# Patient Record
Sex: FEMALE | Race: WHITE | NOT HISPANIC OR LATINO | Employment: OTHER | URBAN - METROPOLITAN AREA
[De-identification: names, ages, dates, MRNs, and addresses within clinical notes are randomized per-mention and may not be internally consistent; named-entity substitution may affect disease eponyms.]

---

## 2021-01-01 ENCOUNTER — TELEPHONE (OUTPATIENT)
Dept: GERIATRIC MEDICINE | Facility: CLINIC | Age: 76
End: 2021-01-01

## 2021-01-01 ENCOUNTER — PATIENT OUTREACH (OUTPATIENT)
Dept: GERIATRIC MEDICINE | Facility: CLINIC | Age: 76
End: 2021-01-01
Payer: COMMERCIAL

## 2021-01-01 ENCOUNTER — LAB REQUISITION (OUTPATIENT)
Dept: LAB | Facility: CLINIC | Age: 76
End: 2021-01-01
Payer: MEDICARE

## 2021-01-01 ENCOUNTER — HOSPITAL ENCOUNTER (OUTPATIENT)
Dept: CARDIAC REHAB | Facility: CLINIC | Age: 76
End: 2021-12-21
Attending: INTERNAL MEDICINE
Payer: MEDICARE

## 2021-01-01 ENCOUNTER — LAB REQUISITION (OUTPATIENT)
Dept: LAB | Facility: CLINIC | Age: 76
End: 2021-01-01
Payer: COMMERCIAL

## 2021-01-01 ENCOUNTER — HOSPITAL ENCOUNTER (OUTPATIENT)
Dept: CARDIAC REHAB | Facility: CLINIC | Age: 76
End: 2021-12-09
Attending: INTERNAL MEDICINE
Payer: MEDICARE

## 2021-01-01 ENCOUNTER — DOCUMENTATION ONLY (OUTPATIENT)
Dept: GERIATRICS | Facility: CLINIC | Age: 76
End: 2021-01-01

## 2021-01-01 ENCOUNTER — HOSPITAL ENCOUNTER (OUTPATIENT)
Dept: CARDIOLOGY | Facility: CLINIC | Age: 76
Discharge: HOME | End: 2021-12-07
Attending: INTERNAL MEDICINE | Admitting: INTERNAL MEDICINE
Payer: MEDICARE

## 2021-01-01 ENCOUNTER — HEALTH MAINTENANCE LETTER (OUTPATIENT)
Age: 76
End: 2021-01-01

## 2021-01-01 ENCOUNTER — HOSPITAL ENCOUNTER (OUTPATIENT)
Dept: CARDIAC REHAB | Facility: CLINIC | Age: 76
End: 2021-11-29
Attending: INTERNAL MEDICINE
Payer: MEDICARE

## 2021-01-01 ENCOUNTER — TELEPHONE (OUTPATIENT)
Dept: GERIATRICS | Facility: CLINIC | Age: 76
End: 2021-01-01
Payer: COMMERCIAL

## 2021-01-01 ENCOUNTER — ASSISTED LIVING VISIT (OUTPATIENT)
Dept: GERIATRICS | Facility: CLINIC | Age: 76
End: 2021-01-01
Payer: MEDICARE

## 2021-01-01 ENCOUNTER — OFFICE VISIT (OUTPATIENT)
Dept: PHARMACY | Facility: CLINIC | Age: 76
End: 2021-01-01
Payer: COMMERCIAL

## 2021-01-01 ENCOUNTER — TELEPHONE (OUTPATIENT)
Dept: PULMONOLOGY | Facility: CLINIC | Age: 76
End: 2021-01-01
Payer: MEDICARE

## 2021-01-01 ENCOUNTER — HOSPITAL ENCOUNTER (OUTPATIENT)
Dept: CARDIAC REHAB | Facility: CLINIC | Age: 76
End: 2021-12-23
Attending: INTERNAL MEDICINE
Payer: MEDICARE

## 2021-01-01 ENCOUNTER — HOSPITAL ENCOUNTER (OUTPATIENT)
Dept: CARDIAC REHAB | Facility: CLINIC | Age: 76
End: 2021-12-30
Attending: INTERNAL MEDICINE
Payer: MEDICARE

## 2021-01-01 ENCOUNTER — TELEPHONE (OUTPATIENT)
Dept: SLEEP MEDICINE | Facility: CLINIC | Age: 76
End: 2021-01-01
Payer: COMMERCIAL

## 2021-01-01 ENCOUNTER — TELEPHONE (OUTPATIENT)
Dept: CARDIOLOGY | Facility: CLINIC | Age: 76
End: 2021-01-01

## 2021-01-01 ENCOUNTER — ANCILLARY PROCEDURE (OUTPATIENT)
Dept: CARDIOLOGY | Facility: CLINIC | Age: 76
End: 2021-01-01
Attending: INTERNAL MEDICINE
Payer: COMMERCIAL

## 2021-01-01 ENCOUNTER — OFFICE VISIT (OUTPATIENT)
Dept: CARDIOLOGY | Facility: CLINIC | Age: 76
End: 2021-01-01
Payer: MEDICARE

## 2021-01-01 ENCOUNTER — PATIENT OUTREACH (OUTPATIENT)
Dept: GERIATRIC MEDICINE | Facility: CLINIC | Age: 76
End: 2021-01-01

## 2021-01-01 ENCOUNTER — HOSPITAL ENCOUNTER (OUTPATIENT)
Dept: CARDIAC REHAB | Facility: CLINIC | Age: 76
End: 2021-12-28
Attending: INTERNAL MEDICINE
Payer: MEDICARE

## 2021-01-01 ENCOUNTER — HOSPITAL ENCOUNTER (OUTPATIENT)
Dept: CARDIAC REHAB | Facility: CLINIC | Age: 76
End: 2021-12-07
Attending: INTERNAL MEDICINE
Payer: MEDICARE

## 2021-01-01 ENCOUNTER — DOCUMENTATION ONLY (OUTPATIENT)
Dept: OTHER | Facility: CLINIC | Age: 76
End: 2021-01-01

## 2021-01-01 ENCOUNTER — MYC MEDICAL ADVICE (OUTPATIENT)
Dept: PULMONOLOGY | Facility: CLINIC | Age: 76
End: 2021-01-01
Payer: COMMERCIAL

## 2021-01-01 ENCOUNTER — OFFICE VISIT (OUTPATIENT)
Dept: CARDIOLOGY | Facility: CLINIC | Age: 76
End: 2021-01-01
Attending: INTERNAL MEDICINE
Payer: MEDICARE

## 2021-01-01 ENCOUNTER — TELEPHONE (OUTPATIENT)
Dept: GERIATRICS | Facility: CLINIC | Age: 76
End: 2021-01-01

## 2021-01-01 ENCOUNTER — HOSPITAL ENCOUNTER (OUTPATIENT)
Dept: CARDIAC REHAB | Facility: CLINIC | Age: 76
End: 2021-12-14
Attending: INTERNAL MEDICINE
Payer: MEDICARE

## 2021-01-01 ENCOUNTER — CARE COORDINATION (OUTPATIENT)
Dept: CARDIOLOGY | Facility: CLINIC | Age: 76
End: 2021-01-01

## 2021-01-01 ENCOUNTER — HOSPITAL ENCOUNTER (OUTPATIENT)
Dept: CARDIAC REHAB | Facility: CLINIC | Age: 76
End: 2021-12-16
Attending: INTERNAL MEDICINE
Payer: MEDICARE

## 2021-01-01 ENCOUNTER — OFFICE VISIT (OUTPATIENT)
Dept: SLEEP MEDICINE | Facility: CLINIC | Age: 76
End: 2021-01-01
Attending: NURSE PRACTITIONER
Payer: MEDICARE

## 2021-01-01 ENCOUNTER — ASSISTED LIVING VISIT (OUTPATIENT)
Dept: GERIATRICS | Facility: CLINIC | Age: 76
End: 2021-01-01
Payer: COMMERCIAL

## 2021-01-01 ENCOUNTER — HOSPITAL ENCOUNTER (OUTPATIENT)
Dept: CARDIAC REHAB | Facility: CLINIC | Age: 76
End: 2021-11-23
Attending: INTERNAL MEDICINE
Payer: MEDICARE

## 2021-01-01 ENCOUNTER — DOCUMENTATION ONLY (OUTPATIENT)
Dept: GERIATRICS | Facility: CLINIC | Age: 76
End: 2021-01-01
Payer: MEDICARE

## 2021-01-01 VITALS
SYSTOLIC BLOOD PRESSURE: 146 MMHG | HEART RATE: 89 BPM | OXYGEN SATURATION: 100 % | DIASTOLIC BLOOD PRESSURE: 74 MMHG | HEIGHT: 64 IN | BODY MASS INDEX: 22.2 KG/M2 | WEIGHT: 130 LBS

## 2021-01-01 VITALS
WEIGHT: 132.8 LBS | HEART RATE: 84 BPM | SYSTOLIC BLOOD PRESSURE: 132 MMHG | HEIGHT: 65 IN | BODY MASS INDEX: 22.13 KG/M2 | DIASTOLIC BLOOD PRESSURE: 70 MMHG

## 2021-01-01 VITALS
WEIGHT: 120.8 LBS | RESPIRATION RATE: 18 BRPM | HEIGHT: 64 IN | TEMPERATURE: 97.2 F | HEART RATE: 87 BPM | BODY MASS INDEX: 20.79 KG/M2 | OXYGEN SATURATION: 99 % | BODY MASS INDEX: 20.62 KG/M2 | OXYGEN SATURATION: 94 % | RESPIRATION RATE: 22 BRPM | SYSTOLIC BLOOD PRESSURE: 120 MMHG | DIASTOLIC BLOOD PRESSURE: 62 MMHG | SYSTOLIC BLOOD PRESSURE: 146 MMHG | HEART RATE: 74 BPM | HEIGHT: 64 IN | DIASTOLIC BLOOD PRESSURE: 72 MMHG | WEIGHT: 121.8 LBS | TEMPERATURE: 96.8 F

## 2021-01-01 VITALS
SYSTOLIC BLOOD PRESSURE: 113 MMHG | HEIGHT: 64 IN | OXYGEN SATURATION: 97 % | TEMPERATURE: 98.3 F | DIASTOLIC BLOOD PRESSURE: 77 MMHG | WEIGHT: 121 LBS | HEART RATE: 79 BPM | RESPIRATION RATE: 16 BRPM | BODY MASS INDEX: 20.66 KG/M2

## 2021-01-01 VITALS
SYSTOLIC BLOOD PRESSURE: 100 MMHG | HEART RATE: 86 BPM | OXYGEN SATURATION: 99 % | TEMPERATURE: 98 F | WEIGHT: 120.8 LBS | DIASTOLIC BLOOD PRESSURE: 62 MMHG | HEIGHT: 64 IN | BODY MASS INDEX: 20.62 KG/M2 | RESPIRATION RATE: 26 BRPM

## 2021-01-01 VITALS
HEIGHT: 64 IN | BODY MASS INDEX: 20.9 KG/M2 | DIASTOLIC BLOOD PRESSURE: 80 MMHG | RESPIRATION RATE: 16 BRPM | HEART RATE: 59 BPM | TEMPERATURE: 97.5 F | WEIGHT: 122.4 LBS | SYSTOLIC BLOOD PRESSURE: 156 MMHG | OXYGEN SATURATION: 97 %

## 2021-01-01 VITALS
DIASTOLIC BLOOD PRESSURE: 69 MMHG | BODY MASS INDEX: 20.49 KG/M2 | SYSTOLIC BLOOD PRESSURE: 140 MMHG | HEART RATE: 79 BPM | WEIGHT: 120 LBS | OXYGEN SATURATION: 99 % | HEIGHT: 64 IN

## 2021-01-01 VITALS
TEMPERATURE: 97.3 F | OXYGEN SATURATION: 100 % | HEART RATE: 87 BPM | WEIGHT: 118 LBS | SYSTOLIC BLOOD PRESSURE: 99 MMHG | RESPIRATION RATE: 18 BRPM | HEIGHT: 64 IN | BODY MASS INDEX: 20.14 KG/M2 | DIASTOLIC BLOOD PRESSURE: 71 MMHG

## 2021-01-01 VITALS
SYSTOLIC BLOOD PRESSURE: 135 MMHG | HEIGHT: 64 IN | DIASTOLIC BLOOD PRESSURE: 69 MMHG | TEMPERATURE: 97.3 F | HEART RATE: 88 BPM | WEIGHT: 130 LBS | RESPIRATION RATE: 23 BRPM | BODY MASS INDEX: 22.2 KG/M2 | OXYGEN SATURATION: 95 %

## 2021-01-01 DIAGNOSIS — F41.9 ANXIETY AND DEPRESSION: ICD-10-CM

## 2021-01-01 DIAGNOSIS — J44.9 COPD MIXED TYPE (H): ICD-10-CM

## 2021-01-01 DIAGNOSIS — L29.9 PRURITIC DISORDER: ICD-10-CM

## 2021-01-01 DIAGNOSIS — Z87.891 FORMER HEAVY TOBACCO SMOKER: ICD-10-CM

## 2021-01-01 DIAGNOSIS — Z95.0 CARDIAC PACEMAKER IN SITU: ICD-10-CM

## 2021-01-01 DIAGNOSIS — I48.20 CHRONIC ATRIAL FIBRILLATION (H): ICD-10-CM

## 2021-01-01 DIAGNOSIS — I27.81 COR PULMONALE (CHRONIC) (H): ICD-10-CM

## 2021-01-01 DIAGNOSIS — M10.9 GOUT, UNSPECIFIED: ICD-10-CM

## 2021-01-01 DIAGNOSIS — Z99.2 ANEMIA IN CHRONIC KIDNEY DISEASE, ON CHRONIC DIALYSIS (H): ICD-10-CM

## 2021-01-01 DIAGNOSIS — I07.1 SEVERE TRICUSPID VALVE REGURGITATION: ICD-10-CM

## 2021-01-01 DIAGNOSIS — M1A.9XX0 CHRONIC GOUT WITHOUT TOPHUS, UNSPECIFIED CAUSE, UNSPECIFIED SITE: ICD-10-CM

## 2021-01-01 DIAGNOSIS — J44.9 COPD, SEVERE (H): Primary | ICD-10-CM

## 2021-01-01 DIAGNOSIS — I73.9 PAD (PERIPHERAL ARTERY DISEASE) (H): ICD-10-CM

## 2021-01-01 DIAGNOSIS — R52 PAIN: ICD-10-CM

## 2021-01-01 DIAGNOSIS — Z99.2 ESRD (END STAGE RENAL DISEASE) ON DIALYSIS (H): ICD-10-CM

## 2021-01-01 DIAGNOSIS — L29.9 ITCHING: ICD-10-CM

## 2021-01-01 DIAGNOSIS — E78.5 HYPERLIPIDEMIA, UNSPECIFIED HYPERLIPIDEMIA TYPE: ICD-10-CM

## 2021-01-01 DIAGNOSIS — I10 ESSENTIAL HYPERTENSION, BENIGN: ICD-10-CM

## 2021-01-01 DIAGNOSIS — G47.33 OSA (OBSTRUCTIVE SLEEP APNEA): Primary | ICD-10-CM

## 2021-01-01 DIAGNOSIS — D63.1 ANEMIA IN CHRONIC KIDNEY DISEASE, ON CHRONIC DIALYSIS (H): ICD-10-CM

## 2021-01-01 DIAGNOSIS — R54 FRAIL ELDERLY: ICD-10-CM

## 2021-01-01 DIAGNOSIS — E87.1 HYPONATREMIA: ICD-10-CM

## 2021-01-01 DIAGNOSIS — E78.5 HYPERLIPIDEMIA LDL GOAL <100: ICD-10-CM

## 2021-01-01 DIAGNOSIS — R26.9 ABNORMAL GAIT: ICD-10-CM

## 2021-01-01 DIAGNOSIS — I50.32 CHRONIC HEART FAILURE WITH PRESERVED EJECTION FRACTION (HFPEF) (H): ICD-10-CM

## 2021-01-01 DIAGNOSIS — R41.89 COGNITIVE IMPAIRMENT: ICD-10-CM

## 2021-01-01 DIAGNOSIS — Z87.39 HX OF GOUT: ICD-10-CM

## 2021-01-01 DIAGNOSIS — R54 FRAILTY: ICD-10-CM

## 2021-01-01 DIAGNOSIS — H35.30 ARMD (AGE RELATED MACULAR DEGENERATION): ICD-10-CM

## 2021-01-01 DIAGNOSIS — Z99.2 ESRD (END STAGE RENAL DISEASE) ON DIALYSIS (H): Primary | ICD-10-CM

## 2021-01-01 DIAGNOSIS — I27.81 COR PULMONALE (CHRONIC) (H): Primary | ICD-10-CM

## 2021-01-01 DIAGNOSIS — D64.9 ANEMIA, UNSPECIFIED: ICD-10-CM

## 2021-01-01 DIAGNOSIS — I07.1 TRICUSPID VALVE REGURGITATION: Primary | ICD-10-CM

## 2021-01-01 DIAGNOSIS — G47.00 INSOMNIA, UNSPECIFIED TYPE: ICD-10-CM

## 2021-01-01 DIAGNOSIS — R25.1 TREMOR: ICD-10-CM

## 2021-01-01 DIAGNOSIS — T14.8XXA HEMATOMA OF SKIN: Primary | ICD-10-CM

## 2021-01-01 DIAGNOSIS — N18.6 END STAGE RENAL DISEASE (H): ICD-10-CM

## 2021-01-01 DIAGNOSIS — F32.A DEPRESSION, UNSPECIFIED DEPRESSION TYPE: ICD-10-CM

## 2021-01-01 DIAGNOSIS — I49.5 SICK SINUS SYNDROME (H): ICD-10-CM

## 2021-01-01 DIAGNOSIS — N18.6 ANEMIA IN CHRONIC KIDNEY DISEASE, ON CHRONIC DIALYSIS (H): ICD-10-CM

## 2021-01-01 DIAGNOSIS — Z00.00 PREVENTATIVE HEALTH CARE: ICD-10-CM

## 2021-01-01 DIAGNOSIS — Z11.59 ENCOUNTER FOR SCREENING FOR OTHER VIRAL DISEASES: ICD-10-CM

## 2021-01-01 DIAGNOSIS — I27.20 PULMONARY HYPERTENSION (H): ICD-10-CM

## 2021-01-01 DIAGNOSIS — I50.42 CHRONIC COMBINED SYSTOLIC AND DIASTOLIC HF (HEART FAILURE) (H): ICD-10-CM

## 2021-01-01 DIAGNOSIS — Z95.0 PACEMAKER: ICD-10-CM

## 2021-01-01 DIAGNOSIS — I50.42 CHRONIC COMBINED SYSTOLIC (CONGESTIVE) AND DIASTOLIC (CONGESTIVE) HEART FAILURE (H): ICD-10-CM

## 2021-01-01 DIAGNOSIS — F32.A ANXIETY AND DEPRESSION: ICD-10-CM

## 2021-01-01 DIAGNOSIS — G47.33 OSA (OBSTRUCTIVE SLEEP APNEA): ICD-10-CM

## 2021-01-01 DIAGNOSIS — N18.6 ESRD (END STAGE RENAL DISEASE) ON DIALYSIS (H): ICD-10-CM

## 2021-01-01 DIAGNOSIS — F41.9 ANXIETY: ICD-10-CM

## 2021-01-01 DIAGNOSIS — N18.6 ESRD (END STAGE RENAL DISEASE) (H): ICD-10-CM

## 2021-01-01 DIAGNOSIS — I36.1 NONRHEUMATIC TRICUSPID VALVE REGURGITATION: ICD-10-CM

## 2021-01-01 DIAGNOSIS — Z71.89 ACP (ADVANCE CARE PLANNING): ICD-10-CM

## 2021-01-01 DIAGNOSIS — N18.6 ESRD (END STAGE RENAL DISEASE) ON DIALYSIS (H): Primary | ICD-10-CM

## 2021-01-01 DIAGNOSIS — I27.20 PULMONARY HTN (H): ICD-10-CM

## 2021-01-01 DIAGNOSIS — K21.9 GERD (GASTROESOPHAGEAL REFLUX DISEASE): ICD-10-CM

## 2021-01-01 DIAGNOSIS — I48.91 ATRIAL FIBRILLATION, UNSPECIFIED TYPE (H): ICD-10-CM

## 2021-01-01 DIAGNOSIS — J44.9 COPD MIXED TYPE (H): Primary | ICD-10-CM

## 2021-01-01 DIAGNOSIS — K21.9 GASTROESOPHAGEAL REFLUX DISEASE WITHOUT ESOPHAGITIS: ICD-10-CM

## 2021-01-01 DIAGNOSIS — J44.9 COPD, SEVERE (H): ICD-10-CM

## 2021-01-01 DIAGNOSIS — I50.42 CHRONIC COMBINED SYSTOLIC AND DIASTOLIC HEART FAILURE (H): ICD-10-CM

## 2021-01-01 DIAGNOSIS — Z12.31 ENCOUNTER FOR SCREENING MAMMOGRAM FOR BREAST CANCER: ICD-10-CM

## 2021-01-01 DIAGNOSIS — I73.9 PAD (PERIPHERAL ARTERY DISEASE) (H): Primary | ICD-10-CM

## 2021-01-01 DIAGNOSIS — J44.9 CHRONIC OBSTRUCTIVE PULMONARY DISEASE, UNSPECIFIED COPD TYPE (H): ICD-10-CM

## 2021-01-01 DIAGNOSIS — I07.1 TRICUSPID VALVE INSUFFICIENCY, UNSPECIFIED ETIOLOGY: ICD-10-CM

## 2021-01-01 DIAGNOSIS — D63.8 ANEMIA IN OTHER CHRONIC DISEASES CLASSIFIED ELSEWHERE: ICD-10-CM

## 2021-01-01 DIAGNOSIS — E87.1 HYPO-OSMOLALITY AND HYPONATREMIA: ICD-10-CM

## 2021-01-01 DIAGNOSIS — J34.89 NOSE DRYNESS: ICD-10-CM

## 2021-01-01 DIAGNOSIS — I07.1 SEVERE TRICUSPID VALVE REGURGITATION: Primary | ICD-10-CM

## 2021-01-01 DIAGNOSIS — E78.5 HYPERLIPIDEMIA: ICD-10-CM

## 2021-01-01 DIAGNOSIS — J30.2 SEASONAL ALLERGIC RHINITIS, UNSPECIFIED TRIGGER: ICD-10-CM

## 2021-01-01 DIAGNOSIS — I10 HTN (HYPERTENSION): ICD-10-CM

## 2021-01-01 DIAGNOSIS — R32 URINARY INCONTINENCE, UNSPECIFIED TYPE: Primary | ICD-10-CM

## 2021-01-01 DIAGNOSIS — H35.30 MACULAR DEGENERATION, UNSPECIFIED LATERALITY, UNSPECIFIED TYPE: ICD-10-CM

## 2021-01-01 DIAGNOSIS — I10 HYPERTENSION, UNSPECIFIED TYPE: ICD-10-CM

## 2021-01-01 DIAGNOSIS — L98.9 SKIN LESION: ICD-10-CM

## 2021-01-01 DIAGNOSIS — Z99.2 DEPENDENCE ON HEMODIALYSIS (H): ICD-10-CM

## 2021-01-01 DIAGNOSIS — M1A.9XX0 CHRONIC GOUT, UNSPECIFIED, WITHOUT TOPHUS (TOPHI): ICD-10-CM

## 2021-01-01 DIAGNOSIS — I07.1 TRICUSPID VALVE INSUFFICIENCY, UNSPECIFIED ETIOLOGY: Primary | ICD-10-CM

## 2021-01-01 DIAGNOSIS — N17.9 ACUTE RENAL FAILURE, UNSPECIFIED ACUTE RENAL FAILURE TYPE (H): Primary | ICD-10-CM

## 2021-01-01 DIAGNOSIS — Z95.0 CARDIAC PACEMAKER IN SITU: Primary | ICD-10-CM

## 2021-01-01 DIAGNOSIS — M1A.9XX0 CHRONIC GOUT WITHOUT TOPHUS, UNSPECIFIED CAUSE, UNSPECIFIED SITE: Primary | ICD-10-CM

## 2021-01-01 DIAGNOSIS — N18.6 END STAGE RENAL DISEASE (H): Primary | ICD-10-CM

## 2021-01-01 DIAGNOSIS — Z87.891 PERSONAL HISTORY OF TOBACCO USE: ICD-10-CM

## 2021-01-01 DIAGNOSIS — R60.0 LOWER EXTREMITY EDEMA: Primary | ICD-10-CM

## 2021-01-01 DIAGNOSIS — R68.2 DRY MOUTH: ICD-10-CM

## 2021-01-01 LAB
ALBUMIN SERPL-MCNC: 3.4 G/DL (ref 3.4–5)
ALP SERPL-CCNC: 122 U/L (ref 40–150)
ALT SERPL W P-5'-P-CCNC: 19 U/L (ref 0–50)
ANION GAP SERPL CALCULATED.3IONS-SCNC: 13 MMOL/L (ref 3–14)
ANION GAP SERPL CALCULATED.3IONS-SCNC: 8 MMOL/L (ref 3–14)
ANION GAP SERPL CALCULATED.3IONS-SCNC: 9 MMOL/L (ref 3–14)
AST SERPL W P-5'-P-CCNC: 24 U/L (ref 0–45)
BILIRUB SERPL-MCNC: 0.7 MG/DL (ref 0.2–1.3)
BUN SERPL-MCNC: 35 MG/DL (ref 7–30)
BUN SERPL-MCNC: 41 MG/DL (ref 7–30)
BUN SERPL-MCNC: 45 MG/DL (ref 7–30)
CALCIUM SERPL-MCNC: 10.3 MG/DL (ref 8.5–10.1)
CALCIUM SERPL-MCNC: 10.4 MG/DL (ref 8.5–10.1)
CALCIUM SERPL-MCNC: 9.9 MG/DL (ref 8.5–10.1)
CHLORIDE BLD-SCNC: 90 MMOL/L (ref 94–109)
CHLORIDE BLD-SCNC: 91 MMOL/L (ref 94–109)
CHLORIDE BLD-SCNC: 92 MMOL/L (ref 94–109)
CO2 SERPL-SCNC: 25 MMOL/L (ref 20–32)
CO2 SERPL-SCNC: 26 MMOL/L (ref 20–32)
CO2 SERPL-SCNC: 30 MMOL/L (ref 20–32)
CREAT SERPL-MCNC: 2.79 MG/DL (ref 0.52–1.04)
CREAT SERPL-MCNC: 3.32 MG/DL (ref 0.52–1.04)
CREAT SERPL-MCNC: 3.97 MG/DL (ref 0.52–1.04)
ERYTHROCYTE [DISTWIDTH] IN BLOOD BY AUTOMATED COUNT: 14 % (ref 10–15)
GFR SERPL CREATININE-BSD FRML MDRD: 10 ML/MIN/1.73M2
GFR SERPL CREATININE-BSD FRML MDRD: 13 ML/MIN/1.73M2
GFR SERPL CREATININE-BSD FRML MDRD: 16 ML/MIN/1.73M2
GLUCOSE BLD-MCNC: 82 MG/DL (ref 70–99)
GLUCOSE BLD-MCNC: 96 MG/DL (ref 70–99)
GLUCOSE BLD-MCNC: 96 MG/DL (ref 70–99)
HCT VFR BLD AUTO: 40.1 % (ref 35–47)
HGB BLD-MCNC: 13 G/DL (ref 11.7–15.7)
LVEF ECHO: NORMAL
MCH RBC QN AUTO: 33.5 PG (ref 26.5–33)
MCHC RBC AUTO-ENTMCNC: 32.4 G/DL (ref 31.5–36.5)
MCV RBC AUTO: 103 FL (ref 78–100)
MDC_IDC_EPISODE_DTM: NORMAL
MDC_IDC_EPISODE_DURATION: 0 S
MDC_IDC_EPISODE_DURATION: 1 S
MDC_IDC_EPISODE_DURATION: 2 S
MDC_IDC_EPISODE_ID: 2
MDC_IDC_EPISODE_ID: 3
MDC_IDC_EPISODE_ID: 4
MDC_IDC_EPISODE_ID: 5
MDC_IDC_EPISODE_ID: 6
MDC_IDC_EPISODE_ID: 7
MDC_IDC_EPISODE_TYPE: NORMAL
MDC_IDC_LEAD_IMPLANT_DT: NORMAL
MDC_IDC_LEAD_LOCATION: NORMAL
MDC_IDC_LEAD_LOCATION_DETAIL_1: NORMAL
MDC_IDC_LEAD_MFG: NORMAL
MDC_IDC_LEAD_MODEL: NORMAL
MDC_IDC_LEAD_POLARITY_TYPE: NORMAL
MDC_IDC_MSMT_BATTERY_DTM: NORMAL
MDC_IDC_MSMT_BATTERY_REMAINING_LONGEVITY: 163 MO
MDC_IDC_MSMT_BATTERY_RRT_TRIGGER: 2.62
MDC_IDC_MSMT_BATTERY_STATUS: NORMAL
MDC_IDC_MSMT_BATTERY_VOLTAGE: 3.2 V
MDC_IDC_MSMT_LEADCHNL_RV_IMPEDANCE_VALUE: 437 OHM
MDC_IDC_MSMT_LEADCHNL_RV_IMPEDANCE_VALUE: 589 OHM
MDC_IDC_MSMT_LEADCHNL_RV_PACING_THRESHOLD_AMPLITUDE: 0.75 V
MDC_IDC_MSMT_LEADCHNL_RV_PACING_THRESHOLD_PULSEWIDTH: 0.4 MS
MDC_IDC_MSMT_LEADCHNL_RV_SENSING_INTR_AMPL: 6.62 MV
MDC_IDC_MSMT_LEADCHNL_RV_SENSING_INTR_AMPL: 8.62 MV
MDC_IDC_PG_IMPLANT_DTM: NORMAL
MDC_IDC_PG_MFG: NORMAL
MDC_IDC_PG_MODEL: NORMAL
MDC_IDC_PG_SERIAL: NORMAL
MDC_IDC_PG_TYPE: NORMAL
MDC_IDC_SESS_CLINIC_NAME: NORMAL
MDC_IDC_SESS_DTM: NORMAL
MDC_IDC_SESS_TYPE: NORMAL
MDC_IDC_SET_BRADY_HYSTRATE: NORMAL
MDC_IDC_SET_BRADY_LOWRATE: 70 {BEATS}/MIN
MDC_IDC_SET_BRADY_MAX_SENSOR_RATE: 120 {BEATS}/MIN
MDC_IDC_SET_BRADY_MODE: NORMAL
MDC_IDC_SET_LEADCHNL_RV_PACING_AMPLITUDE: 2 V
MDC_IDC_SET_LEADCHNL_RV_PACING_ANODE_ELECTRODE_1: NORMAL
MDC_IDC_SET_LEADCHNL_RV_PACING_ANODE_LOCATION_1: NORMAL
MDC_IDC_SET_LEADCHNL_RV_PACING_CAPTURE_MODE: NORMAL
MDC_IDC_SET_LEADCHNL_RV_PACING_CATHODE_ELECTRODE_1: NORMAL
MDC_IDC_SET_LEADCHNL_RV_PACING_CATHODE_LOCATION_1: NORMAL
MDC_IDC_SET_LEADCHNL_RV_PACING_POLARITY: NORMAL
MDC_IDC_SET_LEADCHNL_RV_PACING_PULSEWIDTH: 0.4 MS
MDC_IDC_SET_LEADCHNL_RV_SENSING_ANODE_ELECTRODE_1: NORMAL
MDC_IDC_SET_LEADCHNL_RV_SENSING_ANODE_LOCATION_1: NORMAL
MDC_IDC_SET_LEADCHNL_RV_SENSING_CATHODE_ELECTRODE_1: NORMAL
MDC_IDC_SET_LEADCHNL_RV_SENSING_CATHODE_LOCATION_1: NORMAL
MDC_IDC_SET_LEADCHNL_RV_SENSING_POLARITY: NORMAL
MDC_IDC_SET_LEADCHNL_RV_SENSING_SENSITIVITY: 0.9 MV
MDC_IDC_SET_ZONE_DETECTION_INTERVAL: 400 MS
MDC_IDC_SET_ZONE_TYPE: NORMAL
MDC_IDC_STAT_BRADY_DTM_END: NORMAL
MDC_IDC_STAT_BRADY_DTM_START: NORMAL
MDC_IDC_STAT_BRADY_RV_PERCENT_PACED: 84.49 %
MDC_IDC_STAT_EPISODE_RECENT_COUNT: 0
MDC_IDC_STAT_EPISODE_RECENT_COUNT: 6
MDC_IDC_STAT_EPISODE_RECENT_COUNT_DTM_END: NORMAL
MDC_IDC_STAT_EPISODE_RECENT_COUNT_DTM_END: NORMAL
MDC_IDC_STAT_EPISODE_RECENT_COUNT_DTM_START: NORMAL
MDC_IDC_STAT_EPISODE_RECENT_COUNT_DTM_START: NORMAL
MDC_IDC_STAT_EPISODE_TOTAL_COUNT: 0
MDC_IDC_STAT_EPISODE_TOTAL_COUNT: 7
MDC_IDC_STAT_EPISODE_TOTAL_COUNT_DTM_END: NORMAL
MDC_IDC_STAT_EPISODE_TOTAL_COUNT_DTM_END: NORMAL
MDC_IDC_STAT_EPISODE_TOTAL_COUNT_DTM_START: NORMAL
MDC_IDC_STAT_EPISODE_TOTAL_COUNT_DTM_START: NORMAL
MDC_IDC_STAT_EPISODE_TYPE: NORMAL
PLATELET # BLD AUTO: 201 10E3/UL (ref 150–450)
POTASSIUM BLD-SCNC: 3.4 MMOL/L (ref 3.4–5.3)
POTASSIUM BLD-SCNC: 4.3 MMOL/L (ref 3.4–5.3)
POTASSIUM BLD-SCNC: 4.5 MMOL/L (ref 3.4–5.3)
PROT SERPL-MCNC: 8.6 G/DL (ref 6.8–8.8)
RBC # BLD AUTO: 3.88 10E6/UL (ref 3.8–5.2)
SARS-COV-2 RNA RESP QL NAA+PROBE: NEGATIVE
SARS-COV-2 RNA RESP QL NAA+PROBE: NOT DETECTED
SODIUM SERPL-SCNC: 126 MMOL/L (ref 133–144)
SODIUM SERPL-SCNC: 128 MMOL/L (ref 133–144)
SODIUM SERPL-SCNC: 130 MMOL/L (ref 133–144)
URATE SERPL-MCNC: 3.5 MG/DL (ref 2.6–6)
URATE SERPL-MCNC: 3.7 MG/DL (ref 2.6–6)
WBC # BLD AUTO: 6 10E3/UL (ref 4–11)

## 2021-01-01 PROCEDURE — G0239 OTH RESP PROC, GROUP: HCPCS

## 2021-01-01 PROCEDURE — 93306 TTE W/DOPPLER COMPLETE: CPT

## 2021-01-01 PROCEDURE — 36415 COLL VENOUS BLD VENIPUNCTURE: CPT | Mod: ORL | Performed by: NURSE PRACTITIONER

## 2021-01-01 PROCEDURE — U0003 INFECTIOUS AGENT DETECTION BY NUCLEIC ACID (DNA OR RNA); SEVERE ACUTE RESPIRATORY SYNDROME CORONAVIRUS 2 (SARS-COV-2) (CORONAVIRUS DISEASE [COVID-19]), AMPLIFIED PROBE TECHNIQUE, MAKING USE OF HIGH THROUGHPUT TECHNOLOGIES AS DESCRIBED BY CMS-2020-01-R: HCPCS | Mod: ORL | Performed by: FAMILY MEDICINE

## 2021-01-01 PROCEDURE — P9603 ONE-WAY ALLOW PRORATED MILES: HCPCS | Mod: ORL | Performed by: NURSE PRACTITIONER

## 2021-01-01 PROCEDURE — 85027 COMPLETE CBC AUTOMATED: CPT | Mod: ORL | Performed by: NURSE PRACTITIONER

## 2021-01-01 PROCEDURE — 84550 ASSAY OF BLOOD/URIC ACID: CPT | Mod: ORL | Performed by: NURSE PRACTITIONER

## 2021-01-01 PROCEDURE — 99607 MTMS BY PHARM ADDL 15 MIN: CPT | Performed by: PHARMACIST

## 2021-01-01 PROCEDURE — G0238 OTH RESP PROC, INDIV: HCPCS

## 2021-01-01 PROCEDURE — 99605 MTMS BY PHARM NP 15 MIN: CPT | Performed by: PHARMACIST

## 2021-01-01 PROCEDURE — 99203 OFFICE O/P NEW LOW 30 MIN: CPT | Performed by: SURGERY

## 2021-01-01 PROCEDURE — P9604 ONE-WAY ALLOW PRORATED TRIP: HCPCS | Mod: ORL | Performed by: NURSE PRACTITIONER

## 2021-01-01 PROCEDURE — 93279 PRGRMG DEV EVAL PM/LDLS PM: CPT | Performed by: INTERNAL MEDICINE

## 2021-01-01 PROCEDURE — 93000 ELECTROCARDIOGRAM COMPLETE: CPT | Performed by: INTERNAL MEDICINE

## 2021-01-01 PROCEDURE — 80048 BASIC METABOLIC PNL TOTAL CA: CPT | Mod: ORL | Performed by: NURSE PRACTITIONER

## 2021-01-01 PROCEDURE — U0005 INFEC AGEN DETEC AMPLI PROBE: HCPCS | Mod: ORL | Performed by: NURSE PRACTITIONER

## 2021-01-01 PROCEDURE — 99205 OFFICE O/P NEW HI 60 MIN: CPT | Performed by: INTERNAL MEDICINE

## 2021-01-01 PROCEDURE — G0239 OTH RESP PROC, GROUP: HCPCS | Performed by: CLINICAL EXERCISE PHYSIOLOGIST

## 2021-01-01 PROCEDURE — 80053 COMPREHEN METABOLIC PANEL: CPT | Mod: ORL | Performed by: NURSE PRACTITIONER

## 2021-01-01 PROCEDURE — 99204 OFFICE O/P NEW MOD 45 MIN: CPT | Performed by: INTERNAL MEDICINE

## 2021-01-01 PROCEDURE — 93306 TTE W/DOPPLER COMPLETE: CPT | Mod: 26 | Performed by: INTERNAL MEDICINE

## 2021-01-01 PROCEDURE — U0005 INFEC AGEN DETEC AMPLI PROBE: HCPCS | Mod: ORL | Performed by: FAMILY MEDICINE

## 2021-01-01 RX ORDER — SILDENAFIL CITRATE 20 MG/1
20 TABLET ORAL 3 TIMES DAILY
COMMUNITY
Start: 2021-01-01 | End: 2021-01-01

## 2021-01-01 RX ORDER — ATORVASTATIN CALCIUM 40 MG/1
40 TABLET, FILM COATED ORAL DAILY
Qty: 30 TABLET | Refills: 11 | Status: SHIPPED | OUTPATIENT
Start: 2021-01-01 | End: 2022-05-27

## 2021-01-01 RX ORDER — ANTIOX #8/OM3/DHA/EPA/LUT/ZEAX 250-2.5 MG
2 CAPSULE ORAL 2 TIMES DAILY
COMMUNITY
End: 2021-01-01

## 2021-01-01 RX ORDER — TORSEMIDE 100 MG/1
50 TABLET ORAL 2 TIMES DAILY
COMMUNITY
Start: 2021-01-01 | End: 2021-01-01

## 2021-01-01 RX ORDER — ALBUTEROL SULFATE 90 UG/1
2 AEROSOL, METERED RESPIRATORY (INHALATION) EVERY 4 HOURS PRN
COMMUNITY
Start: 2021-01-01

## 2021-01-01 RX ORDER — CITALOPRAM HYDROBROMIDE 10 MG/1
5 TABLET ORAL DAILY
COMMUNITY
Start: 2021-01-01 | End: 2021-01-01

## 2021-01-01 RX ORDER — ATORVASTATIN CALCIUM 40 MG/1
40 TABLET, FILM COATED ORAL DAILY
COMMUNITY
Start: 2021-01-01 | End: 2021-01-01

## 2021-01-01 RX ORDER — CITALOPRAM HYDROBROMIDE 10 MG/1
5 TABLET ORAL DAILY
Qty: 15 TABLET | Refills: 11 | Status: SHIPPED | OUTPATIENT
Start: 2021-01-01 | End: 2021-01-01

## 2021-01-01 RX ORDER — SILDENAFIL CITRATE 20 MG/1
20 TABLET ORAL 3 TIMES DAILY
Qty: 90 TABLET | Refills: 11 | Status: SHIPPED | OUTPATIENT
Start: 2021-01-01 | End: 2021-01-01

## 2021-01-01 RX ORDER — LORATADINE 10 MG/1
10 TABLET ORAL AT BEDTIME
Qty: 30 TABLET | Refills: 98 | Status: SHIPPED | OUTPATIENT
Start: 2021-01-01 | End: 2022-05-27

## 2021-01-01 RX ORDER — PANTOPRAZOLE SODIUM 40 MG/1
40 TABLET, DELAYED RELEASE ORAL DAILY
Qty: 30 TABLET | Refills: 11 | Status: SHIPPED | OUTPATIENT
Start: 2021-01-01 | End: 2022-05-27

## 2021-01-01 RX ORDER — PHENOL 1.4 %
10 AEROSOL, SPRAY (ML) MUCOUS MEMBRANE AT BEDTIME
COMMUNITY
Start: 2021-01-01 | End: 2022-05-27

## 2021-01-01 RX ORDER — ALLOPURINOL 100 MG/1
100 TABLET ORAL DAILY
Qty: 30 TABLET | Refills: 11 | Status: SHIPPED | OUTPATIENT
Start: 2021-01-01 | End: 2021-01-01

## 2021-01-01 RX ORDER — ACETAMINOPHEN 325 MG/1
325 TABLET ORAL EVERY 4 HOURS PRN
COMMUNITY
Start: 2021-01-01 | End: 2021-01-01

## 2021-01-01 RX ORDER — VIT B COMP NO.3/FOLIC/C/BIOTIN 1 MG-60 MG
1 TABLET ORAL DAILY
COMMUNITY
End: 2022-01-01

## 2021-01-01 RX ORDER — TIOTROPIUM BROMIDE AND OLODATEROL 3.124; 2.736 UG/1; UG/1
2 SPRAY, METERED RESPIRATORY (INHALATION) DAILY
COMMUNITY
Start: 2021-01-01 | End: 2022-01-01

## 2021-01-01 RX ORDER — TORSEMIDE 20 MG/1
40 TABLET ORAL DAILY
Qty: 120 TABLET | Refills: 4 | Status: SHIPPED | OUTPATIENT
Start: 2021-01-01 | End: 2022-01-01

## 2021-01-01 RX ORDER — ALLOPURINOL 100 MG/1
100 TABLET ORAL DAILY
COMMUNITY
Start: 2021-01-01 | End: 2021-01-01

## 2021-01-01 RX ORDER — PANTOPRAZOLE SODIUM 40 MG/1
40 TABLET, DELAYED RELEASE ORAL DAILY
COMMUNITY
Start: 2021-01-01 | End: 2021-01-01

## 2021-01-01 RX ORDER — IPRATROPIUM BROMIDE AND ALBUTEROL SULFATE 2.5; .5 MG/3ML; MG/3ML
3 SOLUTION RESPIRATORY (INHALATION) EVERY 6 HOURS PRN
COMMUNITY
Start: 2021-01-01 | End: 2021-01-01

## 2021-01-01 RX ORDER — ALLOPURINOL 100 MG/1
100 TABLET ORAL
COMMUNITY
Start: 2021-01-01 | End: 2021-01-01

## 2021-01-01 RX ORDER — ALLOPURINOL 100 MG/1
TABLET ORAL
Qty: 12 TABLET | Refills: 97 | Status: SHIPPED | OUTPATIENT
Start: 2021-01-01 | End: 2022-05-27

## 2021-01-01 RX ORDER — ACETAMINOPHEN 325 MG/1
TABLET ORAL
Qty: 120 TABLET | Refills: 98 | Status: ON HOLD | OUTPATIENT
Start: 2021-01-01 | End: 2022-01-01

## 2021-01-01 RX ORDER — TORSEMIDE 100 MG/1
50 TABLET ORAL 2 TIMES DAILY
Qty: 30 TABLET | Refills: 11 | Status: SHIPPED | OUTPATIENT
Start: 2021-01-01 | End: 2021-01-01

## 2021-01-01 RX ORDER — AMBRISENTAN 5 MG/1
5 TABLET, FILM COATED ORAL DAILY
COMMUNITY
Start: 2021-01-01 | End: 2021-01-01

## 2021-01-01 ASSESSMENT — MIFFLIN-ST. JEOR
SCORE: 1020.24
SCORE: 1098.26
SCORE: 1032.48
SCORE: 1027.95
SCORE: 1069.68
SCORE: 1035.2
SCORE: 1069.68
SCORE: 1027.95
SCORE: 1024.32
SCORE: 1028.85

## 2021-09-09 NOTE — PROGRESS NOTES
[facility] Care Coordination Contact    Member became effective with  [facility] on 9/1/2021 with Nadya MSC+.  Previous Health Plan: [place]/Fee For Service  Previous Care System: [facility]  Previous care coordinators name and number: Salvador Miranda, [phone]  Waiver Type: EW  Last MMIS Entry: Date 8/20/21 and Type Doc Change  MMIS visit date (and type) if different from above: 8/11/21- EW Open, 7/21/21- Pers Assmt  Services Listed in MMIS:   A3 F [facility]-CSP 36 F CASE MGMT  UTF received: Yes: Received and saved to member file.    Maricruz Pizano  Care Management Specialist  [facility]  [phone]

## 2021-09-09 NOTE — LETTER
September 9, 2021    FRANCINE CHO  [facility]  [address] [facility] [address]    Dear  Francine,    Welcome to [facility] [place] [facility] Plus (MSC+) health program. My name is GAYLE Coleman. I am your MSC+ care coordinator. You are eligible for Care Coordination through [facility] MSC+ Product.    Here is how Care Coordination works:    I will meet with you in person to determine your care coordination needs    We will develop a plan of care to meet your needs    We will create a service plan showing the services you will receive    We will talk about and coordinate any preventive care needs you have    I will call you soon to see how you are doing and determine what needs you may have. Our goal is to keep you as healthy and independent as possible.     Soon, you will receive a new [facility] member identification (ID) card from [facility]. When you receive it, please use this card along with your [place] [facility] Programs card and Prescription Drug Coverage Program card. When you receive, it please use this card where you get your health services. If you have Medicare, you will need to show your Medicare card when you get health services.    Being in the [place] [facility] Plus (MSC+) Care Coordination program is voluntary and offered to you at no cost. If you ever wish to stop being in the Care Coordination program or have questions, call me at [phone]. If you reach my voice mail, leave a message and your phone number. If you are hearing impaired, please call the [place] Relay at 812 or [phone] (geoplh-yv-xowigd relay service).    Sincerely,      GAYLE Coleman    E-mail: [email or web address]  Phone: [phone]    Care Manager  [facility]    [id number] accepted   (12/2019)
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
## 2021-09-17 NOTE — TELEPHONE ENCOUNTER
DME supply(s) have been requested by the patient and her sister. Both patient and family report that patient is incontinent and requires the use of incontinent products. DME orders(s) have been queued up and pended for the provider to review. Patient reports the need for DME supplies due to incontinence. Once completed, please route the encounter to GAYLE Coleman to fax completed documentation and order requisition to [facility].  GAYLE Coleman  [facility]  [phone]

## 2021-09-20 PROBLEM — E83.52 HYPERCALCEMIA: Status: ACTIVE | Noted: 2018-07-16

## 2021-09-20 PROBLEM — D89.0 POLYCLONAL GAMMOPATHY: Status: ACTIVE | Noted: 2018-07-17

## 2021-09-20 PROBLEM — H35.3132 INTERMEDIATE STAGE NONEXUDATIVE AGE-RELATED MACULAR DEGENERATION OF BOTH EYES: Status: ACTIVE | Noted: 2021-01-13

## 2021-09-20 PROBLEM — N18.6 ANEMIA IN CHRONIC KIDNEY DISEASE, ON CHRONIC DIALYSIS (H): Status: ACTIVE | Noted: 2021-01-01

## 2021-09-20 PROBLEM — L30.9 DERMATITIS OF LOWER EXTREMITY: Status: ACTIVE | Noted: 2017-05-05

## 2021-09-20 PROBLEM — G47.33 OSA (OBSTRUCTIVE SLEEP APNEA): Status: ACTIVE | Noted: 2020-04-08

## 2021-09-20 PROBLEM — I47.29 NSVT (NONSUSTAINED VENTRICULAR TACHYCARDIA) (H): Status: ACTIVE | Noted: 2020-04-09

## 2021-09-20 PROBLEM — N17.9 ACUTE RENAL FAILURE (ARF) (H): Status: ACTIVE | Noted: 2021-01-01

## 2021-09-20 PROBLEM — N18.30 CHRONIC KIDNEY DISEASE, STAGE III (MODERATE) (H): Status: ACTIVE | Noted: 2019-11-13

## 2021-09-20 PROBLEM — Z99.2 ANEMIA IN CHRONIC KIDNEY DISEASE, ON CHRONIC DIALYSIS (H): Status: ACTIVE | Noted: 2021-01-01

## 2021-09-20 PROBLEM — D63.1 ANEMIA IN CHRONIC KIDNEY DISEASE, ON CHRONIC DIALYSIS (H): Status: ACTIVE | Noted: 2021-01-01

## 2021-09-20 PROBLEM — Z95.0 PACEMAKER: Status: ACTIVE | Noted: 2021-01-01

## 2021-09-20 PROBLEM — I73.9 PAD (PERIPHERAL ARTERY DISEASE) (H): Status: ACTIVE | Noted: 2019-02-08

## 2021-09-20 PROBLEM — I13.10 HYPERTENSIVE HEART AND KIDNEY DISEASE: Status: ACTIVE | Noted: 2018-07-16

## 2021-09-20 PROBLEM — N26.1 ATROPHY OF LEFT KIDNEY: Status: ACTIVE | Noted: 2017-05-18

## 2021-09-20 PROBLEM — R94.31 PROLONGED QT INTERVAL: Status: ACTIVE | Noted: 2018-07-16

## 2021-09-20 PROBLEM — I27.81 COR PULMONALE (CHRONIC) (H): Status: ACTIVE | Noted: 2021-01-01

## 2021-09-20 PROBLEM — R91.1 LUNG NODULE: Status: ACTIVE | Noted: 2021-04-28

## 2021-09-20 NOTE — PROGRESS NOTES
[facility]  PRIMARY CARE PROVIDER AND CLINIC:  Eileen Arredondo, APRN CNP, [address] / [place] [address]  Chief Complaint   Patient presents with     Establish Care     [place] [facility] Record Number:  [id number]  Place of Service where encounter took place:  [facility] [[id number]]    Francine Chris  is a 75 year old  (1945) female with hx of ESRD on HD, afib s/p AV node ablation and pacemaker on DOAC (dx 2013), HTN, combined systolic and diastolic HF, mod-severe tricuspid regurgitation, mid ascending aortic enlargement, BL LE edema, PAD, QTc prolongation, COPD, pulmonary HTN, hx of smoking, macular degeneration, anxiety/depression, SKY on CPAP, PUD admitted to the above facility from [facility].     Admitted to this facility for rehab, medical management and nursing care.    HPI:    HPI information obtained from: facility chart records, facility staff, patient report, [facility] chart review, Care Everywhere Epic chart review and family/first contact sister Mugs report.     Previous PCP: Dr. Hari Snyder  Pulmonology: Dr. Agusto Sharma, [phone]    Nephrology: [facility] [place], Dr. Ro, [phone]  Cardiology: Dr. Talavera, Dr. Martinez > [facility] [phone]    Brief Summary of Hospital Course:   Hospitalized at [facility] from 6/21/21 to 7/5/21 with nausea/weakness/dairrhea and acute renal failure along with supratheraputic INR, hyperkalemia, and digoxin toxicity. Per nephrology note unclear precipitating even for SILAS but likely severe ATN. Imaging negative for acute process. Previous baseline Cr 1.5-1.7 as of May 2021. Required emergent femoral line for HD on admission, was transitioned to right chest wall tunneled catheter on 6/29/21 as renal function did not return to baseline prior to discharge and HD still required. During this hospitalization coumadin was switched to apixaban.  Hospital stay  significant for fluid overload on 7/3, restarted torsemide. Continued on supplemental oxygen. Permanent pacemaker placed 6/25/21 s/p ablation due to inability to use digoxin in setting of renal failure. Recovered at [facility] and discharged to [facility]. Per hospital discharge was supposed to follow-up at [facility] for Pacemaker check, but lost to follow-up per sister.    Updates on Status Since Skilled nursing Admission:   Follow-up today for initial [facility] visit with primary support person her sister Tereza who goes by Mugs.  See ROS.     CODE STATUS/ADVANCE DIRECTIVES DISCUSSION:   CPR/Full code     Patient's living condition: lives in an assisted living facility  ALLERGIES: Sotalol and Bupropion  PAST MEDICAL HISTORY:  has a past medical history of Acute renal failure (ARF) (H) (6/22/2021), Anemia in chronic kidney disease, on chronic dialysis (H) (7/14/2021), Atrophy of left kidney (5/18/2017), Chronic atrial fibrillation (H) (4/1/2013), Chronic kidney disease, stage III (moderate) (11/13/2019), COPD mixed type (H) (4/6/2013), Cor pulmonale (chronic) (H) (9/20/2021), Depressive disorder, not elsewhere classified, Dermatitis of lower extremity (5/5/2017), Elevated blood pressure reading without diagnosis of hypertension (12/10/2004), HTN (hypertension) (9/7/2011), gout (8/13/2014), Hypercalcemia (7/16/2018), Hypertensive heart and kidney disease (7/16/2018), Hyponatremia (6/22/2021), Intermediate stage nonexudative age-related macular degeneration of both eyes (1/13/2021), Lower extremity edema (4/6/2013), Lung nodule (4/28/2021), Migraine variant (12/10/2004), Neoplasm of skin of neck (6/6/2014), NSVT (nonsustained ventricular tachycardia) (H) (4/9/2020), SKY (obstructive sleep apnea) (4/8/2020), Osteoporosis (11/10/2004), Osteoporosis, unspecified (dx 8/02), Pacemaker (7/14/2021), PAD (peripheral artery disease) (H) (2/8/2019), 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  PAST SURGICAL HISTORY:   has a past surgical history that includes NONSPECIFIC PROCEDURE (1990's); NONSPECIFIC PROCEDURE; NONSPECIFIC PROCEDURE; and NONSPECIFIC PROCEDURE (1980's).    (IA) [place] OPEN TX POST ANKLE FX   right     CATARACT REMOVAL 2012   bilateral     COLONOSCOPY DIAGNOSTIC 10/09/2020   ESD     HYSTERECTOMY     [place] LAP RPR INCARCERATED FEMORAL HERNIA Right 05/18/2017     [place] UNLISTED PROCEDURE VESTIBULE MOUTH   upper teeth all removed     SMALL BOWEL RESECTION 05/18/2017   due to incarcerated femoral hernia     tonsiletomy     TUBAL LIGATION     WISDOM TEETH EXTRACTION     FAMILY HISTORY: family history includes Alcohol/Drug in her brother and father; Allergies in her sister; Alzheimer Disease in her maternal uncle; Arthritis in her maternal grandmother, mother, and sister; Bladder Cancer in her brother; Breast Cancer in her maternal grandmother; C.A.D. in her maternal grandfather; Cancer - colorectal in her maternal grandmother; Cerebrovascular Disease in her maternal grandmother; Circulatory in her father and mother; Diabetes in her brother; Gastrointestinal Disease in her maternal grandmother; Heart Disease in her father and mother; Heart Failure in her paternal grandmother; Hyperlipidemia in her maternal uncle; Hypertension in her mother and sister; Lipids in her mother; Myocardial Infarction in her brother; Obesity in her brother; Osteoarthritis in her maternal grandfather; Osteoporosis in her maternal grandmother, mother, and sister; Prostate Cancer in her brother; Thyroid Disease in her maternal grandmother and another family member.     SOCIAL HISTORY:   reports that she quit smoking about 18 months ago. Her smoking use included cigarettes. She has a 43.00 pack-year smoking history. She has never used smokeless tobacco. She reports  current alcohol use. She reports that she does not use drugs.    to Richard Chris, estranged, not sure of location, but likely still living.  Foster, long-time partner .   Sister Yukos primary contact ([phone]). Two brothers passed away in 2019.   Living independetly prior to hospitalization in 2021.  Grew up in [place].  Highest education - first year of college.  Worked as  for group home.  Smoked 1 to 3 packs a day since age 16, quit around 2021.  Occasional wine.     Most Recent Immunizations   Administered Date(s) Administered     COVID-19,PF,Moderna 03/10/2021     DTaP, Unspecified 2011     Influenza (High Dose) 3 valent vaccine 10/09/2019     Influenza (IIV3) PF 2011     Influenza, Quad, High Dose, Pf, 65yr+ (Fluzone HD) 09/10/2020     Mantoux Tuberculin Skin Test 2009     Pneumococcal 23 valent 2013     TD (ADULT, 7+) 11/10/2004     Td (Adult), Adsorbed 11/10/2004     Tdap (Adacel,Boostrix) 2011     Post Discharge Medication Reconciliation Status: discharge medications reconciled, continue medications without change.    Current Outpatient Medications   Medication Sig Dispense Refill     acetaminophen (TYLENOL) 325 MG tablet Take 325 mg by mouth every 4 hours as needed       albuterol (PROAIR HFA/PROVENTIL HFA/VENTOLIN HFA) 108 (90 Base) MCG/ACT inhaler Inhale 2 puffs into the lungs every 4 hours as needed       ipratropium - albuterol 0.5 mg/2.5 mg/3 mL (DUONEB) 0.5-2.5 (3) MG/3ML neb solution Inhale 3 mLs into the lungs every 6 hours as needed       Melatonin 10 MG TABS tablet Take 10 mg by mouth nightly as needed       Multiple Vitamins-Minerals (PRESERVISION AREDS 2) CAPS Take 2 capsules by mouth 2 times daily       sodium chloride (OCEAN) 0.65 % nasal spray Spray 1 spray into both nostrils daily as needed for congestion       tiotropium-olodaterol (STIOLTO RESPIMAT) 2.5-2.5 MCG/ACT AERS Inhale 2 puffs into the lungs daily        "allopurinol (ZYLOPRIM) 100 MG tablet Take 1 tablet (100 mg) by mouth daily 30 tablet 11     apixaban ANTICOAGULANT (ELIQUIS) 2.5 MG tablet Take 1 tablet (2.5 mg) by mouth 2 times daily 60 tablet 11     atorvastatin (LIPITOR) 40 MG tablet Take 1 tablet (40 mg) by mouth daily 30 tablet 11     citalopram (CELEXA) 10 MG tablet Take 0.5 tablets (5 mg) by mouth daily 15 tablet 11     pantoprazole (PROTONIX) 40 MG EC tablet Take 1 tablet (40 mg) by mouth daily 30 tablet 11     sildenafil (REVATIO) 20 MG tablet Take 1 tablet (20 mg) by mouth 3 times daily 90 tablet 11     torsemide (DEMADEX) 100 MG tablet Take 0.5 tablets (50 mg) by mouth 2 times daily 30 tablet 11     ROS:  No PCP for many years, saw Dr. Snyder only recently.  Constitutional - No fever/chills. Good appetite \"so-so\" cardiac and renal diet, follows with dietician at HD. Sleeps  at night.  HEENT - Rare HA, No double/blurry vision. New glasses, ARMD was on Presvervision in the past. No vision change, follows with Dr. Rick. No difficulty hearing. Upper denture plate and needs dental work on bottom.  Pulmonary - Only saw pulmologist one time after hospital, lack of communication regarding medications. Episodic SOB. SOB yesterday after not wearing oxygen for several days. [facility] took CPAP, but she was wearing this prior to hospital. Occasional coughing spells, brings up phlegm.   CV- Pacemaker is new 6/25/21. Episode of chest pain recently, but felt related to anxiety when not wearing oxygen. 115#-120#.  PV - Mild swelling to legs.  GI - No abd pain. No N/V. No GERD. Normal BMS no loose stools.   - No dysuria, wears pull-up incontinent of urine.   Neuro - No focal deficit. No dizziness or seizure. + tremor, saw neurology in the hospital, occurs at rest and with intention.   MS - Ambulatory with 4WW and wheelchair, no pain in muscles or joints. Needing WC due to oxygen tank, does not have concentrator.  SKIN - SCC of neck s/p resection, " "lesion coming back.  Psychiatric - Irritable at times due to frustrations with her health. Memory issues over last several years, no formal dementia diagnosis.    Vitals:  BP 99/71   Pulse 87   Temp 97.3  F (36.3  C)   Resp 18   Ht 1.626 m (5' 4\")   Wt 53.5 kg (118 lb)   SpO2 100%   BMI 20.25 kg/m    Exam:  GENERAL APPEARANCE:  Alert, in no distress, pleasant, cooperative, thin well dressed women sitting on edge of bed.  EYES:  EOM intact, lids normal, PERRL BL, sclera clear and conjunctiva normal, no discharge   ENT:  Mouth normal, moist mucous membranes, nose without drainage or crusting, external ears without lesions, hearing acuity grossly in tact.  NECK:  Nontender, supple, symmetrical; no cervical adenopathy, masses or thyromegaly, trachea midline  RESP:  Non-labored breathing, palpation of chest w/ L CW pacer and R tunnel cath, no chest wall tenderness, no respiratory distress, Lung sounds clear, patient is on supplemental oxygen via NC as needed.  CV:  Palpation - no murmur/non-displaced PMI, L CW pacemaker with well healed incision and R tunneled cath. Auscultation - rate and rhythm regular.  VASCULAR: No edema bilateral lower extremities.   ABDOMEN:  Normal bowel sounds, soft, nontender, no grimacing or guarding with palpation. No hepatosplenomegaly. No appreciable masses.  M/S:   Gait and station ambulates independently with walker and uses wheelchair.   SKIN:  Inspection - < 0.5 cm nodular growths to neck, site of previous skin cancer. R chest wall tunneled cath with CDI dressing. Well healed L CW incision over pacemaker.  Palpation- no increased warmth, skin is dry and non-tender.  NEURO: cranial nerves II-XII grossly intact, no facial asymmetry, follows simple commands, moves all extremities symmetrically, normal tone, no tremor noted today.  PSYCH: Awake and alert, speech fluent,  insight and judgement fair, oriented to person/place/time, memory fiar, without depressed or anxious affect, calm " and cooperative.    Lab/Diagnostic data:  Recent labs in [facility] reviewed by me today.              Echo 5/19/21  Estimated EF: 60-65%    FINDINGS    eft Ventricle Normal left ventricular chamber size. Normal left ventricular wall thickness.   No regional wall motion abnormalities. Normal left ventricular systolic function. Calculated left ventricular ejection   fraction (modified Krishna technique) is 63 %.    Diastolic Function Indeterminate left ventricular diastolic function.    Right Ventricle Moderate-severe right ventricular chamber enlargement. Mildly-moderately decreased right ventricular systolic function. Estimated right ventricular systolic pressure is 55 mmHg.    Left Atrium Severe left atrial enlargement.    Right Atrium Severe right atrial enlargement.    Atrial Septum No evidence of inter-atrial shunt by color flow Doppler.    Aortic Valve Tricuspid aortic valve. No aortic valve stenosis. Mild aortic valve regurgitation.    Mitral Valve Mildly calcified mitral annulus. Mildly thickened mitral valve. Mild-moderate mitral valve regurgitation.    Tricuspid Valve Tricuspid annular dilatation. Moderate-severe tricuspid valve regurgitation.    Pulmonic Valve Normal pulmonary valve. Trivial pulmonary valve regurgitation.    Pericardium No pericardial effusion.    Aorta Normal aortic sinus of Valsalva dimension (3.7 cm). Mild ascending aorta dilatation (3.8 cm).    Inferior Vena Cava Dilated inferior vena cava with normal collapse.       ASSESSMENT/PLAN:  (N17.9) Acute renal failure, - June 2021 (H)  (primary encounter diagnosis)  (Z99.2) Dependence on hemodialysis (H)  Comment: Newly diagnosed with SILAS June 2021 etiology unclear but likely severe ATN with vomiting and afib with RVR/hypotension prior, imaging negative for acute process. Getting HD MWF at [facility] [address] [place], [place] [address], [phone] via R CW tunneled cath. Patient/family unsure of plan for  ongoing HD, recently completed 24 hour urine. Possible uremic pruritis.  Plan:   - Work to get records/labs from nephrology,  - Renally dose medications and avoid nephrotoxins, consider MTM review of medications  - Resident/sister to discuss skin itching with nephrology tomorrow.   - R CW tunneled cath care by HD nursing     (J44.9) COPD mixed type (H)  Comment: Recently stopped smoking (June 2021) after 60 year history of 1-2 PPD  Plan:   - Needs screening chest CT   - Continue PRN albuterol  - Continue Duonebs PRN, consider switching to inhaler due to global pandemic vs stopping if not using as albuterol in place  - Continue Stiolto respimat daily  - PRN supplemental oxygen in place    (G47.33) SKY (obstructive sleep apnea)  Comment: Using CPAP prior to recent hospitalization  Plan:   - Referral place to [facility] sleep medicine to clarify need for ongoing CPAP use    (I10) Essential hypertension, benign  Comment: Chronic, at goal of < 140/90  BP Readings from Last 3 Encounters:   09/20/21 99/71   08/17/05 134/82   04/13/05 118/78   Plan:   - Follow VS and labs  - Referral placed to cardiology in [facility] system for urgent follow-up for reasons below    (I48.20) Chronic atrial fibrillation (H)  (Z95.0) Pacemaker s/p AV Node Ablation  Comment: Chronic afib with digoxin toxicity in June 2021.   S/p ablation and pacemaker placement 6/25/21 anticoagulated in apixaban.   Lost to cardiology follow-up in [facility] system.   Plan:   - Referral placed to cardiology in [facility] system to establish follow-up care for pacemaker  - Continue apixaban with monitoring for adverse effects in setting of CKD    (I50.42) Chronic combined systolic and diastolic HF (heart failure) (H)  (I07.1) Tricuspid valve insufficiency, unspecified etiology  (I27.20) Pulmonary hypertension (H)  (I27.81) Cor pulmonale (chronic) (H)  Comment: Last Echo 5/19/21 outside [facility] system with EF 60-65%, see report above.   Lost to  follow-up with pulmonology never started on ambesartan due to cost and miscommunication with speciality clinic, do not want to follow-up with previous care team. Admission weight 118#.  Plan:   - Discontinue ambesartan order  - Continue PRN oxygen   - Continue Torsemide 50 mg BID  - Continue sildenafil 20 mg TID  - Referral placed to [facility] for HF and pulmonary HTN    (N18.6,  D63.1,  Z99.2) Anemia in chronic kidney disease, on chronic dialysis (H)  Comment: Hgb as low as 7.3 in July 2021, slowly increased to 11.7 over last two months. . .   9/20/21: Iron 49 WNL, Ferritin 836 H  7/5/21: B12 305 WNL,   Plan:   - Needs follow-up with [place] [facility] for colonoscopy  - Hgb trending up, continue to follow labs    - Continue PPI, reasonable given DOAC    (I73.9) PAD (peripheral artery disease) (H)  (E78.5) HL  Comment: Stable, diagnosis per chart review, work to clarify hx  4/29/21: Chol 101 N, HDL 34 L, LDL 49 N, Tri 88 N  Plan:   - Continue Lipitor  - Follow lipid panel      (H35.30) ARMD (age related macular degeneration)  Comment: Stable  Plan:   - Continue eye vitamin  - Follows with opthalmology Dr. Rick     (F41.9,  F32.9) Anxiety and depression  Comment: Stable, multiple stressors over last six motnhs  Plan:  - Continue Celexa 5 mg daily  - Continue melatonin PRN  - Referral placed for ACP supports    (R26.9) Gait Abnormality  Comment: Uses walker and wheelchair, limited by size and weight of oxygen tank  Plan:   - HH PT/OT through [facility]    (R41.89) Cognitive impairment  Comment: Sister and patient endorse memory losses  Plan:   - HH OT to complete formal testing to further characterize deficits  - Will benefit from supportive care in group home setting    (M1A.9XXX0) Chronic gout  Comment: Per history, uric acid level 5.0 11/13/19  Plan:   - Allopurinol 100 mg daily, will consult with PharmD about renal dosing for this medication  - Check uric acid with next labs, coordinate with HD if  possible    (L98.9) Skin Lesion  Comment: Bumps on neck, hx of skin cancer  Plan:  - Dermatology referral in [facility]   - Nephrology input regarding skin itching, consider antihistamine or topical agent    (Z00.0) Preventive Health Care  Comment: Needs multiple vaccinations and screening tests, limited primary care over last several years  Plan:   - Primarily needs to establish care with cardiology and sleep medicine, then work on list below:  - Needs: Td booster, Shingrix, and Prevnar  - Referral to [facility] for colonoscopy  - Needs annual mammogram    - Screening chest CT    (Z71.89) ACP (advance care planning)  Comment: No POLST on chart  Plan:   - Confirms Full Code, but due to recent health concerns would like to have ongoing discussion  -  but  not involved in life, would prefer sister Mugs for decision making, appreciate support from [facility] . Would benefit from making HCD as soon as able.     Electronically signed by:  JUSTO Jerry CNP        Documentation of Face-to-Face and Certification for Home Health Services     Patient: Francine Chris   YOB: 1945  MR Number: [id number]  Today's Date: 9/21/2021    I certify that patient: Francine Chris is under my care and that I, or a nurse practitioner or physician's assistant working with me, had a face-to-face encounter that meets the physician face-to-face encounter requirements with this patient on: 9/21/21.    This encounter with the patient was in whole, or in part, for the following medical condition, which is the primary reason for home health care:    Abnormal gait  Cognitive impairment  Multi-comorbidity    I certify that, based on my findings, the following services are medically necessary home health services: Occupational Therapy, Physical Therapy    My clinical findings support the need for the above services because: Occupational Therapy Services are needed to assess and treat cognitive ability and  address ADL safety due to impairment in cognition and function mobiltiy, request updated SLUMS score, Physical Therapy Services are needed to assess and treat the following functional impairments: gait, strength, and balance.    Further, I certify that my clinical findings support that this patient is homebound (i.e. absences from home require considerable and taxing effort and are for medical reasons or [facility] services or infrequently or of short duration when for other reasons) because: Requires assistance of another person or specialized equipment to access medical services because patient: Is prone to wander/get lost without assistance and mainly using wheelchair for mobility.    Based on the above findings. I certify that this patient is confined to the home and needs intermittent skilled nursing care, physical therapy and/or speech therapy.  The patient is under my care, and I have initiated the establishment of the plan of care.  This patient will be followed by a physician who will periodically review the plan of care.  Physician/Provider to provide follow up care: Eileen Arredondo    Responsible Medicare certified PECOS Physician: Dr. Grisel Santos MD  Physician Signature: See electronic signature associated with these discharge orders.  Date: 9/21/2021    [employer] [facility].

## 2021-09-21 PROBLEM — I47.29 NSVT (NONSUSTAINED VENTRICULAR TACHYCARDIA) (H): Status: RESOLVED | Noted: 2020-04-09 | Resolved: 2021-01-01

## 2021-09-21 NOTE — LETTER
9/21/2021        RE: [facility]  [address]  [facility] [address]        [facility]  PRIMARY CARE PROVIDER AND CLINIC:  JUSTO Jerry CNP, [address] [facility] [address] / [facility] [address]  Chief Complaint   Patient presents with     Establish Care     [facility] Record Number:  [id number]  Place of Service where encounter took place:  [facility] [[id number]]    Francine Chris  is a 75 year old  (1945) female with hx of ESRD on HD, afib s/p AV node ablation and pacemaker on DOAC (dx 2013), HTN, combined systolic and diastolic HF, mod-severe tricuspid regurgitation, mid ascending aortic enlargement, BL LE edema, PAD, QTc prolongation, COPD, pulmonary HTN, hx of smoking, macular degeneration, anxiety/depression, SKY on CPAP, PUD admitted to the above facility from [facility].     Admitted to this facility for rehab, medical management and nursing care.    HPI:    HPI information obtained from: facility chart records, facility staff, patient report, [facility] chart review, Care Everywhere Epic chart review and family/first contact sister Mugs report.     Previous PCP: Dr. Hari Snyder  Pulmonology: Dr. Agusto Sharma, [phone]    Nephrology: [facility] [place], Dr. Ro, [phone]  Cardiology: Dr. Talavera, Dr. Martinez [facility] [phone]    Brief Summary of Hospital Course:   Hospitalized at  from 6/21/21 to 7/5/21 with nausea/weakness/dairrhea and acute renal failure along with supratheraputic INR, hyperkalemia, and digoxin toxicity. Per nephrology note unclear precipitating even for SILAS but likely severe ATN. Imaging negative for acute process. Previous baseline Cr 1.5-1.7 as of May 2021. Required emergent femoral line for HD on admission, was transitioned to right chest wall tunneled catheter on 6/29/21 as renal function did not return to baseline prior to  discharge and HD still required. During this hospitalization coumadin was switched to apixaban.  Hospital stay significant for fluid overload on 7/3, restarted torsemide. Continued on supplemental oxygen. Permanent pacemaker placed 6/25/21 s/p ablation due to inability to use digoxin in setting of renal failure. Recovered at [facility] and discharged to [facility]. Per hospital discharge was supposed to follow-up at [facility] for Pacemaker check, but lost to follow-up per sister.    Updates on Status Since Skilled nursing Admission:   Follow-up today for initial [facility] visit with primary support person her sister Tereza who goes by Mugs.  See ROS.     CODE STATUS/ADVANCE DIRECTIVES DISCUSSION:   CPR/Full code     Patient's living condition: lives in an assisted living facility  ALLERGIES: Sotalol and Bupropion  PAST MEDICAL HISTORY:  has a past medical history of Acute renal failure (ARF) (H) (6/22/2021), Anemia in chronic kidney disease, on chronic dialysis (H) (7/14/2021), Atrophy of left kidney (5/18/2017), Chronic atrial fibrillation (H) (4/1/2013), Chronic kidney disease, stage III (moderate) (11/13/2019), COPD mixed type (H) (4/6/2013), Cor pulmonale (chronic) (H) (9/20/2021), Depressive disorder, not elsewhere classified, Dermatitis of lower extremity (5/5/2017), Elevated blood pressure reading without diagnosis of hypertension (12/10/2004), HTN (hypertension) (9/7/2011), gout (8/13/2014), Hypercalcemia (7/16/2018), Hypertensive heart and kidney disease (7/16/2018), Hyponatremia (6/22/2021), Intermediate stage nonexudative age-related macular degeneration of both eyes (1/13/2021), Lower extremity edema (4/6/2013), Lung nodule (4/28/2021), Migraine variant (12/10/2004), Neoplasm of skin of neck (6/6/2014), NSVT (nonsustained ventricular tachycardia) (H) (4/9/2020), SKY (obstructive sleep apnea) (4/8/2020), Osteoporosis (11/10/2004), Osteoporosis, unspecified (dx 8/02), Pacemaker  (7/14/2021), PAD (peripheral artery disease) (H) (2/8/2019), 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  PAST SURGICAL HISTORY:   has a past surgical history that includes NONSPECIFIC PROCEDURE (1990's); NONSPECIFIC PROCEDURE; NONSPECIFIC PROCEDURE; and NONSPECIFIC PROCEDURE (1980's).    (IA) AR OPEN TX POST ANKLE FX   right     CATARACT REMOVAL 2012   bilateral     COLONOSCOPY DIAGNOSTIC 10/09/2020   ESD     HYSTERECTOMY     AR LAP RPR INCARCERATED FEMORAL HERNIA Right 05/18/2017     AR UNLISTED PROCEDURE VESTIBULE MOUTH   upper teeth all removed     SMALL BOWEL RESECTION 05/18/2017   due to incarcerated femoral hernia     tonsiletomy     TUBAL LIGATION     WISDOM TEETH EXTRACTION     FAMILY HISTORY: family history includes Alcohol/Drug in her brother and father; Allergies in her sister; Alzheimer Disease in her maternal uncle; Arthritis in her maternal grandmother, mother, and sister; Bladder Cancer in her brother; Breast Cancer in her maternal grandmother; C.A.D. in her maternal grandfather; Cancer - colorectal in her maternal grandmother; Cerebrovascular Disease in her maternal grandmother; Circulatory in her father and mother; Diabetes in her brother; Gastrointestinal Disease in her maternal grandmother; Heart Disease in her father and mother; Heart Failure in her paternal grandmother; Hyperlipidemia in her maternal uncle; Hypertension in her mother and sister; Lipids in her mother; Myocardial Infarction in her brother; Obesity in her brother; Osteoarthritis in her maternal grandfather; Osteoporosis in her maternal grandmother, mother, and sister; Prostate Cancer in her brother; Thyroid Disease in her maternal grandmother and another family member.     SOCIAL HISTORY:   reports that she quit smoking about 18 months ago. Her smoking  use included cigarettes. She has a 43.00 pack-year smoking history. She has never used smokeless tobacco. She reports current alcohol use. She reports that she does not use drugs.    to Richard Chris, estranged, not sure of location, but likely still living.  Foster, long-time partner .   Sister Yukos primary contact ([phone]). Two brothers passed away in 2019.   Living independetly prior to hospitalization in 2021.  Grew up in [place].  Highest education - first year of college.  Worked as  for group home.  Smoked 1 to 3 packs a day since age 16, quit around 2021.  Occasional wine.     Most Recent Immunizations   Administered Date(s) Administered     COVID-19,PF,Moderna 03/10/2021     DTaP, Unspecified 2011     Influenza (High Dose) 3 valent vaccine 10/09/2019     Influenza (IIV3) PF 2011     Influenza, Quad, High Dose, Pf, 65yr+ (Fluzone HD) 09/10/2020     Mantoux Tuberculin Skin Test 2009     Pneumococcal 23 valent 2013     TD (ADULT, 7+) 11/10/2004     Td (Adult), Adsorbed 11/10/2004     Tdap (Adacel,Boostrix) 2011     Post Discharge Medication Reconciliation Status: discharge medications reconciled, continue medications without change.    Current Outpatient Medications   Medication Sig Dispense Refill     acetaminophen (TYLENOL) 325 MG tablet Take 325 mg by mouth every 4 hours as needed       albuterol (PROAIR HFA/PROVENTIL HFA/VENTOLIN HFA) 108 (90 Base) MCG/ACT inhaler Inhale 2 puffs into the lungs every 4 hours as needed       ipratropium - albuterol 0.5 mg/2.5 mg/3 mL (DUONEB) 0.5-2.5 (3) MG/3ML neb solution Inhale 3 mLs into the lungs every 6 hours as needed       Melatonin 10 MG TABS tablet Take 10 mg by mouth nightly as needed       Multiple Vitamins-Minerals (PRESERVISION AREDS 2) CAPS Take 2 capsules by mouth 2 times daily       sodium chloride (OCEAN) 0.65 % nasal spray Spray 1 spray into both nostrils daily as needed for  "congestion       tiotropium-olodaterol (STIOLTO RESPIMAT) 2.5-2.5 MCG/ACT AERS Inhale 2 puffs into the lungs daily       allopurinol (ZYLOPRIM) 100 MG tablet Take 1 tablet (100 mg) by mouth daily 30 tablet 11     apixaban ANTICOAGULANT (ELIQUIS) 2.5 MG tablet Take 1 tablet (2.5 mg) by mouth 2 times daily 60 tablet 11     atorvastatin (LIPITOR) 40 MG tablet Take 1 tablet (40 mg) by mouth daily 30 tablet 11     citalopram (CELEXA) 10 MG tablet Take 0.5 tablets (5 mg) by mouth daily 15 tablet 11     pantoprazole (PROTONIX) 40 MG EC tablet Take 1 tablet (40 mg) by mouth daily 30 tablet 11     sildenafil (REVATIO) 20 MG tablet Take 1 tablet (20 mg) by mouth 3 times daily 90 tablet 11     torsemide (DEMADEX) 100 MG tablet Take 0.5 tablets (50 mg) by mouth 2 times daily 30 tablet 11     ROS:  No PCP for many years, saw Dr. Snyder only recently.  Constitutional - No fever/chills. Good appetite \"so-so\" cardiac and renal diet, follows with dietician at HD. Sleeps  at night.  HEENT - Rare HA, No double/blurry vision. New glasses, ARMD was on Presvervision in the past. No vision change, follows with Dr. Rick. No difficulty hearing. Upper denture plate and needs dental work on bottom.  Pulmonary - Only saw pulmologist one time after hospital, lack of communication regarding medications. Episodic SOB. SOB yesterday after not wearing oxygen for several days. [facility] took CPAP, but she was wearing this prior to hospital. Occasional coughing spells, brings up phlegm.   CV- Pacemaker is new 6/25/21. Episode of chest pain recently, but felt related to anxiety when not wearing oxygen. 115#-120#.  PV - Mild swelling to legs.  GI - No abd pain. No N/V. No GERD. Normal BMS no loose stools.   - No dysuria, wears pull-up incontinent of urine.   Neuro - No focal deficit. No dizziness or seizure. + tremor, saw neurology in the hospital, occurs at rest and with intention.   MS - Ambulatory with 4WW and wheelchair, no " "pain in muscles or joints. Needing WC due to oxygen tank, does not have concentrator.  SKIN - SCC of neck s/p resection, lesion coming back.  Psychiatric - Irritable at times due to frustrations with her health. Memory issues over last several years, no formal dementia diagnosis.    Vitals:  BP 99/71   Pulse 87   Temp 97.3  F (36.3  C)   Resp 18   Ht 1.626 m (5' 4\")   Wt 53.5 kg (118 lb)   SpO2 100%   BMI 20.25 kg/m    Exam:  GENERAL APPEARANCE:  Alert, in no distress, pleasant, cooperative, thin well dressed women sitting on edge of bed.  EYES:  EOM intact, lids normal, PERRL BL, sclera clear and conjunctiva normal, no discharge   ENT:  Mouth normal, moist mucous membranes, nose without drainage or crusting, external ears without lesions, hearing acuity grossly in tact.  NECK:  Nontender, supple, symmetrical; no cervical adenopathy, masses or thyromegaly, trachea midline  RESP:  Non-labored breathing, palpation of chest w/ L CW pacer and R tunnel cath, no chest wall tenderness, no respiratory distress, Lung sounds clear, patient is on supplemental oxygen via NC as needed.  CV:  Palpation - no murmur/non-displaced PMI, L CW pacemaker with well healed incision and R tunneled cath. Auscultation - rate and rhythm regular.  VASCULAR: No edema bilateral lower extremities.   ABDOMEN:  Normal bowel sounds, soft, nontender, no grimacing or guarding with palpation. No hepatosplenomegaly. No appreciable masses.  M/S:   Gait and station ambulates independently with walker and uses wheelchair.   SKIN:  Inspection - < 0.5 cm nodular growths to neck, site of previous skin cancer. R chest wall tunneled cath with CDI dressing. Well healed L CW incision over pacemaker.  Palpation- no increased warmth, skin is dry and non-tender.  NEURO: cranial nerves II-XII grossly intact, no facial asymmetry, follows simple commands, moves all extremities symmetrically, normal tone, no tremor noted today.  PSYCH: Awake and alert, speech " fluent,  insight and judgement fair, oriented to person/place/time, memory fiar, without depressed or anxious affect, calm and cooperative.    Lab/Diagnostic data:  Recent labs in [facility] reviewed by me today.              Echo 5/19/21  Estimated EF: 60-65%    FINDINGS    eft Ventricle Normal left ventricular chamber size. Normal left ventricular wall thickness.   No regional wall motion abnormalities. Normal left ventricular systolic function. Calculated left ventricular ejection   fraction (modified Krishna technique) is 63 %.    Diastolic Function Indeterminate left ventricular diastolic function.    Right Ventricle Moderate-severe right ventricular chamber enlargement. Mildly-moderately decreased right ventricular systolic function. Estimated right ventricular systolic pressure is 55 mmHg.    Left Atrium Severe left atrial enlargement.    Right Atrium Severe right atrial enlargement.    Atrial Septum No evidence of inter-atrial shunt by color flow Doppler.    Aortic Valve Tricuspid aortic valve. No aortic valve stenosis. Mild aortic valve regurgitation.    Mitral Valve Mildly calcified mitral annulus. Mildly thickened mitral valve. Mild-moderate mitral valve regurgitation.    Tricuspid Valve Tricuspid annular dilatation. Moderate-severe tricuspid valve regurgitation.    Pulmonic Valve Normal pulmonary valve. Trivial pulmonary valve regurgitation.    Pericardium No pericardial effusion.    Aorta Normal aortic sinus of Valsalva dimension (3.7 cm). Mild ascending aorta dilatation (3.8 cm).    Inferior Vena Cava Dilated inferior vena cava with normal collapse.       ASSESSMENT/PLAN:  (N17.9) Acute renal failure, - June 2021 (H)  (primary encounter diagnosis)  (Z99.2) Dependence on hemodialysis (H)  Comment: Newly diagnosed with SILAS June 2021 etiology unclear but likely severe ATN with vomiting and afib with RVR/hypotension prior, imaging negative for acute process. Getting HD MWF at [place] [address]  [address] [place], [place] [address], [phone] via R CW tunneled cath. Patient/family unsure of plan for ongoing HD, recently completed 24 hour urine. Possible uremic pruritis.  Plan:   - Work to get records/labs from nephrology,  - Renally dose medications and avoid nephrotoxins, consider MTM review of medications  - Resident/sister to discuss skin itching with nephrology tomorrow.   - R CW tunneled cath care by HD nursing     (J44.9) COPD mixed type (H)  Comment: Recently stopped smoking (June 2021) after 60 year history of 1-2 PPD  Plan:   - Needs screening chest CT   - Continue PRN albuterol  - Continue Duonebs PRN, consider switching to inhaler due to global pandemic vs stopping if not using as albuterol in place  - Continue Stiolto respimat daily  - PRN supplemental oxygen in place    (G47.33) SKY (obstructive sleep apnea)  Comment: Using CPAP prior to recent hospitalization  Plan:   - Referral place to [facility] sleep medicine to clarify need for ongoing CPAP use    (I10) Essential hypertension, benign  Comment: Chronic, at goal of < 140/90  BP Readings from Last 3 Encounters:   09/20/21 99/71   08/17/05 134/82   04/13/05 118/78   Plan:   - Follow VS and labs  - Referral placed to cardiology in [facility] system for urgent follow-up for reasons below    (I48.20) Chronic atrial fibrillation (H)  (Z95.0) Pacemaker s/p AV Node Ablation  Comment: Chronic afib with digoxin toxicity in June 2021.   S/p ablation and pacemaker placement 6/25/21 anticoagulated in apixaban.   Lost to cardiology follow-up in [facility] system.   Plan:   - Referral placed to cardiology in [facility] system to establish follow-up care for pacemaker  - Continue apixaban with monitoring for adverse effects in setting of CKD    (I50.42) Chronic combined systolic and diastolic HF (heart failure) (H)  (I07.1) Tricuspid valve insufficiency, unspecified etiology  (I27.20) Pulmonary hypertension (H)  (I27.81) Cor pulmonale  (chronic) (H)  Comment: Last Echo 5/19/21 outside [facility] system with EF 60-65%, see report above.   Lost to follow-up with pulmonology never started on ambesartan due to cost and miscommunication with speciality clinic, do not want to follow-up with previous care team. Admission weight 118#.  Plan:   - Discontinue ambesartan order  - Continue PRN oxygen   - Continue Torsemide 50 mg BID  - Continue sildenafil 20 mg TID  - Referral placed to [facility] for HF and pulmonary HTN    (N18.6,  D63.1,  Z99.2) Anemia in chronic kidney disease, on chronic dialysis (H)  Comment: Hgb as low as 7.3 in July 2021, slowly increased to 11.7 over last two months. . .   9/20/21: Iron 49 WNL, Ferritin 836 H  7/5/21: B12 305 WNL,   Plan:   - Needs follow-up with [place] [facility] for colonoscopy  - Hgb trending up, continue to follow labs    - Continue PPI, reasonable given DOAC    (I73.9) PAD (peripheral artery disease) (H)  (E78.5) HL  Comment: Stable, diagnosis per chart review, work to clarify hx  4/29/21: Chol 101 N, HDL 34 L, LDL 49 N, Tri 88 N  Plan:   - Continue Lipitor  - Follow lipid panel      (H35.30) ARMD (age related macular degeneration)  Comment: Stable  Plan:   - Continue eye vitamin  - Follows with opthalmology Dr. Rick     (F41.9,  F32.9) Anxiety and depression  Comment: Stable, multiple stressors over last six motnhs  Plan:  - Continue Celexa 5 mg daily  - Continue melatonin PRN  - Referral placed for ACP supports    (R26.9) Gait Abnormality  Comment: Uses walker and wheelchair, limited by size and weight of oxygen tank  Plan:   - HH PT/OT through [facility]    (R41.89) Cognitive impairment  Comment: Sister and patient endorse memory losses  Plan:   - HH OT to complete formal testing to further characterize deficits  - Will benefit from supportive care in half-way setting    (M1A.9XXX0) Chronic gout  Comment: Per history, uric acid level 5.0 11/13/19  Plan:   - Allopurinol 100 mg daily, will consult  with PharmD about renal dosing for this medication  - Check uric acid with next labs, coordinate with HD if possible    (L98.9) Skin Lesion  Comment: Bumps on neck, hx of skin cancer  Plan:  - Dermatology referral in [facility]   - Nephrology input regarding skin itching, consider antihistamine or topical agent    (Z00.0) Preventive Health Care  Comment: Needs multiple vaccinations and screening tests, limited primary care over last several years  Plan:   - Primarily needs to establish care with cardiology and sleep medicine, then work on list below:  - Needs: Td booster, Shingrix, and Prevnar  - Referral to [facility] for colonoscopy  - Needs annual mammogram    - Screening chest CT    (Z71.89) ACP (advance care planning)  Comment: No POLST on chart  Plan:   - Confirms Full Code, but due to recent health concerns would like to have ongoing discussion  -  but  not involved in life, would prefer sister Mugs for decision making, appreciate support from [facility] . Would benefit from making HCD as soon as able.     Electronically signed by:  JUSTO Jerry CNP        Documentation of Face-to-Face and Certification for Home Health Services     Patient: Francine Chris   YOB: 1945  MR Number: [id number]  Today's Date: 9/21/2021    I certify that patient: Francine Chris is under my care and that I, or a nurse practitioner or physician's assistant working with me, had a face-to-face encounter that meets the physician face-to-face encounter requirements with this patient on: 9/21/21.    This encounter with the patient was in whole, or in part, for the following medical condition, which is the primary reason for home health care:    Abnormal gait  Cognitive impairment  Multi-comorbidity    I certify that, based on my findings, the following services are medically necessary home health services: Occupational Therapy, Physical Therapy    My clinical findings support the need for the above  services because: Occupational Therapy Services are needed to assess and treat cognitive ability and address ADL safety due to impairment in cognition and function mobiltiy, request updated SLUMS score, Physical Therapy Services are needed to assess and treat the following functional impairments: gait, strength, and balance.    Further, I certify that my clinical findings support that this patient is homebound (i.e. absences from home require considerable and taxing effort and are for medical reasons or Sabian[facility] services or infrequently or of short duration when for other reasons) because: Requires assistance of another person or specialized equipment to access medical services because patient: Is prone to wander/get lost without assistance and mainly using wheelchair for mobility.    Based on the above findings. I certify that this patient is confined to the home and needs intermittent skilled nursing care, physical therapy and/or speech therapy.  The patient is under my care, and I have initiated the establishment of the plan of care.  This patient will be followed by a physician who will periodically review the plan of care.  Physician/Provider to provide follow up care: Eileen Arredondo    Responsible Medicare certified [place] Physician: Dr. Grisel Santos MD  Physician Signature: See electronic signature associated with these discharge orders.  Date: 9/21/2021    [facility]           Sincerely,        JUSTO Jerry CNP

## 2021-09-24 NOTE — TELEPHONE ENCOUNTER
[facility] Nurse Triage Telephone Note    Provider: JUSTO Jerry CNP  Facility: [facility] Type:  AL    Caller: [facility]  Call Back Number:     Allergies:    Allergies   Allergen Reactions     Sotalol Other (See Comments)     Prolonged QT     Bupropion Other (See Comments)     Na 118 acutely. Comorbid diuresis for heart failure        Reason for call: [employer] is requesting RN , PT and OT to eval and treat d/t new admission to the [facility] and was recently in a TCU.    [facility] STANDING ORDER GIVEN:  Start/Continue in house Home Care services if requested by facility or home care agency  RN, PT and OT    Provider giving Order:  JUSTO Jerry CNP    Verbal Order given to: Randi Mar RN

## 2021-10-04 NOTE — TELEPHONE ENCOUNTER
Per [facility], the pull-ups were delivered on 9/23/21. CC notified.     Francine Chris 1945 [employer] products- Pull-ups, XL, 3/day 9/21/2021 Jesica Mohr DELIVERED 9/23/2021     Maricruz Pizano  Care Management Specialist  [facility]  [phone]

## 2021-10-04 NOTE — PATIENT INSTRUCTIONS
Francine Chris  1945  ORDERS:  - ACP Psych referral for anxiety/depression  - Please fax POLST to [phone] for scan into Epic, if no POLST please leave blank form in NP folder to be completed   FYIs --   - HOME CARE REFERRAL[facility]: PT/OT (already completed)  - Adult Dermatology Referral   - Adult Cardiology Eval Referral  - SLEEP EVALUATION & MANAGEMENT REFERRAL  Electronically signed by:   JUSTO Jerry CNP  10/04/21 3:18 PM

## 2021-10-05 NOTE — TELEPHONE ENCOUNTER
Patient needs to establish pacemaker care with our device clinic. Patient is scheduled for cardiology consult with Dr. Pacheco on 10/12/2021.     Patient has single chamber Medtronic Pacemaker placed at [facility] in June 2021. Upon further chart review, last remote device check completed on 8/2/2021. Patient has chronic Afib and is on Eliquis.     Placed orders for patient to be seen in device clinic to establish care. Called patient to confirm appointment time of 10/12 at 1:30PM will work following office visit with Dr. Pacheco. Patient/sister, Tereza in agreement with plan.

## 2021-10-05 NOTE — PROGRESS NOTES
Medication Therapy Management (MTM) Encounter    ASSESSMENT:                            Medication Adherence/Access: No issues identified    ESRD on hemodialysis: May benefit from recheck K level due to low on 9/22 - nephrology following.  Current medication doses appropriate, however depending on uric acid result, may be of benefit to consider reduction in allopurinol frequency as noted below.    Pruritis:  Discussed potential culprits - due to kidney disease, environmental changes, allopurinol dose. She may benefit from using her own detergent on her sheets and will ask facility to use this.  As noted below, felt benadryl cream was helpful and resolved rash/itching - requesting anti-itch cream from NP.    COPD: May benefit from d'c prn duoneb (no nebulizer machine on hand, and has prn albuterol inhaler available).  Discussed she does have prn albuterol inhaler available if needed for shortness of breath/wheezing/cough.  Discussed benefits of aerochamber if inhaler technique is tough for her, but with education, she feels she can use inhalers without aerochamber right now.  Reviewed proper inhaler technique and will mail patient instructions to her for a reminder.    Afib, Hypertension, Chronic combined systolic and diastolic heart failure, Pulmonary hypertension : Stable.  Discussed use of eliquis vs warfarin in ESRD/risks vs benefits, and is appropriate to continue with current eliquis dose if continues to tolerate.  Discussed more likely that pruritis is from alternative cause vs eliquis.  As noted below, establishing with new cardiologist next week, 10/12, and they are looking forward to this, and hoping for pulmonology referral from cardiology as well.    Hyperlipidemia, PAD: Stable.    Depression, Anxiety:  Continue to monitor.  Of note, does have a h/o QTc prolongation, and is at increased risk of this with citalopram use in ESRD, h/o afib.  If feel citalopram is not offering adequate benefit, may consider  alternative such as sertraline in future, which has lower likelihood of contributing to QTc prolongation and does not require dose adjustment in ESRD.  Discussed may be beneficial to recheck EKG with upcoming cardiology appt given h/o QTc prolongation.    Gout: Patient is due for uric acid level.  Depending on result, given renal function and per her report, allopurinol initiation after only one episode of gout several years ago, may benefit from reduction in allopurinol frequency; possibly contributing to rash/pruritis?    GERD: Stable.  Appropriate to continue with PPI given continued Eliquis use for gi protection.    Age related macular degeneration: As noted below, eye MD informed patient and sister ok to stop AREDS-2, and they would like this dc'd given multiple meds, not effective.    Allergic Rhinitis: Stable.    Pain:  Stable.    Insomnia: Discussed/encouraged non-pharm measures to implement for sleep hygiene.  May use prn melatonin if necessary, but encouraged to try non-pharm measures first.     Tremor:  New onset since May as noted below; will inform NP of patient's symptoms for assessment.    PLAN:                            1.  Provider may consider recheck potassium level.  2.  Provider may consider uric acid level. If level allows, may consider reduction in frequency of allopurinol dose.  3.  Provider may consider addition of diphenhydramine cream as needed for pruritis/rash.  4.  Patient to request facility to use her own detergent on sheets/bedding.  5.  Patient to use proper inhaler technique; will mail instructions for both Stiolto respimat and albuterol inhalers.  Remember prn albuterol inhaler available for use if needed for shortness of breath/wheezing/cough.  6.  Provider may consider d'c duoneb prn.  7.  Patient sees cardiology next week on 10/12; cardiology may consider EKG to follow-up QTc interval.    8.  Continue to monitor mood/depression/anxiety.  If feel symptoms not controlled, may  "benefit from switch to sertraline from citalopram in the future.  9.  Provider may consider d'c Preservision AREDS-2 per eye MD and patient/sister.  10.  Patient to implement non-pharmacological measures for improvement of sleep.  Will mail patient info.  If necessary, melatonin 10mg prior to bedtime as needed is available.  11.  I will inform NP of tremor symptoms.        Follow-up: 4-8 weeks, sooner if necessary  Addendum: 11/2/21: called pt's sister, Jhoan, to schedule follow-up visit - Jhoan would like to meet on Dec 14th at 9am.  Will follow-up sooner if requested by patient/Mugs/provider.  Addendum: 12/9/21: Jhoan called to cancel MTM appt for the 14th.  States too many appts right now, and would like to wait a few months.        SUBJECTIVE/OBJECTIVE:                          Francine Chris is a 76 year old female seen for an initial visit. She was referred to me from MEENAKSHI Payan.  Her sister, Jhoan, was present for the visit as well.     Reason for visit: initial MTM visit, review of meds.    Allergies/ADRs: Reviewed in chart  Past Medical History: Reviewed in chart  Tobacco: She reports that she quit smoking about 18 months ago. Her smoking use included cigarettes. She has a 43.00 pack-year smoking history. She has never used smokeless tobacco.  Alcohol: occasional glass of wine  Caffeine: 1 cup coffee in the a.m. on days when no dialysis; occasional diet coke - trying to wean off of this.  Assistive Devices: walker and wheelchair for ambulation; no recent falls although reports not long after coming to facility, tripped and fell - was not dizzy/lightheaded  Just started PT yesterday - will be once weekly.  OT to start as well.    Medication Adherence/Access: Patient has assistance with medication administration: assisted living.    ESRD on hemodialysis: Per chart review, new diagnosis with [facility] June 2021, \"etiology unclear, but likely severe ATN with vomiting and afib with RVR/hypotension prior, imaging " "negative for acute process.\"  On hemodialysis Mon, Wed, Fri.  Followed by nephrology.  Notes labs done at dialysis monthly.   Moving port tomorrow.  Notes she is urinating frequently.  9/22 labs: albumin 3.6  K 2.8  Hgb 11.7  Phos 4.9  Calcium 9.6  iPTH 257  Due to low K, she was advised to get more potassium through her diet.      Pruritis:  Notes itching all the time, and that this started right after coming to [place].  Has tried switching detergents, and this hasn't made a difference, however, facility washing sheets with new detergent to her.  She would like to try washing sheets with her own detergent. Sister wondering if itching could be due to eliquis since this was new, and notes she knows someone who has had itching due to eliquis.  Noted on chart review NP questioning if possibly uremic pruritis, and patient was to discuss itching with nephrology on 9/22, however unclear if this occurred.  Sister did  a benadryl cream for itching as needed, and patient tried this- was helpful, but is now out and not using anything.  Is asking if NP can prescribe a cream.    COPD: Current medication includes Stiolto Respimat 2 puffs once daily, Albuterol inhaler as needed, Duoneb as needed.  Also on oxygen - Mugs notes she should be using this 24/7, but patient not currently on during this visit and notes would like to be off.  In Aug, was on 4L per sister, then reduced to 2L - frequently going without now and sister would like her gradually to reduce as directed by provider vs making changes on her own.  I do note, however, that order currently is for supplemental oxygen as needed.  Hasn't seen respiratory therapy.  Denies shortness of breath with rest; does have some with activity.  Occasional wheezing with activity.  Notes coughs a lot; occasional phlegm.  States doesn't have a nebulzer machine, so not using prn duoneb and wasn't aware prn albuterol inhaler was available.  Quit smoking in June following " several years of smoking.  Was able to witness patient take her Stiolto Respimat because aid came in with it during our visit.  Missed some steps in technique, such as taking deep breath out before placing inhaler to lips.    Afib, Hypertension, Chronic combined systolic and diastolic heart failure, Pulmonary hypertension : Patient is currently taking Eliquis 2.5mg twice daily for anticoagulation (previously on warfarin; was switched to eliquis during hospitalization June 2021). Now with pacemaker.  Has cardiology visit next week - establishing with new cardiologist - appt on Oct 12th with  and then pacemaker appt. Patient reports bruises easily. Patient does not have a history of GI bleed.   Patient is also taking torsemide 50mg twice daily for heart failure, sildenafil 20mg three times daily for pulmonary hypertension.  Had been prescribed ambrisentan by pulmonology for pulmonary hypertension, however, patient never did start this due to cost and access issues, and patient wondering if she even needs this.  They note they need follow-up with pulmonology.  Patient reports the following medication side effects:urinary frequency, dizziness with position changes.   Notes dizziness only if gets up too quickly (typically sitting to standing)  Notes BP will drop with dialysis - states has only happened a couple times & they watch her closely  Denies edema.  Noted palpitations at dialysis yesterday, otherwise this has not occurred.  Did inform them at dialysis.  Denies chest pain.  [id number] Score    [id number] Score: 5       BP Readings from Last 3 Encounters:   09/20/21 99/71   08/17/05 134/82   04/13/05 118/78     Hyperlipidemia, PAD: Current therapy includes atorvastatin 40mg daily.  Patient reports no significant myalgias or other side effects.  Notes occasional cramps that start in toes and goes up calf - will happen at dialysis and adjustment helps.    Depression, Anxiety:  Current medications  include: Citalopram 5mg once daily.  She is not sure if citalopram is helpful.  Some days will just stay in room.  Notes can get anxious.      Gout: Currently taking allopurinol 100mg daily. Patient reports no current pain concerns. Patient is experiencing the following potential medication side effects: itching, previous rash.   Last uric acid = 5.0 on 11/13/19, and per NP note, plans to recheck uric acid with next labs, and may coordinate with HD if possible.  Notes only one episode of gout and was started on allopurinol several years ago following this.    GERD: Current medications include: Protonix (pantoprazole) 40mg once daily. The patient does not have a history of GI bleed. Patient feels that current regimen is effective.  Noted on chart review that tums in past not effective.  Was on ranitidine at one point, which helped some, but ran out.  Denies recent concerns of nausea, heartburn.    Age related macular degeneration: Current medication includes Preservision AREDS2 - 2 tabs twice daily.  Mugs and patient note that she no longer needs to be taking this - eye MD informed her ok to stop as it has not been helping.    Allergic Rhinitis: Current medications include saline nasal spray daily as needed.  Patient feels that current therapy is effective. She does use this, and notes it is effective.    Pain:  Current medication includes tylenol 325mg every 4hr as needed.  Uses occasionally for headaches; effective.    Insomnia: Current medications include: melatonin 10mg nightly as needed. Patient reports not sleeping well lately.   Notes tried lower doses in past of melatonin and wasn't helpful.  Now minimizing naps during the day. Was using Cpap - no longer has this available to her. Didn't feel like sleep was more restful with it.  Has been referred to sleep clinic by NP to assess need for continued Cpap.  Wasn't aware prn melatonin was available for her, so hasn't been using.  Has some lavender soap and open  to non-pharm measures for sleep.    Tremor:  Not currently on medication for this.  Tremor started around May - tremor in both hands and notes tremor in feet, she states seems like RLS type symptoms.  Tremor in hands worsens with trying to eat/drink.  Sister notes Parkinsons has been ruled out, and were going to do MRI while in hospital, but didn't due to pacemaker.      Est CrCl ([place]-G[place]) = 12ml/min (based on Scr 3.44 on 9/15/21 & actual BW 53.5kg)    ----------------      I spent 90 minutes with this patient today. A copy of the visit note was provided to the patient's referring provider.    The patient was mailed a summary of these recommendations.     Lauren Lovett, Pharm.D.,[facility] [place]  Board Certified Geriatric Pharmacist  Medication Therapy Management Pharmacist  [phone]           Medication Therapy Recommendations  COPD mixed type (H)    Current Medication: ipratropium - albuterol 0.5 mg/2.5 mg/3 mL (DUONEB) 0.5-2.5 (3) MG/3ML neb solution   Rationale: Duplicate Therapy - Unnecessary medication therapy - Indication   Recommendation: Discontinue Medication   Status: Contact Provider - Awaiting Response          Current Medication: tiotropium-olodaterol (STIOLTO RESPIMAT) 2.5-2.5 MCG/ACT AERS   Rationale: Incorrect administration - Dosage too low - Effectiveness   Recommendation: Provide Education   Status: Patient Agreed - Adherence/Education         Depression, unspecified depression type    Current Medication: citalopram (CELEXA) 10 MG tablet   Rationale: Medication requires monitoring - Needs additional monitoring   Recommendation: Order Lab   Status: Contact Provider - Awaiting Response         Hx of gout    Current Medication: allopurinol (ZYLOPRIM) 100 MG tablet   Rationale: Medication requires monitoring - Needs additional monitoring   Recommendation: Order Lab   Status: Contact Provider - Awaiting Response         Itching    Rationale: Untreated condition - Needs additional medication therapy -  Indication   Recommendation: Start Medication   Status: Contact Provider - Awaiting Response   Note: consider topical diphenhydramine cream as needed for itching         Macular degeneration, unspecified laterality, unspecified type    Current Medication: Multiple Vitamins-Minerals (PRESERVISION AREDS 2) CAPS   Rationale: Nonmedication therapy more appropriate - Unnecessary medication therapy - Indication   Recommendation: Discontinue Medication   Status: Contact Provider - Awaiting Response

## 2021-10-06 NOTE — PATIENT INSTRUCTIONS
Recommendations from today's MTM visit:                                                    MTM (medication therapy management) is a service provided by a clinical pharmacist designed to help you get the most of out of your medicines.   Today we reviewed what your medicines are for, how to know if they are working, that your medicines are safe and how to make your medicine regimen as easy as possible.      1.  Provider may consider recheck potassium level.  2.  Provider may consider uric acid level. If level allows, may consider reduction in frequency of allopurinol dose.  3.  Provider may consider addition of diphenhydramine cream as needed for itching/rash.  4.  Please request facility to use your own detergent on sheets/bedding.  5.  Use proper inhaler technique; see patient instructions included with this mailing.  You may use your as needed albuterol inhaler available for use if needed for shortness of breath/wheezing/cough.  6.  Provider may consider discontinuation of as needed duonebs.  7.  See cardiology next week as planned; cardiology may consider EKG to follow-up QTc interval.    8.  Continue to monitor mood/depression/anxiety.  If feel symptoms not controlled, may benefit from switch to sertraline from citalopram in the future.  9.  Provider may consider stopping Preservision AREDS-2 per eye MD and patient/sister.  10.  Implement non-pharmacological measures for improvement of sleep.  See handout included with this mailing.  If necessary, melatonin 10mg prior to bedtime as needed is available.  11.  I will inform NP of tremor symptoms.    Follow-up: 4-8 weeks, sooner if necessary    It was great to speak with you today.  I value your experience and would be very thankful for your time with providing feedback on our clinic survey. You may receive a survey via email or text message in the next few days.     To schedule another MTM appointment, please call me directly or you may call the MTM scheduling line  at [phone] or toll-free at [phone].     My Clinical Pharmacist's contact information:                                                      Please feel free to contact me with any questions or concerns you have.      Lauren Lovett, Pharm.D.,[facility] [place]  Board Certified Geriatric Pharmacist  Medication Therapy Management Pharmacist  [phone]

## 2021-10-12 NOTE — TELEPHONE ENCOUNTER
Patient seen in device clinic today to establish care and for annual threshold testing of her Medtronic single chamber Pacemaker. Last device check was on 8/4/21 [facility]. Patient had 3 episodes of NSVT logged since last device check on 8/4/21 and 3 EGMs available. EGMs show 6-8 beats of NSVT with V rates of 170-180 bpm.     Patient does not recall symptoms with episodes. Patient had an echo at [facility] 5/19/21 with an EF of 60-65%.     Patient has an underlying rhythm of Chronic Afib with V rates of 60-80bpm. Patient is on Eliquis.     Patient is new to the clinic and will be establishing care with Dr Holt. Appointment is scheduled for 11/1/21. Will route message for review.

## 2021-10-14 NOTE — PATIENT INSTRUCTIONS
Francine Chris  1945  ORDERS:  - Swab for COVID-19 on Monday, October 18th via PCR   - NPO at 0:00 on 10/21/21   - Hold Eliquis x 2 days prior and day of surgery: Tuesday 10/19, Wednesday 10/20, Thursday 10/21. Resume as per Dr. Arnulfo [facility], tele# [phone], call for orders.   - Hold Celexa 10/21 as resident will be in OR at this time.  - Discontinue Preservision   - Discontinue PRN Duonebs due to pandemic precautions   - Fax POLST to [phone] for scan into [facility]   - FYI  - okay to give sildenafil, Protonix, inhalers, allopurinol, and torsemide AM of surgery 10/21/21 with small sip of water  Electronically signed by:   JUSTO Jerry CNP  10/14/21 11:42 AM

## 2021-10-14 NOTE — PROGRESS NOTES
[facility] GERIATRICS  [address] [place] [address]  Phone: [phone]  Fax: [phone]  Primary Provider: Eileen Chiang  Pre-op Performing Provider: EILEEN CHIANG    PREOPERATIVE EVALUATION:  Today's date: 10/14/2021    Francine Chris is a 76 year old female with PMH significant for hx of ESRD on HD, afib s/p AV node ablation and pacemaker on DOAC (dx 2013), HTN, combined systolic and diastolic HF, mod-severe tricuspid regurgitation, mid ascending aortic enlargement, BL LE edema, PAD, QTc prolongation, COPD, pulmonary HTN, hx of smoking, macular degeneration, anxiety/depression, SKY on CPAP, PUD who presents for a preoperative evaluation.    Surgical Information:  Surgery/Procedure: Right 1st stage brachiobasilic fistula vs R UE graft  Surgery Location: [facility]  Surgeon: Dr. Chiang with  [facility], tele# [phone]   Surgery Date: 10/21/21  Time of Surgery: 9:00 AM  Where patient plans to recover: At home with family  Fax number for surgical facility: [facility] [phone]  Type of Anesthesia Anticipated: Local with MAC    Subjective     HPI related to upcoming procedure:   Newly diagnosed with SILAS June 2021 etiology unclear but likely in setting of severe ATN with vomiting and afib with RVR and hypotension in setting of CKD stage III with atrophic left kidney; imaging was negative for acute process. Started on HD 6/21/21. Getting HD MWF at [facility] via R CW tunneled cath. Now ESRD and plan for AV fistula or graft for permanent HD access. Nephrologist is Dr. Derrick Ro MD, last visit 9/22/21.     Recent Health History Reviewed:  Hospitalized at [facility] from 6/21/21 to 7/5/21 with nausea/weakness/dairrhea and acute renal failure along with supratheraputic INR, hyperkalemia, and digoxin toxicity. Per nephrology note unclear precipitating even for SILAS but likely severe ATN. Imaging negative for acute process.  Previous baseline Cr 1.5-1.7 as of May 2021. Required emergent femoral line for HD on admission, was transitioned to right chest wall tunneled catheter on 6/29/21 as renal function did not return to baseline prior to discharge and HD still required. During this hospitalization coumadin was switched to apixaban.  Hospital stay significant for fluid overload on 7/3/21, restarted torsemide. Continued on supplemental oxygen. Permanent pacemaker placed 6/25/21 s/p ablation due to inability to use digoxin in setting of renal failure. Recovered at [facility] and discharged to [facility]. Per hospital discharge was supposed to follow-up at [facility] for Pacemaker check, but lost to follow-up per sister.    Preop Questions 10/14/2021   1. Have you ever had a heart attack or stroke? No   2. Have you ever had surgery on your heart or blood vessels, such as a stent placement, a coronary artery bypass, or surgery on an artery in your head, neck, heart, or legs? No   3. Do you have chest pain with activity? No   4. Do you have a history of  heart failure? YES - Followed by Dr. Talavera on [facility] prior to recent hospitalization. Combined systolic and diastolic heart failure. Hospitalization for CHF 07/2018 and 04/2020. Severe TR. Pulmonary HTN. Hx of non-ischemic cardiomyopathy.   5. Do you currently have a cold, bronchitis or symptoms of other infection? No   6. Do you have a cough, shortness of breath, or wheezing? No   7. Do you or anyone in your family have previous history of blood clots? YES - no personal hx, but thinks sibling had issues in the past, brother.   8. Do you or does anyone in your family have a serious bleeding problem such as prolonged bleeding following surgeries or cuts? No   9. Have you ever had problems with anemia or been told to take iron pills? YES - Mild, no symptoms, last Hgb 12.2 on 10/11/21   10. Have you had any abnormal blood loss such as black, tarry or  bloody stools, or abnormal vaginal bleeding? No   11. Have you ever had a blood transfusion? No   12. Are you willing to have a blood transfusion if it is medically needed before, during, or after your surgery? Yes   13. Have you or any of your relatives ever had problems with anesthesia? No   14. Do you have sleep apnea, excessive snoring or daytime drowsiness? YES - known SKY, no longer has CPAP, has sleep medicine follow-up 12/7/21  CPAP 5-20 cm H2O with nasal pillows     14a. Do you have a CPAP machine? Yes   15. Do you have any artifical heart valves or other implanted medical devices like a pacemaker, defibrillator, or continuous glucose monitor? YES - pacemaker placed 6/25/21   15a. What type of device do you have? Pacemaker - Medtronic/[id number] Rhonda XT SR MRI       15b. Name of the clinic that manages your device:  [facility], previous [facility]   16. Do you have artificial joints? No   17. Are you allergic to latex? No     Health Care Directive:  Patient does not have a Health Care Directive or Living Will: POLST at facility       Preoperative Review of :   reviewed - no record of controlled substances prescribed.    Review of Systems  Constitutional - No fever/chills. Appetite is okay. Sleeping okay, does not have CPAP.   HEENT - No. HA, No double/blurry vision. No vision change, follows with Dr. Rick. No difficulty hearing. Upper denture plate and needs dental work on bottom.  Pulmonary - No SOB at rest. + SANCHEZ resolves with rest. [facility] took CPAP, but she was wearing this prior to hospital. Only has large oxygen canisters.   CV- Pacemaker is new 6/25/21. Episode of chest pain recently at HD and episodic NSVT without symptoms.  PV - No leg swelling.  GI - No abd pain. No N/V. Tends to constipation.   - No dysuria, wears pull-up incontinent of urine.   Neuro - No focal deficit. No dizziness or seizure. + tremor, saw neurology in the hospital, occurs at  rest and with intention.   MS - Ambulatory with 4WW and wheelchair for longer distances, no pain in muscles or joints. Heavy oxygen tanks, does not have concentrator unable to walk with large tanks. Has 24-7 staff to assist with WC use. Environment allows for patient to use WC without issue. Resident willing to use wheelchair. Due to unsteady gait, CHF and COPD unable to use walker alone to meet functional mobility needs, wheelchair allows patient to get to meals and activities, as well numerous medical appointments.   SKIN - SCC of neck s/p resection, lesion coming back.  Psychiatric - Mood improved with time, getting used to facility. Memory issues over last several years, no formal dementia diagnosis.    Patient Active Problem List    Diagnosis Date Noted     Cor pulmonale (chronic) (H) 09/20/2021     Priority: Medium     Anemia in chronic kidney disease, on chronic dialysis (H) 07/14/2021     Priority: Medium     Pacemaker 07/14/2021     Priority: Medium     Formatting of this note might be different from the original.  After AV luis ablation for AF with RVR       Acute renal failure (ARF) (H) 06/22/2021     Priority: Medium     Hyponatremia 06/22/2021     Priority: Medium     Lung nodule 04/28/2021     Priority: Medium     Intermediate stage nonexudative age-related macular degeneration of both eyes 01/13/2021     Priority: Medium     SKY (obstructive sleep apnea) 04/08/2020     Priority: Medium     Chronic kidney disease, stage III (moderate) (H) 11/13/2019     Priority: Medium     Formatting of this note might be different from the original.  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  7/16/18  Ca++=10.8   Alb=3.8   7/17/18:  PTH=95.6  Ca++=10.1 (after diuresis), some renal impairment, was on vit D  and calcium outpt       Hypertensive heart and kidney disease 07/16/2018     Priority: Medium     Formatting of this note might be different from the original.  Atrophic left kidney, rise in Cr with increase in bumex 2018       Prolonged QT interval 07/16/2018     Priority: Medium     Formatting of this note might be different from the original.  On sotolol       Atrophy of left kidney 05/18/2017     Priority: Medium     Dermatitis of lower extremity 05/05/2017     Priority: Medium     Hx of gout 08/13/2014     Priority: Medium     Neoplasm of skin of neck 06/06/2014     Priority: Medium     COPD mixed type (H) 04/06/2013     Priority: Medium     Formatting of this note might be different from the original.  On Qvar and combivent, hx of cor pulmonale       Lower extremity edema 04/06/2013     Priority: Medium     HTN (hypertension) 09/07/2011     Priority: Medium     Migraine variant 12/10/2004     Priority: Medium     Problem list name updated by automated process. Provider to review       Elevated blood pressure reading without diagnosis of hypertension 12/10/2004     Priority: Medium     Depressive disorder, not elsewhere classified 11/10/2004     Priority: Medium     Osteoporosis 11/10/2004     Priority: Medium     Problem list name updated by automated process. Provider to review       Tobacco use disorder 11/10/2004     Priority: Medium      Past Medical History:   Diagnosis Date     Acute renal failure (ARF) (H) 6/22/2021     Anemia in chronic kidney disease, on chronic dialysis (H) 7/14/2021     Atrophy of left kidney 5/18/2017     Chronic atrial fibrillation (H) 4/1/2013     Chronic kidney disease, stage III (moderate) (H) 11/13/2019    Formatting of this note might be different from the original. Cr=1.2     COPD mixed type (H) 4/6/2013    Formatting of this note might be different from the original. On Qvar and combivent, hx of cor pulmonale     Cor pulmonale (chronic) (H) 9/20/2021     Depressive  disorder, not elsewhere classified      Dermatitis of lower extremity 5/5/2017     Elevated blood pressure reading without diagnosis of hypertension 12/10/2004     HTN (hypertension) 9/7/2011     Hx of gout 8/13/2014     Hypercalcemia 7/16/2018    Formatting of this note might be different from the original. 7/16/18  Ca++=10.8   Alb=3.8   7/17/18:  PTH=95.6  Ca++=10.1 (after diuresis), some renal impairment, was on vit D and calcium outpt     Hypertensive heart and kidney disease 7/16/2018    Formatting of this note might be different from the original. Atrophic left kidney, rise in Cr with increase in bumex 2018     Hyponatremia 6/22/2021     Intermediate stage nonexudative age-related macular degeneration of both eyes 1/13/2021     Lower extremity edema 4/6/2013     Lung nodule 4/28/2021     Migraine variant 12/10/2004     Problem list name updated by automated process. Provider to review     Neoplasm of skin of neck 6/6/2014     NSVT (nonsustained ventricular tachycardia) (H) 4/9/2020     SKY (obstructive sleep apnea) 4/8/2020     Osteoporosis 11/10/2004     Problem list name updated by automated process. Provider to review     Osteoporosis, unspecified dx 8/02     Pacemaker 7/14/2021    Formatting of this note might be different from the original. 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 On sotolol     Tobacco use disorder      Variants of migraine, not elsewhere classified, without mention of intractable migraine without mention of status migrainosus     flashing lights     Past Surgical History:   Procedure Laterality Date     ZZC NONSPECIFIC PROCEDURE  1990's    right ankle fracture     ZZC NONSPECIFIC PROCEDURE      spontaneous AB     ZZC NONSPECIFIC PROCEDURE      tonsillectomy     ZZC NONSPECIFIC PROCEDURE  1980's    tubal  ligation     Current Outpatient Medications   Medication Sig Dispense Refill     acetaminophen (TYLENOL) 325 MG tablet Take 325 mg by mouth every 4 hours as needed       albuterol (PROAIR HFA/PROVENTIL HFA/VENTOLIN HFA) 108 (90 Base) MCG/ACT inhaler Inhale 2 puffs into the lungs every 4 hours as needed       allopurinol (ZYLOPRIM) 100 MG tablet Take 1 tablet (100 mg) by mouth daily 30 tablet 11     apixaban ANTICOAGULANT (ELIQUIS) 2.5 MG tablet Take 1 tablet (2.5 mg) by mouth 2 times daily 60 tablet 11     atorvastatin (LIPITOR) 40 MG tablet Take 1 tablet (40 mg) by mouth daily 30 tablet 11     citalopram (CELEXA) 10 MG tablet Take 0.5 tablets (5 mg) by mouth daily 15 tablet 11     ipratropium - albuterol 0.5 mg/2.5 mg/3 mL (DUONEB) 0.5-2.5 (3) MG/3ML neb solution Inhale 3 mLs into the lungs every 6 hours as needed       Melatonin 10 MG TABS tablet Take 10 mg by mouth nightly as needed       Multiple Vitamins-Minerals (PRESERVISION AREDS 2) CAPS Take 2 capsules by mouth 2 times daily       pantoprazole (PROTONIX) 40 MG EC tablet Take 1 tablet (40 mg) by mouth daily 30 tablet 11     sildenafil (REVATIO) 20 MG tablet Take 1 tablet (20 mg) by mouth 3 times daily 90 tablet 11     sodium chloride (OCEAN) 0.65 % nasal spray Spray 1 spray into both nostrils daily as needed for congestion       tiotropium-olodaterol (STIOLTO RESPIMAT) 2.5-2.5 MCG/ACT AERS Inhale 2 puffs into the lungs daily       torsemide (DEMADEX) 100 MG tablet Take 0.5 tablets (50 mg) by mouth 2 times daily 30 tablet 11       Allergies   Allergen Reactions     Sotalol Other (See Comments)     Prolonged QT     Bupropion Other (See Comments)     Na 118 acutely. Comorbid diuresis for heart failure        Social History     Tobacco Use     Smoking status: Former Smoker     Packs/day: 1.00     Years: 43.00     Pack years: 43.00     Types: Cigarettes     Quit date: 2020     Years since quittin.5     Smokeless tobacco: Never Used   Substance Use Topics  "    Alcohol use: Yes     Comment: occ. glass of wine     Family History   Problem Relation Age of Onset     Hypertension Mother      Arthritis Mother      Circulatory Mother      Heart Disease Mother      Lipids Mother      Osteoporosis Mother      Alcohol/Drug Father         ?MI     Circulatory Father      Heart Disease Father      Hypertension Sister      Allergies Sister         thyroid dz     Arthritis Sister      Osteoporosis Sister      Obesity Brother      Alcohol/Drug Brother         dm, ptsd, cad     Bladder Cancer Brother      Diabetes Brother      Myocardial Infarction Brother      Prostate Cancer Brother      Cerebrovascular Disease Maternal Grandmother      Breast Cancer Maternal Grandmother         94     Cancer - colorectal Maternal Grandmother         97     Arthritis Maternal Grandmother      Gastrointestinal Disease Maternal Grandmother      Osteoporosis Maternal Grandmother      Thyroid Disease Maternal Grandmother      Osteoarthritis Maternal Grandfather      C.A.D. Maternal Grandfather      Heart Failure Paternal Grandmother      Hyperlipidemia Maternal Uncle      Alzheimer Disease Maternal Uncle      Thyroid Disease Other         niece     History   Drug Use No         Objective     /62   Pulse 86   Temp 98  F (36.7  C)   Resp 26   Ht 1.626 m (5' 4\")   Wt 54.8 kg (120 lb 12.8 oz)   SpO2 99%   BMI 20.74 kg/m       Physical Exam  GENERAL APPEARANCE:  Alert, in no distress, pleasant, cooperative, thin well dressed women sitting on edge of bed.  EYES:  EOM intact, lids normal, PERRL BL, sclera clear and conjunctiva normal, no discharge   ENT:  Mouth normal, moist mucous membranes, nose without drainage or crusting, external ears without lesions, hearing acuity grossly in tact.  NECK:  Nontender, supple, symmetrical; no cervical adenopathy, masses or thyromegaly, trachea midline  RESP:  Non-labored breathing, palpation of chest w/ L CW pacer and R tunnel cath, no chest wall tenderness, " "no respiratory distress, Lung sounds clear, patient is on supplemental oxygen via NC as needed.  CV:  Palpation - no murmur/non-displaced PMI, L CW pacemaker with well healed incision and R tunneled cath with dressing CDI. Auscultation - rate and rhythm irregular.   VASCULAR: No edema bilateral lower extremities.   ABDOMEN:  Normal bowel sounds, soft, nontender, no grimacing or guarding with palpation. No hepatosplenomegaly. No appreciable masses.  M/S:   Gait and station ambulates independently with walker and uses wheelchair. Scoliosis thoracic spine.  SKIN:  Inspection - < 0.5 cm nodular growths to neck, site of previous skin cancer. R chest wall tunneled cath with CDI dressing. Well healed L CW incision over pacemaker.  Palpation- no increased warmth, skin is dry and non-tender.  NEURO: Cranial nerves II-XII grossly intact, no facial asymmetry, follows simple commands, moves all extremities symmetrically, normal tone, no tremor noted today.  PSYCH: Awake and alert, speech fluent,  insight and judgement fair, oriented to person/place/time, memory fiar, without depressed or anxious affect, calm and cooperative.          CrCl (64\", 54 kg, Cr 3.03)          Echo 5/19/21   Final Conclusion Previous Study: 04/29/2021    1. Normal left ventricular chamber size. Normal left ventricular wall thickness. No regional   wall motion abnormalities. Normal left    ventricular systolic function. Calculated left ventricular ejection fraction (modified Krishna   technique) is 63 %.    2. Moderate-severe right ventricular chamber enlargement. Mildly-moderately decreased right   ventricular systolic function. Estimated    right ventricular systolic pressure is 55 mmHg.    3. Tricuspid annular dilatation. Moderate-severe tricuspid valve regurgitation.    4. Mildly calcified mitral annulus. Mildly thickened mitral valve. Mild-moderate mitral valve   regurgitation.    5. Mild aortic valve regurgitation.    6. Mild ascending aorta " "dilatation (3.8 cm).    7.  Severe biatrial enlargement.     Diagnostics:    No labs were ordered during this visit.     EKG: See above, pacemaker last checked on 10/12/21 in [facility]   \"3 episodes of NSVT logged since last device check on 8/4/21. With 3 EGMs available. EGMs show 6-8 beats of NSVT with V rates of 170-180 bpm. Patient does not recall symptoms with episodes.\"    NM Stress Test noted to be negative for ischemia per notes 7/2018 in [facility]    R heart catheterization on 5/17/2021    Revised Cardiac Risk Index (RCRI):  The patient has the following serious cardiovascular risks for perioperative complications:   - Congestive Heart Failure (pulmonary edema, PND, s3 robyn, CXR with pulmonary congestion, basilar rales) = 1 point   - Serum Creatinine >2.0 mg/dl = 1 point     RCRI Interpretation: 2 points: Class III (moderate risk - 6.6% complication rate)     Estimated Functional Capacity: CANNOT perform 4 METS without symptoms    ASSESSMENT/PLAN  Assessment & Plan     The proposed surgical procedure is considered INTERMEDIATE risk.    (N18.6,  Z99.2) ESRD (end stage renal disease) on dialysis (H)  (primary encounter diagnosis)  Comment: Newly diagnosed with SILAS on CKD stage III June 2021 etiology unclear but likely severe ATN with vomiting and afib with RVR/hypotension prior, imaging negative for acute process. Getting HD MWF at [facility] via R CW tunneled cath. Nephrologist Dr. Derrick Ro MD. Plan for right 1st stage brachiobasilic fistula vs R UE graft on 10/21/21 to allow for removal of tunneled catheter. Dry weight 53 kg. Recent baseline Cr ~ 3, CrCl 13.9 via C-G ideal body weight.   Plan:   - Approval given for right 1st stage brachiobasilic fistula vs R UE graft on 10/21/21 with Dr. Arredondo at [facility]    - R CW tunneled cath care by HD nursing, goal to remove once fistula vs graft matured to reduce infection/bleeding risk  - Will have HD on 10/20 and 10/22, " request updated labs prior to procedure    - Follow-up with Dr. Santos on 10/26 for post-op check and initial geriatrics physician visit  - Renally dose medications and avoid nephrotoxins  - MTM PharmD following   - Resident/sister to discuss skin itching with nephrology 10/25/21 next visit    (J44.9) Severe COPD (H)  Comment: Stable. No cough, sputum production, purulence. Using oxygen 2 L/min PRN. Recently stopped smoking (June 2021) after 60 year history of 1-2 PPD. Followed by pulmonology in past in [facility]. Using inhalers without issue, administered by nursing staff at [facility].  Plan:   - Continue PRN albuterol  - Continue Stiolto respimat daily (LABA/anticholingeric)  - PRN supplemental oxygen in place >> need to get smaller tanks, nursing to call [facility]   - Discointue Duonebs PRN due to global pandemic precautions  - Consider pulmonology referral in  System     (G47.33) SKY (obstructive sleep apnea)  Comment: Using CPAP prior to recent hospitalization but no longer has CPAP, previous CPAP settings were 5-20 cm H2O with nasal pillows  Plan:   - Referral place to [facility] sleep medicine to clarify need for ongoing CPAP use    (I48.20) Chronic atrial fibrillation (H)  (Z95.0) Pacemaker s/p AV Node Ablation  Comment: Chronic afib with digoxin toxicity in June 2021.   S/p ablation and pacemaker placement 6/25/21 anticoagulated in apixaban.   Lost to cardiology follow-up in [facility] system.   Plan:   - Based on CrCl will hold apixaban 48 hours prior to surgery   - Follow-up with Dr. Holt [facility] cardiology on 11/01/21    (I50.42) Chronic combined systolic and diastolic HF (heart failure) (H)  (I07.1) Tricuspid valve insufficiency, unspecified etiology  (I27.20) Pulmonary hypertension (H)  Comment: Last Echo 5/19/21 outside [facility] system with EF 60-65%, see report above.   Lost to follow-up with pulmonology never started on ambesartan due to cost and miscommunication with speciality clinic, do not  want to follow-up with previous care team. Mean PAP 34 mmHg on bilateral heart catheterization 5/17/2021.  Dry weight 53 kg, today 54 kg.  No SOB, orthopnea, PND, leg swelling. Chronic SANCHEZ unchanged.   Plan:   - Continue Torsemide 50 mg BID on day of surgery due to HF  -  Continue sildenafil 20 mg PO TID on day of surgery  - Continue PRN oxygen   - Follow-up with Dr. Holt 11/01/21  - Routine weights labs VS     (I10) Essential hypertension, benign  Comment: Chronic, at goal of < 140/90, no symptoms.  BP Readings from Last 3 Encounters:   10/14/21 100/62   09/20/21 99/71   08/17/05 134/82   Plan:   - Follow VS and labs  - Follow-up with Dr. Holt 11/01/21    (N18.6,  D63.1,  Z99.2) Anemia in chronic kidney disease, on chronic dialysis (H)  Comment: Hgb as low as 7.3 in July 2021, slowly increased to 12.2 on 10/11 over last two months. . .   9/20/21: Iron 49 WNL, Ferritin 836 H  7/5/21: B12 305 WNL,   Plan:   - Needs follow-up with [facility] for colonoscopy   - Continue PPI, reasonable given DOAC, can take AM of surgery    (I73.9) PAD (peripheral artery disease) (H)  (E78.5) HL  Comment: Stable, diagnosis per chart review, work to clarify hx  4/29/21: Chol 101 N, HDL 34 L, LDL 49 N, Tri 88 N  Plan:   - Continue Lipitor, take in PM, no change to administration  - Follow lipid panel      (H35.30) ARMD (age related macular degeneration)  Comment: Stable  Plan:   - Discotniunue eye vitamin, not taking   - Follows with opthalmology Dr. Rick     (F41.9,  F32.9) Anxiety and depression  Comment: Stable, multiple stressors over last six motnhs  Plan:  - Continue Celexa 5 mg daily at 12 PM >> will be in OR when due on 10/21, so will hold  - Continue melatonin PRN  - Referral placed for ACP supports    (R26.9) Gait Abnormality  Comment: Uses walker and wheelchair, limited by size and weight of oxygen tank. Spoke with PT today. Wheelchair will greatly improve functional mobility and QoL. Currently using [facility] for  mobility which is ill fitting.   Plan:   - HH PT/OT through [facility], discussed WC recommendations with PT today at start for pre-op visit, see separate encounter for WC order. Lives in [facility] with 24-7 staff to assist with mobility.    (R41.89) Cognitive impairment  Comment: Sister and patient endorse memory losses  Plan:   - HH OT to complete formal testing to further characterize deficits  - Continues to benefit from supportive care in [facility] setting  - Appreciate [facility]  supports    (M1A.[id number]) Chronic gout  Comment: Per history, uric acid level 5.0 11/13/19  Plan:   - Allopurinol 100 mg daily, okay to take AM of surgery  - Check uric acid with next labs, coordinate with HD if possible, address after procedure if unable prior     (Z71.89) ACP (advance care planning)  Comment: No POLST on chart, copy at faciltiy  Plan:   - Confirms Full Code, but due to recent health concerns would like to have ongoing discussion  - Facility to POLST to HIMS  -  but  not involved in life, would prefer sister Mugs for decision making, appreciate support from [facility] . Would benefit from making HCD as soon as able.      Implanted Device:   - Type of device: Medtronic/W1SR01 [place] XT SR MRI, placed 6/25/21 in [facility], last check 10/12/21 in [facility] Patient advised to bring device information on day of surgery.    Risks and Recommendations:  The patient has the following additional risks and recommendations for perioperative complications:   - History of delirium or dementia  Cardiovascular:   - Please note recent runs of NSVT on last pacemaker check on 10/12/21, cardiology aware, no changes made to regimen or plan of care at this time  Pulmonary:    - Oxygen dependent lung disease, severe COPD  Obstructive Sleep Apnea:   - Does not currently have CPAP    Medication Instructions:   - Bleeding risk is low for this procedure (e.g. dental, skin, cataract).   - Apixaban (Eliquis): CrCl <50  mL/min. HOLD 48 hours before procedure.    - Diuretics: May continue due to heart failure.   - SSRIs, SNRIs, TCAs, Antipsychotics: Continue without modification.    - LABA, inhaled corticosteroid, long-acting anticholinergics: Continue without modification.   - Pulmonary hypertension medications: Continue without modification.    RECOMMENDATION:  APPROVAL GIVEN to proceed with proposed procedure, without further diagnostic evaluation. Will attempt to get updated labs at HD run 10/18 or 10/20 prior to procedure.  ** Discussed recent episodes of NSVT with OR nursing staff who feel Dr. Arredondo would not postpone surgery based on this information. Message also sent to cardiology team regarding upcoming procedure, no return message.**  Discussion of management with external physician/other qualified healthcare professional/appropriate source - reviewed NSVT with attending physician Dr. Santos, would not recommend delaying procedure due to this finding if no associated symptoms.     Signed Electronically by: JUSTO Jerry CNP  Copy of this evaluation report is provided to requesting physician.    .

## 2021-10-14 NOTE — LETTER
10/14/2021        RE: Francine Chris  [facility]  [address]  [facility] [address]          [facility]  [address] [place] [address]  Phone: [phone]  Fax: [phone]  Primary Provider: Eileen Chiang  Pre-op Performing Provider: EILEEN CHIANG    PREOPERATIVE EVALUATION:  Today's date: 10/14/2021    Francine Chris is a 76 year old female with PMH significant for hx of ESRD on HD, afib s/p AV node ablation and pacemaker on DOAC (dx 2013), HTN, combined systolic and diastolic HF, mod-severe tricuspid regurgitation, mid ascending aortic enlargement, BL LE edema, PAD, QTc prolongation, COPD, pulmonary HTN, hx of smoking, macular degeneration, anxiety/depression, SKY on CPAP, PUD who presents for a preoperative evaluation.    Surgical Information:  Surgery/Procedure: Right 1st stage brachiobasilic fistula vs R UE graft  Surgery Location: [facility]  Surgeon: Dr. Chiang with  [facility], tele# [phone]   Surgery Date: 10/21/21  Time of Surgery: 9:00 AM  Where patient plans to recover: At home with family  Fax number for surgical facility: [facility] [phone]  Type of Anesthesia Anticipated: Local with MAC    Subjective     HPI related to upcoming procedure:   Newly diagnosed with SILAS June 2021 etiology unclear but likely in setting of severe ATN with vomiting and afib with RVR and hypotension in setting of CKD stage III with atrophic left kidney; imaging was negative for acute process. Started on HD 6/21/21. Getting HD MWF at [facility] via R CW tunneled cath. Now ESRD and plan for AV fistula or graft for permanent HD access. Nephrologist is Dr. Derrick Ro MD, last visit 9/22/21.     Recent Health History Reviewed:  Hospitalized at [facility] from 6/21/21 to 7/5/21 with nausea/weakness/dairrhea and acute renal failure along with supratheraputic INR, hyperkalemia, and digoxin  toxicity. Per nephrology note unclear precipitating even for SILAS but likely severe ATN. Imaging negative for acute process. Previous baseline Cr 1.5-1.7 as of May 2021. Required emergent femoral line for HD on admission, was transitioned to right chest wall tunneled catheter on 6/29/21 as renal function did not return to baseline prior to discharge and HD still required. During this hospitalization coumadin was switched to apixaban.  Hospital stay significant for fluid overload on 7/3/21, restarted torsemide. Continued on supplemental oxygen. Permanent pacemaker placed 6/25/21 s/p ablation due to inability to use digoxin in setting of renal failure. Recovered at [facility] and discharged to [facility]. Per hospital discharge was supposed to follow-up at [facility] for Pacemaker check, but lost to follow-up per sister.    Preop Questions 10/14/2021   1. Have you ever had a heart attack or stroke? No   2. Have you ever had surgery on your heart or blood vessels, such as a stent placement, a coronary artery bypass, or surgery on an artery in your head, neck, heart, or legs? No   3. Do you have chest pain with activity? No   4. Do you have a history of  heart failure? YES - Followed by Dr. Talavera on [facility] prior to recent hospitalization. Combined systolic and diastolic heart failure. Hospitalization for CHF 07/2018 and 04/2020. Severe TR. Pulmonary HTN. Hx of non-ischemic cardiomyopathy.   5. Do you currently have a cold, bronchitis or symptoms of other infection? No   6. Do you have a cough, shortness of breath, or wheezing? No   7. Do you or anyone in your family have previous history of blood clots? YES - no personal hx, but thinks sibling had issues in the past, brother.   8. Do you or does anyone in your family have a serious bleeding problem such as prolonged bleeding following surgeries or cuts? No   9. Have you ever had problems with anemia or been told to take iron  pills? YES - Mild, no symptoms, last Hgb 12.2 on 10/11/21   10. Have you had any abnormal blood loss such as black, tarry or bloody stools, or abnormal vaginal bleeding? No   11. Have you ever had a blood transfusion? No   12. Are you willing to have a blood transfusion if it is medically needed before, during, or after your surgery? Yes   13. Have you or any of your relatives ever had problems with anesthesia? No   14. Do you have sleep apnea, excessive snoring or daytime drowsiness? YES - known SKY, no longer has CPAP, has sleep medicine follow-up 12/7/21  CPAP 5-20 cm H2O with nasal pillows     14a. Do you have a CPAP machine? Yes   15. Do you have any artifical heart valves or other implanted medical devices like a pacemaker, defibrillator, or continuous glucose monitor? YES - pacemaker placed 6/25/21   15a. What type of device do you have? Pacemaker - Medtronic/[id number] [place] XT SR MRI       15b. Name of the clinic that manages your device:  [facility], [facility]   16. Do you have artificial joints? No   17. Are you allergic to latex? No     Health Care Directive:  Patient does not have a Health Care Directive or Living Will: POLST at facility       Preoperative Review of :   reviewed - no record of controlled substances prescribed.    Review of Systems  Constitutional - No fever/chills. Appetite is okay. Sleeping okay, does not have CPAP.   HEENT - No. HA, No double/blurry vision. No vision change, follows with Dr. Rick. No difficulty hearing. Upper denture plate and needs dental work on bottom.  Pulmonary - No SOB at rest. + SANCHEZ resolves with rest. [facility] took CPAP, but she was wearing this prior to hospital. Only has large oxygen canisters.   CV- Pacemaker is new 6/25/21. Episode of chest pain recently at HD and episodic NSVT without symptoms.  PV - No leg swelling.  GI - No abd pain. No N/V. Tends to constipation.   - No dysuria, wears pull-up  incontinent of urine.   Neuro - No focal deficit. No dizziness or seizure. + tremor, saw neurology in the hospital, occurs at rest and with intention.   MS - Ambulatory with 4WW and wheelchair for longer distances, no pain in muscles or joints. Heavy oxygen tanks, does not have concentrator unable to walk with large tanks. Has 24-7 staff to assist with WC use. Environment allows for patient to use WC without issue. Resident willing to use wheelchair. Due to unsteady gait, CHF and COPD unable to use walker alone to meet functional mobility needs, wheelchair allows patient to get to meals and activities, as well numerous medical appointments.   SKIN - SCC of neck s/p resection, lesion coming back.  Psychiatric - Mood improved with time, getting used to facility. Memory issues over last several years, no formal dementia diagnosis.    Patient Active Problem List    Diagnosis Date Noted     Cor pulmonale (chronic) (H) 09/20/2021     Priority: Medium     Anemia in chronic kidney disease, on chronic dialysis (H) 07/14/2021     Priority: Medium     Pacemaker 07/14/2021     Priority: Medium     Formatting of this note might be different from the original.  After AV luis ablation for AF with RVR       Acute renal failure (ARF) (H) 06/22/2021     Priority: Medium     Hyponatremia 06/22/2021     Priority: Medium     Lung nodule 04/28/2021     Priority: Medium     Intermediate stage nonexudative age-related macular degeneration of both eyes 01/13/2021     Priority: Medium     SKY (obstructive sleep apnea) 04/08/2020     Priority: Medium     Chronic kidney disease, stage III (moderate) (H) 11/13/2019     Priority: Medium     Formatting of this note might be different from the original.  Cr=1.2       PAD (peripheral artery disease) (H) 02/08/2019     Priority: Medium     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     Priority: Medium     Hypercalcemia 07/16/2018     Priority: Medium     Formatting of this note might be different from the  original.  7/16/18  Ca++=10.8   Alb=3.8   7/17/18:  PTH=95.6  Ca++=10.1 (after diuresis), some renal impairment, was on vit D and calcium outpt       Hypertensive heart and kidney disease 07/16/2018     Priority: Medium     Formatting of this note might be different from the original.  Atrophic left kidney, rise in Cr with increase in bumex 2018       Prolonged QT interval 07/16/2018     Priority: Medium     Formatting of this note might be different from the original.  On sotolol       Atrophy of left kidney 05/18/2017     Priority: Medium     Dermatitis of lower extremity 05/05/2017     Priority: Medium     Hx of gout 08/13/2014     Priority: Medium     Neoplasm of skin of neck 06/06/2014     Priority: Medium     COPD mixed type (H) 04/06/2013     Priority: Medium     Formatting of this note might be different from the original.  On Qvar and combivent, hx of cor pulmonale       Lower extremity edema 04/06/2013     Priority: Medium     HTN (hypertension) 09/07/2011     Priority: Medium     Migraine variant 12/10/2004     Priority: Medium     Problem list name updated by automated process. Provider to review       Elevated blood pressure reading without diagnosis of hypertension 12/10/2004     Priority: Medium     Depressive disorder, not elsewhere classified 11/10/2004     Priority: Medium     Osteoporosis 11/10/2004     Priority: Medium     Problem list name updated by automated process. Provider to review       Tobacco use disorder 11/10/2004     Priority: Medium      Past Medical History:   Diagnosis Date     Acute renal failure (ARF) (H) 6/22/2021     Anemia in chronic kidney disease, on chronic dialysis (H) 7/14/2021     Atrophy of left kidney 5/18/2017     Chronic atrial fibrillation (H) 4/1/2013     Chronic kidney disease, stage III (moderate) (H) 11/13/2019    Formatting of this note might be different from the original. Cr=1.2     COPD mixed type (H) 4/6/2013    Formatting of this note might be different  from the original. On Qvar and combivent, hx of cor pulmonale     Cor pulmonale (chronic) (H) 9/20/2021     Depressive disorder, not elsewhere classified      Dermatitis of lower extremity 5/5/2017     Elevated blood pressure reading without diagnosis of hypertension 12/10/2004     HTN (hypertension) 9/7/2011     Hx of gout 8/13/2014     Hypercalcemia 7/16/2018    Formatting of this note might be different from the original. 7/16/18  Ca++=10.8   Alb=3.8   7/17/18:  PTH=95.6  Ca++=10.1 (after diuresis), some renal impairment, was on vit D and calcium outpt     Hypertensive heart and kidney disease 7/16/2018    Formatting of this note might be different from the original. Atrophic left kidney, rise in Cr with increase in bumex 2018     Hyponatremia 6/22/2021     Intermediate stage nonexudative age-related macular degeneration of both eyes 1/13/2021     Lower extremity edema 4/6/2013     Lung nodule 4/28/2021     Migraine variant 12/10/2004     Problem list name updated by automated process. Provider to review     Neoplasm of skin of neck 6/6/2014     NSVT (nonsustained ventricular tachycardia) (H) 4/9/2020     SKY (obstructive sleep apnea) 4/8/2020     Osteoporosis 11/10/2004     Problem list name updated by automated process. Provider to review     Osteoporosis, unspecified dx 8/02     Pacemaker 7/14/2021    Formatting of this note might be different from the original. 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 On sotolol     Tobacco use disorder      Variants of migraine, not elsewhere classified, without mention of intractable migraine without mention of status migrainosus     flashing lights     Past Surgical History:   Procedure Laterality Date     ZZC NONSPECIFIC PROCEDURE  1990's    right ankle fracture     ZZC NONSPECIFIC  PROCEDURE      spontaneous AB     ZZC NONSPECIFIC PROCEDURE      tonsillectomy     ZZC NONSPECIFIC PROCEDURE  1980's    tubal ligation     Current Outpatient Medications   Medication Sig Dispense Refill     acetaminophen (TYLENOL) 325 MG tablet Take 325 mg by mouth every 4 hours as needed       albuterol (PROAIR HFA/PROVENTIL HFA/VENTOLIN HFA) 108 (90 Base) MCG/ACT inhaler Inhale 2 puffs into the lungs every 4 hours as needed       allopurinol (ZYLOPRIM) 100 MG tablet Take 1 tablet (100 mg) by mouth daily 30 tablet 11     apixaban ANTICOAGULANT (ELIQUIS) 2.5 MG tablet Take 1 tablet (2.5 mg) by mouth 2 times daily 60 tablet 11     atorvastatin (LIPITOR) 40 MG tablet Take 1 tablet (40 mg) by mouth daily 30 tablet 11     citalopram (CELEXA) 10 MG tablet Take 0.5 tablets (5 mg) by mouth daily 15 tablet 11     ipratropium - albuterol 0.5 mg/2.5 mg/3 mL (DUONEB) 0.5-2.5 (3) MG/3ML neb solution Inhale 3 mLs into the lungs every 6 hours as needed       Melatonin 10 MG TABS tablet Take 10 mg by mouth nightly as needed       Multiple Vitamins-Minerals (PRESERVISION AREDS 2) CAPS Take 2 capsules by mouth 2 times daily       pantoprazole (PROTONIX) 40 MG EC tablet Take 1 tablet (40 mg) by mouth daily 30 tablet 11     sildenafil (REVATIO) 20 MG tablet Take 1 tablet (20 mg) by mouth 3 times daily 90 tablet 11     sodium chloride (OCEAN) 0.65 % nasal spray Spray 1 spray into both nostrils daily as needed for congestion       tiotropium-olodaterol (STIOLTO RESPIMAT) 2.5-2.5 MCG/ACT AERS Inhale 2 puffs into the lungs daily       torsemide (DEMADEX) 100 MG tablet Take 0.5 tablets (50 mg) by mouth 2 times daily 30 tablet 11       Allergies   Allergen Reactions     Sotalol Other (See Comments)     Prolonged QT     Bupropion Other (See Comments)     Na 118 acutely. Comorbid diuresis for heart failure        Social History     Tobacco Use     Smoking status: Former Smoker     Packs/day: 1.00     Years: 43.00     Pack years: 43.00      "Types: Cigarettes     Quit date: 2020     Years since quittin.5     Smokeless tobacco: Never Used   Substance Use Topics     Alcohol use: Yes     Comment: occ. glass of wine     Family History   Problem Relation Age of Onset     Hypertension Mother      Arthritis Mother      Circulatory Mother      Heart Disease Mother      Lipids Mother      Osteoporosis Mother      Alcohol/Drug Father         ?MI     Circulatory Father      Heart Disease Father      Hypertension Sister      Allergies Sister         thyroid dz     Arthritis Sister      Osteoporosis Sister      Obesity Brother      Alcohol/Drug Brother         dm, ptsd, cad     Bladder Cancer Brother      Diabetes Brother      Myocardial Infarction Brother      Prostate Cancer Brother      Cerebrovascular Disease Maternal Grandmother      Breast Cancer Maternal Grandmother         94     Cancer - colorectal Maternal Grandmother         97     Arthritis Maternal Grandmother      Gastrointestinal Disease Maternal Grandmother      Osteoporosis Maternal Grandmother      Thyroid Disease Maternal Grandmother      Osteoarthritis Maternal Grandfather      C.A.D. Maternal Grandfather      Heart Failure Paternal Grandmother      Hyperlipidemia Maternal Uncle      Alzheimer Disease Maternal Uncle      Thyroid Disease Other         niece     History   Drug Use No         Objective     /62   Pulse 86   Temp 98  F (36.7  C)   Resp 26   Ht 1.626 m (5' 4\")   Wt 54.8 kg (120 lb 12.8 oz)   SpO2 99%   BMI 20.74 kg/m       Physical Exam  GENERAL APPEARANCE:  Alert, in no distress, pleasant, cooperative, thin well dressed women sitting on edge of bed.  EYES:  EOM intact, lids normal, PERRL BL, sclera clear and conjunctiva normal, no discharge   ENT:  Mouth normal, moist mucous membranes, nose without drainage or crusting, external ears without lesions, hearing acuity grossly in tact.  NECK:  Nontender, supple, symmetrical; no cervical adenopathy, masses or " "thyromegaly, trachea midline  RESP:  Non-labored breathing, palpation of chest w/ L CW pacer and R tunnel cath, no chest wall tenderness, no respiratory distress, Lung sounds clear, patient is on supplemental oxygen via NC as needed.  CV:  Palpation - no murmur/non-displaced PMI, L CW pacemaker with well healed incision and R tunneled cath with dressing CDI. Auscultation - rate and rhythm irregular.   VASCULAR: No edema bilateral lower extremities.   ABDOMEN:  Normal bowel sounds, soft, nontender, no grimacing or guarding with palpation. No hepatosplenomegaly. No appreciable masses.  M/S:   Gait and station ambulates independently with walker and uses wheelchair. Scoliosis thoracic spine.  SKIN:  Inspection - < 0.5 cm nodular growths to neck, site of previous skin cancer. R chest wall tunneled cath with CDI dressing. Well healed L CW incision over pacemaker.  Palpation- no increased warmth, skin is dry and non-tender.  NEURO: Cranial nerves II-XII grossly intact, no facial asymmetry, follows simple commands, moves all extremities symmetrically, normal tone, no tremor noted today.  PSYCH: Awake and alert, speech fluent,  insight and judgement fair, oriented to person/place/time, memory fiar, without depressed or anxious affect, calm and cooperative.          CrCl (64\", 54 kg, Cr 3.03)          Echo 5/19/21   Final Conclusion Previous Study: 04/29/2021    1. Normal left ventricular chamber size. Normal left ventricular wall thickness. No regional   wall motion abnormalities. Normal left    ventricular systolic function. Calculated left ventricular ejection fraction (modified Krishna   technique) is 63 %.    2. Moderate-severe right ventricular chamber enlargement. Mildly-moderately decreased right   ventricular systolic function. Estimated    right ventricular systolic pressure is 55 mmHg.    3. Tricuspid annular dilatation. Moderate-severe tricuspid valve regurgitation.    4. Mildly calcified mitral annulus. Mildly " "thickened mitral valve. Mild-moderate mitral valve   regurgitation.    5. Mild aortic valve regurgitation.    6. Mild ascending aorta dilatation (3.8 cm).    7.  Severe biatrial enlargement.     Diagnostics:    No labs were ordered during this visit.     EKG: See above, pacemaker last checked on 10/12/21 in [facility]   \"3 episodes of NSVT logged since last device check on 8/4/21. With 3 EGMs available. EGMs show 6-8 beats of NSVT with V rates of 170-180 bpm. Patient does not recall symptoms with episodes.\"    NM Stress Test noted to be negative for ischemia per notes 7/2018 in [facility]    R heart catheterization on 5/17/2021    Revised Cardiac Risk Index (RCRI):  The patient has the following serious cardiovascular risks for perioperative complications:   - Congestive Heart Failure (pulmonary edema, PND, s3 robyn, CXR with pulmonary congestion, basilar rales) = 1 point   - Serum Creatinine >2.0 mg/dl = 1 point     RCRI Interpretation: 2 points: Class III (moderate risk - 6.6% complication rate)     Estimated Functional Capacity: CANNOT perform 4 METS without symptoms    ASSESSMENT/PLAN  Assessment & Plan     The proposed surgical procedure is considered INTERMEDIATE risk.    (N18.6,  Z99.2) ESRD (end stage renal disease) on dialysis (H)  (primary encounter diagnosis)  Comment: Newly diagnosed with SILAS on CKD stage III June 2021 etiology unclear but likely severe ATN with vomiting and afib with RVR/hypotension prior, imaging negative for acute process. Getting HD MWF at [facility] via R CW tunneled cath. Nephrologist Dr. Derrick Ro MD. Plan for right 1st stage brachiobasilic fistula vs R UE graft on 10/21/21 to allow for removal of tunneled catheter. Dry weight 53 kg. Recent baseline Cr ~ 3, CrCl 13.9 via C-G ideal body weight.   Plan:   - Approval given for right 1st stage brachiobasilic fistula vs R UE graft on 10/21/21 with Dr. Arredondo at [facility]    - R CW tunneled cath " care by HD nursing, goal to remove once fistula vs graft matured to reduce infection/bleeding risk  - Will have HD on 10/20 and 10/22, request updated labs prior to procedure    - Follow-up with Dr. Santos on 10/26 for post-op check and initial geriatrics physician visit  - Renally dose medications and avoid nephrotoxins  - MTM PharmD following   - Resident/sister to discuss skin itching with nephrology 10/25/21 next visit    (J44.9) Severe COPD (H)  Comment: Stable. No cough, sputum production, purulence. Using oxygen 2 L/min PRN. Recently stopped smoking (June 2021) after 60 year history of 1-2 PPD. Followed by pulmonology in past in [facility]. Using inhalers without issue, administered by nursing staff at [facility].  Plan:   - Continue PRN albuterol  - Continue Stiolto respimat daily (LABA/anticholingeric)  - PRN supplemental oxygen in place >> need to get smaller tanks, nursing to call [facility]   - Discointue Duonebs PRN due to global pandemic precautions  - Consider pulmonology referral in  System     (G47.33) SKY (obstructive sleep apnea)  Comment: Using CPAP prior to recent hospitalization but no longer has CPAP, previous CPAP settings were 5-20 cm H2O with nasal pillows  Plan:   - Referral place to [facility] sleep medicine to clarify need for ongoing CPAP use    (I48.20) Chronic atrial fibrillation (H)  (Z95.0) Pacemaker s/p AV Node Ablation  Comment: Chronic afib with digoxin toxicity in June 2021.   S/p ablation and pacemaker placement 6/25/21 anticoagulated in apixaban.   Lost to cardiology follow-up in [facility] system.   Plan:   - Based on CrCl will hold apixaban 48 hours prior to surgery   - Follow-up with Dr. Holt [facility] cardiology on 11/01/21    (I50.42) Chronic combined systolic and diastolic HF (heart failure) (H)  (I07.1) Tricuspid valve insufficiency, unspecified etiology  (I27.20) Pulmonary hypertension (H)  Comment: Last Echo 5/19/21 outside [facility] system with EF 60-65%, see report  above.   Lost to follow-up with pulmonology never started on ambesartan due to cost and miscommunication with speciality clinic, do not want to follow-up with previous care team. Mean PAP 34 mmHg on bilateral heart catheterization 5/17/2021.  Dry weight 53 kg, today 54 kg.  No SOB, orthopnea, PND, leg swelling. Chronic SANCHEZ unchanged.   Plan:   - Continue Torsemide 50 mg BID on day of surgery due to HF  -  Continue sildenafil 20 mg PO TID on day of surgery  - Continue PRN oxygen   - Follow-up with Dr. Holt 11/01/21  - Routine weights labs VS     (I10) Essential hypertension, benign  Comment: Chronic, at goal of < 140/90, no symptoms.  BP Readings from Last 3 Encounters:   10/14/21 100/62   09/20/21 99/71   08/17/05 134/82   Plan:   - Follow VS and labs  - Follow-up with Dr. Holt 11/01/21    (N18.6,  D63.1,  Z99.2) Anemia in chronic kidney disease, on chronic dialysis (H)  Comment: Hgb as low as 7.3 in July 2021, slowly increased to 12.2 on 10/11 over last two months. . .   9/20/21: Iron 49 WNL, Ferritin 836 H  7/5/21: B12 305 WNL,   Plan:   - Needs follow-up with [facility] for colonoscopy   - Continue PPI, reasonable given DOAC, can take AM of surgery    (I73.9) PAD (peripheral artery disease) (H)  (E78.5) HL  Comment: Stable, diagnosis per chart review, work to clarify hx  4/29/21: Chol 101 N, HDL 34 L, LDL 49 N, Tri 88 N  Plan:   - Continue Lipitor, take in PM, no change to administration  - Follow lipid panel      (H35.30) ARMD (age related macular degeneration)  Comment: Stable  Plan:   - Discotniunue eye vitamin, not taking   - Follows with opthalmology Dr. Rick     (F41.9,  F32.9) Anxiety and depression  Comment: Stable, multiple stressors over last six motnhs  Plan:  - Continue Celexa 5 mg daily at 12 PM >> will be in OR when due on 10/21, so will hold  - Continue melatonin PRN  - Referral placed for ACP supports    (R26.9) Gait Abnormality  Comment: Uses walker and wheelchair, limited by size and  weight of oxygen tank. Spoke with PT today. Wheelchair will greatly improve functional mobility and QoL. Currently using facility WC for mobility which is ill fitting.   Plan:   - HH PT/OT through [employer], discussed WC recommendations with PT today at start for pre-op visit, see separate encounter for WC order. Lives in [place] with 24-7 staff to assist with mobility.    (R41.89) Cognitive impairment  Comment: Sister and patient endorse memory losses  Plan:   - HH OT to complete formal testing to further characterize deficits  - Continues to benefit from supportive care in correction setting  - Appreciate [facility]  supports    (M1A.9XXX0) Chronic gout  Comment: Per history, uric acid level 5.0 11/13/19  Plan:   - Allopurinol 100 mg daily, okay to take AM of surgery  - Check uric acid with next labs, coordinate with HD if possible, address after procedure if unable prior     (Z71.89) ACP (advance care planning)  Comment: No POLST on chart, copy at faciltiy  Plan:   - Confirms Full Code, but due to recent health concerns would like to have ongoing discussion  - Facility to POLST to HIMS  -  but  not involved in life, would prefer sister Mugs for decision making, appreciate support from [facility] . Would benefit from making HCD as soon as able.      Implanted Device:   - Type of device: Medtronic/[id number] [place] XT SR MRI, placed 6/25/21 in [facility], last check 10/12/21 in [facility] Patient advised to bring device information on day of surgery.    Risks and Recommendations:  The patient has the following additional risks and recommendations for perioperative complications:   - History of delirium or dementia  Cardiovascular:   - Please note recent runs of NSVT on last pacemaker check on 10/12/21, cardiology aware, no changes made to regimen or plan of care at this time  Pulmonary:    - Oxygen dependent lung disease, severe COPD  Obstructive Sleep Apnea:   - Does not currently have  CPAP    Medication Instructions:   - Bleeding risk is low for this procedure (e.g. dental, skin, cataract).   - Apixaban (Eliquis): CrCl <50 mL/min. HOLD 48 hours before procedure.    - Diuretics: May continue due to heart failure.   - SSRIs, SNRIs, TCAs, Antipsychotics: Continue without modification.    - LABA, inhaled corticosteroid, long-acting anticholinergics: Continue without modification.   - Pulmonary hypertension medications: Continue without modification.    RECOMMENDATION:  APPROVAL GIVEN to proceed with proposed procedure, without further diagnostic evaluation. Will attempt to get updated labs at HD run 10/18 or 10/20 prior to procedure.  ** Discussed recent episodes of NSVT with OR nursing staff who feel Dr. Arredondo would not postpone surgery based on this information. Message also sent to cardiology team regarding upcoming procedure, no return message.**  Discussion of management with external physician/other qualified healthcare professional/appropriate source - reviewed NSVT with attending physician Dr. Santos, would not recommend delaying procedure due to this finding if no associated symptoms.     Signed Electronically by: JUSTO Jerry CNP  Copy of this evaluation report is provided to requesting physician.    .        Sincerely,        JUSTO Jerry CNP

## 2021-10-14 NOTE — PROGRESS NOTES
Face to Face and Medical Necessity Statement for DME Provider visit    Demographic Information on Francine Chris:  Gender: female  : 1945  [facility]  [address]  [facility] [address]  [phone] (home) [phone] (work)    Medical Record: [id number]  Social Security Number: [id number]  Primary Care Provider: Eileen Arredondo  Insurance: Payor: [facility] / Plan: [facility] / Product Type: HMO /     HPI:   Francine Chris is a 76 year old  (1945), who is being seen today for a face to face provider visit at [facility]; medical necessity statement for DME included. This patient requires the following:  DME Ordered and Medical Necessity Statement     Wheelchair Documentation  Size: standard 16 x 16  Corresponding cushion: Yes: foam  Standard foot rests: Yes  Elevating leg rests: No  Arm rests: Yes: Removable  Lap tray: No  Dose the patient use oxygen? Yes: needs to carry tank   Is the patient able to propel wheelchair? Yes If no why not? NA. And is there someone who can? Yes, lives   1. The patient has mobility limitations that impairs their ability to participate in one or more mobility related activities: Toileting, Feeding, Grooming and Bathing.  The wheelchair is suitable and necessary for use in the patient's home.  2. The patient's mobility limitations cannot be safely resolved by using a cane/walker:Yes    Reason why a cane or walker will not meet the patient's needs. (ie: balance, tolerance, level of assistance) due impaired balance and low activity tolerance in setting of severe oxygen dependent COPD, ESRD, and HFpEF.  3. The patients home has adequate access to use a manual wheelchair:Yes  4. The use of a manual wheelchair on a regular basis will improve the patients ability to participate in mobility related ADL's at home:Yes  5. The patient is willing to use a manual wheelchair at home:Yes  6. The patient has adequate upper body strength and the mental capability  "to safely use a manual wheelchair and/or has a caregiver that is able to assist: Yes  7. Does the patient have a lower extremity injury or edema?No  Reason for Type of Wheelchair  Patient weight: 120 lbs 12.8 ox  Height: 5' 4\"     Pt needing above DME with expected length of need of 99  years  due to medical necessity associated with following diagnosis:    COPD, severe (H)    Chronic heart failure with preserved ejection fraction (HFpEF) (H)    ESRD (end stage renal disease) (H)    Abnormal gait    Frailty      PMH   has a past medical history of Acute renal failure (ARF) (H) (6/22/2021), Anemia in chronic kidney disease, on chronic dialysis (H) (7/14/2021), Atrophy of left kidney (5/18/2017), Chronic atrial fibrillation (H) (4/1/2013), Chronic kidney disease, stage III (moderate) (H) (11/13/2019), COPD mixed type (H) (4/6/2013), Cor pulmonale (chronic) (H) (9/20/2021), Depressive disorder, not elsewhere classified, Dermatitis of lower extremity (5/5/2017), Elevated blood pressure reading without diagnosis of hypertension (12/10/2004), HTN (hypertension) (9/7/2011), gout (8/13/2014), Hypercalcemia (7/16/2018), Hypertensive heart and kidney disease (7/16/2018), Hyponatremia (6/22/2021), Intermediate stage nonexudative age-related macular degeneration of both eyes (1/13/2021), Lower extremity edema (4/6/2013), Lung nodule (4/28/2021), Migraine variant (12/10/2004), Neoplasm of skin of neck (6/6/2014), NSVT (nonsustained ventricular tachycardia) (H) (4/9/2020), SKY (obstructive sleep apnea) (4/8/2020), Osteoporosis (11/10/2004), Osteoporosis, unspecified (dx 8/02), Pacemaker (7/14/2021), PAD (peripheral artery disease) (H) (2/8/2019), 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    ROS:  Constitutional - No fever/chills. Appetite is okay. " "Sleeping okay, does not have CPAP.   HEENT - No. HA, No double/blurry vision. No vision change, follows with Dr. Rick. No difficulty hearing. Upper denture plate and needs dental work on bottom.  Pulmonary - No SOB at rest. + SANCHEZ resolves with rest. [facility] took CPAP, but she was wearing this prior to hospital. Only has large oxygen canisters.   CV- Pacemaker is new 6/25/21. Episode of chest pain recently at HD and episodic NSVT without symptoms.  PV - No leg swelling.  GI - No abd pain. No N/V. Tends to constipation.   - No dysuria, wears pull-up incontinent of urine.   Neuro - No focal deficit. No dizziness or seizure. + tremor, saw neurology in the hospital, occurs at rest and with intention.   MS - Ambulatory with 4WW and wheelchair for longer distances, no pain in muscles or joints. Heavy oxygen tanks, does not have concentrator unable to walk with large tanks. Has 24-7 staff to assist with WC use. Environment allows for patient to use WC without issue. Resident willing to use wheelchair. Due to unsteady gait, CHF and COPD unable to use walker alone to meet functional mobility needs, wheelchair allows patient to get to meals and activities, as well numerous medical appointments.   SKIN - SCC of neck s/p resection, lesion coming back.  Psychiatric - Mood improved with time, getting used to facility. Memory issues over last several years, no formal dementia diagnosis.    EXAM  /62   Pulse 86   Temp 98  F (36.7  C)   Resp 26   Ht 1.626 m (5' 4\")   Wt 54.8 kg (120 lb 12.8 oz)   SpO2 99%   BMI 20.74 kg/m       Physical Exam  GENERAL APPEARANCE:  Alert, in no distress, pleasant, cooperative, thin well dressed women sitting on edge of bed.  EYES:  EOM intact, lids normal, PERRL BL, sclera clear and conjunctiva normal, no discharge   ENT:  Mouth normal, moist mucous membranes, nose without drainage or crusting, external ears without lesions, hearing acuity grossly in tact.  NECK:  " Nontender, supple, symmetrical; no cervical adenopathy, masses or thyromegaly, trachea midline  RESP:  Non-labored breathing, palpation of chest w/ L CW pacer and R tunnel cath, no chest wall tenderness, no respiratory distress, Lung sounds clear, patient is on supplemental oxygen via NC as needed.  CV:  Palpation - no murmur/non-displaced PMI, L CW pacemaker with well healed incision and R tunneled cath with dressing CDI. Auscultation - rate and rhythm irregular.   VASCULAR: No edema bilateral lower extremities.   ABDOMEN:  Normal bowel sounds, soft, nontender, no grimacing or guarding with palpation. No hepatosplenomegaly. No appreciable masses.  M/S:   Gait and station ambulates independently with walker and uses wheelchair. Scoliosis thoracic spine.  SKIN:  Inspection - < 0.5 cm nodular growths to neck, site of previous skin cancer. R chest wall tunneled cath with CDI dressing. Well healed L CW incision over pacemaker.  Palpation- no increased warmth, skin is dry and non-tender.  NEURO: Cranial nerves II-XII grossly intact, no facial asymmetry, follows simple commands, moves all extremities symmetrically, normal tone, no tremor noted today.  PSYCH: Awake and alert, speech fluent,  insight and judgement fair, oriented to person/place/time, memory fiar, without depressed or anxious affect, calm and cooperative.    ASSESSMENT/PLAN:  1. COPD, severe (H)    2. Chronic heart failure with preserved ejection fraction (HFpEF) (H)    3. ESRD (end stage renal disease) (H)    4. Abnormal gait    5. Frailty      Comment: Frail older adult with unsteady gait on supplemental oxygen in setting of COPD, HFpEF and need for x3/week HD does not currently have own wheelchair. She would benefit from WC due to limited activity tolerance. Due to SANCHEZ, physical deconditioning, and unsteady gait she is unable to carry oxygen tank with walker. WC will help her get to HD, meals, activities, toilet, and maintain independence with ADLs.  Currently using ill fitting facility provided WC.  Plan:  - Order for WC placed at request of [facility] PT who evaluated resident today. Faxed to [facility] per PT preference.  - Updated FVP      Orders:  1. Facility staff/TC to contact [employer] company to get their order form for provider to fill out: [facility] Attn: Ezio. Facility to fax face sheet.  Telephone: [phone]  Fax: [phone]    ELECTRONICALLY SIGNED BY JACLYN CERTIFIED PROVIDER:  JUSTO Jerry CNP   NPI: [id number]  [facility]  [facility], [address]  [place] [address]

## 2021-10-17 PROBLEM — N18.6 ESRD (END STAGE RENAL DISEASE) ON DIALYSIS (H): Status: ACTIVE | Noted: 2021-01-01

## 2021-10-17 PROBLEM — Z99.2 ESRD (END STAGE RENAL DISEASE) ON DIALYSIS (H): Status: ACTIVE | Noted: 2021-01-01

## 2021-10-20 NOTE — TELEPHONE ENCOUNTER
Francine Chris  1945  ORDERS:  - May resume Eliquis as previously ordered on Friday 10/22 unless otherwise directed by Dr. Arredondo   Electronically signed by:   JUSTO Jerry CNP  10/20/21 2:56 PM

## 2021-10-26 NOTE — LETTER
10/26/2021        RE: Francine Chris  [facility]  [address]  [facility] [address]        Francine Chris is a 76 year old female seen October 26, 2021 at [facility] where she has resided for one month (admit 9/2021) seen for initial visit.   She is seen in her apartment sitting edge of bed.   She has a small room that is full of DME, so very little room to move around.  She is interested in getting rid of her oxygen tanks, doesn't think she needs it as she has weaned herself off O2.   She denies any dyspnea or CP today   Some minimal discomfort RUE       By chart review, pt was living and working independently until this past spring.  She had CKD and chronic atrial fib anticoagulated with warfarin.  She had a [facility] stay in June 2021 for acute renal failure with hyperkalemia, digoxin toxicity and a supratherapeutic INR.  She was emergently dialyzed and has continued to require dialysis, has a tunneled catheter right chest wall.   She also had AV ablation and pacemaker placement, switched from warfarin to apixaban for anticoagulation.  She discharged to [facility] where she made functional gains with therapies, then transitioned to AL.   Has not yet had Cardiology or Pulmonology follow up.     Pt was at [facility] last week for the first stage of placement of brachial basilic fistula RUE.  Went well without complications.     Past Medical History:   Diagnosis Date     Acute renal failure (ARF) (H) 6/22/2021     Anemia in chronic kidney disease, on chronic dialysis (H) 7/14/2021     Atrophy of left kidney 5/18/2017     Chronic atrial fibrillation (H) 4/1/2013     Chronic kidney disease, stage III (moderate) (H) 11/13/2019    Formatting of this note might be different from the original. Cr=1.2     COPD mixed type (H) 4/6/2013    Formatting of this note might be different from the original. On Qvar and combivent, hx of cor pulmonale     Cor pulmonale (chronic)  (H) 9/20/2021     Depressive disorder, not elsewhere classified      Dermatitis of lower extremity 5/5/2017     Elevated blood pressure reading without diagnosis of hypertension 12/10/2004     HTN (hypertension) 9/7/2011     Hx of gout 8/13/2014     Hypercalcemia 7/16/2018    Formatting of this note might be different from the original. 7/16/18  Ca++=10.8   Alb=3.8   7/17/18:  PTH=95.6  Ca++=10.1 (after diuresis), some renal impairment, was on vit D and calcium outpt     Hypertensive heart and kidney disease 7/16/2018    Formatting of this note might be different from the original. Atrophic left kidney, rise in Cr with increase in bumex 2018     Hyponatremia 6/22/2021     Intermediate stage nonexudative age-related macular degeneration of both eyes 1/13/2021     Lower extremity edema 4/6/2013     Lung nodule 4/28/2021     Migraine variant 12/10/2004     Problem list name updated by automated process. Provider to review     Neoplasm of skin of neck 6/6/2014     NSVT (nonsustained ventricular tachycardia) (H) 4/9/2020     SKY (obstructive sleep apnea) 4/8/2020     Osteoporosis 11/10/2004     Problem list name updated by automated process. Provider to review     Osteoporosis, unspecified dx 8/02     Pacemaker 7/14/2021    Formatting of this note might be different from the original. 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 On sotolol     Tobacco use disorder      Variants of migraine, not elsewhere classified, without mention of intractable migraine without mention of status migrainosus     flashing lights       Past Surgical History:   Procedure Laterality Date     ZZC NONSPECIFIC PROCEDURE  1990's    right ankle fracture     ZZC NONSPECIFIC PROCEDURE      spontaneous AB     ZZC NONSPECIFIC PROCEDURE      tonsillectomy     ZC  "NONSPECIFIC PROCEDURE  1980's    tubal ligation     SH: Previously living alone  Her sister Tereza (Jhoan) is first contact and helps with her care  >40 pack year smoking history; quit date 4/2021       Pt worked as a  for a group home.       ROS: Ambulatory with 4WW, uses WC for distance   Weight in May 2021 was 135 lbs   Wt Readings from Last 5 Encounters:   10/26/21 54.8 kg (120 lb 12.8 oz)   10/14/21 54.8 kg (120 lb 12.8 oz)   09/20/21 53.5 kg (118 lb)   08/17/05 85.3 kg (188 lb)   12/10/04 88 kg (194 lb)      EXAM: NAD, frail, looks older than her stated age.   BP (!) 146/62   Pulse 74   Temp 97.2  F (36.2  C)   Resp 18   Ht 1.626 m (5' 4\")   Wt 54.8 kg (120 lb 12.8 oz)   SpO2 94%   BMI 20.74 kg/m     Neck supple without adenopathy  Lungs clear bilaterally   Heart RRR s1s2, I/VI HSM  Abd soft, NT, no distention or guarding, +BS  Ext with 1+ edema to knees, with chronic skin changes.     RUE fistula with +thrill, dressing without any drainage (not removed)   Neuro: normal speech, generalized weakness, no focal findings  Psych: affect okay     10/21/2021:     WHITE BLOOD COUNT         4.5 - 11.0 thou/cu mm 5.8    RED BLOOD COUNT           4.00 - 5.20 mil/cu mm 3.85Low     HEMOGLOBIN                12.0 - 16.0 g/dL 12.9    HEMATOCRIT                33.0 - 51.0 % 38.4    MCV                       80 - 100 fL 100    MCH                       26.0 - 34.0 pg 33.5    MCHC                      32.0 - 36.0 g/dL 33.6    RDW                       11.5 - 15.5 % 13.4    PLATELET COUNT            140 - 440 thou/cu mm 155           SODIUM 135 - 145 mmol/L 128Low     POTASSIUM 3.5 - 5.0 mmol/L 4.4    CHLORIDE 98 - 110 mmol/L 93Low     CO2,TOTAL 21 - 31 mmol/L 21    ANION GAP 5 - 18                  14    GLUCOSE 65 - 100 mg/dL 94    CALCIUM 8.5 - 10.5 mg/dL 11.0High     BUN 8 - 25 mg/dL 45High     CREATININE 0.57 - 1.11 mg/dL 3.83High     BUN/CREAT RATIO           10 - 20                  12    GFR      " >60 ml/min/1.73m2 11    XR CHEST 2 VIEWS PA AND LATERAL   DATE/TIME: 7/3/2021 2:21 PM   INDICATION: Shortness of breath   IMPRESSION: Right IJ dual lumen central line has been placed with tip at the SVC/RA junction. Small bilateral pleural effusions have enlarged with increased passive atelectasis in the lung bases. Remainder unchanged. Cardiac pacer lead tip in the right ventricle. Enlarged cardiac silhouette with pulmonary venous hypertension and interstitial edema, suggesting congestive heart failure or fluid overload. Scoliosis with moderate degenerative change. Calcified aortic atherosclerosis.        CT HEAD BRAIN WO      DATE/TIME: 6/22/2021 5:19 AM   INDICATION: Neurologic dysfunction   FINDINGS:   The exam is significantly degraded by motion.   INTRACRANIAL CONTENTS: No definite intracranial hemorrhage, extraaxial collection, or mass effect.  No definite CT evidence for acute infarct. Mild presumed chronic small vessel ischemic changes. Mild generalized volume loss. No hydrocephalus.   VISUALIZED ORBITS/SINUSES/MASTOIDS: No intraorbital abnormality. Chronic left maxillary sinusitis.   IMPRESSION:   1.  The exam is significantly degraded by motion.   2.  Within these limitations no definite acute intracranial findings.   3.  Chronic left maxillary sinusitis.      IMP/PLAN:   (J44.9) COPD, severe (H)    Comment: longtime smoker    Plan: Recommended she keep O2 in her room for prn use, as O2 is difficult to get quickly in AL setting      Stiolto respimat daily, and prn albuterol   Follow up with Pulmonology to achieve maximum treatment would be helpful      (I27.20) Pulmonary HTN (H)  (I50.32) Chronic heart failure with preserved ejection fraction (HFpEF) (H)  Comment: stable volume status today    Plan: torsemide 40 mg bid   Also on atorvastatin 40 mg/day   Follow weights, exam and BMP       (N18.6) ESRD (end stage renal disease) (H)  Comment: on MWF dialysis   Plan: per Nephrology    Monitor incision of new  fistula      (G47.33) SKY (obstructive sleep apnea)  Comment: no longer has CPAP, likely secondary to noncompliance     Plan: will need another formal sleep study       (I48.20) Chronic atrial fibrillation (H)  Comment: HR 70-80s without rate-slowing medications  S/p pacemaker placement 4 months ago   Plan: apixaban 2.5 mg bid for stroke prophylaxis      Follow up with Cardiology pending       (F41.9,  F32.A) Anxiety and depression  Comment: many stressors and losses   Plan: citalopram 5 mg/day    Melatonin 10 mg/HS prn for sleep     (R41.89) Cognitive impairment  Comment: no cog scores available, but clearly needs assist with multiple medical problems, appointments, etc.   Plan: AL support for med admin, meals, activity       (M1A.9XX0) Chronic gout without tophus, unspecified cause, unspecified site  Comment: by hx  Plan: allopurinol 100 mg/day      Grisel Santos MD         Sincerely,        Grisel Santos MD

## 2021-10-26 NOTE — PROGRESS NOTES
[facility] Care Coordination Contact    Received a message from [facility] requesting that this Care Coordinator follow up with [facility] regarding a wheelchair order that was faxed.  Called [facility] ([phone]) and was informed that they have not received the referral. Faxed the F2F and orders along with referral information to [facility].    GNP also requested that this Care Coordinator follow up with [facility] regarding her O2 tanks that she can't carry. And is wondering if there is a smaller tank option.  Currently has an E size tank which weighs about 6-7 pounds when it is full and lasts 5 hours at her current Rx.  The next size down is D size tank which weights about 3-4 pounds when full, and would fit into a backpack and would lasts 3 hours at her current Rx.  The smallest is a C, and weighs about 3 pounds and is 20 inches tall and stubbier than the other tanks and when full but will only last about 2 hours when full.  Called member to review the calls that were made. Updated her that this Care Coordinator placed the referral for the wheelchair since [facility] did not have her on file. Also reviewed the tank options that [facility] had for her. She was unclear of her current portable tank options and shared that the tank weighs 20#. Encouraged her to call her [facility] [employer] rep, and member shared that she does know her rep and he will come out when she calls him. Encouraged her to reach out and have him bring various portable tank options so she can better visualize the size in proportion to her room. Reviewed that she will need to keep in mind that she does need at least 1 tank that is able to last 5 hours at her current Rx to be able to cover her time at dialysis. Member stated understanding.    Jesica Whitehead, GAYLE  [facility]  [phone]

## 2021-10-26 NOTE — PROGRESS NOTES
Francine Chris is a 76 year old female seen October 26, 2021 at [facility] where she has resided for one month (admit 9/2021) seen for initial visit.   She is seen in her apartment sitting edge of bed.   She has a small room that is full of DME, so very little room to move around.  She is interested in getting rid of her oxygen tanks, doesn't think she needs it as she has weaned herself off O2.   She denies any dyspnea or CP today   Some minimal discomfort RUE       By chart review, pt was living and working independently until this past spring.  She had CKD and chronic atrial fib anticoagulated with warfarin.  She had a [facility] stay in June 2021 for acute renal failure with hyperkalemia, digoxin toxicity and a supratherapeutic INR.  She was emergently dialyzed and has continued to require dialysis, has a tunneled catheter right chest wall.   She also had AV ablation and pacemaker placement, switched from warfarin to apixaban for anticoagulation.  She discharged to [facility] T[facility] where she made functional gains with therapies, then transitioned to AL.   Has not yet had Cardiology or Pulmonology follow up.     Pt was at [facility] last week for the first stage of placement of brachial basilic fistula RUE.  Went well without complications.     Past Medical History:   Diagnosis Date     Acute renal failure (ARF) (H) 6/22/2021     Anemia in chronic kidney disease, on chronic dialysis (H) 7/14/2021     Atrophy of left kidney 5/18/2017     Chronic atrial fibrillation (H) 4/1/2013     Chronic kidney disease, stage III (moderate) (H) 11/13/2019    Formatting of this note might be different from the original. Cr=1.2     COPD mixed type (H) 4/6/2013    Formatting of this note might be different from the original. On Qvar and combivent, hx of cor pulmonale     Cor pulmonale (chronic) (H) 9/20/2021     Depressive disorder, not elsewhere classified      Dermatitis of lower extremity 5/5/2017     Elevated  blood pressure reading without diagnosis of hypertension 12/10/2004     HTN (hypertension) 9/7/2011     Hx of gout 8/13/2014     Hypercalcemia 7/16/2018    Formatting of this note might be different from the original. 7/16/18  Ca++=10.8   Alb=3.8   7/17/18:  PTH=95.6  Ca++=10.1 (after diuresis), some renal impairment, was on vit D and calcium outpt     Hypertensive heart and kidney disease 7/16/2018    Formatting of this note might be different from the original. Atrophic left kidney, rise in Cr with increase in bumex 2018     Hyponatremia 6/22/2021     Intermediate stage nonexudative age-related macular degeneration of both eyes 1/13/2021     Lower extremity edema 4/6/2013     Lung nodule 4/28/2021     Migraine variant 12/10/2004     Problem list name updated by automated process. Provider to review     Neoplasm of skin of neck 6/6/2014     NSVT (nonsustained ventricular tachycardia) (H) 4/9/2020     SKY (obstructive sleep apnea) 4/8/2020     Osteoporosis 11/10/2004     Problem list name updated by automated process. Provider to review     Osteoporosis, unspecified dx 8/02     Pacemaker 7/14/2021    Formatting of this note might be different from the original. 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 On sotolol     Tobacco use disorder      Variants of migraine, not elsewhere classified, without mention of intractable migraine without mention of status migrainosus     flashing lights       Past Surgical History:   Procedure Laterality Date     ZZC NONSPECIFIC PROCEDURE  1990's    right ankle fracture     ZZC NONSPECIFIC PROCEDURE      spontaneous AB     ZZC NONSPECIFIC PROCEDURE      tonsillectomy     ZZC NONSPECIFIC PROCEDURE  1980's    tubal ligation     SH: Previously living alone  Her sister Tereza (Mugs) is first contact and  "helps with her care  >40 pack year smoking history; quit date 4/2021       Pt worked as a  for a group home.       ROS: Ambulatory with 4WW, uses WC for distance   Weight in May 2021 was 135 lbs   Wt Readings from Last 5 Encounters:   10/26/21 54.8 kg (120 lb 12.8 oz)   10/14/21 54.8 kg (120 lb 12.8 oz)   09/20/21 53.5 kg (118 lb)   08/17/05 85.3 kg (188 lb)   12/10/04 88 kg (194 lb)      EXAM: NAD, frail, looks older than her stated age.   BP (!) 146/62   Pulse 74   Temp 97.2  F (36.2  C)   Resp 18   Ht 1.626 m (5' 4\")   Wt 54.8 kg (120 lb 12.8 oz)   SpO2 94%   BMI 20.74 kg/m     Neck supple without adenopathy  Lungs clear bilaterally   Heart RRR s1s2, I/VI HSM  Abd soft, NT, no distention or guarding, +BS  Ext with 1+ edema to knees, with chronic skin changes.     RUE fistula with +thrill, dressing without any drainage (not removed)   Neuro: normal speech, generalized weakness, no focal findings  Psych: affect okay     10/21/2021:     WHITE BLOOD COUNT         4.5 - 11.0 thou/cu mm 5.8    RED BLOOD COUNT           4.00 - 5.20 mil/cu mm 3.85Low     HEMOGLOBIN                12.0 - 16.0 g/dL 12.9    HEMATOCRIT                33.0 - 51.0 % 38.4    MCV                       80 - 100 fL 100    MCH                       26.0 - 34.0 pg 33.5    MCHC                      32.0 - 36.0 g/dL 33.6    RDW                       11.5 - 15.5 % 13.4    PLATELET COUNT            140 - 440 thou/cu mm 155           SODIUM 135 - 145 mmol/L 128Low     POTASSIUM 3.5 - 5.0 mmol/L 4.4    CHLORIDE 98 - 110 mmol/L 93Low     CO2,TOTAL 21 - 31 mmol/L 21    ANION GAP 5 - 18                  14    GLUCOSE 65 - 100 mg/dL 94    CALCIUM 8.5 - 10.5 mg/dL 11.0High     BUN 8 - 25 mg/dL 45High     CREATININE 0.57 - 1.11 mg/dL 3.83High     BUN/CREAT RATIO           10 - 20                  12    GFR      >60 ml/min/1.73m2 11    XR CHEST 2 VIEWS PA AND LATERAL   DATE/TIME: 7/3/2021 2:21 PM   INDICATION: Shortness of breath "   IMPRESSION: Right IJ dual lumen central line has been placed with tip at the SVC/RA junction. Small bilateral pleural effusions have enlarged with increased passive atelectasis in the lung bases. Remainder unchanged. Cardiac pacer lead tip in the right ventricle. Enlarged cardiac silhouette with pulmonary venous hypertension and interstitial edema, suggesting congestive heart failure or fluid overload. Scoliosis with moderate degenerative change. Calcified aortic atherosclerosis.        CT HEAD BRAIN WO      DATE/TIME: 6/22/2021 5:19 AM   INDICATION: Neurologic dysfunction   FINDINGS:   The exam is significantly degraded by motion.   INTRACRANIAL CONTENTS: No definite intracranial hemorrhage, extraaxial collection, or mass effect.  No definite CT evidence for acute infarct. Mild presumed chronic small vessel ischemic changes. Mild generalized volume loss. No hydrocephalus.   VISUALIZED ORBITS/SINUSES/MASTOIDS: No intraorbital abnormality. Chronic left maxillary sinusitis.   IMPRESSION:   1.  The exam is significantly degraded by motion.   2.  Within these limitations no definite acute intracranial findings.   3.  Chronic left maxillary sinusitis.      IMP/PLAN:   (J44.9) COPD, severe (H)    Comment: longtime smoker    Plan: Recommended she keep O2 in her room for prn use, as O2 is difficult to get quickly in AL setting      Stiolto respimat daily, and prn albuterol   Follow up with Pulmonology to achieve maximum treatment would be helpful      (I27.20) Pulmonary HTN (H)  (I50.32) Chronic heart failure with preserved ejection fraction (HFpEF) (H)  Comment: stable volume status today    Plan: torsemide 40 mg bid   Also on atorvastatin 40 mg/day   Follow weights, exam and BMP       (N18.6) ESRD (end stage renal disease) (H)  Comment: on MWF dialysis   Plan: per Nephrology    Monitor incision of new fistula      (G47.33) SKY (obstructive sleep apnea)  Comment: no longer has CPAP, likely secondary to noncompliance      Plan: will need another formal sleep study       (I48.20) Chronic atrial fibrillation (H)  Comment: HR 70-80s without rate-slowing medications  S/p pacemaker placement 4 months ago   Plan: apixaban 2.5 mg bid for stroke prophylaxis      Follow up with Cardiology pending       (F41.9,  F32.A) Anxiety and depression  Comment: many stressors and losses   Plan: citalopram 5 mg/day    Melatonin 10 mg/HS prn for sleep     (R41.89) Cognitive impairment  Comment: no cog scores available, but clearly needs assist with multiple medical problems, appointments, etc.   Plan: AL support for med admin, meals, activity       (M1A.9XX0) Chronic gout without tophus, unspecified cause, unspecified site  Comment: by hx  Plan: allopurinol 100 mg/day      Grisel Santos MD

## 2021-11-01 PROBLEM — R94.31 PROLONGED QT INTERVAL: Status: RESOLVED | Noted: 2018-07-16 | Resolved: 2021-01-01

## 2021-11-01 PROBLEM — N17.9 ACUTE RENAL FAILURE (ARF) (H): Status: RESOLVED | Noted: 2021-01-01 | Resolved: 2021-01-01

## 2021-11-01 PROBLEM — I13.10 HYPERTENSIVE HEART AND KIDNEY DISEASE: Status: RESOLVED | Noted: 2018-07-16 | Resolved: 2021-01-01

## 2021-11-01 NOTE — LETTER
11/1/2021    Eileen Arredondo, APRN CNP  [address]  [facility] [address]    RE: Francine Chris       Dear Colleague,    I had the pleasure of seeing Francine Chris in the [facility].    Clinic visit note dictated. Dictation reference number - [id number]          REVIEW OF SYSTEMS:  A comprehensive 10-point review of systems was completed and the pertinent positives are documented in the history of present illness.      CURRENT MEDICATIONS:  Current Outpatient Medications   Medication Sig Dispense Refill     acetaminophen (TYLENOL) 325 MG tablet Take 325 mg by mouth every 4 hours as needed       albuterol (PROAIR HFA/PROVENTIL HFA/VENTOLIN HFA) 108 (90 Base) MCG/ACT inhaler Inhale 2 puffs into the lungs every 4 hours as needed       allopurinol (ZYLOPRIM) 100 MG tablet Take 1 tablet (100 mg) by mouth daily 30 tablet 11     apixaban ANTICOAGULANT (ELIQUIS) 2.5 MG tablet Take 1 tablet (2.5 mg) by mouth 2 times daily 60 tablet 11     atorvastatin (LIPITOR) 40 MG tablet Take 1 tablet (40 mg) by mouth daily 30 tablet 11     citalopram (CELEXA) 10 MG tablet Take 0.5 tablets (5 mg) by mouth daily 15 tablet 11     Melatonin 10 MG TABS tablet Take 10 mg by mouth nightly as needed       pantoprazole (PROTONIX) 40 MG EC tablet Take 1 tablet (40 mg) by mouth daily 30 tablet 11     sodium chloride (OCEAN) 0.65 % nasal spray Spray 1 spray into both nostrils daily as needed for congestion       tiotropium-olodaterol (STIOLTO RESPIMAT) 2.5-2.5 MCG/ACT AERS Inhale 2 puffs into the lungs daily       torsemide (DEMADEX) 20 MG tablet Take 2 tablets (40 mg) by mouth daily Take at 8 AM and 1 PM. 120 tablet 4         ALLERGIES:  Allergies   Allergen Reactions     Sotalol Other (See Comments)     Prolonged QT     Bupropion Other (See Comments)     Na 118 acutely. Comorbid diuresis for heart failure       PAST MEDICAL HISTORY:    Past Medical History:   Diagnosis Date     Acute  renal failure (ARF) (H) 06/22/2021     Anemia in chronic kidney disease, on chronic dialysis (H) 07/14/2021     Atrophy of left kidney 05/18/2017     Chronic atrial fibrillation (H) 04/01/2013     Chronic kidney disease, stage III (moderate) (H) 11/13/2019    Formatting of this note might be different from the original. Cr=1.2     COPD mixed type (H) 04/06/2013    Formatting of this note might be different from the original. On Qvar and combivent, hx of cor pulmonale     Cor pulmonale (chronic) (H) 09/20/2021     Depressive disorder, not elsewhere classified      Dermatitis of lower extremity 05/05/2017     Elevated blood pressure reading without diagnosis of hypertension 12/10/2004     HTN (hypertension) 09/07/2011     Hx of gout 08/13/2014     Hypercalcemia 07/16/2018    Formatting of this note might be different from the original. 7/16/18  Ca++=10.8   Alb=3.8   7/17/18:  PTH=95.6  Ca++=10.1 (after diuresis), some renal impairment, was on vit D and calcium outpt     Hypertensive heart and kidney disease 07/16/2018    Formatting of this note might be different from the original. Atrophic left kidney, rise in Cr with increase in bumex 2018     Hyponatremia 06/22/2021     Intermediate stage nonexudative age-related macular degeneration of both eyes 01/13/2021     Lung nodule 04/28/2021     Migraine variant 12/10/2004     Problem list name updated by automated process. Provider to review     Neoplasm of skin of neck 06/06/2014     NSVT (nonsustained ventricular tachycardia) (H) 04/09/2020     SKY (obstructive sleep apnea) 04/08/2020     Osteoporosis 11/10/2004     Problem list name updated by automated process. Provider to review     Osteoporosis, unspecified dx 8/02     Pacemaker 07/14/2021    Formatting of this note might be different from the original. 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 On sotolol     Tobacco use disorder      Variants of migraine, not elsewhere classified, without mention of intractable migraine without mention of status migrainosus     flashing lights       PAST SURGICAL HISTORY:    Past Surgical History:   Procedure Laterality Date     ZZC NONSPECIFIC PROCEDURE  1990's    right ankle fracture     ZZC NONSPECIFIC PROCEDURE      spontaneous AB     ZZC NONSPECIFIC PROCEDURE      tonsillectomy     ZZC NONSPECIFIC PROCEDURE  1980's    tubal ligation       FAMILY HISTORY:    Family History   Problem Relation Age of Onset     Hypertension Mother      Arthritis Mother      Circulatory Mother      Heart Disease Mother      Lipids Mother      Osteoporosis Mother      Alcohol/Drug Father         ?MI     Circulatory Father      Heart Disease Father      Hypertension Sister      Allergies Sister         thyroid dz     Arthritis Sister      Osteoporosis Sister      Obesity Brother      Alcohol/Drug Brother         dm, ptsd, cad     Bladder Cancer Brother      Diabetes Brother      Myocardial Infarction Brother      Prostate Cancer Brother      Cerebrovascular Disease Maternal Grandmother      Breast Cancer Maternal Grandmother         94     Cancer - colorectal Maternal Grandmother         97     Arthritis Maternal Grandmother      Gastrointestinal Disease Maternal Grandmother      Osteoporosis Maternal Grandmother      Thyroid Disease Maternal Grandmother      Osteoarthritis Maternal Grandfather      C.A.D. Maternal Grandfather      Heart Failure Paternal Grandmother      Hyperlipidemia Maternal Uncle      Alzheimer Disease Maternal Uncle      Thyroid Disease Other         niece       SOCIAL HISTORY:    Social History     Socioeconomic History     Marital status: Legally      Spouse name: Derrick Chris     Number of children: 0     Years of education: None     Highest education level: None   Occupational History      "Employer: [employer]   Tobacco Use     Smoking status: Former Smoker     Packs/day: 1.00     Years: 43.00     Pack years: 43.00     Types: Cigarettes     Quit date: 2021     Years since quittin.5     Smokeless tobacco: Never Used   Substance and Sexual Activity     Alcohol use: Yes     Comment: occ. glass of wine     Drug use: No     Sexual activity: None   Other Topics Concern     None   Social History Narrative    She was  to Derrick Chris, and she  from him for over 20 years, and she has not had contact with him, and does not know where he is. She never officially  him.    She had a long term relationship with Ventura Lopez, who she considered herself to be  to, but because she was legally  to Derrick, she could not officially  him. Also Ventura was a quad, and legally marrying him would disrupt his benefits, as Francine was his PCA. Ventura  around .     Social Determinants of Health     Financial Resource Strain:      Difficulty of Paying Living Expenses:    Food Insecurity:      Worried About Running Out of Food in the Last Year:      Ran Out of Food in the Last Year:    Transportation Needs:      Lack of Transportation (Medical):      Lack of Transportation (Non-Medical):    Physical Activity:      Days of Exercise per Week:      Minutes of Exercise per Session:    Stress:      Feeling of Stress :    Social Connections:      Frequency of Communication with Friends and Family:      Frequency of Social Gatherings with Friends and Family:      Attends Spiritism Services:      Active Member of Clubs or Organizations:      Attends Club or Organization Meetings:      Marital Status:    Intimate Partner Violence:      Fear of Current or Ex-Partner:      Emotionally Abused:      Physically Abused:      Sexually Abused:        PHYSICAL EXAM:    Vitals: BP (!) 140/69   Pulse 79   Ht 1.626 m (5' 4\")   Wt 54.4 kg (120 lb)   SpO2 99%   BMI 20.60 kg/m    Wt " Readings from Last 5 Encounters:   11/01/21 54.4 kg (120 lb)   10/26/21 54.8 kg (120 lb 12.8 oz)   10/14/21 54.8 kg (120 lb 12.8 oz)   09/20/21 53.5 kg (118 lb)   08/17/05 85.3 kg (188 lb)               Encounter Diagnoses   Name Primary?     Severe tricuspid valve regurgitation Yes     Cor pulmonale (chronic) (H)      Pulmonary hypertension (H)      Chronic atrial fibrillation (H)      Pacemaker s/p AV Node Ablation      Chronic combined systolic and diastolic HF (heart failure) (H)      ESRD (end stage renal disease) on dialysis (H)      Anemia in other chronic diseases classified elsewhere      Frail elderly      PAD (peripheral artery disease) (H)      Former heavy tobacco smoker        Orders Placed This Encounter   Procedures     Follow-Up with Pulmonary Hypertension Clinic     Pulmonary Rehab Full Program Referral     EKG 12-lead complete w/read - Clinics (performed today)     Echocardiogram Complete       CC  REFERRING PROVIDER:  JUSTO Garcia CNP  [address] [place] [id number]    Thank you for allowing me to participate in the care of your patient.      Sincerely,     Alex Holt MD     [facility]  cc:   JUSTO Garcia CNP  [address] [place] [address] [address]  [place] [address]

## 2021-11-01 NOTE — PROGRESS NOTES
Order  sildenafil (REVATIO) 20 MG tablet [[id number]] (Order [id number])    Order Information    Ordered Status Priority Ordering User Department   09/30/21 Sent (none) Cheryl Cruz RN  GERIATRICS     Order History  Outpatient  Date/Time Action Taken User Additional Information   09/28/21 1713 Pend Cheryl Cruz RN    09/30/21 1026 Sign Cheryl Cruz RN Reorder from Order:[id number]; Ordering Mode: Telephone with readback   09/30/21 1026 Taking Flag Checked Cheryl Cruz RN    09/30/21 2032 Verbal Cosign Eileen Arredondo, APRN CNP    10/04/21 1421 Taking Flag Checked Eileen Arredondo, APR CNP    10/05/21 1010 Taking Flag Checked Lauren Lovett [facility]    10/14/21 0942 Taking Flag Checked Eileen Arredondo, APRN CNP    11/01/21 0848 Taking Flag Checked Lisa Myers LPN    11/01/21 0913 Taking Flag Unchecked Alex Holt MD    11/01/21 0913 Discontinue Alex Holt MD      Outpatient Medication Detail     Disp Refills Start End KATHE   sildenafil (REVATIO) 20 MG tablet (Discontinued) 90 tablet 11 9/30/2021 7/1/2021 No   Sig - Route: Take 1 tablet (20 mg) by mouth 3 times daily - Oral   Sent to pharmacy as: Sildenafil Citrate 20 MG Oral Tablet (REVATIO)   Class: E-Prescribe   Order: [id number]   E-Prescribing Status: Receipt confirmed by pharmacy (9/30/2021 10:27 AM CDT)   E-Cancel Status: Request sent; waiting for response (11/1/2021  9:13 AM CDT)         Printout Tracking    External Result Report     Pharmacy    [facility] - [address]     This Order Has Been Discontinued    Order Status Reason By On   Discontinued None Alex Holt MD 11/1/21 0913            Associated Diagnoses    HTN (hypertension) [I10]         Source Order Set    Order Set Name Order ID    [id number]     Verbal Order Info    Action Created on Order Mode Entered by Responsible Provider Signed by Signed on   Ordering 09/30/21 1026 Telephone with readback Anthony  IDANIA Luna, Eileen Larry, APRN CNP Eileen Arredondo APRN CNP 09/30/21 2032     Prescribing Provider's NPI: [id number]  Eileen Arredondo        Warnings History    No Interaction Warnings Shown      Encounter    View Encounter            Tracking Links    Cosign Tracking Order Transmittal      =================================  =================================  Order  torsemide (DEMADEX) 20 MG tablet [[id number]] (Order [id number])    Order Information    Ordered Status Priority Ordering User Department   11/01/21 Sent (none) Alex Holt MD [facility]     Order History  Outpatient  Date/Time Action Taken User Additional Information   11/01/21 0928 Sign Alex Holt MD Modify from Order:[id number]   11/01/21 0928 Taking Flag Checked Alex Holt MD      Outpatient Medication Detail     Disp Refills Start End KATHE   torsemide (DEMADEX) 20 MG tablet 120 tablet 4 11/1/2021  No   Sig - Route: Take 2 tablets (40 mg) by mouth daily Take at 8 AM and 1 PM. - Oral   Sent to pharmacy as: Torsemide 20 MG Oral Tablet (DEMADEX)   Class: E-Prescribe   Order: [id number]   E-Prescribing Status: Receipt confirmed by pharmacy (11/1/2021  9:28 AM CDT)     Printout Tracking    External Result Report     Medication Administration Instructions    Take at 8 AM and 1 PM.     Pharmacy    [facility] [address]     Source Order Set    Order Set Name Order ID    [id number]     Prescribing Provider's NPI: [id number]  Alex Holt        Warnings History    No Interaction Warnings Shown      Encounter    View Encounter            Tracking Links    Cosign Tracking Order Transmittal Tracking

## 2021-11-01 NOTE — PROGRESS NOTES
Call from Amena with [facility] asking for fax of orders [phone].  Medication orders from Dr Holt faxed.  Ekaterina Hebert RN 11/01/21 1:47 PM

## 2021-11-01 NOTE — PROGRESS NOTES
Pulmonary Hypertension Clinic  Provider:  Dr SHILO Holt  Visit Date: 2021  Location:  [facility]    Review of After Visit Summary (AVS) / Instructions from provider:   The AVS and instructions from the provider were reviewed in detail with the patient following their office visit with Dr. Holt.   Family present: Sister  The patient was educated on the outlined plan of care including tests, labs, medication changes, and follow up.  Reviewed heart failure education.  Patient verbalized understanding of the information and stated agreement to the plan.   Instructed patient to call with any questions or concerns.     Instructions from provider:  MEDICATION CHANGES:  1.  Change torsemide dosage to 40 mg 2 times daily.  Take at 8-9 AM and 1-2 PM.  2.  You can skip the morning dose of torsemide on the days of your dialysis.  TESTIN.  Heart ultrasound (transthoracic echocardiogram).  FOLLOW-UP:  1.  Referral to pulmonary rehabilitation exercise program. If you have not heard from the scheduling office within 2 business days, please call [phone] for all locations.  2.  Follow-up with Dr. Holt in the pulmonary hypertension clinic in 8 weeks with results of the heart ultrasound.      Return appointment:   Patient was given instructions on when and how to schedule their next office visit with the clinic.    Future Appointments   Date Time Provider Department Center   2021  8:45 AM [id number] [facility] CVIMG   2021  3:30 PM Artie Cary MD [facility] S[facility]   1/3/2022 11:15 AM Alex Holt MD [facility] PSA CLIN   2022  2:30 PM Sophia Damico PA-C [facility] OX   2022 12:00 AM [facility] [address] PSA CLIN     MARY JO Persaud, BSN, RN  RN Care Coordinator  [facility] locations  [phone]

## 2021-11-01 NOTE — PROGRESS NOTES
Clinic visit note dictated. Dictation reference number - [id number]          REVIEW OF SYSTEMS:  A comprehensive 10-point review of systems was completed and the pertinent positives are documented in the history of present illness.      CURRENT MEDICATIONS:  Current Outpatient Medications   Medication Sig Dispense Refill     acetaminophen (TYLENOL) 325 MG tablet Take 325 mg by mouth every 4 hours as needed       albuterol (PROAIR HFA/PROVENTIL HFA/VENTOLIN HFA) 108 (90 Base) MCG/ACT inhaler Inhale 2 puffs into the lungs every 4 hours as needed       allopurinol (ZYLOPRIM) 100 MG tablet Take 1 tablet (100 mg) by mouth daily 30 tablet 11     apixaban ANTICOAGULANT (ELIQUIS) 2.5 MG tablet Take 1 tablet (2.5 mg) by mouth 2 times daily 60 tablet 11     atorvastatin (LIPITOR) 40 MG tablet Take 1 tablet (40 mg) by mouth daily 30 tablet 11     citalopram (CELEXA) 10 MG tablet Take 0.5 tablets (5 mg) by mouth daily 15 tablet 11     Melatonin 10 MG TABS tablet Take 10 mg by mouth nightly as needed       pantoprazole (PROTONIX) 40 MG EC tablet Take 1 tablet (40 mg) by mouth daily 30 tablet 11     sodium chloride (OCEAN) 0.65 % nasal spray Spray 1 spray into both nostrils daily as needed for congestion       tiotropium-olodaterol (STIOLTO RESPIMAT) 2.5-2.5 MCG/ACT AERS Inhale 2 puffs into the lungs daily       torsemide (DEMADEX) 20 MG tablet Take 2 tablets (40 mg) by mouth daily Take at 8 AM and 1 PM. 120 tablet 4         ALLERGIES:  Allergies   Allergen Reactions     Sotalol Other (See Comments)     Prolonged QT     Bupropion Other (See Comments)     Na 118 acutely. Comorbid diuresis for heart failure       PAST MEDICAL HISTORY:    Past Medical History:   Diagnosis Date     Acute renal failure (ARF) (H) 06/22/2021     Anemia in chronic kidney disease, on chronic dialysis (H) 07/14/2021     Atrophy of left kidney 05/18/2017     Chronic atrial fibrillation (H) 04/01/2013     Chronic kidney disease, stage III (moderate) (H)  11/13/2019    Formatting of this note might be different from the original. Cr=1.2     COPD mixed type (H) 04/06/2013    Formatting of this note might be different from the original. On Qvar and combivent, hx of cor pulmonale     Cor pulmonale (chronic) (H) 09/20/2021     Depressive disorder, not elsewhere classified      Dermatitis of lower extremity 05/05/2017     Elevated blood pressure reading without diagnosis of hypertension 12/10/2004     HTN (hypertension) 09/07/2011     Hx of gout 08/13/2014     Hypercalcemia 07/16/2018    Formatting of this note might be different from the original. 7/16/18  Ca++=10.8   Alb=3.8   7/17/18:  PTH=95.6  Ca++=10.1 (after diuresis), some renal impairment, was on vit D and calcium outpt     Hypertensive heart and kidney disease 07/16/2018    Formatting of this note might be different from the original. Atrophic left kidney, rise in Cr with increase in bumex 2018     Hyponatremia 06/22/2021     Intermediate stage nonexudative age-related macular degeneration of both eyes 01/13/2021     Lung nodule 04/28/2021     Migraine variant 12/10/2004     Problem list name updated by automated process. Provider to review     Neoplasm of skin of neck 06/06/2014     NSVT (nonsustained ventricular tachycardia) (H) 04/09/2020     SKY (obstructive sleep apnea) 04/08/2020     Osteoporosis 11/10/2004     Problem list name updated by automated process. Provider to review     Osteoporosis, unspecified dx 8/02     Pacemaker 07/14/2021    Formatting of this note might be different from the original. 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 On sotolol     Tobacco use disorder      Variants of migraine, not elsewhere classified, without mention of intractable migraine without mention of status migrainosus     flashing  lights       PAST SURGICAL HISTORY:    Past Surgical History:   Procedure Laterality Date     ZZC NONSPECIFIC PROCEDURE  's    right ankle fracture     ZZC NONSPECIFIC PROCEDURE      spontaneous AB     ZZC NONSPECIFIC PROCEDURE      tonsillectomy     ZZC NONSPECIFIC PROCEDURE      tubal ligation       FAMILY HISTORY:    Family History   Problem Relation Age of Onset     Hypertension Mother      Arthritis Mother      Circulatory Mother      Heart Disease Mother      Lipids Mother      Osteoporosis Mother      Alcohol/Drug Father         ?MI     Circulatory Father      Heart Disease Father      Hypertension Sister      Allergies Sister         thyroid dz     Arthritis Sister      Osteoporosis Sister      Obesity Brother      Alcohol/Drug Brother         dm, ptsd, cad     Bladder Cancer Brother      Diabetes Brother      Myocardial Infarction Brother      Prostate Cancer Brother      Cerebrovascular Disease Maternal Grandmother      Breast Cancer Maternal Grandmother         94     Cancer - colorectal Maternal Grandmother         97     Arthritis Maternal Grandmother      Gastrointestinal Disease Maternal Grandmother      Osteoporosis Maternal Grandmother      Thyroid Disease Maternal Grandmother      Osteoarthritis Maternal Grandfather      C.A.D. Maternal Grandfather      Heart Failure Paternal Grandmother      Hyperlipidemia Maternal Uncle      Alzheimer Disease Maternal Uncle      Thyroid Disease Other         niece       SOCIAL HISTORY:    Social History     Socioeconomic History     Marital status: Legally      Spouse name: Derrick Chris     Number of children: 0     Years of education: None     Highest education level: None   Occupational History     Employer: [employer]   Tobacco Use     Smoking status: Former Smoker     Packs/day: 1.00     Years: 43.00     Pack years: 43.00     Types: Cigarettes     Quit date: 2021     Years since quittin.5     Smokeless tobacco: Never Used  "  Substance and Sexual Activity     Alcohol use: Yes     Comment: occ. glass of wine     Drug use: No     Sexual activity: None   Other Topics Concern     None   Social History Narrative    She was  to Derrick Chris, and she  from him for over 20 years, and she has not had contact with him, and does not know where he is. She never officially  him.    She had a long term relationship with Ventura Lopez, who she considered herself to be  to, but because she was legally  to Derrick, she could not officially  him. Also Ventura was a quad, and legally marrying him would disrupt his benefits, as Francine was his PCA. Ventura  around .     Social Determinants of Health     Financial Resource Strain:      Difficulty of Paying Living Expenses:    Food Insecurity:      Worried About Running Out of Food in the Last Year:      Ran Out of Food in the Last Year:    Transportation Needs:      Lack of Transportation (Medical):      Lack of Transportation (Non-Medical):    Physical Activity:      Days of Exercise per Week:      Minutes of Exercise per Session:    Stress:      Feeling of Stress :    Social Connections:      Frequency of Communication with Friends and Family:      Frequency of Social Gatherings with Friends and Family:      Attends Alevism Services:      Active Member of Clubs or Organizations:      Attends Club or Organization Meetings:      Marital Status:    Intimate Partner Violence:      Fear of Current or Ex-Partner:      Emotionally Abused:      Physically Abused:      Sexually Abused:        PHYSICAL EXAM:    Vitals: BP (!) 140/69   Pulse 79   Ht 1.626 m (5' 4\")   Wt 54.4 kg (120 lb)   SpO2 99%   BMI 20.60 kg/m    Wt Readings from Last 5 Encounters:   21 54.4 kg (120 lb)   10/26/21 54.8 kg (120 lb 12.8 oz)   10/14/21 54.8 kg (120 lb 12.8 oz)   21 53.5 kg (118 lb)   05 85.3 kg (188 lb)               Encounter Diagnoses   Name Primary?     " Severe tricuspid valve regurgitation Yes     Cor pulmonale (chronic) (H)      Pulmonary hypertension (H)      Chronic atrial fibrillation (H)      Pacemaker s/p AV Node Ablation      Chronic combined systolic and diastolic HF (heart failure) (H)      ESRD (end stage renal disease) on dialysis (H)      Anemia in other chronic diseases classified elsewhere      Frail elderly      PAD (peripheral artery disease) (H)      Former heavy tobacco smoker        Orders Placed This Encounter   Procedures     Follow-Up with Pulmonary Hypertension Clinic     Pulmonary Rehab Full Program Referral     EKG 12-lead complete w/read - Clinics (performed today)     Echocardiogram Complete       CC  REFERRING PROVIDER:  JUSTO Garcia CNP  [address]  [place] [address]

## 2021-11-01 NOTE — PATIENT INSTRUCTIONS
MEDICATION CHANGES:  1.  Change torsemide dosage to 40 mg 2 times daily.  Take at 8-9 AM and 1-2 PM.  2.  You can skip the morning dose of torsemide on the days of your dialysis.    TESTIN.  Heart ultrasound (transthoracic echocardiogram).    FOLLOW-UP:  1.  Referral to pulmonary rehabilitation exercise program. If you have not heard from the scheduling office within 2 business days, please call [phone] for all locations.  2.  Follow-up with Dr. Holt in the pulmonary hypertension clinic in 8 weeks with results of the heart ultrasound.    If you have any questions or concerns, please call my nurse Ekaterina Merino at [phone].

## 2021-11-02 NOTE — PROGRESS NOTES
Service Date: 11/01/2021    PRIMARY CARE AND REFERRING PROVIDER:  JUSTO Garcia CNP    REASON FOR VISIT:    1.  Severe tricuspid valve regurgitation.  2.  Pulmonary hypertension.    HISTORY OF PRESENT ILLNESS:  Francine Chris is a very pleasant, frail and older-appearing 76-year-old  lady, who resides in [facility], who was accompanied by her sister and primary caregiver, Tereza, today.  Her previous Cardiology care has been at [facility].  She is transferring care to [facility].    Francine has a complex medical history and appeared very frail today.  This includes:    1.  In 05/2021, she was hospitalized with progressive worsening of lower extremity edema and generalized edema, acute renal failure, digoxin toxicity (serum digoxin level was 3.4).  Her echocardiogram showed moderate to severe tricuspid valve regurgitation in the context of severe biatrial enlargement, moderate to severe right ventricular enlargement with moderately decreased systolic function and estimated RVSP of 55 mmHg.  Her LVEF was normal at 62% with mild concentric LVH and normal wall motion.  The severe TR was deemed to be secondary to tricuspid annular dilatation with incomplete leaflet coaptation.  Dialysis was also initiated for her digoxin toxicity and ARF.  She still gets dialysis Monday, Wednesday and Friday through indwelling catheter.  Recently, she has had a right arm AV fistula placed.  Her other comorbidities are permanent atrial fibrillation over many decades, Eliquis anticoagulation, heart failure with preserved ejection fraction, end-stage renal disease (on dialysis), chronic anemia, frailty.    She was seen by Cardiology during that hospitalization and subsequently.  A right heart catheterization was done on 05/17/2021.  I personally reviewed the results.  At baseline, LVEDP was 10, RA was 7, wedge pressure was 14, mean PA was 34, PA sat 64%, estimated Andrea cardiac index  normal at 2.4, PVR mildly elevated at 4.0 Wood units.  With IV fluids, her PA increased from 34 mmHg to 38 mmHg.  There was no reversibility with inhaled nitric oxide, and her cardiac index remained at 2.2.    Based on this, her cardiologist, Dr. Talavera, attempted her on sildenafil and ambrisentan.  This resulted in increasing oxygen requirements.  She did not notice any significant improvement.  Her prior authorization for tadalafil was denied on 07/2021, as was subsequently sildenafil.  She has been able to wean herself off oxygen.  Her sats are now 99% on room air.    She is currently on her high dose of torsemide 50 mg b.i.d.  She states that her lower extremity edema has been the best in many years, no orthopnea or PND.  She consistently wears her CPAP at night.  Her depression is stable.    I reviewed all of her extensive testing, multiple clinic and hospitalization notes and reports as documented in Care Everywhere.      I note that her nuclear V/Q scan showed no evidence of pulmonary embolus.  No acute intracranial findings on CT of the brain.  No evidence of renal artery stenosis and evidence of left renal atrophy on ultrasound of the kidneys.    Due to challenging rate control of her atrial fibrillation, she underwent successful transcatheter AV luis ablation and a single-chamber ventricular pacemaker implantation on 06/25/2021.    Overall, Francine is very deconditioned.  She is able to ambulate with a walker, is off oxygen, she is a heavy tobacco user from age 16 years and quit in 04/2021 (43 pack years).  No history of excess alcohol use, DVT/PE.    ECG done today shows a ventricular paced rhythm.    PHYSICAL EXAMINATION:    GENERAL:  Appears older than stated age.  VITAL SIGNS:  /78, pulse 79 per minute, sats 99% on room air.  Weight 120 pounds, BMI 20 kg/m2.  CARDIOVASCULAR:  Pacemaker site is satisfactory.  Heart sounds are regular (paced), very soft pansystolic murmur that is barely audible at  the left lower sternal border.  JVP is normal.  ABDOMEN:  Soft and nontender.  VASCULAR:  She has a right internal jugular/subclavian indwelling catheter and right antecubital AV fistula.  EXTREMITIES:  She has 2+ pitting edema bilaterally with skin changes suggestive of chronic venous stasis and multiple superficial varicosities.    DIAGNOSES:    1.  Severe functional tricuspid valve regurgitation.  2.  History of chronic cor pulmonale.  3.  Pulmonary hypertension.  4.  Permanent atrial fibrillation, status post AV luis ablation and single-chamber pacemaker implantation in 06/2021 ([facility]).  5.  End-stage renal disease, on dialysis.  6.  Chronic anemia.  7.  Peripheral arterial disease.  8.  Former heavy tobacco user.  9.  Elderly frailty, low BMI of 20 kg/m2.    ASSESSMENT:    In summary, this is a frail-appearing 76-year-old who has had hospitalization with acute cor pulmonale, digoxin toxicity and acute renal failure in May of this year at [facility].  From this point, she was diagnosed with severe tricuspid valve regurgitation, RV dysfunction, pulmonary hypertension, end-stage renal disease on dialysis.  Overall, she has improved gradually.  Still needing high dose of torsemide 50 mg b.i.d. (urinates normally) and requiring dialysis.  Her pulmonary vasodilator has been denied due to the fact that she has mild PH with mildly elevated left-sided filling pressures.    PLAN:    1.  I educated and counseled the patient and her sister on the diagnosis of pulmonary hypertension, etiology of her tricuspid valve regurgitation and our management options.  She has multiple comorbidities and is extremely frail.  I do not think she would be a surgical candidate, but I will be happy to refer her to see Dr. Janet Sanderson in CV surgery with the results of an up-to-date echocardiogram.  2.  Complete transthoracic echocardiogram.  I request the sonographer to perform a detailed assessment of the right heart,  tricuspid regurgitation hemodynamics, pulmonary hypertension hemodynamics.  3.  I have, for patient convenience, decreased torsemide dosage to 40 mg 2 times daily.  Take at 8 a.m. and 1 p.m.  She can skip the morning dose of torsemide on the days of her dialysis.  She does not have any symptoms of hypovolemia.  4.  Referral to Pulmonary Rehabilitation Exercise Program.  5.  I will see her back in the Pulmonary Hypertension Clinic in 8 weeks.    This was a very complex patient with poor health literacy and extensive outside records, results of testing, etc., had to be evaluated in Care Everywhere and future appointments coordinated and medication changes made.  Prescriptions were renewed.  60 minutes.  High complexity.  New patient.    [facility] Pooja Holt MD        D: 2021   T: 2021   MT: jose guadalupe    Name:     TIFFANIE CHONarayan  MRN:      [id number]        Account:      [id number]   :      1945           Service Date: 2021       Document: [id number]     62 year old male with history of urinary retention s/p Ramirez catheter placement in the ED earlier today presented to ED with malfunctioning Ramirez. Pt states the Ramirez catheter stopped draining this afternoon. Also noticed significant amount urine leakage around the bladder. No fever, chills, abdominal or back pain. Impression: Likely obstructed Ramirez and/or small ramirez. Plan: Exchange with a large Ramirez. Follow up with urology as previously scheduled. Strict return precautions.

## 2021-11-04 NOTE — PROGRESS NOTES
"[facility] GERIATRIC SERVICES    Chief Complaint   Patient presents with     RECHECK     ESRD,CHF,COPD     HPI:  Francine Chris is a 76 year old  (1945) female with hx of ESRD on HD, afib s/p AV node ablation and pacemaker on DOAC (dx 2013), HTN, combined systolic and diastolic HF, mod-severe tricuspid regurgitation, mid ascending aortic enlargement, BL LE edema, PAD, QTc prolongation, COPD, pulmonary HTN, hx of smoking, macular degeneration, anxiety/depression, SKY on CPAP, PUD, who is being seen today for an episodic care visit at: [facility] ([facility], later [facility]) [[id number]].     Hospitalized at [facility] from 6/21/21 to 7/5/21 with nausea/weakness/dairrhea and acute renal failure along with supratheraputic INR, hyperkalemia, and digoxin toxicity. Per nephrology note unclear precipitating even for SILAS but likely severe ATN. Imaging negative for acute process. Previous baseline Cr 1.5-1.7 as of May 2021. Required emergent femoral line for HD on admission, was transitioned to right chest wall tunneled catheter on 6/29/21 as renal function did not return to baseline prior to discharge and HD still required. During this hospitalization coumadin was switched to apixaban.  Hospital stay significant for fluid overload on 7/3/21, restarted torsemide. Continued on supplemental oxygen. Permanent pacemaker placed 6/25/21 s/p ablation due to inability to use digoxin in setting of renal failure. Recovered at [facility] and discharged to [facility] on supplemental oxygen. Per hospital discharge was supposed to follow-up at [facility] for Pacemaker check, but lost to follow-up per sister.    Today's concern is:   Follow-up today for assessment of multiple chronic health issues including ESRD with recent placement of AV Fistula and cardiovascular disease recently established care with [place] cardiology team.     Upset today about portable oxygen tanks in her room, \"they take up too much " "space!\"   On initial visit reported she was still using oxygen especially with going out and exertion, would get short of breath which would precipitate anxiety episodes.   Sister is also wondering about need for pulmonology follow-up.   Referred to pulmonology by previous cardiologist in [facility] system, per notes PFTs showed very severe COPD. Per TCU notes, \"Needs ongoing oxygen: 02 sat of 85% at rest on room air recorded at TCU, on treatment for CHF and COPD, O2 corrects to 96% with oxygen at 2 L/min by NC, she is mobile in home requiring portability of 02.\"   Seems are oxygen are much less now, no longer smoking. Using CPAP at night in the past, but likely taken away due to non-compliance. \"That mask made me feel like I'm suffocating!\"     Had post-op check yesterday for AV fistula which was placed 10/21/21 in R upper arm without complication.  \"I'm having surgery again to finish the fistula\" - scheduled at [facility] on 12/01/21 with .  First step went \"easier than they thought they would.\"     Saw new cardiologist, Dr. Holt, \"I love her!\"   Transferred care from [facility] Dr. Talavera.   Renewed cardiology note from 11/0/21.  Per notes, \"pulmonary vasodilator has been denied due to the fact that she has mild PH with mildly elevated left-sided filling pressures.\"   Discussed tricuspid valve regurgitation, not likely surgical candidate, but could discuss, referred to Dr. Sanderson. Decreased torsemide from 50 mg PO BID to 40 mg BID and skip torsemide morning of HD MWF. Referred to pulmonary rehabilitation exercise program. Plan for follow-up in 8 weeks.    Reports skin is still itching.  Per patient report nephrology felt not related renal disease?  Recommended decreasing allopurinol dose if able.  Has upcoming dermatology follow-up.     Will get COVID-19 booster at HD center.    Allergies, and PMH/PSH reviewed in EPIC today.    Most Recent Immunizations   Administered Date(s) Administered " "    COVID-19,PF,Moderna 03/10/2021     DTaP, Unspecified 09/07/2011     Influenza (High Dose) 3 valent vaccine 10/09/2019     Influenza (IIV3) PF 09/07/2011     Influenza, Quad, High Dose, Pf, 65yr+ (Fluzone HD) 09/10/2020     Mantoux Tuberculin Skin Test 06/03/2009     Pneumococcal 23 valent 04/06/2013     TD (ADULT, 7+) 11/10/2004     Td (Adult), Adsorbed 11/10/2004     Tdap (Adacel,Boostrix) 09/07/2011     REVIEW OF SYSTEMS:  4 point ROS including Respiratory, CV, GI and , other than that noted in the HPI,  is negative    Objective:   /72   Pulse 87   Temp 96.8  F (36  C)   Resp 22   Ht 1.626 m (5' 4\")   Wt 55.2 kg (121 lb 12.8 oz)   SpO2 99%   BMI 20.91 kg/m    GENERAL APPEARANCE:  Alert, in no distress, pleasant, cooperative, thin well dressed woman.   RESP:  Non-labored breathing, palpation of chest w/ L CW pacer and R tunnel cath, no chest wall tenderness, no respiratory distress, Lung sounds clear, patient is on supplemental oxygen via NC as needed.  CV:  Palpation - no murmur/non-displaced PMI, L CW pacemaker and R tunneled cath with dressing CDI. Auscultation - rate and rhythm irregular.   VASCULAR: R UE fistula with +bruit/+thrill. No edema bilateral lower extremities.   ABDOMEN:  Normal bowel sounds, soft, nontender, no grimacing or guarding with palpation.   M/S:   Gait and station ambulates independently with walker. Scoliosis thoracic spine.  SKIN:  Inspection - Chest wall tunneled cath with CDI dressing. Well healed L CW incision over pacemaker. 3 cm surgical incision to right AC well approximated without erythema or edema. Palpation- no increased warmth, skin is dry and non-tender.  NEURO: Cranial nerves II-XII grossly intact, no facial asymmetry, follows simple commands, moves all extremities symmetrically, normal tone, no tremor noted today.  PSYCH: Awake and alert, speech fluent,  insight and judgement fair, oriented to person/place/time, memory fair, tearful/irritable when " discussing recent stresses.     Labs done in SNF are in [place] EPIC. Please refer to them using EPIC/Care Everywhere.     Echo 5/19/21  Estimated EF: 60-65%    FINDINGS    Left Ventricle Normal left ventricular chamber size. Normal left ventricular wall thickness.   No regional wall motion abnormalities. Normal left ventricular systolic function. Calculated left ventricular ejection   fraction (modified Krishna technique) is 63 %.    Diastolic Function Indeterminate left ventricular diastolic function.    Right Ventricle Moderate-severe right ventricular chamber enlargement. Mildly-moderately decreased right ventricular systolic function. Estimated right ventricular systolic pressure is 55 mmHg.    Left Atrium Severe left atrial enlargement.    Right Atrium Severe right atrial enlargement.    Atrial Septum No evidence of inter-atrial shunt by color flow Doppler.    Aortic Valve Tricuspid aortic valve. No aortic valve stenosis. Mild aortic valve regurgitation.    Mitral Valve Mildly calcified mitral annulus. Mildly thickened mitral valve. Mild-moderate mitral valve regurgitation.    Tricuspid Valve Tricuspid annular dilatation. Moderate-severe tricuspid valve regurgitation.    Pulmonic Valve Normal pulmonary valve. Trivial pulmonary valve regurgitation.    Pericardium No pericardial effusion.    Aorta Normal aortic sinus of Valsalva dimension (3.7 cm). Mild ascending aorta dilatation (3.8 cm).    Inferior Vena Cava Dilated inferior vena cava with normal collapse.     Assessment/Plan:  (N18.6,  Z99.2) ESRD (end stage renal disease) on dialysis (H)  (primary encounter diagnosis)  Comment: Newly diagnosed with SILAS on CKD stage III June 2021 etiology unclear but likely severe ATN with vomiting and afib with RVR/hypotension prior, imaging negative for acute process. Getting HD MWF at [facility] via R CW tunneled cath, but R brachiobasilic fistula placed 10/21/21. Nephrologist Dr. Derrick Ro MD.   Dry weight 53 kg.   Plan:   - 2nd stage brachiobasilic fistula with Dr. Arredondo at [facility] 12/01/21 - pre-op instructions at foot of note, reports no additional pre-op needed, will need to hold DOAC  - R CW tunneled cath care by HD nursing, goal to remove once fistula vs graft matured to reduce infection/bleeding risk  - Renally dose medications and avoid nephrotoxins  - MTM PharmD following     (J44.9) Severe COPD (H)  Comment: Stable. No cough, sputum production, purulence. Using oxygen 2 L/min PRN. Recently stopped smoking (June 2021) after 60 year history of 1-2 PPD. Followed by pulmonology in past in [facility]. Using inhalers without issue, administered by nursing staff at [facility]. Wants oxygen out of room, taking up too much space in small apartment, but still using PRN for SOB episodes.   Plan:   - Pulmonology referral   - Spot check SpO2 overnight x 3 days   - Continue PRN albuterol  - Continue Stiolto respimat daily   - PRN supplemental oxygen in place >> discussed with pt/sister, if we discussion in [facility] setting will be hard to get back, would not remove from apartment until pulmonology consult    (G47.33) SKY (obstructive sleep apnea)  Comment: Using CPAP prior to recent hospitalization but no longer has CPAP, previous CPAP settings were 5-20 cm H2O with nasal pillows  Plan:   - Referral place to [facility] sleep medicine to clarify need for ongoing CPAP use, if patient will not wear CPAP can forego referral and meet with pulmonology first    (I50.42) Chronic combined systolic and diastolic HF (heart failure) (H)  (I07.1) Tricuspid valve insufficiency, unspecified etiology  (I27.20) Pulmonary hypertension (H)  Comment: Chronic, established care with Dr. Holt in [facility] system, she discontinued sildenafil.   HFpEF with tricuspid valve regurgitation, RV dysfunction, pulmonary hypertension.  Last echo 5/19/21 in [facility] system.  Dry weight 53 kg, today 55 kg.  No SOB, orthopnea, PND, leg swelling. Chronic SANCHEZ  unchanged.   Plan:   - Cardiology decrease Torsemide to 40 mg BID except hold AM torsemide on HD days  - Continue PRN oxygen   - Follow-up with Dr. Holt 01/03/2022  - Routine weights labs VS     (R26.9) Gait Abnormality  Comment: Chronic unsteady gait and frailty   Plan:   - HH PT/OT through [facility]  - WC is ordered   - Continue falls precautions and always using walker for mobility    (M1A.9XXX0) Chronic gout  Comment: Per history, uric acid level 5.0 11/13/19  Plan:   - Allopurinol 100 mg daily,  - Check uric acid 11/11/21     (L29.9) Pruritic Skin Disorder  Comment: Present since admission in setting of ESRD, possible uremic pruritis, also xerosis.   Plan:   - Continue emollient   - Started topical antihistamine per PharmD recommendation  - Consider gabapentin   - Dermatology follow-up scheduled for Jan 2021     (Z00.00) Preventive health care  Comment: Review basic preventive care  Plan:   - Mammogram ordered, will likely scheduled in new year  - Discussed screening chest CT given age and strong smoking hx, sister would like to dicusss with pulmonologiat prior to ordering   - COVID-19 booster to be administered at HD    Electronically signed by: JUSTO Jerry CNP

## 2021-11-04 NOTE — PATIENT INSTRUCTIONS
Francine Chris  1945  ORDERS:  - Check CBC dx D64.9.9, CMP dx N18.6, uric acid level 11/11/21 dx M1A.9XXX0 next lab day 11/11/21  - Please discontinue meal escorts per family request  - Check SpO2 at 0:00 x 3 nights and leave copy of results in NP folder   - diphenhydrAMINE (BENADRYL) 2 % external cream; Apply topically 3 times daily as needed for itching  Dispense: 30 g; Refill: 98 dx Pruritic disorder - may keep at bedside   Electronically signed by:   JUSTO Jerry CNP  11/04/21 10:40 PM

## 2021-11-04 NOTE — PROGRESS NOTES
Patient is scheduled for a PFT new appointment with Dr. Linares on 1/12/2022. Referring diagnosis is COPD. Placed orders for PFT. Patient is going to schedule chest CT that was ordered by another provider.     Romina Iqbal RN

## 2021-11-04 NOTE — LETTER
11/4/2021        RE: Francine Chris  [facility]  [address]  [place] [address]        [facility] GERIATRIC SERVICES    Chief Complaint   Patient presents with     RECHECK     ESRD,CHF,COPD     HPI:  Francine Chris is a 76 year old  (1945) female with hx of ESRD on HD, afib s/p AV node ablation and pacemaker on DOAC (dx 2013), HTN, combined systolic and diastolic HF, mod-severe tricuspid regurgitation, mid ascending aortic enlargement, BL LE edema, PAD, QTc prolongation, COPD, pulmonary HTN, hx of smoking, macular degeneration, anxiety/depression, SKY on CPAP, PUD, who is being seen today for an episodic care visit at: [facility] [[id number]].     Hospitalized at [facility] from 6/21/21 to 7/5/21 with nausea/weakness/dairrhea and acute renal failure along with supratheraputic INR, hyperkalemia, and digoxin toxicity. Per nephrology note unclear precipitating even for SILAS but likely severe ATN. Imaging negative for acute process. Previous baseline Cr 1.5-1.7 as of May 2021. Required emergent femoral line for HD on admission, was transitioned to right chest wall tunneled catheter on 6/29/21 as renal function did not return to baseline prior to discharge and HD still required. During this hospitalization coumadin was switched to apixaban.  Hospital stay significant for fluid overload on 7/3/21, restarted torsemide. Continued on supplemental oxygen. Permanent pacemaker placed 6/25/21 s/p ablation due to inability to use digoxin in setting of renal failure. Recovered at [facility] and discharged to [facility] on supplemental oxygen. Per hospital discharge was supposed to follow-up at [facility] for Pacemaker check, but lost to follow-up per sister.    Today's concern is:   Follow-up today for assessment of multiple chronic health issues including ESRD with recent placement of AV Fistula and cardiovascular disease recently  "established care with [facility] cardiology team.     Upset today about portable oxygen tanks in her room, \"they take up too much space!\"   On initial visit reported she was still using oxygen especially with going out and exertion, would get short of breath which would precipitate anxiety episodes.   Sister is also wondering about need for pulmonology follow-up.   Referred to pulmonology by previous cardiologist in [facility] system, per notes PFTs showed very severe COPD. Per TCU notes, \"Needs ongoing oxygen: 02 sat of 85% at rest on room air recorded at TCU, on treatment for CHF and COPD, O2 corrects to 96% with oxygen at 2 L/min by NC, she is mobile in home requiring portability of 02.\"   Seems are oxygen are much less now, no longer smoking. Using CPAP at night in the past, but likely taken away due to non-compliance. \"That mask made me feel like I'm suffocating!\"     Had post-op check yesterday for AV fistula which was placed 10/21/21 in R upper arm without complication.  \"I'm having surgery again to finish the fistula\" - scheduled at [facility] on 12/01/21 with .  First step went \"easier than they thought they would.\"     Saw new cardiologist, Dr. Holt, \"I love her!\"   Transferred care from [facility] Dr. Talavera.   Renewed cardiology note from 11/0/21.  Per notes, \"pulmonary vasodilator has been denied due to the fact that she has mild PH with mildly elevated left-sided filling pressures.\"   Discussed tricuspid valve regurgitation, not likely surgical candidate, but could discuss, referred to Dr. Sanderson. Decreased torsemide from 50 mg PO BID to 40 mg BID and skip torsemide morning of HD MWF. Referred to pulmonary rehabilitation exercise program. Plan for follow-up in 8 weeks.    Reports skin is still itching.  Per patient report nephrology felt not related renal disease?  Recommended decreasing allopurinol dose if able.  Has upcoming dermatology follow-up.     Will get COVID-19 booster at " "HD center.    Allergies, and PMH/PSH reviewed in [facility] today.    Most Recent Immunizations   Administered Date(s) Administered     COVID-19,PF,Moderna 03/10/2021     DTaP, Unspecified 09/07/2011     Influenza (High Dose) 3 valent vaccine 10/09/2019     Influenza (IIV3) PF 09/07/2011     Influenza, Quad, High Dose, Pf, 65yr+ (Fluzone HD) 09/10/2020     Mantoux Tuberculin Skin Test 06/03/2009     Pneumococcal 23 valent 04/06/2013     TD (ADULT, 7+) 11/10/2004     Td (Adult), Adsorbed 11/10/2004     Tdap (Adacel,Boostrix) 09/07/2011     REVIEW OF SYSTEMS:  4 point ROS including Respiratory, CV, GI and , other than that noted in the HPI,  is negative    Objective:   /72   Pulse 87   Temp 96.8  F (36  C)   Resp 22   Ht 1.626 m (5' 4\")   Wt 55.2 kg (121 lb 12.8 oz)   SpO2 99%   BMI 20.91 kg/m    GENERAL APPEARANCE:  Alert, in no distress, pleasant, cooperative, thin well dressed woman.   RESP:  Non-labored breathing, palpation of chest w/ L CW pacer and R tunnel cath, no chest wall tenderness, no respiratory distress, Lung sounds clear, patient is on supplemental oxygen via NC as needed.  CV:  Palpation - no murmur/non-displaced PMI, L CW pacemaker and R tunneled cath with dressing CDI. Auscultation - rate and rhythm irregular.   VASCULAR: R UE fistula with +bruit/+thrill. No edema bilateral lower extremities.   ABDOMEN:  Normal bowel sounds, soft, nontender, no grimacing or guarding with palpation.   M/S:   Gait and station ambulates independently with walker. Scoliosis thoracic spine.  SKIN:  Inspection - Chest wall tunneled cath with CDI dressing. Well healed L CW incision over pacemaker. 3 cm surgical incision to right AC well approximated without erythema or edema. Palpation- no increased warmth, skin is dry and non-tender.  NEURO: Cranial nerves II-XII grossly intact, no facial asymmetry, follows simple commands, moves all extremities symmetrically, normal tone, no tremor noted today.  PSYCH: Awake and " alert, speech fluent,  insight and judgement fair, oriented to person/place/time, memory fair, tearful/irritable when discussing recent stresses.     Labs done in SNF are in [place] EPIC. Please refer to them using EPIC/Care Everywhere.     Echo 5/19/21  Estimated EF: 60-65%    FINDINGS    Left Ventricle Normal left ventricular chamber size. Normal left ventricular wall thickness.   No regional wall motion abnormalities. Normal left ventricular systolic function. Calculated left ventricular ejection   fraction (modified Krishna technique) is 63 %.    Diastolic Function Indeterminate left ventricular diastolic function.    Right Ventricle Moderate-severe right ventricular chamber enlargement. Mildly-moderately decreased right ventricular systolic function. Estimated right ventricular systolic pressure is 55 mmHg.    Left Atrium Severe left atrial enlargement.    Right Atrium Severe right atrial enlargement.    Atrial Septum No evidence of inter-atrial shunt by color flow Doppler.    Aortic Valve Tricuspid aortic valve. No aortic valve stenosis. Mild aortic valve regurgitation.    Mitral Valve Mildly calcified mitral annulus. Mildly thickened mitral valve. Mild-moderate mitral valve regurgitation.    Tricuspid Valve Tricuspid annular dilatation. Moderate-severe tricuspid valve regurgitation.    Pulmonic Valve Normal pulmonary valve. Trivial pulmonary valve regurgitation.    Pericardium No pericardial effusion.    Aorta Normal aortic sinus of Valsalva dimension (3.7 cm). Mild ascending aorta dilatation (3.8 cm).    Inferior Vena Cava Dilated inferior vena cava with normal collapse.     Assessment/Plan:  (N18.6,  Z99.2) ESRD (end stage renal disease) on dialysis (H)  (primary encounter diagnosis)  Comment: Newly diagnosed with SILAS on CKD stage III June 2021 etiology unclear but likely severe ATN with vomiting and afib with RVR/hypotension prior, imaging negative for acute process. Getting HD MWF at [facility] via R CW tunneled cath, but R brachiobasilic fistula placed 10/21/21. Nephrologist Dr. Derrick Ro MD.  Dry weight 53 kg.   Plan:   - 2nd stage brachiobasilic fistula with Dr. Arredondo at [facility] 12/01/21 - pre-op instructions at foot of note, reports no additional pre-op needed, will need to hold DOAC  - R CW tunneled cath care by HD nursing, goal to remove once fistula vs graft matured to reduce infection/bleeding risk  - Renally dose medications and avoid nephrotoxins  - MTM PharmD following     (J44.9) Severe COPD (H)  Comment: Stable. No cough, sputum production, purulence. Using oxygen 2 L/min PRN. Recently stopped smoking (June 2021) after 60 year history of 1-2 PPD. Followed by pulmonology in past in [facility]. Using inhalers without issue, administered by nursing staff at [facility]. Wants oxygen out of room, taking up too much space in small apartment, but still using PRN for SOB episodes.   Plan:   - Pulmonology referral   - Spot check SpO2 overnight x 3 days   - Continue PRN albuterol  - Continue Stiolto respimat daily   - PRN supplemental oxygen in place >> discussed with pt/sister, if we discussion in [facility] setting will be hard to get back, would not remove from apartment until pulmonology consult    (G47.33) SKY (obstructive sleep apnea)  Comment: Using CPAP prior to recent hospitalization but no longer has CPAP, previous CPAP settings were 5-20 cm H2O with nasal pillows  Plan:   - Referral place to [facility] sleep medicine to clarify need for ongoing CPAP use, if patient will not wear CPAP can forego referral and meet with pulmonology first    (I50.42) Chronic combined systolic and diastolic HF (heart failure) (H)  (I07.1) Tricuspid valve insufficiency, unspecified etiology  (I27.20) Pulmonary hypertension (H)  Comment: Chronic, established care with Dr. Holt in [facility], she discontinued sildenafil.   HFpEF with tricuspid valve regurgitation, RV dysfunction, pulmonary  hypertension.  Last echo 5/19/21 in [facility] system.  Dry weight 53 kg, today 55 kg.  No SOB, orthopnea, PND, leg swelling. Chronic SANCHEZ unchanged.   Plan:   - Cardiology decrease Torsemide to 40 mg BID except hold AM torsemide on HD days  - Continue PRN oxygen   - Follow-up with Dr. Holt 01/03/2022  - Routine weights labs VS     (R26.9) Gait Abnormality  Comment: Chronic unsteady gait and frailty   Plan:   - HH PT/OT through [facility]  - WC is ordered   - Continue falls precautions and always using walker for mobility    (M1A.9XXX0) Chronic gout  Comment: Per history, uric acid level 5.0 11/13/19  Plan:   - Allopurinol 100 mg daily,  - Check uric acid 11/11/21     (L29.9) Pruritic Skin Disorder  Comment: Present since admission in setting of ESRD, possible uremic pruritis, also xerosis.   Plan:   - Continue emollient   - Started topical antihistamine per PharmD recommendation  - Consider gabapentin   - Dermatology follow-up scheduled for Jan 2021     (Z00.00) Preventive health care  Comment: Review basic preventive care  Plan:   - Mammogram ordered, will likely scheduled in new year  - Discussed screening chest CT given age and strong smoking hx, sister would like to dicusss with pulmonologiat prior to ordering   - COVID-19 booster to be administered at HD    Electronically signed by: JUSTO Jerry CNP               Sincerely,        JUSTO Jerry CNP

## 2021-11-04 NOTE — Clinical Note
Future Appointments  11/23/2021 8:00 AM    2, Sh Pulmonary Rehab      SHCR                [facility]  12/7/2021  8:45 AM    [id number]                  SHCVCV              CVIMG  12/7/2021  3:30 PM    Artie Cary MD           SHSLE               [place]  12/14/2021 9:00 AM    Lauren Lovett, RP           GNPMTM              [place] [facility]  1/3/2022   11:15 AM   Alex Holt MD   [facility] PSA CLIN  1/4/2022   2:30 PM    Sophia Damico PA-C    OXDERM              OX  1/10/2022  2:10 PM    CS PULMONARY FUNCTION      CSPULM              CS  1/11/2022  12:00 AM   [facility] [place] [address] PSA CLIN  1/12/2022  12:30 PM   Derrick Linares MD CSPULM              CS

## 2021-11-12 NOTE — PROGRESS NOTES
Francine Chris  1945  ORDERS:  - Discontinue Celexa   Electronically signed by:   JUSTO Jerry CNP  11/12/21 3:43 PM

## 2021-11-15 NOTE — TELEPHONE ENCOUNTER
Left a voicemail with patient to reschedule Jan 12, 2022 appointment with Dr. Linares due to provider schedule conflict.    Magali Trevizo MA

## 2021-11-15 NOTE — PROGRESS NOTES
[facility] GERIATRIC SERVICES    Chief Complaint   Patient presents with     RECHECK     pre-procedure planning     HPI:  Francine Chris is a 76 year old  (1945) female with hx of ESRD on HD, afib s/p AV node ablation and pacemaker on DOAC (dx 2013), HTN, combined systolic and diastolic HF, mod-severe tricuspid regurgitation, mid ascending aortic enlargement, BL LE edema, PAD, QTc prolongation, COPD, pulmonary HTN, hx of smoking, macular degeneration, anxiety/depression, SKY on CPAP, PUD, who is being seen today for an episodic care visit at: [facility] ([facility]) [[id number]].     Hospitalized at [facility] from 6/21/21 to 7/5/21 with nausea/weakness/dairrhea and acute renal failure along with supratheraputic INR, hyperkalemia, and digoxin toxicity. Per nephrology note unclear precipitating even for SILAS but likely severe ATN. Imaging negative for acute process. Previous baseline Cr 1.5-1.7 as of May 2021. Required emergent femoral line for HD on admission, was transitioned to right chest wall tunneled catheter on 6/29/21 as renal function did not return to baseline prior to discharge and HD still required. During this hospitalization coumadin was switched to apixaban.  Hospital stay significant for fluid overload on 7/3/21, restarted torsemide. Continued on supplemental oxygen. Permanent pacemaker placed 6/25/21 s/p ablation due to inability to use digoxin in setting of renal failure. Recovered at [facility] and discharged to [facility] on supplemental oxygen. Per hospital discharge was supposed to follow-up at [facility] for Pacemaker check, but lost to follow-up per sister    Today's concern is:   Follow-up today due to abnormal labs including low sodium and uric acid.  Also has upcoming 2nd stage brachiobasilic fistula with Dr. Arredondo at [facility] 12/01/21; R brachiobasilic fistula placed 10/21/21 without complication.     Feeling well.  No SOB or chest  "pain.  Does not drink copious amount of fluid t/o the day.  No leg swelling.  No SANCHEZ.  Can sleep flat in bed.  No abd pain.  No change in bowel or bladder habits.   No issues with fistula reported.    Mood improved, settling in to new environment.  Not sure how helpful Celexa has been.    Patient reports she reviewed her COVID-19 booster on 11/5 Moderna at HD.     Allergies, and PMH/PSH reviewed in [facility] today.    REVIEW OF SYSTEMS:  4 point ROS including Respiratory, CV, GI and , other than that noted in the HPI,  is negative    Objective:   /77   Pulse 79   Temp 98.3  F (36.8  C)   Resp 16   Ht 1.626 m (5' 4\")   Wt 55.2 kg (121 lb 12.8 oz)   SpO2 97%   BMI 20.91 kg/m    GENERAL APPEARANCE:  Alert, in no distress.  RESP:  Non-labored breathing, palpation of chest w/ L CW pacer and R tunnel cath with dressing CDI, no chest wall tenderness, no respiratory distress, Lung sounds clear, patient is on supplemental oxygen via NC as needed.  CV:  Palpation - no murmur/non-displaced PMI, L CW pacemaker and R tunneled cath with dressing CDI. Auscultation - rate and rhythm irregular.   VASCULAR: R UE fistula with +bruit/+thrill. No edema bilateral lower extremities.   ABDOMEN:  Normal bowel sounds, soft, nontender, no grimacing or guarding with palpation.   M/S:   Gait and station ambulates independently with walker. Scoliosis thoracic spine.  SKIN:  Inspection - Chest wall tunneled cath with CDI dressing. 3 cm surgical incision to right AC well approximated without erythema or edema. Palpation- no increased warmth, skin is dry and non-tender.  NEURO: Cranial nerves II-XII grossly intact, no facial asymmetry, follows simple commands, moves all extremities symmetrically, normal tone, no tremor noted today.  PSYCH: Awake and alert, speech fluent,  insight and judgement fair, memory fair, calm/cooperative.    Recent labs in [facility] reviewed by me today.      CBC RESULTS: Recent Labs   Lab Test 11/11/21  0700   WBC 6.0 "   RBC 3.88   HGB 13.0   HCT 40.1   *   MCH 33.5*   MCHC 32.4   RDW 14.0          Last Basic Metabolic Panel:  Recent Labs   Lab Test 11/11/21  0700   *   POTASSIUM 4.5   CHLORIDE 91*   SILVIA 10.3*   CO2 26   BUN 35*   CR 3.32*   GLC 82   Estimated Creatinine Clearance: 12.8 mL/min (A) (based on SCr of 3.32 mg/dL (H)).        Assessment/Plan:  (N18.6,  Z99.2) ESRD (end stage renal disease) on dialysis (H)  (primary encounter diagnosis)  (E87.1) Hyponatremia   Comment: Newly diagnosed with SILAS on CKD stage III June 2021 etiology unclear but likely severe ATN with vomiting and afib with RVR/hypotension prior, imaging negative for acute process. Getting HD MWF at [facility] via R CW tunneled cath, but R brachiobasilic fistula placed 10/21/21. Nephrologist Dr. Derrick Ro MD.  Dry weight 53 kg or 116#. Chronically low sodium last 126.  Plan:   - Discontinue Celexa may contribute  - BMP 11/18   - 2nd stage brachiobasilic fistula with Dr. Arredondo at [facility] 12/01/21 - pre-op instructions at foot of note 11/4, reports no additional pre-op needed as will be done outpatient, will need to hold DOAC 48 hours prior.   - Hold orders for Eliquis 11/29, 11/30, 12/1 placed  - R CW tunneled cath care by HD nursing, goal to remove once fistula vs graft matured to reduce infection/bleeding risk  - Renally dose medications and avoid nephrotoxins  - MTM PharmD following    (I50.42) Chronic combined systolic and diastolic HF (heart failure) (H)  (I07.1) Tricuspid valve insufficiency, unspecified etiology  (I27.20) Pulmonary hypertension (H)  Comment: Chronic, established care with Dr. Holt in [facility] system, she discontinued sildenafil.   HFpEF with tricuspid valve regurgitation, RV dysfunction, pulmonary hypertension.  Last echo 5/19/21 in "[facility] system.  Dry weight 53 kg (116#), today 55 kg (121#).  No SOB, orthopnea, PND, leg swelling. Chronic SANCHEZ unchanged.   Plan:   - Cardiology decreased  Torsemide to 40 mg BID on 11/17/21 from 50 mg BID and hold AM torsemide on HD days >> unclear how this impacted sodium, follow labs  - Continue PRN oxygen   - Follow-up with Dr. Holt 01/03/2022  - Routine weights labs VS   - Starting cardiac rehab     (F41.9,  F32.9) Anxiety and depression  Comment: Stable, multiple stressors over last six motnhs  Plan:  - Discontinue as above due to low soidum  - Continue melatonin PRN  - ACP following    (M1A.9XXX0) Chronic gout  Comment: Per history, uric acid level 5.0 11/13/19, now 3.7 on 11/11/21, at goal of less 6  Plan:   - Decreased allopurinol to 100 mg x3/week s/p HD run, reviewed with PharmD    - Check uric acid on 12/9/21    Electronically signed by: JUSTO Jerry CNP

## 2021-11-16 NOTE — LETTER
11/16/2021        RE: Francine Chris  [facility]  [address]  [facility] [address]        [facility] GERIATRIC SERVICES    Chief Complaint   Patient presents with     RECHECK     pre-procedure planning     HPI:  Francine Chris is a 76 year old  (1945) female with hx of ESRD on HD, afib s/p AV node ablation and pacemaker on DOAC (dx 2013), HTN, combined systolic and diastolic HF, mod-severe tricuspid regurgitation, mid ascending aortic enlargement, BL LE edema, PAD, QTc prolongation, COPD, pulmonary HTN, hx of smoking, macular degeneration, anxiety/depression, SKY on CPAP, PUD, who is being seen today for an episodic care visit at: [facility] ([facility] [[id number]].     Hospitalized at [facility] from 6/21/21 to 7/5/21 with nausea/weakness/dairrhea and acute renal failure along with supratheraputic INR, hyperkalemia, and digoxin toxicity. Per nephrology note unclear precipitating even for SILAS but likely severe ATN. Imaging negative for acute process. Previous baseline Cr 1.5-1.7 as of May 2021. Required emergent femoral line for HD on admission, was transitioned to right chest wall tunneled catheter on 6/29/21 as renal function did not return to baseline prior to discharge and HD still required. During this hospitalization coumadin was switched to apixaban.  Hospital stay significant for fluid overload on 7/3/21, restarted torsemide. Continued on supplemental oxygen. Permanent pacemaker placed 6/25/21 s/p ablation due to inability to use digoxin in setting of renal failure. Recovered at [facility] and discharged to [facility] on supplemental oxygen. Per hospital discharge was supposed to follow-up at [facility] for Pacemaker check, but lost to follow-up per sister    Today's concern is:   Follow-up today due to abnormal labs including low sodium and uric acid.  Also has upcoming 2nd stage brachiobasilic fistula with Dr. Arredondo at [facility]  "Hospital 12/01/21; R brachiobasilic fistula placed 10/21/21 without complication.     Feeling well.  No SOB or chest pain.  Does not drink copious amount of fluid t/o the day.  No leg swelling.  No SANCHEZ.  Can sleep flat in bed.  No abd pain.  No change in bowel or bladder habits.   No issues with fistula reported.    Mood improved, settling in to new environment.  Not sure how helpful Celexa has been.    Patient reports she reviewed her COVID-19 booster on 11/5 Moderna at HD.     Allergies, and PMH/PSH reviewed in [facility] today.    REVIEW OF SYSTEMS:  4 point ROS including Respiratory, CV, GI and , other than that noted in the HPI,  is negative    Objective:   /77   Pulse 79   Temp 98.3  F (36.8  C)   Resp 16   Ht 1.626 m (5' 4\")   Wt 55.2 kg (121 lb 12.8 oz)   SpO2 97%   BMI 20.91 kg/m    GENERAL APPEARANCE:  Alert, in no distress.  RESP:  Non-labored breathing, palpation of chest w/ L CW pacer and R tunnel cath with dressing CDI, no chest wall tenderness, no respiratory distress, Lung sounds clear, patient is on supplemental oxygen via NC as needed.  CV:  Palpation - no murmur/non-displaced PMI, L CW pacemaker and R tunneled cath with dressing CDI. Auscultation - rate and rhythm irregular.   VASCULAR: R UE fistula with +bruit/+thrill. No edema bilateral lower extremities.   ABDOMEN:  Normal bowel sounds, soft, nontender, no grimacing or guarding with palpation.   M/S:   Gait and station ambulates independently with walker. Scoliosis thoracic spine.  SKIN:  Inspection - Chest wall tunneled cath with CDI dressing. 3 cm surgical incision to right AC well approximated without erythema or edema. Palpation- no increased warmth, skin is dry and non-tender.  NEURO: Cranial nerves II-XII grossly intact, no facial asymmetry, follows simple commands, moves all extremities symmetrically, normal tone, no tremor noted today.  PSYCH: Awake and alert, speech fluent,  insight and judgement fair, memory fair, " calm/cooperative.    Recent labs in [facility] reviewed by me today.      CBC RESULTS: Recent Labs   Lab Test 11/11/21  0700   WBC 6.0   RBC 3.88   HGB 13.0   HCT 40.1   *   MCH 33.5*   MCHC 32.4   RDW 14.0          Last Basic Metabolic Panel:  Recent Labs   Lab Test 11/11/21  0700   *   POTASSIUM 4.5   CHLORIDE 91*   SILVIA 10.3*   CO2 26   BUN 35*   CR 3.32*   GLC 82   Estimated Creatinine Clearance: 12.8 mL/min (A) (based on SCr of 3.32 mg/dL (H)).        Assessment/Plan:  (N18.6,  Z99.2) ESRD (end stage renal disease) on dialysis (H)  (primary encounter diagnosis)  (E87.1) Hyponatremia   Comment: Newly diagnosed with SILAS on CKD stage III June 2021 etiology unclear but likely severe ATN with vomiting and afib with RVR/hypotension prior, imaging negative for acute process. Getting HD MWF at [facility] via R CW tunneled cath, but R brachiobasilic fistula placed 10/21/21. Nephrologist Dr. Derrick Ro MD.  Dry weight 53 kg or 116#. Chronically low sodium last 126.  Plan:   - Discontinue Celexa may contribute  - BMP 11/18   - 2nd stage brachiobasilic fistula with Dr. Arredondo at [facility] 12/01/21 - pre-op instructions at foot of note 11/4, reports no additional pre-op needed as will be done outpatient, will need to hold DOAC 48 hours prior.   - Hold orders for Eliquis 11/29, 11/30, 12/1 placed  - R CW tunneled cath care by HD nursing, goal to remove once fistula vs graft matured to reduce infection/bleeding risk  - Renally dose medications and avoid nephrotoxins  - MTM PharmD following    (I50.42) Chronic combined systolic and diastolic HF (heart failure) (H)  (I07.1) Tricuspid valve insufficiency, unspecified etiology  (I27.20) Pulmonary hypertension (H)  Comment: Chronic, established care with Dr. Holt in [facility] system, she discontinued sildenafil.   HFpEF with tricuspid valve regurgitation, RV dysfunction, pulmonary hypertension.  Last echo 5/19/21 in [facility] system.  Dry weight  53 kg (116#), today 55 kg (121#).  No SOB, orthopnea, PND, leg swelling. Chronic SANCHEZ unchanged.   Plan:   - Cardiology decreased Torsemide to 40 mg BID on 11/17/21 from 50 mg BID and hold AM torsemide on HD days >> unclear how this impacted sodium, follow labs  - Continue PRN oxygen   - Follow-up with Dr. Holt 01/03/2022  - Routine weights labs VS   - Starting cardiac rehab     (F41.9,  F32.9) Anxiety and depression  Comment: Stable, multiple stressors over last six motnhs  Plan:  - Discontinue as above due to low soidum  - Continue melatonin PRN  - ACP following    (M1A.9XXX0) Chronic gout  Comment: Per history, uric acid level 5.0 11/13/19, now 3.7 on 11/11/21, at goal of less 6  Plan:   - Decreased allopurinol to 100 mg x3/week s/p HD run, reviewed with PharmD    - Check uric acid on 12/9/21    Electronically signed by: JUSTO Jerry CNP             Sincerely,        JUSTO Jerry CNP

## 2021-11-26 PROBLEM — M1A.9XX0 CHRONIC GOUT WITHOUT TOPHUS, UNSPECIFIED CAUSE, UNSPECIFIED SITE: Status: ACTIVE | Noted: 2021-01-01

## 2021-12-01 NOTE — PROGRESS NOTES
"[facility] GERIATRIC SERVICES    Chief Complaint   Patient presents with     RECHECK     fistula     HPI:  Francine Chris is a 76 year old  (1945) female with hx of ESRD on HD, afib s/p AV node ablation and pacemaker on DOAC (dx 2013), HTN, combined systolic and diastolic HF, mod-severe tricuspid regurgitation, mid ascending aortic enlargement, BL LE edema, PAD, QTc prolongation, COPD, pulmonary HTN, hx of smoking, macular degeneration, anxiety/depression, SKY on CPAP, PUD, who is being seen today for an episodic care visit at: [facility] ([facility]) [[id number]].     Today's concern is:   Follow-up today after 2nd stage of brachiobasilic fistula with Dr. Arredondo at [facility] 12/01/21; R brachiobasilic fistula placed 10/21/21 without complication.     Reports procedure went okay.  Really sore yesterday, took two percocet, but today has been better.  Dr. Arredondo said everything went \"really well.\"  Will have another procedure in early 2022 and hopefully get R CW tunneled catheter removed.    Started cardio-pulmonary rehab.  Hasn't seen been on oxygen.  Reports nephrologist feels she is keeping on too much fluid.  Drinks because her mouth feels dry, hard to cut back on fluid intake.    Benedryl cream is not helping itching.     Meeting with Mirela from [facility].  More anxiety with stopping Celexa due to hyponatremia.    Having more tremor.   Saw neurology in hospital.   Was supposed to have an MRI, but not able due to pacemaker.   Hands and feet tremor at rest and with movement.  No pattern appreciated.      Recent weights:       Allergies, and PMH/PSH reviewed in [facility] today.    REVIEW OF SYSTEMS:  4 point ROS including Respiratory, CV, GI and , other than that noted in the HPI,  is negative    Objective:   BP (!) 156/80   Pulse 59   Temp 97.5  F (36.4  C)   Resp 16   Ht 1.626 m (5' 4\")   Wt 55.5 kg (122 lb 6.4 oz)   SpO2 97%   BMI 21.01 kg/m    GENERAL APPEARANCE:  Alert, in no distress, well groomed.  RESP:  " Non-labored breathing, palpation of chest w/ L CW pacer, also R tunnel cath with dressing CDI, no chest wall tenderness, no respiratory distress, Lung sounds clear, SpO2 95% on room air.  CV:  Palpation - no murmur/non-displaced PMI, L CW pacemaker and R tunneled cath with dressing CDI. Auscultation - rate and rhythm irregular. HR 59.  VASCULAR: R UE fistula with large bulky dressing CDI. No edema bilateral lower extremities. Radial pulses 2+ BL.  ABDOMEN:  Normal bowel sounds, soft, nontender, no grimacing or guarding with palpation.   M/S:   Gait and station ambulates independently with walker. Scoliosis thoracic spine.  SKIN:  Inspection - Chest wall tunneled cath with CDI dressing. Dry skin. No excoriations. Large bandage to right UE CDI. Palpation- no increased warmth, skin is dry and non-tender.  NEURO: Cranial nerves II-XII grossly intact, no facial asymmetry, follows simple commands, moves all extremities symmetrically, normal tone, no tremor noted today.  PSYCH: Awake and alert, speech fluent,  insight and judgement fair, memory fair, calm/cooperative.    Recent labs in [facility] reviewed by me today.    CBC RESULTS: Recent Labs   Lab Test 11/11/21  0700   WBC 6.0   RBC 3.88   HGB 13.0   HCT 40.1   *   MCH 33.5*   MCHC 32.4   RDW 14.0          Last Basic Metabolic Panel:  Recent Labs   Lab Test 11/18/21  0737 11/11/21  0700   * 126*   POTASSIUM 4.3 4.5   CHLORIDE 90* 91*   SILVIA 9.9 10.3*   CO2 25 26   BUN 45* 35*   CR 3.97* 3.32*   GLC 96 82       Liver Function Studies -   Recent Labs   Lab Test 11/11/21  0700   PROTTOTAL 8.6   ALBUMIN 3.4   BILITOTAL 0.7   ALKPHOS 122   AST 24   ALT 19       TSH   Date Value Ref Range Status   01/12/2005 1.22 0.4 - 5.0 mU/L Final     Uric Acid   Date Value Ref Range Status   11/11/2021 3.7 2.6 - 6.0 mg/dL Final     Assessment/Plan:  (N18.6,  Z99.2) ESRD (end stage renal disease) on dialysis (H)  (primary encounter diagnosis)  (E87.1) Hyponatremia   (R52)  Pain  Comment: Newly diagnosed ESRD with SILAS on CKD stage III June 2021 etiology unclear but likely severe ATN with vomiting and afib with RVR/hypotension prior, imaging negative for acute process. Getting HD MWF at [facility] via R CW tunneled cath, but R brachiobasilic fistula placed 10/21/21 with 2nd stage 12/01/21 w/o complication some pain. Nephrologist Dr. Derrick Ro MD.  Dry weight 53 kg or 116#, now 122#. Chronically low sodium last 128, selective serotonin reuptake inhibitor stopped.  Plan:   - HD nursing to remove bandage to R UE at HD run tomorrow per post-op instructions given to patient  - Discontinue percocet after 7 days   - Schedule Tylenol 650 mg PO BID and BID PRN  - R CW tunneled cath care by HD nursing, goal to remove once fistula vs graft matured to reduce infection/bleeding risk  - Renally dose medications and avoid nephrotoxins  - MTM PharmD following    (I50.42) Chronic combined systolic and diastolic HF (heart failure) (H)  (I07.1) Tricuspid valve insufficiency, unspecified etiology  (I27.20) Pulmonary hypertension (H)  (R68.2) Dry Mouth  Comment: Chronic, established care with Dr. Holt in [facility] system, she discontinued sildenafil.   HFpEF with tricuspid valve regurgitation, RV dysfunction, pulmonary hypertension.  Last echo 5/19/21 in [facility] system.  Dry weight 53 kg (116#), today 55 kg (122#).  No SOB, orthopnea, PND, leg swelling. Chronic SANCHEZ unchanged.  Started cardio-pulmonary rehab.   Plan:   - Cardiology decreased Torsemide to 40 mg BID on 11/17/21 from 50 mg BID and hold AM torsemide on HD days >> unclear how this impacted sodium and weights, follow labs, weights, and clinical status  - Check BMP 12/09/21   - Monday and Thursday weights, notify NP if weight > 125#  - Continue PRN oxygen   - Follow-up with Dr. Holt 01/03/2022  - Routine weights labs VS   - Continue cardio-pulmonary rehab   - Biotene spray - can keep at bedside to address dry  "mouth    (F41.9,  F32.9) Anxiety and depression  Comment: Stable, multiple stressors over last six months. Discontinued Celexa due to low soidum.  Plan:  - Continue melatonin PRN  - ACP following - counselor is Felicit[facility]   - Consider low dose mirtazapine (could help with sleep) or gabapentin (could help with itching) if medication needed    (M1A.9XXX0) Chronic gout  Comment: Per history, uric acid level 5.0 11/13/19, now 3.7 on 11/11/21, at goal of less 6  Plan:   - Continue allopurinol 100 mg x3/week s/p HD run   - Check uric acid on 12/9/21    (L29.9) Pruritic Skin Disorder  Comment: Present since admission in setting of ESRD, possible uremic pruritis, also xerosis.   Plan:   - Start loratadine 10 mg a HS  - Continue emollient   - Continue topical antihistamine   - Consider gabapentin   - Dermatology follow-up scheduled for Jan 2021     (R25.1) Tremor  Comment: On chart review tremor present since April 2021 both upper and lower extremities, inpatient neurology consulted \"Tremor-this appears to be predominantly a positional type of tremor without actual component. I do not see evidence of parkinsonism\" during last hospitalization in June 2021. Plan had been for MRI, but unable due to pacemaker, considering primidone, gabapentin, tropiamate, but elected not to medicate due to side effect profile and renal disease.  Plan:   - Outpatient neurology referral now that patient is stable     Electronically signed by: JUSTO Jerry CNP       "

## 2021-12-02 NOTE — LETTER
"    12/2/2021        RE: Francine Chris  [facility]  [address]  [facility] [address]        M [facility] GERIATRIC SERVICES    Chief Complaint   Patient presents with     RECHECK     fistula     HPI:  Francine Chris is a 76 year old  (1945) female with hx of ESRD on HD, afib s/p AV node ablation and pacemaker on DOAC (dx 2013), HTN, combined systolic and diastolic HF, mod-severe tricuspid regurgitation, mid ascending aortic enlargement, BL LE edema, PAD, QTc prolongation, COPD, pulmonary HTN, hx of smoking, macular degeneration, anxiety/depression, SKY on CPAP, PUD, who is being seen today for an episodic care visit at: [facility] ([facility]) [61].     Today's concern is:   Follow-up today after 2nd stage of brachiobasilic fistula with Dr. Arredondo at [facility] 12/01/21; R brachiobasilic fistula placed 10/21/21 without complication.     Reports procedure went okay.  Really sore yesterday, took two percocet, but today has been better.  Dr. Arredondo said everything went \"really well.\"  Will have another procedure in early 2022 and hopefully get R CW tunneled catheter removed.    Started cardio-pulmonary rehab.  Hasn't seen been on oxygen.  Reports nephrologist feels she is keeping on too much fluid.  Drinks because her mouth feels dry, hard to cut back on fluid intake.    Benedryl cream is not helping itching.     Meeting with Mirela from [facility].  More anxiety with stopping Celexa due to hyponatremia.    Having more tremor.   Saw neurology in hospital.   Was supposed to have an MRI, but not able due to pacemaker.   Hands and feet tremor at rest and with movement.  No pattern appreciated.      Recent weights:       Allergies, and PMH/PSH reviewed in EPIC today.    REVIEW OF SYSTEMS:  4 point ROS including Respiratory, CV, GI and , other than that noted in the HPI,  is negative    Objective:   BP (!) 156/80   Pulse 59   Temp 97.5  F (36.4  C)   Resp 16   Ht 1.626 m (5' 4\")   Wt 55.5 kg " (122 lb 6.4 oz)   SpO2 97%   BMI 21.01 kg/m    GENERAL APPEARANCE:  Alert, in no distress, well groomed.  RESP:  Non-labored breathing, palpation of chest w/ L CW pacer, also R tunnel cath with dressing CDI, no chest wall tenderness, no respiratory distress, Lung sounds clear, SpO2 95% on room air.  CV:  Palpation - no murmur/non-displaced PMI, L CW pacemaker and R tunneled cath with dressing CDI. Auscultation - rate and rhythm irregular. HR 59.  VASCULAR: R UE fistula with large bulky dressing CDI. No edema bilateral lower extremities. Radial pulses 2+ BL.  ABDOMEN:  Normal bowel sounds, soft, nontender, no grimacing or guarding with palpation.   M/S:   Gait and station ambulates independently with walker. Scoliosis thoracic spine.  SKIN:  Inspection - Chest wall tunneled cath with CDI dressing. Dry skin. No excoriations. Large bandage to right UE CDI. Palpation- no increased warmth, skin is dry and non-tender.  NEURO: Cranial nerves II-XII grossly intact, no facial asymmetry, follows simple commands, moves all extremities symmetrically, normal tone, no tremor noted today.  PSYCH: Awake and alert, speech fluent,  insight and judgement fair, memory fair, calm/cooperative.    Recent labs in [facility] reviewed by me today.    CBC RESULTS: Recent Labs   Lab Test 11/11/21  0700   WBC 6.0   RBC 3.88   HGB 13.0   HCT 40.1   *   MCH 33.5*   MCHC 32.4   RDW 14.0          Last Basic Metabolic Panel:  Recent Labs   Lab Test 11/18/21  0737 11/11/21  0700   * 126*   POTASSIUM 4.3 4.5   CHLORIDE 90* 91*   SILVIA 9.9 10.3*   CO2 25 26   BUN 45* 35*   CR 3.97* 3.32*   GLC 96 82       Liver Function Studies -   Recent Labs   Lab Test 11/11/21  0700   PROTTOTAL 8.6   ALBUMIN 3.4   BILITOTAL 0.7   ALKPHOS 122   AST 24   ALT 19       TSH   Date Value Ref Range Status   01/12/2005 1.22 0.4 - 5.0 mU/L Final     Uric Acid   Date Value Ref Range Status   11/11/2021 3.7 2.6 - 6.0 mg/dL Final     Assessment/Plan:  (N18.6,   Z99.2) ESRD (end stage renal disease) on dialysis (H)  (primary encounter diagnosis)  (E87.1) Hyponatremia   (R52) Pain  Comment: Newly diagnosed ESRD with SILAS on CKD stage III June 2021 etiology unclear but likely severe ATN with vomiting and afib with RVR/hypotension prior, imaging negative for acute process. Getting HD MWF at [facility] via R CW tunneled cath, but R brachiobasilic fistula placed 10/21/21 with 2nd stage 12/01/21 w/o complication some pain. Nephrologist Dr. Derrick Ro MD.  Dry weight 53 kg or 116#, now 122#. Chronically low sodium last 128, selective serotonin reuptake inhibitor stopped.  Plan:   - HD nursing to remove bandage to R UE at HD run tomorrow per post-op instructions given to patient  - Discontinue percocet after 7 days   - Schedule Tylenol 650 mg PO BID and BID PRN  - R CW tunneled cath care by HD nursing, goal to remove once fistula vs graft matured to reduce infection/bleeding risk  - Renally dose medications and avoid nephrotoxins  - MTM PharmD following    (I50.42) Chronic combined systolic and diastolic HF (heart failure) (H)  (I07.1) Tricuspid valve insufficiency, unspecified etiology  (I27.20) Pulmonary hypertension (H)  (R68.2) Dry Mouth  Comment: Chronic, established care with Dr. Holt in [facility], she discontinued sildenafil.   HFpEF with tricuspid valve regurgitation, RV dysfunction, pulmonary hypertension.  Last echo 5/19/21 in [facility].  Dry weight 53 kg (116#), today 55 kg (122#).  No SOB, orthopnea, PND, leg swelling. Chronic SANCHEZ unchanged.  Started cardio-pulmonary rehab.   Plan:   - Cardiology decreased Torsemide to 40 mg BID on 11/17/21 from 50 mg BID and hold AM torsemide on HD days >> unclear how this impacted sodium and weights, follow labs, weights, and clinical status  - Check BMP 12/09/21   - Monday and Thursday weights, notify NP if weight > 125#  - Continue PRN oxygen   - Follow-up with Dr. Holt 01/03/2022  - Routine weights  "labs VS   - Continue cardio-pulmonary rehab   - Biotene spray - can keep at bedside to address dry mouth    (F41.9,  F32.9) Anxiety and depression  Comment: Stable, multiple stressors over last six months. Discontinued Celexa due to low soidum.  Plan:  - Continue melatonin PRN  - ACP following - counselor is Felicit[facility]   - Consider low dose mirtazapine (could help with sleep) or gabapentin (could help with itching) if medication needed    (M1A.9XXX0) Chronic gout  Comment: Per history, uric acid level 5.0 11/13/19, now 3.7 on 11/11/21, at goal of less 6  Plan:   - Continue allopurinol 100 mg x3/week s/p HD run   - Check uric acid on 12/9/21    (L29.9) Pruritic Skin Disorder  Comment: Present since admission in setting of ESRD, possible uremic pruritis, also xerosis.   Plan:   - Start loratadine 10 mg a HS  - Continue emollient   - Continue topical antihistamine   - Consider gabapentin   - Dermatology follow-up scheduled for Jan 2021     (R25.1) Tremor  Comment: On chart review tremor present since April 2021 both upper and lower extremities, inpatient neurology consulted \"Tremor-this appears to be predominantly a positional type of tremor without actual component. I do not see evidence of parkinsonism\" during last hospitalization in June 2021. Plan had been for MRI, but unable due to pacemaker, considering primidone, gabapentin, tropiamate, but elected not to medicate due to side effect profile and renal disease.  Plan:   - Outpatient neurology referral now that patient is stable     Electronically signed by: JUSTO Jerry CNP             Sincerely,        JUSTO Jerry CNP    "

## 2021-12-05 NOTE — PATIENT INSTRUCTIONS
Francine Chris  1945  ADDITIONAL ORDERS:  - Check BMP on 12/09/21 dx E87.1  - Monday and Thursday weights, notify NP if weight > 125#  Electronically signed by:   JUSTO Jerry CNP  12/05/21 2:58 PM

## 2021-12-07 NOTE — PATIENT INSTRUCTIONS
Your BMI is Body mass index is 22.31 kg/m .  Weight management is a personal decision.  If you are interested in exploring weight loss strategies, the following discussion covers the approaches that may be successful. Body mass index (BMI) is one way to tell whether you are at a healthy weight, overweight, or obese. It measures your weight in relation to your height.  A BMI of 18.5 to 24.9 is in the healthy range. A person with a BMI of 25 to 29.9 is considered overweight, and someone with a BMI of 30 or greater is considered obese. More than two-thirds of American adults are considered overweight or obese.  Being overweight or obese increases the risk for further weight gain. Excess weight may lead to heart disease and diabetes.  Creating and following plans for healthy eating and physical activity may help you improve your health.  Weight control is part of healthy lifestyle and includes exercise, emotional health, and healthy eating habits. Careful eating habits lifelong are the mainstay of weight control. Though there are significant health benefits from weight loss, long-term weight loss with diet alone may be very difficult to achieve- studies show long-term success with dietary management in less than 10% of people. Attaining a healthy weight may be especially difficult to achieve in those with severe obesity. In some cases, medications, devices and surgical management might be considered.  What can you do?  If you are overweight or obese and are interested in methods for weight loss, you should discuss this with your provider.     Consider reducing daily calorie intake by 500 calories.     Keep a food journal.     Avoiding skipping meals, consider cutting portions instead.    Diet combined with exercise helps maintain muscle while optimizing fat loss. Strength training is particularly important for building and maintaining muscle mass. Exercise helps reduce stress, increase energy, and improves fitness.  Increasing exercise without diet control, however, may not burn enough calories to loose weight.       Start walking three days a week 10-20 minutes at a time    Work towards walking thirty minutes five days a week     Eventually, increase the speed of your walking for 1-2 minutes at time    In addition, we recommend that you review healthy lifestyles and methods for weight loss available through the National Institutes of Health patient information sites:  http://win.niddk.nih.gov/publications/index.htm    And look into health and wellness programs that may be available through your health insurance provider, employer, local community center, or festus club.

## 2021-12-07 NOTE — PROGRESS NOTES
"Presenting history:     Ms. Chris presents to clinic today for management of obstructive sleep apnea.  She is accompanied by her sister.    Her medical history significant for pulmonary hypertension, COPD, permanent atrial fibrillation, hypertension,  end-stage renal disease & chronic anemia.     Patient was diagnosed with obstructive sleep apnea following a PSG through [facility] on 9/29/2018.  Based on the interpretation summary, she had an apnea-hypopnea index of 14.1/h and RDI of 21/h, indicating mild to moderate obstructive sleep apnea.  Supine AHI was 30/h and nonsupine AHI was 10.7/h.  Lowest oxygen saturation was 79%.    Patient was prescribed auto CPAP therapy.  She reports that she had intolerance with CPAP due to difficulty with the mask interface.  Her compliance with therapy is uncertain.  Patient reports that her CPAP device was removed by her [employer] company when she was started on oxygen following a hospital discharge.  She is no longer on oxygen therapy.    Patient complains of having poor sleep quality.  She goes to bed around 9:30 PM and sleep latency is about 10 to 15 minutes.  Sleep offset is around 4:15 AM on dialysis days and at 6:30 AM on other days.  About 3 nights a week, she will have difficulty maintaining sleep.  She wakes up in the night for uncertain reasons or due to restless leg symptoms and can stay awake for about an hour.    Patient sleeps alone.  She continues to snore and sometimes has gasping episodes in her sleep.  She has frequent headaches.  She denies having sleepwalking, sleep talking, dream enactment behavior.    She reports having restless leg symptoms which are intermittent.  She reports sometimes waking up in the night with restless leg symptoms.    Exam:  Constitutional: alert, active and no distress  ENT: crowded oropharynx, Becker class-III    BP (!) 146/74   Pulse 89   Ht 1.626 m (5' 4\")   Wt 59 kg (130 lb)   SpO2 100%   BMI 22.31 kg/m      Impression and " plan    1. Obstructive sleep apnea  2. COPD     Patient's previous sleep testing in 2018 showed mild to moderate obstructive sleep apnea with an apnea-hypopnea index of 14/h and RDI of 21/h.  She was previously prescribed auto CPAP therapy but had difficulty tolerating treatment due to mask intolerance.  Sleep-related hypoventilation is a consideration in the context of comorbid COPD.  Patient's CO2 level from most recent CMP is in the normal range at 26.  She is noted to have normal resting oxygen saturation today.  We reviewed our options which can include repeat diagnostic testing with CO2 monitoring or restarting CPAP therapy.  Patient wants to restart CPAP therapy and thinks that a different mask interface will be helpful for her to tolerate therapy.  We also reviewed alternate therapy options for mild obstructive sleep apnea including oral appliance and upper airway surgical evaluation.  Patient is partially edentulous and may not be a candidate for oral appliance treatment.  She does not want surgical options.    Plan:     1. Start auto PAP therapy 5-15 cm H2O  2. Follow up in 2 month after starting treatment   3. We can consider a WatchPAT HST on CPAP if there is ongoing concern about sleep related hypoxemia     I spent a total of 45 minutes for this appointment on this date of service which include time spent before, during and after the visit for chart review, patient care, counseling and coordination of care.    Dr. Artie Cary

## 2021-12-07 NOTE — TELEPHONE ENCOUNTER
Message from Dr. Holt:  Alex Holt MD  [facility] Heart Team 2  Please refer patient to see Dr. Sanderson for her severe tricuspid valve regurgitation. Kindly contact Dr. Sanderson's nurse or CV surgery [facility].     Thank you.     Dr. Holt     Order placed for referral to Dr. Sanderson and CV Surgery. Message sent to scheduling team. Call placed for RN team @ CV surgery with update.

## 2021-12-07 NOTE — NURSING NOTE
"Chief Complaint   Patient presents with     Sleep Problem     DIscuss teatment options for sleep apnea       Initial BP (!) 146/74   Pulse 89   Ht 1.626 m (5' 4\")   Wt 59 kg (130 lb)   SpO2 100%   BMI 22.31 kg/m   Estimated body mass index is 22.31 kg/m  as calculated from the following:    Height as of this encounter: 1.626 m (5' 4\").    Weight as of this encounter: 59 kg (130 lb).    Medication Reconciliation: complete  ESS 8  Neck circumference: 33 centimeters.  Sol Johnson, JOSE MIGUEL  "

## 2021-12-13 NOTE — PROGRESS NOTES
Patient's previous sleep testing in 2018 showed mild to moderate obstructive sleep apnea with an apnea-hypopnea index of 14/h and RDI of 21/h. She was previously intolerant of CPAP. Due to comorbid COPD, high CO2 level on recent BMP, and previous CPAP intolerance, recommended titration PSG with CO2 monitoring for optimizing PAP therapy.

## 2021-12-13 NOTE — TELEPHONE ENCOUNTER
Called to let patient know she will need a new sleep study to restart CPAP therapy. Message routed to provider.

## 2021-12-15 NOTE — PROGRESS NOTES
"[facility] GERIATRIC SERVICES    Chief Complaint   Patient presents with     RECHECK     skin issue     HPI:  Francine Chris is a 76 year old  (1945) female with hx of ESRD on HD, afib s/p AV node ablation and pacemaker on DOAC (dx 2013), HTN, combined systolic and diastolic HF, mod-severe tricuspid regurgitation, mid ascending aortic enlargement, BL LE edema, PAD, QTc prolongation, COPD, pulmonary HTN, hx of smoking, macular degeneration, anxiety/depression, SKY on CPAP, PUD, who is being seen today for an episodic care visit at: [facility] ([facility]) [61].     Today's concern is:   Follow-up today after blood draw with bruising and swelling to arm.     Last labs on 12/9/21 to trend sodium after stopping low dose Celexa.  Sodium improved from 126 to 130 mmol/L.     Resident reports blood draws are routinely quite difficult for phlebotomy, with last draw attempted multiple times in AC joint.  HD only draws labs once a month.   Area assessed by HD nurse, instructed to apply cool and warm packs.  No severe pain. No numbness or tingling in hand. Hand warm, pink.     Allergies, and PMH/PSH reviewed in EPIC today.    REVIEW OF SYSTEMS:  4 point ROS including Respiratory, CV, GI and , other than that noted in the HPI,  is negative    Objective:   /69   Pulse 88   Temp 97.3  F (36.3  C)   Resp 23   Ht 1.626 m (5' 4\")   Wt 59 kg (130 lb)   SpO2 95%   BMI 22.31 kg/m    GENERAL APPEARANCE:  Alert, in no distress, well groomed.  RESP:  Non-labored breathing.  VASCULAR: R UE fistula dressing CDI. No edema bilateral lower extremities. Radial and branchial pulses 2+ BL. Area of induration L AC with surrounding ecchymosis - see photo below.  MSK: Good  BL, full ROM BL elbows  SKIN:  Inspection - See vascular. Palpation- no increased warmth, skin is dry and non-tender.  PSYCH: Awake and alert, speech fluent,  insight and judgement fair, memory fair, calm/cooperative.    Left Anticubital " Fossa      Recent labs in [facility] reviewed by me today.    CBC RESULTS: Recent Labs   Lab Test 11/11/21  0700   WBC 6.0   RBC 3.88   HGB 13.0   HCT 40.1   *   MCH 33.5*   MCHC 32.4   RDW 14.0          Last Basic Metabolic Panel:  Recent Labs   Lab Test 12/09/21  0716 11/18/21  0737   * 128*   POTASSIUM 3.4 4.3   CHLORIDE 92* 90*   SILVIA 10.4* 9.9   CO2 30 25   BUN 41* 45*   CR 2.79* 3.97*   GLC 96 96       Liver Function Studies -   Recent Labs   Lab Test 11/11/21  0700   PROTTOTAL 8.6   ALBUMIN 3.4   BILITOTAL 0.7   ALKPHOS 122   AST 24   ALT 19       TSH   Date Value Ref Range Status   01/12/2005 1.22 0.4 - 5.0 mU/L Final     Assessment/Plan:  (T14.8XXA) Hematoma of skin  (primary encounter diagnosis)  Comment: To L AC s/p phlebotomy with likely injury to vessel about six days ago, slowly improving, normal neurovascular exam   Plan:   - Continue ice and heat for comfort  - Notify nursing of increased pain, swelling, or if ecchymosis is not resolving  - Follow on serial exam    (E87.1) Hyponatremia  Comment: Chronic over last six months per chart review, improved after stopping Celexa, reviewed Care Everywhere low sodium with Bupropion in the past.  Plan:   - Avoid frequent blood draws, follow Na+ on HD labs  - Avoid medication that contribute to low sodium  - Monitor mood    Orders:  - Albuterol refill - resident to request from nursing  - Discontinue escorts - message to nursing    Electronically signed by: JUSTO Jerry CNP

## 2021-12-16 NOTE — LETTER
"    12/16/2021        RE: Francine Chris  [facility]  [address]  [facility] [address]        M [facility] GERIATRIC SERVICES    Chief Complaint   Patient presents with     RECHECK     skin issue     HPI:  Francine Chris is a 76 year old  (1945) female with hx of ESRD on HD, afib s/p AV node ablation and pacemaker on DOAC (dx 2013), HTN, combined systolic and diastolic HF, mod-severe tricuspid regurgitation, mid ascending aortic enlargement, BL LE edema, PAD, QTc prolongation, COPD, pulmonary HTN, hx of smoking, macular degeneration, anxiety/depression, SKY on CPAP, PUD, who is being seen today for an episodic care visit at: [facility] ([facility]) [61].     Today's concern is:   Follow-up today after blood draw with bruising and swelling to arm.     Last labs on 12/9/21 to trend sodium after stopping low dose Celexa.  Sodium improved from 126 to 130 mmol/L.     Resident reports blood draws are routinely quite difficult for phlebotomy, with last draw attempted multiple times in AC joint.  HD only draws labs once a month.   Area assessed by HD nurse, instructed to apply cool and warm packs.  No severe pain. No numbness or tingling in hand. Hand warm, pink.     Allergies, and PMH/PSH reviewed in [facility] today.    REVIEW OF SYSTEMS:  4 point ROS including Respiratory, CV, GI and , other than that noted in the HPI,  is negative    Objective:   /69   Pulse 88   Temp 97.3  F (36.3  C)   Resp 23   Ht 1.626 m (5' 4\")   Wt 59 kg (130 lb)   SpO2 95%   BMI 22.31 kg/m    GENERAL APPEARANCE:  Alert, in no distress, well groomed.  RESP:  Non-labored breathing.  VASCULAR: R UE fistula dressing CDI. No edema bilateral lower extremities. Radial and branchial pulses 2+ BL. Area of induration L AC with surrounding ecchymosis - see photo below.  MSK: Good  BL, full ROM BL elbows  SKIN:  Inspection - See vascular. Palpation- no increased warmth, skin is dry and non-tender.  PSYCH: Awake " and alert, speech fluent,  insight and judgement fair, memory fair, calm/cooperative.    Left Anticubital Fossa      Recent labs in [facility] reviewed by me today.    CBC RESULTS: Recent Labs   Lab Test 11/11/21  0700   WBC 6.0   RBC 3.88   HGB 13.0   HCT 40.1   *   MCH 33.5*   MCHC 32.4   RDW 14.0          Last Basic Metabolic Panel:  Recent Labs   Lab Test 12/09/21  0716 11/18/21  0737   * 128*   POTASSIUM 3.4 4.3   CHLORIDE 92* 90*   SILVIA 10.4* 9.9   CO2 30 25   BUN 41* 45*   CR 2.79* 3.97*   GLC 96 96       Liver Function Studies -   Recent Labs   Lab Test 11/11/21  0700   PROTTOTAL 8.6   ALBUMIN 3.4   BILITOTAL 0.7   ALKPHOS 122   AST 24   ALT 19       TSH   Date Value Ref Range Status   01/12/2005 1.22 0.4 - 5.0 mU/L Final     Assessment/Plan:  (T14.8XXA) Hematoma of skin  (primary encounter diagnosis)  Comment: To L AC s/p phlebotomy with likely injury to vessel about six days ago, slowly improving, normal neurovascular exam   Plan:   - Continue ice and heat for comfort  - Notify nursing of increased pain, swelling, or if ecchymosis is not resolving  - Follow on serial exam    (E87.1) Hyponatremia  Comment: Chronic over last six months per chart review, improved after stopping Celexa, reviewed Care Everywhere low sodium with Bupropion in the past.  Plan:   - Avoid frequent blood draws, follow Na+ on HD labs  - Avoid medication that contribute to low sodium  - Monitor mood    Orders:  - Albuterol refill - resident to request from nursing  - Discontinue escorts - message to nursing    Electronically signed by: JUSTO Jerry CNP             Sincerely,        JUSTO Jerry CNP

## 2021-12-26 NOTE — PATIENT INSTRUCTIONS
Francine Chris  1945  ORDERS:  - Patient requested Albuterol refill - please supply to patient as appropriate   - Discontinue escorts to meals if nursing feels appropriate   Electronically signed by:   JUSTO Jerry CNP  12/26/21 11:46 AM

## 2021-12-27 NOTE — NURSING NOTE
Patient seen today for consultation for tricuspid valve regurgitation.     Pt is not a surgical candidate.  Pt and daughter are agreeable and verbalized understanding. They stated that she had a rough time when they did surgery for her fistula.     Contact information given if anything should change.     Romelia Jerez, RN Care Coordinator  Cardiothoracic Surgery

## 2021-12-27 NOTE — PATIENT INSTRUCTIONS
You were seen today in the [facility]                     Cardiothoracic Surgery Clinic    Your surgeon was Dr. Janet Sanderson      Recommendations:    You are not a surgical candidate, if things were to change feel free to reach out to the nurse care coordinators.         Please feel free to call me with any questions or concerns.    Romelia Jerez, RN Care Coordinator  Cardiothoracic Surgery Division  [facility]  [phone]

## 2021-12-27 NOTE — LETTER
12/27/2021      RE: Francine Chris  [facility]  [address] [facility] [address]       Dear Colleague,    Thank you for the opportunity to participate in the care of your patient, Francine Chris, at the [facility] [place] at [facility]. Please see a copy of my visit note below.    CV Surgery    Patient seen, clinic note dictated #[id number].    Janet Sanderson MD      Please do not hesitate to contact me if you have any questions/concerns.     Sincerely,     Janet Sanderson MD

## 2021-12-27 NOTE — LETTER
12/27/2021      RE: Francine Chris  [facility]  [address] [facility] [address]       CV Surgery      Janet Sanderson MD    Service Date: 12/27/2021    REFERRING CARDIOLOGIST:  Dr. Pooja Holt.    REASON FOR CONSULTATION:  Evaluation for severe tricuspid valve regurgitation.    HISTORY OF PRESENT ILLNESS:  Ms. Chris is a very pleasant 76-year-old female who lives in assisted living with end-stage renal failure, on scheduled hemodialysis 3 times a week.  She was hospitalized back in May for heart failure symptoms and was shown to have severe tricuspid valve regurgitation.  She also had evidence of significant pulmonary hypertension as well.  She was seen recently by Dr. Holt for her right heart failure and severe tricuspid valve regurgitation, who referred the patient to me for evaluation from a surgical standpoint.    PAST MEDICAL/SURGICAL HISTORY:  Include:  1.  Severe TR with pulmonary hypertension and right heart failure as described above.  2.  Permanent AFib, status post AV luis ablation and permanent pacemaker implantation.  3.  End-stage renal disease, on scheduled hemodialysis.  4.  Peripheral vascular disease.  5.  Anemia of chronic disease.  6.  Severe deconditioning with a BMI of 20.    ALLERGIES:  Sotalol and bupropion.    OUTPATIENT MEDICATIONS:  Reviewed in Epic chart.    FAMILY HISTORY:  Noncontributory.    SOCIAL HISTORY:  Former tobacco abuse.  Denies any alcohol use at this time.  Lives in assisted living as described above.    REVIEW OF SYSTEMS:  As per HPI.  She lives a very sedentary lifestyle and complains of generalized weakness, but no chest pain or shortness of breath.    PHYSICAL EXAMINATION:    VITAL SIGNS:  All vital signs are stable.  GENERAL:  She appears quite frail and older than her stated age, but in no acute distress.  HEENT:  Within normal limits.  NECK:  Supple, no lymphadenopathy.  CARDIOVASCULAR:  Regular rate and rhythm, normal S1 and S2.  Grade 3/6  systolic murmur at the apex.  LUNGS:  Clear bilaterally.  ABDOMEN:  Soft, nontender, nondistended.  EXTREMITIES:  Negative for edema, cyanosis or clubbing.  NEUROLOGIC:  She is A and O x3.  No focal deficits.    PERTINENT LABORATORY STUDIES:  Her most recent echocardiogram was from 2021 that demonstrated low normal LV function with EF of 50%-55%, mild RV dilatation with mildly reduced RV systolic function, severe biatrial enlargement and severe tricuspid valve regurgitation, mild to moderate mitral valve regurgitation, mild aortic valve insufficiency.    IMPRESSION AND PLAN:  Ms. Cho is a 76-year-old frail female who lives in assisted living with end-stage renal disease, on scheduled hemodialysis with severe tricuspid valve regurgitation with chronic right heart failure symptoms.  Unfortunately, given her overall functional status with severe physical deconditioning and being on hemodialysis, she is not a surgical candidate as her risk for a tricuspid valve surgery will be extremely high, if not prohibitive.  I discussed this with Dr. Holt, her referring cardiologist as well, who agrees.  Her diagnosis was discussed in detail with the patient and the reason for not recommending surgical intervention in her case.  She understood and was actually expecting this recommendation and that she would actually not consider surgery even if we recommended it.  She was satisfied with our visit today.    Thank you very much for this referral.    Janet Sanderson MD        D: 2021   T: 2021   MT: al    Name:     TIFFANIE CHO  MRN:      [id number]        Account:      [id number]   :      1945           Service Date: 2021       Document: [id number]

## 2021-12-28 NOTE — TELEPHONE ENCOUNTER
[facility] Nurse Triage Telephone Note    Provider: JUSTO Jerry CNP  Facility: [facility] Type:  AL    Caller: Fabby  Call Back Number: [phone]    Allergies:    Allergies   Allergen Reactions     Sotalol Other (See Comments)     Prolonged QT     Bupropion Other (See Comments)     Na 118 acutely. Comorbid diuresis for heart failure        Reason for call: Pt is Dialysis pt, we have orders to check her wt on Non-dialysis days & call if wt >125#. She has been running 132-137#.    Verbal Order/Direction given by Provider: Cancel order for checking weights, they monitor that at Dialysis.    Provider giving Order:  Grisel Santos MD    Verbal Order given to: Fabby Palafox RN

## 2021-12-29 NOTE — PROGRESS NOTES
Service Date: 12/27/2021    REFERRING CARDIOLOGIST:  Dr. Pooja Holt.    REASON FOR CONSULTATION:  Evaluation for severe tricuspid valve regurgitation.    HISTORY OF PRESENT ILLNESS:  Ms. Chris is a very pleasant 76-year-old female who lives in assisted living with end-stage renal failure, on scheduled hemodialysis 3 times a week.  She was hospitalized back in May for heart failure symptoms and was shown to have severe tricuspid valve regurgitation.  She also had evidence of significant pulmonary hypertension as well.  She was seen recently by Dr. Holt for her right heart failure and severe tricuspid valve regurgitation, who referred the patient to me for evaluation from a surgical standpoint.    PAST MEDICAL/SURGICAL HISTORY:  Include:  1.  Severe TR with pulmonary hypertension and right heart failure as described above.  2.  Permanent AFib, status post AV luis ablation and permanent pacemaker implantation.  3.  End-stage renal disease, on scheduled hemodialysis.  4.  Peripheral vascular disease.  5.  Anemia of chronic disease.  6.  Severe deconditioning with a BMI of 20.    ALLERGIES:  Sotalol and bupropion.    OUTPATIENT MEDICATIONS:  Reviewed in Epic chart.    FAMILY HISTORY:  Noncontributory.    SOCIAL HISTORY:  Former tobacco abuse.  Denies any alcohol use at this time.  Lives in assisted living as described above.    REVIEW OF SYSTEMS:  As per HPI.  She lives a very sedentary lifestyle and complains of generalized weakness, but no chest pain or shortness of breath.    PHYSICAL EXAMINATION:    VITAL SIGNS:  All vital signs are stable.  GENERAL:  She appears quite frail and older than her stated age, but in no acute distress.  HEENT:  Within normal limits.  NECK:  Supple, no lymphadenopathy.  CARDIOVASCULAR:  Regular rate and rhythm, normal S1 and S2.  Grade 3/6 systolic murmur at the apex.  LUNGS:  Clear bilaterally.  ABDOMEN:  Soft, nontender, nondistended.  EXTREMITIES:  Negative for edema, cyanosis or  clubbing.  NEUROLOGIC:  She is A and O x3.  No focal deficits.    PERTINENT LABORATORY STUDIES:  Her most recent echocardiogram was from 2021 that demonstrated low normal LV function with EF of 50%-55%, mild RV dilatation with mildly reduced RV systolic function, severe biatrial enlargement and severe tricuspid valve regurgitation, mild to moderate mitral valve regurgitation, mild aortic valve insufficiency.    IMPRESSION AND PLAN:  Ms. Cho is a 76-year-old frail female who lives in assisted living with end-stage renal disease, on scheduled hemodialysis with severe tricuspid valve regurgitation with chronic right heart failure symptoms.  Unfortunately, given her overall functional status with severe physical deconditioning and being on hemodialysis, she is not a surgical candidate as her risk for a tricuspid valve surgery will be extremely high, if not prohibitive.  I discussed this with Dr. Holt, her referring cardiologist as well, who agrees.  Her diagnosis was discussed in detail with the patient and the reason for not recommending surgical intervention in her case.  She understood and was actually expecting this recommendation and that she would actually not consider surgery even if we recommended it.  She was satisfied with our visit today.    Thank you very much for this referral.    Janet Sanderson MD        D: 2021   T: 2021   MT: al    Name:     TIFFANIE CHO  MRN:      [id number]        Account:      [id number]   :      1945           Service Date: 2021       Document: [id number]

## 2021-12-29 NOTE — PROGRESS NOTES
[facility] Care Coordination Contact    Received a call from member's sister, Tereza. She shared that member has been really struggling with the diet offered at the AL, reporting that the diet is not a renal diet. Reviewed with Tereza that the AL doesn't provide specialized diet. Tereza shared that she has been trying to cook meals for member but that it has been difficult. She shared that she has been doing some research on [employer] and shared that [employer] has renal diets and inquired if member would be able to get her meals from [employer]. Reviewed with sister that member is on [employer] which means that a portion of member's rent goes toward raw food costs. Also reviewed that the home delivered meals are a 1 meal per day benefit. Reviewed that this Care Coordinator would do some more research to inquire about what other options are.  Reviewed the case with a manager, and it was suggested a BE with the health plan as there would be duplication in costs because of the GR[employer] and possibly about offering more than 1 meal per day. Reviewed the menu that [employer] medicaid meal options against a renal diet. There is very little on the menu that would meet the renal diet. Emailed Amena the RN at the AL, and inquired if member would be able to get renal diet meals from the NH that is attached to the AL, and will wait for a response.  Called member's sister back. Reviewed the meal options at [employer] and she shared that those meals would not work, and the small portion of meals that would work would be too repetitive. Sister also shared that she has talked with the AL regarding sauces and gravy and have asked them to put those the side so member is able to eat the foods without those items, and the sister shared that she was told that the AL doesn't do special diets. Reviewed the email that this Care Coordinator sent the AL RN. The sister shared that for now she will be making meals for member. No  further questions at this time.  GAYLE Coleman  [facility]  [phone]

## 2022-01-01 ENCOUNTER — APPOINTMENT (OUTPATIENT)
Dept: GENERAL RADIOLOGY | Facility: CLINIC | Age: 77
End: 2022-01-01
Attending: EMERGENCY MEDICINE
Payer: MEDICARE

## 2022-01-01 ENCOUNTER — APPOINTMENT (OUTPATIENT)
Dept: PHYSICAL THERAPY | Facility: CLINIC | Age: 77
DRG: 521 | End: 2022-01-01
Payer: COMMERCIAL

## 2022-01-01 ENCOUNTER — CARE COORDINATION (OUTPATIENT)
Dept: CARDIOLOGY | Facility: CLINIC | Age: 77
End: 2022-01-01

## 2022-01-01 ENCOUNTER — TELEPHONE (OUTPATIENT)
Dept: DERMATOLOGY | Facility: CLINIC | Age: 77
End: 2022-01-01

## 2022-01-01 ENCOUNTER — NURSING HOME VISIT (OUTPATIENT)
Dept: GERIATRICS | Facility: CLINIC | Age: 77
End: 2022-01-01

## 2022-01-01 ENCOUNTER — PATIENT OUTREACH (OUTPATIENT)
Dept: GERIATRIC MEDICINE | Facility: CLINIC | Age: 77
End: 2022-01-01

## 2022-01-01 ENCOUNTER — NURSING HOME VISIT (OUTPATIENT)
Dept: GERIATRICS | Facility: CLINIC | Age: 77
End: 2022-01-01
Payer: COMMERCIAL

## 2022-01-01 ENCOUNTER — APPOINTMENT (OUTPATIENT)
Dept: MRI IMAGING | Facility: CLINIC | Age: 77
DRG: 521 | End: 2022-01-01
Attending: NURSE PRACTITIONER
Payer: COMMERCIAL

## 2022-01-01 ENCOUNTER — LAB REQUISITION (OUTPATIENT)
Dept: LAB | Facility: CLINIC | Age: 77
End: 2022-01-01
Payer: COMMERCIAL

## 2022-01-01 ENCOUNTER — TELEPHONE (OUTPATIENT)
Dept: GERIATRICS | Facility: CLINIC | Age: 77
End: 2022-01-01
Payer: COMMERCIAL

## 2022-01-01 ENCOUNTER — APPOINTMENT (OUTPATIENT)
Dept: GENERAL RADIOLOGY | Facility: CLINIC | Age: 77
DRG: 521 | End: 2022-01-01
Attending: ORTHOPAEDIC SURGERY
Payer: COMMERCIAL

## 2022-01-01 ENCOUNTER — APPOINTMENT (OUTPATIENT)
Dept: CT IMAGING | Facility: CLINIC | Age: 77
End: 2022-01-01
Attending: EMERGENCY MEDICINE
Payer: COMMERCIAL

## 2022-01-01 ENCOUNTER — TRANSITIONAL CARE UNIT VISIT (OUTPATIENT)
Dept: GERIATRICS | Facility: CLINIC | Age: 77
End: 2022-01-01
Payer: MEDICARE

## 2022-01-01 ENCOUNTER — TELEPHONE (OUTPATIENT)
Dept: WOUND CARE | Facility: CLINIC | Age: 77
End: 2022-01-01
Payer: COMMERCIAL

## 2022-01-01 ENCOUNTER — MEDICAL CORRESPONDENCE (OUTPATIENT)
Dept: HEALTH INFORMATION MANAGEMENT | Facility: CLINIC | Age: 77
End: 2022-01-01
Payer: COMMERCIAL

## 2022-01-01 ENCOUNTER — HOSPITAL ENCOUNTER (OUTPATIENT)
Dept: CARDIAC REHAB | Facility: CLINIC | Age: 77
End: 2022-01-04
Attending: INTERNAL MEDICINE
Payer: MEDICARE

## 2022-01-01 ENCOUNTER — TELEPHONE (OUTPATIENT)
Dept: WOUND CARE | Facility: CLINIC | Age: 77
End: 2022-01-01

## 2022-01-01 ENCOUNTER — PREP FOR PROCEDURE (OUTPATIENT)
Dept: OTHER | Facility: CLINIC | Age: 77
End: 2022-01-01

## 2022-01-01 ENCOUNTER — PATIENT OUTREACH (OUTPATIENT)
Dept: GERIATRIC MEDICINE | Facility: CLINIC | Age: 77
End: 2022-01-01
Payer: COMMERCIAL

## 2022-01-01 ENCOUNTER — ASSISTED LIVING VISIT (OUTPATIENT)
Dept: GERIATRICS | Facility: CLINIC | Age: 77
End: 2022-01-01
Payer: MEDICARE

## 2022-01-01 ENCOUNTER — HOSPITAL ENCOUNTER (EMERGENCY)
Facility: CLINIC | Age: 77
Discharge: HOME | End: 2022-05-02
Attending: EMERGENCY MEDICINE | Admitting: EMERGENCY MEDICINE
Payer: COMMERCIAL

## 2022-01-01 ENCOUNTER — TELEPHONE (OUTPATIENT)
Dept: OTHER | Facility: CLINIC | Age: 77
End: 2022-01-01

## 2022-01-01 ENCOUNTER — DOCUMENTATION ONLY (OUTPATIENT)
Dept: OTHER | Facility: CLINIC | Age: 77
End: 2022-01-01

## 2022-01-01 ENCOUNTER — MEDICAL CORRESPONDENCE (OUTPATIENT)
Dept: HEALTH INFORMATION MANAGEMENT | Facility: CLINIC | Age: 77
End: 2022-01-01

## 2022-01-01 ENCOUNTER — OFFICE VISIT (OUTPATIENT)
Dept: DERMATOLOGY | Facility: CLINIC | Age: 77
End: 2022-01-01
Attending: NURSE PRACTITIONER
Payer: MEDICARE

## 2022-01-01 ENCOUNTER — PATIENT OUTREACH (OUTPATIENT)
Dept: GERIATRIC MEDICINE | Facility: CLINIC | Age: 77
End: 2022-01-01
Payer: MEDICARE

## 2022-01-01 ENCOUNTER — APPOINTMENT (OUTPATIENT)
Dept: CT IMAGING | Facility: CLINIC | Age: 77
DRG: 521 | End: 2022-01-01
Attending: STUDENT IN AN ORGANIZED HEALTH CARE EDUCATION/TRAINING PROGRAM
Payer: COMMERCIAL

## 2022-01-01 ENCOUNTER — ANESTHESIA (OUTPATIENT)
Dept: SURGERY | Facility: CLINIC | Age: 77
DRG: 264 | End: 2022-01-01
Payer: COMMERCIAL

## 2022-01-01 ENCOUNTER — APPOINTMENT (OUTPATIENT)
Dept: PHYSICAL THERAPY | Facility: CLINIC | Age: 77
End: 2022-01-01
Attending: INTERNAL MEDICINE
Payer: MEDICARE

## 2022-01-01 ENCOUNTER — APPOINTMENT (OUTPATIENT)
Dept: CT IMAGING | Facility: CLINIC | Age: 77
DRG: 521 | End: 2022-01-01
Attending: INTERNAL MEDICINE
Payer: COMMERCIAL

## 2022-01-01 ENCOUNTER — APPOINTMENT (OUTPATIENT)
Dept: CT IMAGING | Facility: CLINIC | Age: 77
DRG: 521 | End: 2022-01-01
Payer: COMMERCIAL

## 2022-01-01 ENCOUNTER — APPOINTMENT (OUTPATIENT)
Dept: CT IMAGING | Facility: CLINIC | Age: 77
End: 2022-01-01
Attending: EMERGENCY MEDICINE
Payer: MEDICARE

## 2022-01-01 ENCOUNTER — ANESTHESIA EVENT (OUTPATIENT)
Dept: SURGERY | Facility: CLINIC | Age: 77
DRG: 264 | End: 2022-01-01
Payer: COMMERCIAL

## 2022-01-01 ENCOUNTER — LAB REQUISITION (OUTPATIENT)
Dept: LAB | Facility: CLINIC | Age: 77
End: 2022-01-01

## 2022-01-01 ENCOUNTER — ALLIED HEALTH/NURSE VISIT (OUTPATIENT)
Dept: PULMONOLOGY | Facility: CLINIC | Age: 77
End: 2022-01-01
Payer: COMMERCIAL

## 2022-01-01 ENCOUNTER — LAB REQUISITION (OUTPATIENT)
Dept: LAB | Facility: CLINIC | Age: 77
End: 2022-01-01
Payer: MEDICARE

## 2022-01-01 ENCOUNTER — OFFICE VISIT (OUTPATIENT)
Dept: CARDIOLOGY | Facility: CLINIC | Age: 77
End: 2022-01-01
Attending: INTERNAL MEDICINE
Payer: MEDICARE

## 2022-01-01 ENCOUNTER — ANCILLARY PROCEDURE (OUTPATIENT)
Dept: CARDIOLOGY | Facility: CLINIC | Age: 77
End: 2022-01-01
Attending: INTERNAL MEDICINE
Payer: COMMERCIAL

## 2022-01-01 ENCOUNTER — PATIENT OUTREACH (OUTPATIENT)
Dept: CARE COORDINATION | Facility: CLINIC | Age: 77
End: 2022-01-01
Payer: MEDICARE

## 2022-01-01 ENCOUNTER — NURSING HOME VISIT (OUTPATIENT)
Dept: GERIATRICS | Facility: CLINIC | Age: 77
End: 2022-01-01
Payer: MEDICARE

## 2022-01-01 ENCOUNTER — APPOINTMENT (OUTPATIENT)
Dept: GENERAL RADIOLOGY | Facility: CLINIC | Age: 77
DRG: 521 | End: 2022-01-01
Attending: EMERGENCY MEDICINE
Payer: COMMERCIAL

## 2022-01-01 ENCOUNTER — ANESTHESIA (OUTPATIENT)
Dept: SURGERY | Facility: CLINIC | Age: 77
DRG: 521 | End: 2022-01-01
Payer: COMMERCIAL

## 2022-01-01 ENCOUNTER — APPOINTMENT (OUTPATIENT)
Dept: GENERAL RADIOLOGY | Facility: CLINIC | Age: 77
End: 2022-01-01
Attending: EMERGENCY MEDICINE
Payer: COMMERCIAL

## 2022-01-01 ENCOUNTER — APPOINTMENT (OUTPATIENT)
Dept: NUCLEAR MEDICINE | Facility: CLINIC | Age: 77
End: 2022-01-01
Attending: INTERNAL MEDICINE
Payer: MEDICARE

## 2022-01-01 ENCOUNTER — ANESTHESIA EVENT (OUTPATIENT)
Dept: SURGERY | Facility: CLINIC | Age: 77
DRG: 521 | End: 2022-01-01
Payer: COMMERCIAL

## 2022-01-01 ENCOUNTER — HOSPITAL ENCOUNTER (OUTPATIENT)
Facility: CLINIC | Age: 77
Setting detail: OBSERVATION
Discharge: HOME | End: 2022-01-13
Attending: EMERGENCY MEDICINE | Admitting: INTERNAL MEDICINE
Payer: MEDICARE

## 2022-01-01 ENCOUNTER — OFFICE VISIT (OUTPATIENT)
Dept: PULMONOLOGY | Facility: CLINIC | Age: 77
End: 2022-01-01
Payer: MEDICARE

## 2022-01-01 ENCOUNTER — ANCILLARY PROCEDURE (OUTPATIENT)
Dept: GENERAL RADIOLOGY | Facility: CLINIC | Age: 77
End: 2022-01-01
Attending: STUDENT IN AN ORGANIZED HEALTH CARE EDUCATION/TRAINING PROGRAM
Payer: MEDICARE

## 2022-01-01 ENCOUNTER — APPOINTMENT (OUTPATIENT)
Dept: ULTRASOUND IMAGING | Facility: CLINIC | Age: 77
DRG: 521 | End: 2022-01-01
Attending: NURSE PRACTITIONER
Payer: COMMERCIAL

## 2022-01-01 ENCOUNTER — HOSPITAL ENCOUNTER (OUTPATIENT)
Dept: CARDIAC REHAB | Facility: CLINIC | Age: 77
End: 2022-01-06
Attending: INTERNAL MEDICINE
Payer: MEDICARE

## 2022-01-01 ENCOUNTER — PATIENT OUTREACH (OUTPATIENT)
Dept: CARE COORDINATION | Facility: CLINIC | Age: 77
End: 2022-01-01
Payer: COMMERCIAL

## 2022-01-01 ENCOUNTER — APPOINTMENT (OUTPATIENT)
Dept: ULTRASOUND IMAGING | Facility: CLINIC | Age: 77
DRG: 264 | End: 2022-01-01
Attending: EMERGENCY MEDICINE
Payer: COMMERCIAL

## 2022-01-01 ENCOUNTER — HOSPITAL ENCOUNTER (INPATIENT)
Facility: CLINIC | Age: 77
LOS: 13 days | Discharge: HOSPICE | DRG: 521 | End: 2022-05-21
Attending: EMERGENCY MEDICINE | Admitting: HOSPITALIST
Payer: COMMERCIAL

## 2022-01-01 ENCOUNTER — HOSPITAL ENCOUNTER (EMERGENCY)
Facility: CLINIC | Age: 77
Discharge: NURSING, REHAB OR LONG-TERM CARE | End: 2022-01-26
Attending: EMERGENCY MEDICINE | Admitting: EMERGENCY MEDICINE
Payer: MEDICARE

## 2022-01-01 ENCOUNTER — OFFICE VISIT (OUTPATIENT)
Dept: OTHER | Facility: CLINIC | Age: 77
End: 2022-01-01
Attending: SURGERY
Payer: COMMERCIAL

## 2022-01-01 ENCOUNTER — DOCUMENTATION ONLY (OUTPATIENT)
Dept: OTHER | Facility: CLINIC | Age: 77
End: 2022-01-01
Payer: COMMERCIAL

## 2022-01-01 ENCOUNTER — PATIENT OUTREACH (OUTPATIENT)
Dept: CARE COORDINATION | Facility: CLINIC | Age: 77
End: 2022-01-01

## 2022-01-01 ENCOUNTER — HOSPITAL ENCOUNTER (EMERGENCY)
Facility: CLINIC | Age: 77
Discharge: HOME | End: 2022-01-10
Attending: EMERGENCY MEDICINE | Admitting: EMERGENCY MEDICINE
Payer: MEDICARE

## 2022-01-01 ENCOUNTER — APPOINTMENT (OUTPATIENT)
Dept: CARDIOLOGY | Facility: CLINIC | Age: 77
DRG: 521 | End: 2022-01-01
Attending: INTERNAL MEDICINE
Payer: COMMERCIAL

## 2022-01-01 ENCOUNTER — HOSPITAL ENCOUNTER (INPATIENT)
Facility: CLINIC | Age: 77
LOS: 4 days | Discharge: NURSING, REHAB OR LONG-TERM CARE | DRG: 264 | End: 2022-04-20
Attending: EMERGENCY MEDICINE | Admitting: HOSPITALIST
Payer: COMMERCIAL

## 2022-01-01 VITALS
OXYGEN SATURATION: 96 % | BODY MASS INDEX: 21.83 KG/M2 | SYSTOLIC BLOOD PRESSURE: 134 MMHG | WEIGHT: 131 LBS | DIASTOLIC BLOOD PRESSURE: 71 MMHG | RESPIRATION RATE: 16 BRPM | HEIGHT: 65 IN | TEMPERATURE: 98.2 F | HEART RATE: 76 BPM

## 2022-01-01 VITALS
RESPIRATION RATE: 18 BRPM | HEART RATE: 79 BPM | HEIGHT: 64 IN | WEIGHT: 131.2 LBS | OXYGEN SATURATION: 95 % | BODY MASS INDEX: 22.4 KG/M2 | SYSTOLIC BLOOD PRESSURE: 116 MMHG | TEMPERATURE: 98.1 F | DIASTOLIC BLOOD PRESSURE: 66 MMHG

## 2022-01-01 VITALS
TEMPERATURE: 97.6 F | HEART RATE: 72 BPM | OXYGEN SATURATION: 98 % | DIASTOLIC BLOOD PRESSURE: 77 MMHG | BODY MASS INDEX: 24.07 KG/M2 | HEIGHT: 64 IN | SYSTOLIC BLOOD PRESSURE: 138 MMHG | RESPIRATION RATE: 20 BRPM | WEIGHT: 141 LBS

## 2022-01-01 VITALS
HEIGHT: 64 IN | RESPIRATION RATE: 16 BRPM | BODY MASS INDEX: 23.05 KG/M2 | SYSTOLIC BLOOD PRESSURE: 118 MMHG | DIASTOLIC BLOOD PRESSURE: 68 MMHG | HEART RATE: 82 BPM | WEIGHT: 135 LBS | TEMPERATURE: 98.2 F | OXYGEN SATURATION: 95 %

## 2022-01-01 VITALS
HEART RATE: 74 BPM | RESPIRATION RATE: 20 BRPM | TEMPERATURE: 97.7 F | BODY MASS INDEX: 21.99 KG/M2 | OXYGEN SATURATION: 98 % | SYSTOLIC BLOOD PRESSURE: 114 MMHG | HEIGHT: 65 IN | WEIGHT: 132 LBS | DIASTOLIC BLOOD PRESSURE: 66 MMHG

## 2022-01-01 VITALS
OXYGEN SATURATION: 97 % | WEIGHT: 131.2 LBS | TEMPERATURE: 98.1 F | HEIGHT: 64 IN | RESPIRATION RATE: 18 BRPM | BODY MASS INDEX: 22.4 KG/M2 | HEART RATE: 70 BPM | SYSTOLIC BLOOD PRESSURE: 112 MMHG | DIASTOLIC BLOOD PRESSURE: 69 MMHG

## 2022-01-01 VITALS
OXYGEN SATURATION: 97 % | HEIGHT: 65 IN | WEIGHT: 132 LBS | HEART RATE: 89 BPM | DIASTOLIC BLOOD PRESSURE: 61 MMHG | BODY MASS INDEX: 21.99 KG/M2 | SYSTOLIC BLOOD PRESSURE: 140 MMHG

## 2022-01-01 VITALS
SYSTOLIC BLOOD PRESSURE: 146 MMHG | TEMPERATURE: 97.3 F | DIASTOLIC BLOOD PRESSURE: 72 MMHG | HEART RATE: 83 BPM | BODY MASS INDEX: 22.36 KG/M2 | RESPIRATION RATE: 20 BRPM | WEIGHT: 131 LBS | OXYGEN SATURATION: 98 % | HEIGHT: 64 IN

## 2022-01-01 VITALS
WEIGHT: 139 LBS | HEART RATE: 73 BPM | OXYGEN SATURATION: 95 % | SYSTOLIC BLOOD PRESSURE: 143 MMHG | DIASTOLIC BLOOD PRESSURE: 85 MMHG | BODY MASS INDEX: 23.73 KG/M2 | TEMPERATURE: 97.6 F | HEIGHT: 64 IN | RESPIRATION RATE: 16 BRPM

## 2022-01-01 VITALS
RESPIRATION RATE: 18 BRPM | SYSTOLIC BLOOD PRESSURE: 122 MMHG | HEIGHT: 64 IN | TEMPERATURE: 98.1 F | DIASTOLIC BLOOD PRESSURE: 63 MMHG | OXYGEN SATURATION: 97 % | WEIGHT: 131 LBS | BODY MASS INDEX: 22.36 KG/M2 | HEART RATE: 80 BPM

## 2022-01-01 VITALS
TEMPERATURE: 97.9 F | BODY MASS INDEX: 22.36 KG/M2 | HEART RATE: 85 BPM | DIASTOLIC BLOOD PRESSURE: 68 MMHG | WEIGHT: 131 LBS | OXYGEN SATURATION: 98 % | RESPIRATION RATE: 18 BRPM | SYSTOLIC BLOOD PRESSURE: 121 MMHG | HEIGHT: 64 IN

## 2022-01-01 VITALS
SYSTOLIC BLOOD PRESSURE: 123 MMHG | TEMPERATURE: 97.4 F | OXYGEN SATURATION: 97 % | HEIGHT: 64 IN | HEART RATE: 73 BPM | WEIGHT: 140.6 LBS | DIASTOLIC BLOOD PRESSURE: 70 MMHG | RESPIRATION RATE: 18 BRPM | BODY MASS INDEX: 24.01 KG/M2

## 2022-01-01 VITALS
WEIGHT: 124.6 LBS | HEIGHT: 65 IN | DIASTOLIC BLOOD PRESSURE: 92 MMHG | SYSTOLIC BLOOD PRESSURE: 143 MMHG | RESPIRATION RATE: 18 BRPM | OXYGEN SATURATION: 97 % | HEART RATE: 88 BPM | BODY MASS INDEX: 20.76 KG/M2 | TEMPERATURE: 98 F

## 2022-01-01 VITALS
DIASTOLIC BLOOD PRESSURE: 50 MMHG | HEIGHT: 64 IN | WEIGHT: 132.06 LBS | OXYGEN SATURATION: 96 % | BODY MASS INDEX: 22.55 KG/M2 | SYSTOLIC BLOOD PRESSURE: 124 MMHG | HEART RATE: 74 BPM | RESPIRATION RATE: 18 BRPM | TEMPERATURE: 97.5 F

## 2022-01-01 VITALS
RESPIRATION RATE: 18 BRPM | HEIGHT: 64 IN | SYSTOLIC BLOOD PRESSURE: 128 MMHG | WEIGHT: 132 LBS | OXYGEN SATURATION: 97 % | BODY MASS INDEX: 22.53 KG/M2 | HEART RATE: 68 BPM | DIASTOLIC BLOOD PRESSURE: 74 MMHG | TEMPERATURE: 97.5 F

## 2022-01-01 VITALS
DIASTOLIC BLOOD PRESSURE: 73 MMHG | TEMPERATURE: 98.6 F | HEART RATE: 76 BPM | RESPIRATION RATE: 18 BRPM | WEIGHT: 131.2 LBS | OXYGEN SATURATION: 99 % | HEIGHT: 64 IN | OXYGEN SATURATION: 96 % | HEART RATE: 66 BPM | SYSTOLIC BLOOD PRESSURE: 108 MMHG | DIASTOLIC BLOOD PRESSURE: 64 MMHG | BODY MASS INDEX: 22.4 KG/M2 | SYSTOLIC BLOOD PRESSURE: 122 MMHG

## 2022-01-01 VITALS
OXYGEN SATURATION: 100 % | BODY MASS INDEX: 22.36 KG/M2 | HEIGHT: 64 IN | WEIGHT: 131 LBS | SYSTOLIC BLOOD PRESSURE: 114 MMHG | HEART RATE: 84 BPM | DIASTOLIC BLOOD PRESSURE: 57 MMHG

## 2022-01-01 VITALS
RESPIRATION RATE: 20 BRPM | BODY MASS INDEX: 22.66 KG/M2 | OXYGEN SATURATION: 99 % | HEIGHT: 65 IN | DIASTOLIC BLOOD PRESSURE: 57 MMHG | TEMPERATURE: 96.8 F | HEART RATE: 77 BPM | SYSTOLIC BLOOD PRESSURE: 109 MMHG | WEIGHT: 136 LBS

## 2022-01-01 VITALS — SYSTOLIC BLOOD PRESSURE: 140 MMHG | DIASTOLIC BLOOD PRESSURE: 72 MMHG | HEART RATE: 80 BPM

## 2022-01-01 VITALS
BODY MASS INDEX: 22.79 KG/M2 | OXYGEN SATURATION: 97 % | HEART RATE: 79 BPM | DIASTOLIC BLOOD PRESSURE: 71 MMHG | RESPIRATION RATE: 18 BRPM | TEMPERATURE: 97.3 F | HEIGHT: 64 IN | WEIGHT: 133.5 LBS | SYSTOLIC BLOOD PRESSURE: 124 MMHG

## 2022-01-01 VITALS
HEIGHT: 64 IN | WEIGHT: 131.2 LBS | HEART RATE: 70 BPM | SYSTOLIC BLOOD PRESSURE: 120 MMHG | BODY MASS INDEX: 22.4 KG/M2 | OXYGEN SATURATION: 97 % | RESPIRATION RATE: 16 BRPM | TEMPERATURE: 97.4 F | DIASTOLIC BLOOD PRESSURE: 58 MMHG

## 2022-01-01 VITALS
TEMPERATURE: 97.1 F | WEIGHT: 128.4 LBS | OXYGEN SATURATION: 95 % | RESPIRATION RATE: 18 BRPM | SYSTOLIC BLOOD PRESSURE: 127 MMHG | DIASTOLIC BLOOD PRESSURE: 76 MMHG | HEART RATE: 78 BPM | HEIGHT: 64 IN | BODY MASS INDEX: 21.92 KG/M2

## 2022-01-01 VITALS
SYSTOLIC BLOOD PRESSURE: 118 MMHG | OXYGEN SATURATION: 97 % | TEMPERATURE: 97.3 F | HEIGHT: 64 IN | DIASTOLIC BLOOD PRESSURE: 76 MMHG | RESPIRATION RATE: 18 BRPM | HEART RATE: 68 BPM | BODY MASS INDEX: 22.64 KG/M2 | WEIGHT: 132.6 LBS

## 2022-01-01 VITALS
BODY MASS INDEX: 22.64 KG/M2 | HEART RATE: 76 BPM | WEIGHT: 132.6 LBS | TEMPERATURE: 97.1 F | RESPIRATION RATE: 18 BRPM | DIASTOLIC BLOOD PRESSURE: 66 MMHG | OXYGEN SATURATION: 96 % | HEIGHT: 64 IN | SYSTOLIC BLOOD PRESSURE: 122 MMHG

## 2022-01-01 VITALS
OXYGEN SATURATION: 97 % | BODY MASS INDEX: 24.07 KG/M2 | WEIGHT: 141 LBS | SYSTOLIC BLOOD PRESSURE: 128 MMHG | RESPIRATION RATE: 18 BRPM | HEART RATE: 65 BPM | TEMPERATURE: 97.3 F | HEIGHT: 64 IN | DIASTOLIC BLOOD PRESSURE: 76 MMHG

## 2022-01-01 VITALS
HEART RATE: 82 BPM | BODY MASS INDEX: 22.04 KG/M2 | RESPIRATION RATE: 18 BRPM | WEIGHT: 132.28 LBS | HEIGHT: 65 IN | DIASTOLIC BLOOD PRESSURE: 76 MMHG | SYSTOLIC BLOOD PRESSURE: 148 MMHG | OXYGEN SATURATION: 98 % | TEMPERATURE: 97.3 F

## 2022-01-01 VITALS
WEIGHT: 132.6 LBS | DIASTOLIC BLOOD PRESSURE: 72 MMHG | HEIGHT: 64 IN | OXYGEN SATURATION: 98 % | SYSTOLIC BLOOD PRESSURE: 118 MMHG | HEART RATE: 76 BPM | RESPIRATION RATE: 16 BRPM | BODY MASS INDEX: 22.64 KG/M2 | TEMPERATURE: 97.6 F

## 2022-01-01 VITALS
RESPIRATION RATE: 18 BRPM | SYSTOLIC BLOOD PRESSURE: 108 MMHG | HEART RATE: 70 BPM | WEIGHT: 132.3 LBS | HEIGHT: 64 IN | OXYGEN SATURATION: 95 % | TEMPERATURE: 97.7 F | DIASTOLIC BLOOD PRESSURE: 60 MMHG | BODY MASS INDEX: 22.59 KG/M2

## 2022-01-01 VITALS
OXYGEN SATURATION: 96 % | WEIGHT: 124.6 LBS | HEART RATE: 89 BPM | SYSTOLIC BLOOD PRESSURE: 126 MMHG | RESPIRATION RATE: 18 BRPM | TEMPERATURE: 97.5 F | DIASTOLIC BLOOD PRESSURE: 80 MMHG | HEIGHT: 64 IN | BODY MASS INDEX: 21.27 KG/M2

## 2022-01-01 VITALS
DIASTOLIC BLOOD PRESSURE: 62 MMHG | RESPIRATION RATE: 18 BRPM | OXYGEN SATURATION: 98 % | WEIGHT: 141 LBS | SYSTOLIC BLOOD PRESSURE: 119 MMHG | TEMPERATURE: 95.7 F | HEART RATE: 72 BPM | BODY MASS INDEX: 23.46 KG/M2

## 2022-01-01 DIAGNOSIS — E87.1 HYPONATREMIA: ICD-10-CM

## 2022-01-01 DIAGNOSIS — U07.1 COVID-19: ICD-10-CM

## 2022-01-01 DIAGNOSIS — I27.20 PULMONARY HYPERTENSION (H): ICD-10-CM

## 2022-01-01 DIAGNOSIS — I27.81 COR PULMONALE (CHRONIC) (H): ICD-10-CM

## 2022-01-01 DIAGNOSIS — Z95.0 CARDIAC PACEMAKER IN SITU: ICD-10-CM

## 2022-01-01 DIAGNOSIS — Z71.89 COUNSELING AND COORDINATION OF CARE: ICD-10-CM

## 2022-01-01 DIAGNOSIS — E87.6 HYPOKALEMIA: ICD-10-CM

## 2022-01-01 DIAGNOSIS — I73.9 PAD (PERIPHERAL ARTERY DISEASE) (H): ICD-10-CM

## 2022-01-01 DIAGNOSIS — G43.809 MIGRAINE VARIANT: ICD-10-CM

## 2022-01-01 DIAGNOSIS — I07.1 TRICUSPID VALVE INSUFFICIENCY, UNSPECIFIED ETIOLOGY: ICD-10-CM

## 2022-01-01 DIAGNOSIS — F32.A DEPRESSION, UNSPECIFIED DEPRESSION TYPE: ICD-10-CM

## 2022-01-01 DIAGNOSIS — M62.81 GENERALIZED MUSCLE WEAKNESS: ICD-10-CM

## 2022-01-01 DIAGNOSIS — E87.1 HYPONATREMIA: Primary | ICD-10-CM

## 2022-01-01 DIAGNOSIS — S09.90XA CLOSED HEAD INJURY, INITIAL ENCOUNTER: ICD-10-CM

## 2022-01-01 DIAGNOSIS — I50.812 CHRONIC RIGHT-SIDED HEART FAILURE (H): ICD-10-CM

## 2022-01-01 DIAGNOSIS — Z87.39 HX OF GOUT: ICD-10-CM

## 2022-01-01 DIAGNOSIS — I48.20 CHRONIC ATRIAL FIBRILLATION (H): ICD-10-CM

## 2022-01-01 DIAGNOSIS — S81.802D WOUND OF LEFT LEG, SUBSEQUENT ENCOUNTER: ICD-10-CM

## 2022-01-01 DIAGNOSIS — L29.89 UREMIC PRURITUS: ICD-10-CM

## 2022-01-01 DIAGNOSIS — Z99.2 ESRD (END STAGE RENAL DISEASE) ON DIALYSIS (H): ICD-10-CM

## 2022-01-01 DIAGNOSIS — R41.89 COGNITIVE IMPAIRMENT: ICD-10-CM

## 2022-01-01 DIAGNOSIS — S00.03XD CONTUSION OF SCALP, SUBSEQUENT ENCOUNTER: Primary | ICD-10-CM

## 2022-01-01 DIAGNOSIS — S86.902D: Primary | ICD-10-CM

## 2022-01-01 DIAGNOSIS — J44.9 CHRONIC OBSTRUCTIVE PULMONARY DISEASE, UNSPECIFIED COPD TYPE (H): ICD-10-CM

## 2022-01-01 DIAGNOSIS — T14.8XXA HEMATOMA OF SKIN: ICD-10-CM

## 2022-01-01 DIAGNOSIS — I50.32 CHRONIC HEART FAILURE WITH PRESERVED EJECTION FRACTION (H): ICD-10-CM

## 2022-01-01 DIAGNOSIS — J44.9 COPD MIXED TYPE (H): ICD-10-CM

## 2022-01-01 DIAGNOSIS — N18.6 ESRD (END STAGE RENAL DISEASE) ON DIALYSIS (H): ICD-10-CM
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
## 2022-01-01 DIAGNOSIS — N18.6 ANEMIA IN CHRONIC KIDNEY DISEASE, ON CHRONIC DIALYSIS (H): ICD-10-CM

## 2022-01-01 DIAGNOSIS — L29.9 ITCHING: Primary | ICD-10-CM

## 2022-01-01 DIAGNOSIS — S80.822A INFECTED BLISTER OF LEFT LOWER EXTREMITY, INITIAL ENCOUNTER: Primary | ICD-10-CM

## 2022-01-01 DIAGNOSIS — G47.33 OSA (OBSTRUCTIVE SLEEP APNEA): ICD-10-CM

## 2022-01-01 DIAGNOSIS — J44.9 CHRONIC OBSTRUCTIVE PULMONARY DISEASE, UNSPECIFIED COPD TYPE (H): Primary | ICD-10-CM

## 2022-01-01 DIAGNOSIS — G47.00 INSOMNIA, UNSPECIFIED TYPE: ICD-10-CM

## 2022-01-01 DIAGNOSIS — I48.91 ATRIAL FIBRILLATION, UNSPECIFIED TYPE (H): ICD-10-CM

## 2022-01-01 DIAGNOSIS — M1A.9XX0 CHRONIC GOUT WITHOUT TOPHUS, UNSPECIFIED CAUSE, UNSPECIFIED SITE: ICD-10-CM

## 2022-01-01 DIAGNOSIS — F32.89 OTHER DEPRESSION: ICD-10-CM

## 2022-01-01 DIAGNOSIS — L97.822 NON-PRESSURE CHRONIC ULCER OF OTHER PART OF LEFT LOWER LEG WITH FAT LAYER EXPOSED (H): ICD-10-CM

## 2022-01-01 DIAGNOSIS — Z99.2 END STAGE RENAL FAILURE ON DIALYSIS (H): ICD-10-CM

## 2022-01-01 DIAGNOSIS — D53.9 MACROCYTIC ANEMIA: ICD-10-CM

## 2022-01-01 DIAGNOSIS — I77.0 A-V FISTULA (H): ICD-10-CM

## 2022-01-01 DIAGNOSIS — N18.6 ESRD (END STAGE RENAL DISEASE) (H): ICD-10-CM

## 2022-01-01 DIAGNOSIS — S81.802D OPEN WOUND OF LEFT LOWER LEG, SUBSEQUENT ENCOUNTER: ICD-10-CM

## 2022-01-01 DIAGNOSIS — R07.9 CHEST PAIN, UNSPECIFIED TYPE: Primary | ICD-10-CM

## 2022-01-01 DIAGNOSIS — I27.81 COR PULMONALE (CHRONIC) (H): Primary | ICD-10-CM

## 2022-01-01 DIAGNOSIS — N18.6 END STAGE RENAL DISEASE (H): ICD-10-CM

## 2022-01-01 DIAGNOSIS — S01.112D LACERATION OF LEFT PERIOCULAR AREA WITHOUT FOREIGN BODY, SUBSEQUENT ENCOUNTER: ICD-10-CM

## 2022-01-01 DIAGNOSIS — S72.002A CLOSED FRACTURE OF LEFT HIP, INITIAL ENCOUNTER (H): ICD-10-CM

## 2022-01-01 DIAGNOSIS — R19.5 LOOSE STOOLS: ICD-10-CM

## 2022-01-01 DIAGNOSIS — E46 PROTEIN-CALORIE MALNUTRITION, UNSPECIFIED SEVERITY (H): ICD-10-CM

## 2022-01-01 DIAGNOSIS — Z87.81 HX OF FRACTURE OF LEFT HIP: Primary | ICD-10-CM

## 2022-01-01 DIAGNOSIS — S81.002D OPEN WOUND OF LEFT KNEE, LEG, AND ANKLE, SUBSEQUENT ENCOUNTER: Primary | ICD-10-CM

## 2022-01-01 DIAGNOSIS — I12.9 RENAL HYPERTENSION: ICD-10-CM

## 2022-01-01 DIAGNOSIS — S96.902D: Primary | ICD-10-CM

## 2022-01-01 DIAGNOSIS — I49.5 SICK SINUS SYNDROME (H): ICD-10-CM

## 2022-01-01 DIAGNOSIS — S81.802D OPEN WOUND OF LEFT KNEE, LEG, AND ANKLE, SUBSEQUENT ENCOUNTER: Primary | ICD-10-CM

## 2022-01-01 DIAGNOSIS — L97.922 ULCER OF LEFT LOWER EXTREMITY WITH FAT LAYER EXPOSED (H): Primary | ICD-10-CM

## 2022-01-01 DIAGNOSIS — Z95.0 PACEMAKER: ICD-10-CM

## 2022-01-01 DIAGNOSIS — Z99.2 ANEMIA IN CHRONIC KIDNEY DISEASE, ON CHRONIC DIALYSIS (H): ICD-10-CM

## 2022-01-01 DIAGNOSIS — L03.90 CELLULITIS, UNSPECIFIED: ICD-10-CM

## 2022-01-01 DIAGNOSIS — I10 ESSENTIAL HYPERTENSION, BENIGN: ICD-10-CM

## 2022-01-01 DIAGNOSIS — L30.9 DERMATITIS OF LOWER EXTREMITY: ICD-10-CM

## 2022-01-01 DIAGNOSIS — S72.002A LEFT DISPLACED FEMORAL NECK FRACTURE (H): Primary | ICD-10-CM

## 2022-01-01 DIAGNOSIS — N18.6 ESRD (END STAGE RENAL DISEASE) ON DIALYSIS (H): Primary | ICD-10-CM

## 2022-01-01 DIAGNOSIS — I82.C21: ICD-10-CM

## 2022-01-01 DIAGNOSIS — R29.6 RECURRENT FALLS: ICD-10-CM

## 2022-01-01 DIAGNOSIS — N18.4 CHRONIC KIDNEY DISEASE, STAGE 4 (SEVERE) (H): ICD-10-CM

## 2022-01-01 DIAGNOSIS — T14.8XXA HEMATOMA: ICD-10-CM

## 2022-01-01 DIAGNOSIS — H35.3132 INTERMEDIATE STAGE NONEXUDATIVE AGE-RELATED MACULAR DEGENERATION OF BOTH EYES: ICD-10-CM

## 2022-01-01 DIAGNOSIS — L03.116 CELLULITIS OF LEFT LOWER EXTREMITY: ICD-10-CM

## 2022-01-01 DIAGNOSIS — F32.A ANXIETY AND DEPRESSION: ICD-10-CM

## 2022-01-01 DIAGNOSIS — R54 FRAIL ELDERLY: ICD-10-CM

## 2022-01-01 DIAGNOSIS — Z51.5 HOSPICE CARE PATIENT: ICD-10-CM

## 2022-01-01 DIAGNOSIS — S01.01XA SCALP LACERATION, INITIAL ENCOUNTER: ICD-10-CM

## 2022-01-01 DIAGNOSIS — R52 PAIN: ICD-10-CM

## 2022-01-01 DIAGNOSIS — S91.002D OPEN WOUND OF LEFT KNEE, LEG, AND ANKLE, SUBSEQUENT ENCOUNTER: Primary | ICD-10-CM

## 2022-01-01 DIAGNOSIS — Z87.81 HX OF FRACTURE OF LEFT HIP: ICD-10-CM

## 2022-01-01 DIAGNOSIS — R26.9 ABNORMAL GAIT: ICD-10-CM

## 2022-01-01 DIAGNOSIS — D63.1 ANEMIA IN CHRONIC KIDNEY DISEASE, ON CHRONIC DIALYSIS (H): ICD-10-CM

## 2022-01-01 DIAGNOSIS — N26.1 ATROPHY OF LEFT KIDNEY: ICD-10-CM

## 2022-01-01 DIAGNOSIS — T82.590D MALFUNCTION OF ARTERIOVENOUS DIALYSIS FISTULA, SUBSEQUENT ENCOUNTER: ICD-10-CM

## 2022-01-01 DIAGNOSIS — Z51.5 HOSPICE CARE PATIENT: Primary | ICD-10-CM

## 2022-01-01 DIAGNOSIS — R07.9 CHEST PAIN, UNSPECIFIED TYPE: ICD-10-CM

## 2022-01-01 DIAGNOSIS — T82.590D MALFUNCTION OF ARTERIOVENOUS DIALYSIS FISTULA, SUBSEQUENT ENCOUNTER: Primary | ICD-10-CM

## 2022-01-01 DIAGNOSIS — N18.30 CHRONIC KIDNEY DISEASE, STAGE III (MODERATE) (H): ICD-10-CM

## 2022-01-01 DIAGNOSIS — Z87.891 FORMER HEAVY TOBACCO SMOKER: ICD-10-CM

## 2022-01-01 DIAGNOSIS — Z96.642 S/P TOTAL LEFT HIP ARTHROPLASTY: ICD-10-CM

## 2022-01-01 DIAGNOSIS — T82.590A MALFUNCTION OF ARTERIOVENOUS DIALYSIS FISTULA, INITIAL ENCOUNTER (H): Primary | ICD-10-CM

## 2022-01-01 DIAGNOSIS — E83.52 HYPERCALCEMIA: ICD-10-CM

## 2022-01-01 DIAGNOSIS — Z71.89 ACP (ADVANCE CARE PLANNING): ICD-10-CM

## 2022-01-01 DIAGNOSIS — N18.6 END STAGE RENAL FAILURE ON DIALYSIS (H): ICD-10-CM

## 2022-01-01 DIAGNOSIS — I50.42 CHRONIC COMBINED SYSTOLIC AND DIASTOLIC HEART FAILURE (H): ICD-10-CM

## 2022-01-01 DIAGNOSIS — T14.8XXA HEMATOMA OF SKIN: Primary | ICD-10-CM

## 2022-01-01 DIAGNOSIS — Z11.52 ENCOUNTER FOR SCREENING FOR COVID-19: ICD-10-CM

## 2022-01-01 DIAGNOSIS — D49.2 NEOPLASM OF SKIN OF NECK: ICD-10-CM

## 2022-01-01 DIAGNOSIS — L08.9 INFECTED BLISTER OF LEFT LOWER EXTREMITY, INITIAL ENCOUNTER: Primary | ICD-10-CM

## 2022-01-01 DIAGNOSIS — R91.1 LUNG NODULE: ICD-10-CM

## 2022-01-01 DIAGNOSIS — I07.1 SEVERE TRICUSPID VALVE REGURGITATION: Primary | ICD-10-CM

## 2022-01-01 DIAGNOSIS — Z99.2 ESRD (END STAGE RENAL DISEASE) ON DIALYSIS (H): Primary | ICD-10-CM

## 2022-01-01 DIAGNOSIS — K21.9 GASTROESOPHAGEAL REFLUX DISEASE, UNSPECIFIED WHETHER ESOPHAGITIS PRESENT: ICD-10-CM

## 2022-01-01 DIAGNOSIS — F41.9 ANXIETY AND DEPRESSION: ICD-10-CM

## 2022-01-01 DIAGNOSIS — D72.829 LEUKOCYTOSIS, UNSPECIFIED TYPE: ICD-10-CM

## 2022-01-01 DIAGNOSIS — H61.22 IMPACTED CERUMEN OF LEFT EAR: ICD-10-CM

## 2022-01-01 DIAGNOSIS — F33.9 EPISODE OF RECURRENT MAJOR DEPRESSIVE DISORDER, UNSPECIFIED DEPRESSION EPISODE SEVERITY (H): ICD-10-CM

## 2022-01-01 DIAGNOSIS — I60.9 SUBARACHNOID HEMORRHAGE (H): ICD-10-CM

## 2022-01-01 DIAGNOSIS — T82.590S MALFUNCTION OF ARTERIOVENOUS DIALYSIS FISTULA, SEQUELA: ICD-10-CM

## 2022-01-01 DIAGNOSIS — S80.822D BLISTER OF LEFT LOWER LEG, SUBSEQUENT ENCOUNTER: Primary | ICD-10-CM

## 2022-01-01 DIAGNOSIS — S91.002A OPEN WOUND OF KNEE, LEG, AND ANKLE, LEFT, INITIAL ENCOUNTER: Primary | ICD-10-CM

## 2022-01-01 DIAGNOSIS — Z11.59 ENCOUNTER FOR SCREENING FOR OTHER VIRAL DISEASES: ICD-10-CM

## 2022-01-01 DIAGNOSIS — E78.5 HYPERLIPIDEMIA, UNSPECIFIED HYPERLIPIDEMIA TYPE: ICD-10-CM

## 2022-01-01 DIAGNOSIS — R04.2 HEMOPTYSIS: ICD-10-CM

## 2022-01-01 DIAGNOSIS — S91.002D: Primary | ICD-10-CM

## 2022-01-01 DIAGNOSIS — I82.C29 CHRONIC DEEP VEIN THROMBOSIS (DVT) OF INTERNAL JUGULAR VEIN (H): ICD-10-CM

## 2022-01-01 DIAGNOSIS — I50.810 RIGHT HEART FAILURE (H): ICD-10-CM

## 2022-01-01 DIAGNOSIS — Z11.1 ENCOUNTER FOR SCREENING FOR RESPIRATORY TUBERCULOSIS: ICD-10-CM

## 2022-01-01 DIAGNOSIS — Z96.642 HISTORY OF TOTAL LEFT HIP REPLACEMENT: ICD-10-CM

## 2022-01-01 DIAGNOSIS — S81.802A OPEN WOUND OF KNEE, LEG, AND ANKLE, LEFT, INITIAL ENCOUNTER: Primary | ICD-10-CM

## 2022-01-01 DIAGNOSIS — I62.9 INTRACRANIAL HEMORRHAGE (H): ICD-10-CM

## 2022-01-01 DIAGNOSIS — M81.0 OSTEOPOROSIS: ICD-10-CM

## 2022-01-01 DIAGNOSIS — S81.802D OPEN WOUND OF LEFT LOWER LEG, SUBSEQUENT ENCOUNTER: Primary | ICD-10-CM

## 2022-01-01 DIAGNOSIS — S81.802D: Primary | ICD-10-CM

## 2022-01-01 DIAGNOSIS — Z86.79 HISTORY OF VENTRICULAR TACHYCARDIA: ICD-10-CM

## 2022-01-01 DIAGNOSIS — Z91.81 HISTORY OF RECENT FALL: Primary | ICD-10-CM

## 2022-01-01 DIAGNOSIS — R26.9 ABNORMAL GAIT: Primary | ICD-10-CM

## 2022-01-01 DIAGNOSIS — K92.0 HEMATEMESIS WITHOUT NAUSEA: ICD-10-CM

## 2022-01-01 DIAGNOSIS — D69.6 THROMBOCYTOPENIA (H): ICD-10-CM

## 2022-01-01 DIAGNOSIS — D89.0 POLYCLONAL GAMMOPATHY: ICD-10-CM

## 2022-01-01 DIAGNOSIS — S01.112A EYEBROW LACERATION, LEFT, INITIAL ENCOUNTER: ICD-10-CM

## 2022-01-01 DIAGNOSIS — I73.9 PERIPHERAL VASCULAR DISEASE, UNSPECIFIED (H): ICD-10-CM

## 2022-01-01 DIAGNOSIS — S81.002D: Primary | ICD-10-CM

## 2022-01-01 DIAGNOSIS — T82.838D: Primary | ICD-10-CM

## 2022-01-01 DIAGNOSIS — F17.200 TOBACCO USE DISORDER: ICD-10-CM

## 2022-01-01 DIAGNOSIS — L98.492 ARM ULCER, WITH FAT LAYER EXPOSED (H): ICD-10-CM

## 2022-01-01 DIAGNOSIS — S81.002A OPEN WOUND OF KNEE, LEG, AND ANKLE, LEFT, INITIAL ENCOUNTER: Primary | ICD-10-CM

## 2022-01-01 DIAGNOSIS — I65.21 CAROTID STENOSIS, ASYMPTOMATIC, RIGHT: ICD-10-CM

## 2022-01-01 LAB
ABO/RH(D): NORMAL
ABO/RH(D): NORMAL
ALBUMIN SERPL-MCNC: 2.7 G/DL (ref 3.4–5)
ALBUMIN SERPL-MCNC: 2.7 G/DL (ref 3.4–5)
ALBUMIN SERPL-MCNC: 2.8 G/DL (ref 3.4–5)
ALBUMIN SERPL-MCNC: 2.9 G/DL (ref 3.4–5)
ALBUMIN SERPL-MCNC: 3.1 G/DL (ref 3.4–5)
ALBUMIN SERPL-MCNC: 3.1 G/DL (ref 3.4–5)
ALBUMIN SERPL-MCNC: 3.2 G/DL (ref 3.4–5)
ALBUMIN SERPL-MCNC: 3.3 G/DL (ref 3.4–5)
ALBUMIN SERPL-MCNC: 3.4 G/DL (ref 3.4–5)
ALBUMIN UR-MCNC: 200 MG/DL
ALP SERPL-CCNC: 148 U/L (ref 40–150)
ALP SERPL-CCNC: 148 U/L (ref 40–150)
ALT SERPL W P-5'-P-CCNC: 46 U/L (ref 0–50)
ALT SERPL W P-5'-P-CCNC: 49 U/L (ref 0–50)
ANION GAP SERPL CALCULATED.3IONS-SCNC: 10 MMOL/L (ref 3–14)
ANION GAP SERPL CALCULATED.3IONS-SCNC: 11 MMOL/L (ref 3–14)
ANION GAP SERPL CALCULATED.3IONS-SCNC: 12 MMOL/L (ref 3–14)
ANION GAP SERPL CALCULATED.3IONS-SCNC: 13 MMOL/L (ref 3–14)
ANION GAP SERPL CALCULATED.3IONS-SCNC: 13 MMOL/L (ref 3–14)
ANION GAP SERPL CALCULATED.3IONS-SCNC: 14 MMOL/L (ref 3–14)
ANION GAP SERPL CALCULATED.3IONS-SCNC: 15 MMOL/L (ref 3–14)
ANION GAP SERPL CALCULATED.3IONS-SCNC: 16 MMOL/L (ref 3–14)
ANION GAP SERPL CALCULATED.3IONS-SCNC: 18 MMOL/L (ref 3–14)
ANION GAP SERPL CALCULATED.3IONS-SCNC: 8 MMOL/L (ref 3–14)
ANION GAP SERPL CALCULATED.3IONS-SCNC: 8 MMOL/L (ref 3–14)
ANION GAP SERPL CALCULATED.3IONS-SCNC: 9 MMOL/L (ref 3–14)
ANION GAP SERPL CALCULATED.3IONS-SCNC: 9 MMOL/L (ref 3–14)
ANTIBODY SCREEN: NEGATIVE
ANTIBODY SCREEN: NEGATIVE
APPEARANCE UR: ABNORMAL
APTT PPP: 33 SECONDS (ref 22–38)
AST SERPL W P-5'-P-CCNC: 43 U/L (ref 0–45)
AST SERPL W P-5'-P-CCNC: 76 U/L (ref 0–45)
ATRIAL RATE - MUSE: 0 BPM
ATRIAL RATE - MUSE: 57 BPM
ATRIAL RATE - MUSE: 67 BPM
ATRIAL RATE - MUSE: 71 BPM
ATRIAL RATE - MUSE: 86 BPM
BACTERIA #/AREA URNS HPF: ABNORMAL /HPF
BACTERIA UR CULT: NO GROWTH
BASOPHILS # BLD AUTO: 0 10E3/UL (ref 0–0.2)
BASOPHILS NFR BLD AUTO: 0 %
BASOPHILS NFR BLD AUTO: 1 %
BASOPHILS NFR BLD AUTO: 1 %
BILIRUB SERPL-MCNC: 0.9 MG/DL (ref 0.2–1.3)
BILIRUB SERPL-MCNC: 1.4 MG/DL (ref 0.2–1.3)
BILIRUB UR QL STRIP: NEGATIVE
BUN SERPL-MCNC: 35 MG/DL (ref 7–30)
BUN SERPL-MCNC: 36 MG/DL (ref 7–30)
BUN SERPL-MCNC: 37 MG/DL (ref 7–30)
BUN SERPL-MCNC: 38 MG/DL (ref 7–30)
BUN SERPL-MCNC: 39 MG/DL (ref 7–30)
BUN SERPL-MCNC: 41 MG/DL (ref 7–30)
BUN SERPL-MCNC: 42 MG/DL (ref 7–30)
BUN SERPL-MCNC: 43 MG/DL (ref 7–30)
BUN SERPL-MCNC: 45 MG/DL (ref 7–30)
BUN SERPL-MCNC: 47 MG/DL (ref 7–30)
BUN SERPL-MCNC: 48 MG/DL (ref 7–30)
BUN SERPL-MCNC: 48 MG/DL (ref 7–30)
BUN SERPL-MCNC: 49 MG/DL (ref 7–30)
BUN SERPL-MCNC: 50 MG/DL (ref 7–30)
BUN SERPL-MCNC: 50 MG/DL (ref 7–30)
BUN SERPL-MCNC: 52 MG/DL (ref 7–30)
BUN SERPL-MCNC: 52 MG/DL (ref 7–30)
BUN SERPL-MCNC: 54 MG/DL (ref 7–30)
BUN SERPL-MCNC: 55 MG/DL (ref 7–30)
BUN SERPL-MCNC: 58 MG/DL (ref 7–30)
BUN SERPL-MCNC: 59 MG/DL (ref 7–30)
BUN SERPL-MCNC: 61 MG/DL (ref 7–30)
BUN SERPL-MCNC: 62 MG/DL (ref 7–30)
BUN SERPL-MCNC: 72 MG/DL (ref 7–30)
CALCIUM SERPL-MCNC: 10 MG/DL (ref 8.5–10.1)
CALCIUM SERPL-MCNC: 10.1 MG/DL (ref 8.5–10.1)
CALCIUM SERPL-MCNC: 10.2 MG/DL (ref 8.5–10.1)
CALCIUM SERPL-MCNC: 10.3 MG/DL (ref 8.5–10.1)
CALCIUM SERPL-MCNC: 10.5 MG/DL (ref 8.5–10.1)
CALCIUM SERPL-MCNC: 7.6 MG/DL (ref 8.5–10.1)
CALCIUM SERPL-MCNC: 8.7 MG/DL (ref 8.5–10.1)
CALCIUM SERPL-MCNC: 8.7 MG/DL (ref 8.5–10.1)
CALCIUM SERPL-MCNC: 8.9 MG/DL (ref 8.5–10.1)
CALCIUM SERPL-MCNC: 9 MG/DL (ref 8.5–10.1)
CALCIUM SERPL-MCNC: 9 MG/DL (ref 8.5–10.1)
CALCIUM SERPL-MCNC: 9.3 MG/DL (ref 8.5–10.1)
CALCIUM SERPL-MCNC: 9.3 MG/DL (ref 8.5–10.1)
CALCIUM SERPL-MCNC: 9.4 MG/DL (ref 8.5–10.1)
CALCIUM SERPL-MCNC: 9.4 MG/DL (ref 8.5–10.1)
CALCIUM SERPL-MCNC: 9.5 MG/DL (ref 8.5–10.1)
CALCIUM SERPL-MCNC: 9.6 MG/DL (ref 8.5–10.1)
CALCIUM SERPL-MCNC: 9.6 MG/DL (ref 8.5–10.1)
CALCIUM SERPL-MCNC: 9.7 MG/DL (ref 8.5–10.1)
CALCIUM SERPL-MCNC: 9.8 MG/DL (ref 8.5–10.1)
CALCIUM SERPL-MCNC: 9.9 MG/DL (ref 8.5–10.1)
CHLORIDE BLD-SCNC: 80 MMOL/L (ref 94–109)
CHLORIDE BLD-SCNC: 84 MMOL/L (ref 94–109)
CHLORIDE BLD-SCNC: 85 MMOL/L (ref 94–109)
CHLORIDE BLD-SCNC: 87 MMOL/L (ref 94–109)
CHLORIDE BLD-SCNC: 87 MMOL/L (ref 94–109)
CHLORIDE BLD-SCNC: 88 MMOL/L (ref 94–109)
CHLORIDE BLD-SCNC: 89 MMOL/L (ref 94–109)
CHLORIDE BLD-SCNC: 90 MMOL/L (ref 94–109)
CHLORIDE BLD-SCNC: 90 MMOL/L (ref 94–109)
CHLORIDE BLD-SCNC: 91 MMOL/L (ref 94–109)
CHLORIDE BLD-SCNC: 92 MMOL/L (ref 94–109)
CHLORIDE BLD-SCNC: 93 MMOL/L (ref 94–109)
CHLORIDE BLD-SCNC: 93 MMOL/L (ref 94–109)
CHLORIDE BLD-SCNC: 94 MMOL/L (ref 94–109)
CHLORIDE BLD-SCNC: 95 MMOL/L (ref 94–109)
CO2 SERPL-SCNC: 13 MMOL/L (ref 20–32)
CO2 SERPL-SCNC: 17 MMOL/L (ref 20–32)
CO2 SERPL-SCNC: 20 MMOL/L (ref 20–32)
CO2 SERPL-SCNC: 20 MMOL/L (ref 20–32)
CO2 SERPL-SCNC: 21 MMOL/L (ref 20–32)
CO2 SERPL-SCNC: 22 MMOL/L (ref 20–32)
CO2 SERPL-SCNC: 23 MMOL/L (ref 20–32)
CO2 SERPL-SCNC: 24 MMOL/L (ref 20–32)
CO2 SERPL-SCNC: 25 MMOL/L (ref 20–32)
CO2 SERPL-SCNC: 25 MMOL/L (ref 20–32)
CO2 SERPL-SCNC: 26 MMOL/L (ref 20–32)
CO2 SERPL-SCNC: 26 MMOL/L (ref 20–32)
CO2 SERPL-SCNC: 27 MMOL/L (ref 20–32)
CO2 SERPL-SCNC: 29 MMOL/L (ref 20–32)
CO2 SERPL-SCNC: 33 MMOL/L (ref 20–32)
COLOR UR AUTO: ABNORMAL
CREAT SERPL-MCNC: 2.66 MG/DL (ref 0.52–1.04)
CREAT SERPL-MCNC: 3.03 MG/DL (ref 0.52–1.04)
CREAT SERPL-MCNC: 3.03 MG/DL (ref 0.52–1.04)
CREAT SERPL-MCNC: 3.56 MG/DL (ref 0.52–1.04)
CREAT SERPL-MCNC: 3.68 MG/DL (ref 0.52–1.04)
CREAT SERPL-MCNC: 3.77 MG/DL (ref 0.52–1.04)
CREAT SERPL-MCNC: 3.79 MG/DL (ref 0.52–1.04)
CREAT SERPL-MCNC: 3.82 MG/DL (ref 0.52–1.04)
CREAT SERPL-MCNC: 3.83 MG/DL (ref 0.52–1.04)
CREAT SERPL-MCNC: 3.91 MG/DL (ref 0.52–1.04)
CREAT SERPL-MCNC: 4.01 MG/DL (ref 0.52–1.04)
CREAT SERPL-MCNC: 4.08 MG/DL (ref 0.52–1.04)
CREAT SERPL-MCNC: 4.45 MG/DL (ref 0.52–1.04)
CREAT SERPL-MCNC: 4.45 MG/DL (ref 0.52–1.04)
CREAT SERPL-MCNC: 4.57 MG/DL (ref 0.52–1.04)
CREAT SERPL-MCNC: 4.61 MG/DL (ref 0.52–1.04)
CREAT SERPL-MCNC: 4.71 MG/DL (ref 0.52–1.04)
CREAT SERPL-MCNC: 4.86 MG/DL (ref 0.52–1.04)
CREAT SERPL-MCNC: 4.99 MG/DL (ref 0.52–1.04)
CREAT SERPL-MCNC: 5.04 MG/DL (ref 0.52–1.04)
CREAT SERPL-MCNC: 5.07 MG/DL (ref 0.52–1.04)
CREAT SERPL-MCNC: 5.21 MG/DL (ref 0.52–1.04)
CREAT SERPL-MCNC: 5.23 MG/DL (ref 0.52–1.04)
CREAT SERPL-MCNC: 5.31 MG/DL (ref 0.52–1.04)
CV STRESS MAX HR HE: 69
DEPRECATED CALCIDIOL+CALCIFEROL SERPL-MC: <18 UG/L (ref 20–75)
DIASTOLIC BLOOD PRESSURE - MUSE: NORMAL MMHG
DLCOCOR-%PRED-PRE: 51 %
DLCOCOR-PRE: 9.77 ML/MIN/MMHG
DLCOUNC-%PRED-PRE: 46 %
DLCOUNC-PRE: 8.85 ML/MIN/MMHG
DLCOUNC-PRED: 19.12 ML/MIN/MMHG
EOSINOPHIL # BLD AUTO: 0 10E3/UL (ref 0–0.7)
EOSINOPHIL # BLD AUTO: 0.1 10E3/UL (ref 0–0.7)
EOSINOPHIL # BLD AUTO: 0.2 10E3/UL (ref 0–0.7)
EOSINOPHIL NFR BLD AUTO: 0 %
EOSINOPHIL NFR BLD AUTO: 0 %
EOSINOPHIL NFR BLD AUTO: 1 %
EOSINOPHIL NFR BLD AUTO: 1 %
EOSINOPHIL NFR BLD AUTO: 3 %
ERV-%PRED-PRE: 70 %
ERV-PRE: 0.54 L
ERV-PRED: 0.77 L
ERYTHROCYTE [DISTWIDTH] IN BLOOD BY AUTOMATED COUNT: 14.4 % (ref 10–15)
ERYTHROCYTE [DISTWIDTH] IN BLOOD BY AUTOMATED COUNT: 14.5 % (ref 10–15)
ERYTHROCYTE [DISTWIDTH] IN BLOOD BY AUTOMATED COUNT: 14.6 % (ref 10–15)
ERYTHROCYTE [DISTWIDTH] IN BLOOD BY AUTOMATED COUNT: 14.7 % (ref 10–15)
ERYTHROCYTE [DISTWIDTH] IN BLOOD BY AUTOMATED COUNT: 14.9 % (ref 10–15)
ERYTHROCYTE [DISTWIDTH] IN BLOOD BY AUTOMATED COUNT: 15 % (ref 10–15)
ERYTHROCYTE [DISTWIDTH] IN BLOOD BY AUTOMATED COUNT: 15.8 % (ref 10–15)
ERYTHROCYTE [DISTWIDTH] IN BLOOD BY AUTOMATED COUNT: 15.9 % (ref 10–15)
ERYTHROCYTE [DISTWIDTH] IN BLOOD BY AUTOMATED COUNT: 15.9 % (ref 10–15)
ERYTHROCYTE [DISTWIDTH] IN BLOOD BY AUTOMATED COUNT: 16.9 % (ref 10–15)
ERYTHROCYTE [DISTWIDTH] IN BLOOD BY AUTOMATED COUNT: 18.4 % (ref 10–15)
ERYTHROCYTE [DISTWIDTH] IN BLOOD BY AUTOMATED COUNT: 18.8 % (ref 10–15)
ERYTHROCYTE [DISTWIDTH] IN BLOOD BY AUTOMATED COUNT: 19 % (ref 10–15)
ERYTHROCYTE [DISTWIDTH] IN BLOOD BY AUTOMATED COUNT: 19 % (ref 10–15)
ERYTHROCYTE [DISTWIDTH] IN BLOOD BY AUTOMATED COUNT: 19.6 % (ref 10–15)
EXPTIME-PRE: 7.03 SEC
FEF2575-%PRED-POST: 28 %
FEF2575-%PRED-PRE: 24 %
FEF2575-POST: 0.48 L/SEC
FEF2575-PRE: 0.42 L/SEC
FEF2575-PRED: 1.7 L/SEC
FEFMAX-%PRED-PRE: 58 %
FEFMAX-PRE: 3.08 L/SEC
FEFMAX-PRED: 5.25 L/SEC
FEV1-%PRED-PRE: 49 %
FEV1-PRE: 1.02 L
FEV1FEV6-PRE: 57 %
FEV1FEV6-PRED: 78 %
FEV1FVC-PRE: 56 %
FEV1FVC-PRED: 78 %
FEV1SVC-PRE: 59 %
FEV1SVC-PRED: 70 %
FIFMAX-PRE: 2.43 L/SEC
FLUAV RNA SPEC QL NAA+PROBE: NEGATIVE
FLUBV RNA RESP QL NAA+PROBE: NEGATIVE
FRCN2-%PRED-PRE: 99 %
FRCN2-PRE: 2.7 L
FRCN2-PRED: 2.72 L
FVC-%PRED-PRE: 67 %
FVC-PRE: 1.83 L
FVC-PRED: 2.69 L
GAMMA INTERFERON BACKGROUND BLD IA-ACNC: 0.05 IU/ML
GFR SERPL CREATININE-BSD FRML MDRD: 10 ML/MIN/1.73M2
GFR SERPL CREATININE-BSD FRML MDRD: 10 ML/MIN/1.73M2
GFR SERPL CREATININE-BSD FRML MDRD: 11 ML/MIN/1.73M2
GFR SERPL CREATININE-BSD FRML MDRD: 12 ML/MIN/1.73M2
GFR SERPL CREATININE-BSD FRML MDRD: 13 ML/MIN/1.73M2
GFR SERPL CREATININE-BSD FRML MDRD: 15 ML/MIN/1.73M2
GFR SERPL CREATININE-BSD FRML MDRD: 15 ML/MIN/1.73M2
GFR SERPL CREATININE-BSD FRML MDRD: 18 ML/MIN/1.73M2
GFR SERPL CREATININE-BSD FRML MDRD: 8 ML/MIN/1.73M2
GFR SERPL CREATININE-BSD FRML MDRD: 9 ML/MIN/1.73M2
GLUCOSE BLD-MCNC: 102 MG/DL (ref 70–99)
GLUCOSE BLD-MCNC: 104 MG/DL (ref 70–99)
GLUCOSE BLD-MCNC: 109 MG/DL (ref 70–99)
GLUCOSE BLD-MCNC: 110 MG/DL (ref 70–99)
GLUCOSE BLD-MCNC: 110 MG/DL (ref 70–99)
GLUCOSE BLD-MCNC: 115 MG/DL (ref 70–99)
GLUCOSE BLD-MCNC: 119 MG/DL (ref 70–99)
GLUCOSE BLD-MCNC: 127 MG/DL (ref 70–99)
GLUCOSE BLD-MCNC: 129 MG/DL (ref 70–99)
GLUCOSE BLD-MCNC: 145 MG/DL (ref 70–99)
GLUCOSE BLD-MCNC: 154 MG/DL (ref 70–99)
GLUCOSE BLD-MCNC: 156 MG/DL (ref 70–99)
GLUCOSE BLD-MCNC: 160 MG/DL (ref 70–99)
GLUCOSE BLD-MCNC: 71 MG/DL (ref 70–99)
GLUCOSE BLD-MCNC: 76 MG/DL (ref 70–99)
GLUCOSE BLD-MCNC: 86 MG/DL (ref 70–99)
GLUCOSE BLD-MCNC: 86 MG/DL (ref 70–99)
GLUCOSE BLD-MCNC: 87 MG/DL (ref 70–99)
GLUCOSE BLD-MCNC: 87 MG/DL (ref 70–99)
GLUCOSE BLD-MCNC: 95 MG/DL (ref 70–99)
GLUCOSE BLD-MCNC: 96 MG/DL (ref 70–99)
GLUCOSE BLD-MCNC: 97 MG/DL (ref 70–99)
GLUCOSE BLDC GLUCOMTR-MCNC: 104 MG/DL (ref 70–99)
GLUCOSE BLDC GLUCOMTR-MCNC: 106 MG/DL (ref 70–99)
GLUCOSE BLDC GLUCOMTR-MCNC: 108 MG/DL (ref 70–99)
GLUCOSE BLDC GLUCOMTR-MCNC: 138 MG/DL (ref 70–99)
GLUCOSE BLDC GLUCOMTR-MCNC: 167 MG/DL (ref 70–99)
GLUCOSE BLDC GLUCOMTR-MCNC: 181 MG/DL (ref 70–99)
GLUCOSE BLDC GLUCOMTR-MCNC: 199 MG/DL (ref 70–99)
GLUCOSE BLDC GLUCOMTR-MCNC: 74 MG/DL (ref 70–99)
GLUCOSE BLDC GLUCOMTR-MCNC: 89 MG/DL (ref 70–99)
GLUCOSE UR STRIP-MCNC: NEGATIVE MG/DL
HBV SURFACE AB SERPL IA-ACNC: >1000 M[IU]/ML
HBV SURFACE AG SERPL QL IA: NONREACTIVE
HCT VFR BLD AUTO: 27.4 % (ref 35–47)
HCT VFR BLD AUTO: 28.4 % (ref 35–47)
HCT VFR BLD AUTO: 29.2 % (ref 35–47)
HCT VFR BLD AUTO: 30.6 % (ref 35–47)
HCT VFR BLD AUTO: 31.3 % (ref 35–47)
HCT VFR BLD AUTO: 31.3 % (ref 35–47)
HCT VFR BLD AUTO: 31.6 % (ref 35–47)
HCT VFR BLD AUTO: 32.6 % (ref 35–47)
HCT VFR BLD AUTO: 32.7 % (ref 35–47)
HCT VFR BLD AUTO: 33 % (ref 35–47)
HCT VFR BLD AUTO: 33.9 % (ref 35–47)
HCT VFR BLD AUTO: 34.2 % (ref 35–47)
HCT VFR BLD AUTO: 34.5 % (ref 35–47)
HCT VFR BLD AUTO: 34.5 % (ref 35–47)
HCT VFR BLD AUTO: 35.4 % (ref 35–47)
HGB BLD-MCNC: 10.3 G/DL (ref 11.7–15.7)
HGB BLD-MCNC: 10.5 G/DL (ref 11.7–15.7)
HGB BLD-MCNC: 10.5 G/DL (ref 11.7–15.7)
HGB BLD-MCNC: 10.7 G/DL (ref 11.7–15.7)
HGB BLD-MCNC: 10.8 G/DL (ref 11.7–15.7)
HGB BLD-MCNC: 10.8 G/DL (ref 11.7–15.7)
HGB BLD-MCNC: 10.9 G/DL (ref 11.7–15.7)
HGB BLD-MCNC: 11.2 G/DL (ref 11.7–15.7)
HGB BLD-MCNC: 11.2 G/DL (ref 11.7–15.7)
HGB BLD-MCNC: 11.4 G/DL (ref 11.7–15.7)
HGB BLD-MCNC: 11.5 G/DL (ref 11.7–15.7)
HGB BLD-MCNC: 11.6 G/DL (ref 11.7–15.7)
HGB BLD-MCNC: 11.8 G/DL (ref 11.7–15.7)
HGB BLD-MCNC: 9.4 G/DL (ref 11.7–15.7)
HGB BLD-MCNC: 9.5 G/DL (ref 11.7–15.7)
HGB BLD-MCNC: 9.8 G/DL (ref 11.7–15.7)
HGB UR QL STRIP: ABNORMAL
HOLD SPECIMEN: NORMAL
IC-%PRED-PRE: 54 %
IC-PRE: 1.19 L
IC-PRED: 2.19 L
IMM GRANULOCYTES # BLD: 0 10E3/UL
IMM GRANULOCYTES # BLD: 0 10E3/UL
IMM GRANULOCYTES # BLD: 0.1 10E3/UL
IMM GRANULOCYTES NFR BLD: 0 %
IMM GRANULOCYTES NFR BLD: 1 %
INR PPP: 1.15 (ref 0.85–1.15)
INR PPP: 1.42 (ref 0.85–1.15)
INR PPP: 1.62 (ref 0.85–1.15)
INTERPRETATION ECG - MUSE: NORMAL
KETONES UR STRIP-MCNC: NEGATIVE MG/DL
LEUKOCYTE ESTERASE UR QL STRIP: NEGATIVE
LIPASE SERPL-CCNC: 317 U/L (ref 73–393)
LYMPHOCYTES # BLD AUTO: 0.6 10E3/UL (ref 0.8–5.3)
LYMPHOCYTES # BLD AUTO: 0.7 10E3/UL (ref 0.8–5.3)
LYMPHOCYTES # BLD AUTO: 0.9 10E3/UL (ref 0.8–5.3)
LYMPHOCYTES # BLD AUTO: 1 10E3/UL (ref 0.8–5.3)
LYMPHOCYTES # BLD AUTO: 1.2 10E3/UL (ref 0.8–5.3)
LYMPHOCYTES NFR BLD AUTO: 10 %
LYMPHOCYTES NFR BLD AUTO: 14 %
LYMPHOCYTES NFR BLD AUTO: 4 %
M TB IFN-G BLD-IMP: NEGATIVE
M TB IFN-G CD4+ BCKGRND COR BLD-ACNC: 3.75 IU/ML
MCH RBC QN AUTO: 33.7 PG (ref 26.5–33)
MCH RBC QN AUTO: 33.9 PG (ref 26.5–33)
MCH RBC QN AUTO: 34.1 PG (ref 26.5–33)
MCH RBC QN AUTO: 34.3 PG (ref 26.5–33)
MCH RBC QN AUTO: 34.9 PG (ref 26.5–33)
MCH RBC QN AUTO: 34.9 PG (ref 26.5–33)
MCH RBC QN AUTO: 35 PG (ref 26.5–33)
MCH RBC QN AUTO: 35.1 PG (ref 26.5–33)
MCH RBC QN AUTO: 35.2 PG (ref 26.5–33)
MCH RBC QN AUTO: 35.2 PG (ref 26.5–33)
MCH RBC QN AUTO: 35.3 PG (ref 26.5–33)
MCH RBC QN AUTO: 35.5 PG (ref 26.5–33)
MCH RBC QN AUTO: 35.7 PG (ref 26.5–33)
MCH RBC QN AUTO: 35.8 PG (ref 26.5–33)
MCH RBC QN AUTO: 36.1 PG (ref 26.5–33)
MCHC RBC AUTO-ENTMCNC: 32.5 G/DL (ref 31.5–36.5)
MCHC RBC AUTO-ENTMCNC: 32.8 G/DL (ref 31.5–36.5)
MCHC RBC AUTO-ENTMCNC: 32.9 G/DL (ref 31.5–36.5)
MCHC RBC AUTO-ENTMCNC: 33 G/DL (ref 31.5–36.5)
MCHC RBC AUTO-ENTMCNC: 33 G/DL (ref 31.5–36.5)
MCHC RBC AUTO-ENTMCNC: 33.2 G/DL (ref 31.5–36.5)
MCHC RBC AUTO-ENTMCNC: 33.3 G/DL (ref 31.5–36.5)
MCHC RBC AUTO-ENTMCNC: 33.5 G/DL (ref 31.5–36.5)
MCHC RBC AUTO-ENTMCNC: 33.6 G/DL (ref 31.5–36.5)
MCHC RBC AUTO-ENTMCNC: 34.3 G/DL (ref 31.5–36.5)
MCHC RBC AUTO-ENTMCNC: 34.3 G/DL (ref 31.5–36.5)
MCHC RBC AUTO-ENTMCNC: 34.5 G/DL (ref 31.5–36.5)
MCHC RBC AUTO-ENTMCNC: 34.9 G/DL (ref 31.5–36.5)
MCV RBC AUTO: 102 FL (ref 78–100)
MCV RBC AUTO: 104 FL (ref 78–100)
MCV RBC AUTO: 105 FL (ref 78–100)
MCV RBC AUTO: 106 FL (ref 78–100)
MCV RBC AUTO: 106 FL (ref 78–100)
MCV RBC AUTO: 107 FL (ref 78–100)
MCV RBC AUTO: 108 FL (ref 78–100)
MCV RBC AUTO: 109 FL (ref 78–100)
MCV RBC AUTO: 99 FL (ref 78–100)
MDC_IDC_EPISODE_DTM: NORMAL
MDC_IDC_EPISODE_DURATION: 1 S
MDC_IDC_EPISODE_DURATION: 1 S
MDC_IDC_EPISODE_DURATION: 2 S
MDC_IDC_EPISODE_DURATION: 2 S
MDC_IDC_EPISODE_ID: 10
MDC_IDC_EPISODE_ID: 11
MDC_IDC_EPISODE_ID: 12
MDC_IDC_EPISODE_ID: 13
MDC_IDC_EPISODE_TYPE: NORMAL
MDC_IDC_LEAD_IMPLANT_DT: NORMAL
MDC_IDC_LEAD_LOCATION: NORMAL
MDC_IDC_LEAD_LOCATION_DETAIL_1: NORMAL
MDC_IDC_LEAD_MFG: NORMAL
MDC_IDC_LEAD_MODEL: NORMAL
MDC_IDC_LEAD_POLARITY_TYPE: NORMAL
MDC_IDC_LEAD_SERIAL: NORMAL
MDC_IDC_MSMT_BATTERY_DTM: NORMAL
MDC_IDC_MSMT_BATTERY_REMAINING_LONGEVITY: 159 MO
MDC_IDC_MSMT_BATTERY_REMAINING_LONGEVITY: 162 MO
MDC_IDC_MSMT_BATTERY_REMAINING_LONGEVITY: 162 MO
MDC_IDC_MSMT_BATTERY_RRT_TRIGGER: 2.62
MDC_IDC_MSMT_BATTERY_STATUS: NORMAL
MDC_IDC_MSMT_BATTERY_VOLTAGE: 3.15 V
MDC_IDC_MSMT_BATTERY_VOLTAGE: 3.18 V
MDC_IDC_MSMT_BATTERY_VOLTAGE: 3.19 V
MDC_IDC_MSMT_LEADCHNL_RV_IMPEDANCE_VALUE: 399 OHM
MDC_IDC_MSMT_LEADCHNL_RV_IMPEDANCE_VALUE: 418 OHM
MDC_IDC_MSMT_LEADCHNL_RV_IMPEDANCE_VALUE: 418 OHM
MDC_IDC_MSMT_LEADCHNL_RV_IMPEDANCE_VALUE: 513 OHM
MDC_IDC_MSMT_LEADCHNL_RV_IMPEDANCE_VALUE: 513 OHM
MDC_IDC_MSMT_LEADCHNL_RV_IMPEDANCE_VALUE: 532 OHM
MDC_IDC_MSMT_LEADCHNL_RV_PACING_THRESHOLD_AMPLITUDE: 0.62 V
MDC_IDC_MSMT_LEADCHNL_RV_PACING_THRESHOLD_PULSEWIDTH: 0.4 MS
MDC_IDC_MSMT_LEADCHNL_RV_SENSING_INTR_AMPL: 4.25 MV
MDC_IDC_MSMT_LEADCHNL_RV_SENSING_INTR_AMPL: 4.25 MV
MDC_IDC_MSMT_LEADCHNL_RV_SENSING_INTR_AMPL: 4.62 MV
MDC_IDC_MSMT_LEADCHNL_RV_SENSING_INTR_AMPL: 4.62 MV
MDC_IDC_MSMT_LEADCHNL_RV_SENSING_INTR_AMPL: 4.88 MV
MDC_IDC_MSMT_LEADCHNL_RV_SENSING_INTR_AMPL: 4.88 MV
MDC_IDC_PG_IMPLANT_DTM: NORMAL
MDC_IDC_PG_MFG: NORMAL
MDC_IDC_PG_MODEL: NORMAL
MDC_IDC_PG_SERIAL: NORMAL
MDC_IDC_PG_TYPE: NORMAL
MDC_IDC_SESS_CLINIC_NAME: NORMAL
MDC_IDC_SESS_DTM: NORMAL
MDC_IDC_SESS_TYPE: NORMAL
MDC_IDC_SET_BRADY_HYSTRATE: NORMAL
MDC_IDC_SET_BRADY_LOWRATE: 70 {BEATS}/MIN
MDC_IDC_SET_BRADY_MAX_SENSOR_RATE: 120 {BEATS}/MIN
MDC_IDC_SET_BRADY_MODE: NORMAL
MDC_IDC_SET_LEADCHNL_RV_PACING_AMPLITUDE: 2 V
MDC_IDC_SET_LEADCHNL_RV_PACING_ANODE_ELECTRODE_1: NORMAL
MDC_IDC_SET_LEADCHNL_RV_PACING_ANODE_LOCATION_1: NORMAL
MDC_IDC_SET_LEADCHNL_RV_PACING_CAPTURE_MODE: NORMAL
MDC_IDC_SET_LEADCHNL_RV_PACING_CATHODE_ELECTRODE_1: NORMAL
MDC_IDC_SET_LEADCHNL_RV_PACING_CATHODE_LOCATION_1: NORMAL
MDC_IDC_SET_LEADCHNL_RV_PACING_POLARITY: NORMAL
MDC_IDC_SET_LEADCHNL_RV_PACING_PULSEWIDTH: 0.4 MS
MDC_IDC_SET_LEADCHNL_RV_SENSING_ANODE_ELECTRODE_1: NORMAL
MDC_IDC_SET_LEADCHNL_RV_SENSING_ANODE_LOCATION_1: NORMAL
MDC_IDC_SET_LEADCHNL_RV_SENSING_CATHODE_ELECTRODE_1: NORMAL
MDC_IDC_SET_LEADCHNL_RV_SENSING_CATHODE_LOCATION_1: NORMAL
MDC_IDC_SET_LEADCHNL_RV_SENSING_POLARITY: NORMAL
MDC_IDC_SET_LEADCHNL_RV_SENSING_SENSITIVITY: 0.9 MV
MDC_IDC_SET_ZONE_DETECTION_INTERVAL: 400 MS
MDC_IDC_SET_ZONE_TYPE: NORMAL
MDC_IDC_STAT_BRADY_DTM_END: NORMAL
MDC_IDC_STAT_BRADY_DTM_START: NORMAL
MDC_IDC_STAT_BRADY_RV_PERCENT_PACED: 49.05 %
MDC_IDC_STAT_BRADY_RV_PERCENT_PACED: 61.33 %
MDC_IDC_STAT_BRADY_RV_PERCENT_PACED: 66.45 %
MDC_IDC_STAT_EPISODE_RECENT_COUNT: 0
MDC_IDC_STAT_EPISODE_RECENT_COUNT: 1
MDC_IDC_STAT_EPISODE_RECENT_COUNT: 2
MDC_IDC_STAT_EPISODE_RECENT_COUNT: 3
MDC_IDC_STAT_EPISODE_RECENT_COUNT_DTM_END: NORMAL
MDC_IDC_STAT_EPISODE_RECENT_COUNT_DTM_START: NORMAL
MDC_IDC_STAT_EPISODE_TOTAL_COUNT: 0
MDC_IDC_STAT_EPISODE_TOTAL_COUNT: 10
MDC_IDC_STAT_EPISODE_TOTAL_COUNT: 11
MDC_IDC_STAT_EPISODE_TOTAL_COUNT: 13
MDC_IDC_STAT_EPISODE_TOTAL_COUNT_DTM_END: NORMAL
MDC_IDC_STAT_EPISODE_TOTAL_COUNT_DTM_START: NORMAL
MDC_IDC_STAT_EPISODE_TYPE: NORMAL
MITOGEN IGNF BCKGRD COR BLD-ACNC: 0 IU/ML
MITOGEN IGNF BCKGRD COR BLD-ACNC: 0 IU/ML
MONOCYTES # BLD AUTO: 0.6 10E3/UL (ref 0–1.3)
MONOCYTES # BLD AUTO: 0.7 10E3/UL (ref 0–1.3)
MONOCYTES # BLD AUTO: 0.8 10E3/UL (ref 0–1.3)
MONOCYTES # BLD AUTO: 1 10E3/UL (ref 0–1.3)
MONOCYTES # BLD AUTO: 1 10E3/UL (ref 0–1.3)
MONOCYTES NFR BLD AUTO: 10 %
MONOCYTES NFR BLD AUTO: 11 %
MONOCYTES NFR BLD AUTO: 13 %
MONOCYTES NFR BLD AUTO: 8 %
MONOCYTES NFR BLD AUTO: 9 %
NEUTROPHILS # BLD AUTO: 11 10E3/UL (ref 1.6–8.3)
NEUTROPHILS # BLD AUTO: 4.7 10E3/UL (ref 1.6–8.3)
NEUTROPHILS # BLD AUTO: 5 10E3/UL (ref 1.6–8.3)
NEUTROPHILS # BLD AUTO: 5.3 10E3/UL (ref 1.6–8.3)
NEUTROPHILS # BLD AUTO: 6.4 10E3/UL (ref 1.6–8.3)
NEUTROPHILS NFR BLD AUTO: 69 %
NEUTROPHILS NFR BLD AUTO: 73 %
NEUTROPHILS NFR BLD AUTO: 74 %
NEUTROPHILS NFR BLD AUTO: 79 %
NEUTROPHILS NFR BLD AUTO: 87 %
NITRATE UR QL: NEGATIVE
NRBC # BLD AUTO: 0 10E3/UL
NRBC BLD AUTO-RTO: 0 /100
NUC STRESS EJECTION FRACTION: 76 %
P AXIS - MUSE: 77 DEGREES
P AXIS - MUSE: NORMAL DEGREES
PH UR STRIP: 5.5 [PH] (ref 5–7)
PHOSPHATE SERPL-MCNC: 3.2 MG/DL (ref 2.5–4.5)
PHOSPHATE SERPL-MCNC: 4.7 MG/DL (ref 2.5–4.5)
PHOSPHATE SERPL-MCNC: 4.8 MG/DL (ref 2.5–4.5)
PHOSPHATE SERPL-MCNC: 5.1 MG/DL (ref 2.5–4.5)
PHOSPHATE SERPL-MCNC: 5.5 MG/DL (ref 2.5–4.5)
PHOSPHATE SERPL-MCNC: 5.6 MG/DL (ref 2.5–4.5)
PHOSPHATE SERPL-MCNC: 5.6 MG/DL (ref 2.5–4.5)
PHOSPHATE SERPL-MCNC: 5.8 MG/DL (ref 2.5–4.5)
PHOSPHATE SERPL-MCNC: 5.9 MG/DL (ref 2.5–4.5)
PHOSPHATE SERPL-MCNC: 6.7 MG/DL (ref 2.5–4.5)
PHOSPHATE SERPL-MCNC: 6.8 MG/DL (ref 2.5–4.5)
PLATELET # BLD AUTO: 115 10E3/UL (ref 150–450)
PLATELET # BLD AUTO: 115 10E3/UL (ref 150–450)
PLATELET # BLD AUTO: 120 10E3/UL (ref 150–450)
PLATELET # BLD AUTO: 126 10E3/UL (ref 150–450)
PLATELET # BLD AUTO: 129 10E3/UL (ref 150–450)
PLATELET # BLD AUTO: 130 10E3/UL (ref 150–450)
PLATELET # BLD AUTO: 132 10E3/UL (ref 150–450)
PLATELET # BLD AUTO: 140 10E3/UL (ref 150–450)
PLATELET # BLD AUTO: 146 10E3/UL (ref 150–450)
PLATELET # BLD AUTO: 151 10E3/UL (ref 150–450)
PLATELET # BLD AUTO: 153 10E3/UL (ref 150–450)
PLATELET # BLD AUTO: 163 10E3/UL (ref 150–450)
PLATELET # BLD AUTO: 170 10E3/UL (ref 150–450)
PLATELET # BLD AUTO: 186 10E3/UL (ref 150–450)
PLATELET # BLD AUTO: 188 10E3/UL (ref 150–450)
POTASSIUM BLD-SCNC: 3.1 MMOL/L (ref 3.4–5.3)
POTASSIUM BLD-SCNC: 3.2 MMOL/L (ref 3.4–5.3)
POTASSIUM BLD-SCNC: 3.4 MMOL/L (ref 3.4–5.3)
POTASSIUM BLD-SCNC: 3.4 MMOL/L (ref 3.4–5.3)
POTASSIUM BLD-SCNC: 3.7 MMOL/L (ref 3.4–5.3)
POTASSIUM BLD-SCNC: 3.8 MMOL/L (ref 3.4–5.3)
POTASSIUM BLD-SCNC: 4 MMOL/L (ref 3.4–5.3)
POTASSIUM BLD-SCNC: 4 MMOL/L (ref 3.4–5.3)
POTASSIUM BLD-SCNC: 4.1 MMOL/L (ref 3.4–5.3)
POTASSIUM BLD-SCNC: 4.2 MMOL/L (ref 3.4–5.3)
POTASSIUM BLD-SCNC: 4.3 MMOL/L (ref 3.4–5.3)
POTASSIUM BLD-SCNC: 4.4 MMOL/L (ref 3.4–5.3)
POTASSIUM BLD-SCNC: 4.4 MMOL/L (ref 3.4–5.3)
POTASSIUM BLD-SCNC: 4.5 MMOL/L (ref 3.4–5.3)
POTASSIUM BLD-SCNC: 4.8 MMOL/L (ref 3.4–5.3)
POTASSIUM BLD-SCNC: 4.8 MMOL/L (ref 3.4–5.3)
POTASSIUM BLD-SCNC: 5.1 MMOL/L (ref 3.4–5.3)
POTASSIUM BLD-SCNC: 5.3 MMOL/L (ref 3.4–5.3)
POTASSIUM BLD-SCNC: 5.5 MMOL/L (ref 3.4–5.3)
PR INTERVAL - MUSE: 264 MS
PR INTERVAL - MUSE: NORMAL MS
PROT SERPL-MCNC: 7.6 G/DL (ref 6.8–8.8)
PROT SERPL-MCNC: 7.6 G/DL (ref 6.8–8.8)
QRS DURATION - MUSE: 130 MS
QRS DURATION - MUSE: 132 MS
QRS DURATION - MUSE: 134 MS
QRS DURATION - MUSE: 80 MS
QRS DURATION - MUSE: 84 MS
QT - MUSE: 408 MS
QT - MUSE: 414 MS
QT - MUSE: 472 MS
QT - MUSE: 474 MS
QT - MUSE: 484 MS
QTC - MUSE: 461 MS
QTC - MUSE: 486 MS
QTC - MUSE: 511 MS
QTC - MUSE: 516 MS
QTC - MUSE: 525 MS
QUANTIFERON MITOGEN: 3.8 IU/ML
QUANTIFERON NIL TUBE: 0.05 IU/ML
QUANTIFERON TB1 TUBE: 0.05 IU/ML
QUANTIFERON TB2 TUBE: 0.05
R AXIS - MUSE: -33 DEGREES
R AXIS - MUSE: -51 DEGREES
R AXIS - MUSE: -55 DEGREES
R AXIS - MUSE: -56 DEGREES
R AXIS - MUSE: -7 DEGREES
RADIOLOGIST FLAGS: ABNORMAL
RATE PRESSURE PRODUCT: 8418
RBC # BLD AUTO: 2.7 10E6/UL (ref 3.8–5.2)
RBC # BLD AUTO: 2.77 10E6/UL (ref 3.8–5.2)
RBC # BLD AUTO: 2.8 10E6/UL (ref 3.8–5.2)
RBC # BLD AUTO: 2.94 10E6/UL (ref 3.8–5.2)
RBC # BLD AUTO: 3.05 10E6/UL (ref 3.8–5.2)
RBC # BLD AUTO: 3.06 10E6/UL (ref 3.8–5.2)
RBC # BLD AUTO: 3.08 10E6/UL (ref 3.8–5.2)
RBC # BLD AUTO: 3.1 10E6/UL (ref 3.8–5.2)
RBC # BLD AUTO: 3.15 10E6/UL (ref 3.8–5.2)
RBC # BLD AUTO: 3.16 10E6/UL (ref 3.8–5.2)
RBC # BLD AUTO: 3.17 10E6/UL (ref 3.8–5.2)
RBC # BLD AUTO: 3.21 10E6/UL (ref 3.8–5.2)
RBC # BLD AUTO: 3.24 10E6/UL (ref 3.8–5.2)
RBC # BLD AUTO: 3.3 10E6/UL (ref 3.8–5.2)
RBC # BLD AUTO: 3.32 10E6/UL (ref 3.8–5.2)
RBC URINE: >182 /HPF
RVN2-%PRED-PRE: 100 %
RVN2-PRE: 2.16 L
RVN2-PRED: 2.14 L
SARS-COV-2 RNA RESP QL NAA+PROBE: NEGATIVE
SODIUM SERPL-SCNC: 119 MMOL/L (ref 133–144)
SODIUM SERPL-SCNC: 120 MMOL/L (ref 133–144)
SODIUM SERPL-SCNC: 121 MMOL/L (ref 133–144)
SODIUM SERPL-SCNC: 121 MMOL/L (ref 133–144)
SODIUM SERPL-SCNC: 122 MMOL/L (ref 133–144)
SODIUM SERPL-SCNC: 122 MMOL/L (ref 133–144)
SODIUM SERPL-SCNC: 123 MMOL/L (ref 133–144)
SODIUM SERPL-SCNC: 123 MMOL/L (ref 133–144)
SODIUM SERPL-SCNC: 124 MMOL/L (ref 133–144)
SODIUM SERPL-SCNC: 125 MMOL/L (ref 133–144)
SODIUM SERPL-SCNC: 126 MMOL/L (ref 133–144)
SODIUM SERPL-SCNC: 127 MMOL/L (ref 133–144)
SODIUM SERPL-SCNC: 128 MMOL/L (ref 133–144)
SODIUM SERPL-SCNC: 129 MMOL/L (ref 133–144)
SODIUM SERPL-SCNC: 129 MMOL/L (ref 133–144)
SODIUM SERPL-SCNC: 130 MMOL/L (ref 133–144)
SODIUM SERPL-SCNC: 130 MMOL/L (ref 133–144)
SP GR UR STRIP: 1.02 (ref 1–1.03)
SPECIMEN EXPIRATION DATE: NORMAL
SPECIMEN EXPIRATION DATE: NORMAL
STRESS ECHO BASELINE DIASTOLIC HE: 66
STRESS ECHO BASELINE HR: 1 BPM
STRESS ECHO BASELINE SYSTOLIC BP: 128
STRESS ECHO CALCULATED PERCENT HR: 48 %
STRESS ECHO LAST STRESS DIASTOLIC BP: 56
STRESS ECHO LAST STRESS SYSTOLIC BP: 122
STRESS ECHO TARGET HR: 145
SYSTOLIC BLOOD PRESSURE - MUSE: NORMAL MMHG
T AXIS - MUSE: -82 DEGREES
T AXIS - MUSE: 111 DEGREES
T AXIS - MUSE: 115 DEGREES
T AXIS - MUSE: 117 DEGREES
T AXIS - MUSE: 240 DEGREES
TLCN2-%PRED-PRE: 78 %
TLCN2-PRE: 3.89 L
TLCN2-PRED: 4.94 L
TROPONIN I SERPL HS-MCNC: 48 NG/L
TROPONIN I SERPL HS-MCNC: 49 NG/L
TROPONIN I SERPL HS-MCNC: 52 NG/L
TROPONIN I SERPL HS-MCNC: 54 NG/L
UROBILINOGEN UR STRIP-MCNC: NORMAL MG/DL
VA-%PRED-PRE: 65 %
VA-PRE: 3.07 L
VC-%PRED-PRE: 58 %
VC-PRE: 1.72 L
VC-PRED: 2.96 L
VENTRICULAR RATE- MUSE: 70 BPM
VENTRICULAR RATE- MUSE: 71 BPM
VENTRICULAR RATE- MUSE: 72 BPM
VENTRICULAR RATE- MUSE: 77 BPM
VENTRICULAR RATE- MUSE: 83 BPM
VITAMIN D2 SERPL-MCNC: <5 UG/L
VITAMIN D3 SERPL-MCNC: 13 UG/L
WBC # BLD AUTO: 12.1 10E3/UL (ref 4–11)
WBC # BLD AUTO: 12.7 10E3/UL (ref 4–11)
WBC # BLD AUTO: 4.8 10E3/UL (ref 4–11)
WBC # BLD AUTO: 5.3 10E3/UL (ref 4–11)
WBC # BLD AUTO: 6 10E3/UL (ref 4–11)
WBC # BLD AUTO: 6 10E3/UL (ref 4–11)
WBC # BLD AUTO: 6.1 10E3/UL (ref 4–11)
WBC # BLD AUTO: 6.3 10E3/UL (ref 4–11)
WBC # BLD AUTO: 6.7 10E3/UL (ref 4–11)
WBC # BLD AUTO: 6.9 10E3/UL (ref 4–11)
WBC # BLD AUTO: 7.3 10E3/UL (ref 4–11)
WBC # BLD AUTO: 8.4 10E3/UL (ref 4–11)
WBC # BLD AUTO: 8.5 10E3/UL (ref 4–11)
WBC # BLD AUTO: 8.6 10E3/UL (ref 4–11)
WBC # BLD AUTO: 9.3 10E3/UL (ref 4–11)
WBC URINE: 43 /HPF
YEAST #/AREA URNS HPF: ABNORMAL /HPF

## 2022-01-01 PROCEDURE — 99223 1ST HOSP IP/OBS HIGH 75: CPT | Mod: AI | Performed by: HOSPITALIST

## 2022-01-01 PROCEDURE — 258N000003 HC RX IP 258 OP 636: Performed by: INTERNAL MEDICINE

## 2022-01-01 PROCEDURE — 90935 HEMODIALYSIS ONE EVALUATION: CPT | Performed by: INTERNAL MEDICINE

## 2022-01-01 PROCEDURE — 99232 SBSQ HOSP IP/OBS MODERATE 35: CPT | Performed by: HOSPITALIST

## 2022-01-01 PROCEDURE — 97602 WOUND(S) CARE NON-SELECTIVE: CPT

## 2022-01-01 PROCEDURE — 70551 MRI BRAIN STEM W/O DYE: CPT

## 2022-01-01 PROCEDURE — 258N000003 HC RX IP 258 OP 636: Performed by: ANESTHESIOLOGY

## 2022-01-01 PROCEDURE — 250N000013 HC RX MED GY IP 250 OP 250 PS 637: Performed by: HOSPITALIST

## 2022-01-01 PROCEDURE — U0005 INFEC AGEN DETEC AMPLI PROBE: HCPCS | Mod: ORL | Performed by: FAMILY MEDICINE

## 2022-01-01 PROCEDURE — 250N000013 HC RX MED GY IP 250 OP 250 PS 637: Performed by: ORTHOPAEDIC SURGERY

## 2022-01-01 PROCEDURE — 99221 1ST HOSP IP/OBS SF/LOW 40: CPT | Performed by: INTERNAL MEDICINE

## 2022-01-01 PROCEDURE — 85610 PROTHROMBIN TIME: CPT | Performed by: SURGERY

## 2022-01-01 PROCEDURE — 250N000011 HC RX IP 250 OP 636: Performed by: EMERGENCY MEDICINE

## 2022-01-01 PROCEDURE — 99215 OFFICE O/P EST HI 40 MIN: CPT | Mod: 25 | Performed by: INTERNAL MEDICINE

## 2022-01-01 PROCEDURE — 96374 THER/PROPH/DIAG INJ IV PUSH: CPT

## 2022-01-01 PROCEDURE — 86901 BLOOD TYPING SEROLOGIC RH(D): CPT | Performed by: PHYSICIAN ASSISTANT

## 2022-01-01 PROCEDURE — 250N000009 HC RX 250: Performed by: ORTHOPAEDIC SURGERY

## 2022-01-01 PROCEDURE — 250N000013 HC RX MED GY IP 250 OP 250 PS 637: Performed by: NURSE PRACTITIONER

## 2022-01-01 PROCEDURE — 36415 COLL VENOUS BLD VENIPUNCTURE: CPT | Mod: ORL | Performed by: NURSE PRACTITIONER

## 2022-01-01 PROCEDURE — 250N000015 HC RX FACTOR IP MED 636 OP 636

## 2022-01-01 PROCEDURE — 80048 BASIC METABOLIC PNL TOTAL CA: CPT | Performed by: INTERNAL MEDICINE

## 2022-01-01 PROCEDURE — 99309 SBSQ NF CARE MODERATE MDM 30: CPT | Performed by: NURSE PRACTITIONER

## 2022-01-01 PROCEDURE — G1004 CDSM NDSC: HCPCS

## 2022-01-01 PROCEDURE — 99232 SBSQ HOSP IP/OBS MODERATE 35: CPT | Performed by: INTERNAL MEDICINE

## 2022-01-01 PROCEDURE — 36415 COLL VENOUS BLD VENIPUNCTURE: CPT | Performed by: EMERGENCY MEDICINE

## 2022-01-01 PROCEDURE — 85025 COMPLETE CBC W/AUTO DIFF WBC: CPT | Performed by: EMERGENCY MEDICINE

## 2022-01-01 PROCEDURE — 80048 BASIC METABOLIC PNL TOTAL CA: CPT | Performed by: NURSE PRACTITIONER

## 2022-01-01 PROCEDURE — 90937 HEMODIALYSIS REPEATED EVAL: CPT

## 2022-01-01 PROCEDURE — 250N000011 HC RX IP 250 OP 636: Performed by: ANESTHESIOLOGY

## 2022-01-01 PROCEDURE — 36415 COLL VENOUS BLD VENIPUNCTURE: CPT | Performed by: NURSE PRACTITIONER

## 2022-01-01 PROCEDURE — 85027 COMPLETE CBC AUTOMATED: CPT | Performed by: HOSPITALIST

## 2022-01-01 PROCEDURE — 999N000141 HC STATISTIC PRE-PROCEDURE NURSING ASSESSMENT: Performed by: ORTHOPAEDIC SURGERY

## 2022-01-01 PROCEDURE — G0378 HOSPITAL OBSERVATION PER HR: HCPCS

## 2022-01-01 PROCEDURE — 93017 CV STRESS TEST TRACING ONLY: CPT

## 2022-01-01 PROCEDURE — 99285 EMERGENCY DEPT VISIT HI MDM: CPT | Mod: 25

## 2022-01-01 PROCEDURE — 36415 COLL VENOUS BLD VENIPUNCTURE: CPT | Performed by: ORTHOPAEDIC SURGERY

## 2022-01-01 PROCEDURE — 250N000009 HC RX 250: Performed by: ANESTHESIOLOGY

## 2022-01-01 PROCEDURE — 85027 COMPLETE CBC AUTOMATED: CPT | Performed by: STUDENT IN AN ORGANIZED HEALTH CARE EDUCATION/TRAINING PROGRAM

## 2022-01-01 PROCEDURE — P9604 ONE-WAY ALLOW PRORATED TRIP: HCPCS | Performed by: NURSE PRACTITIONER

## 2022-01-01 PROCEDURE — 99310 SBSQ NF CARE HIGH MDM 45: CPT | Performed by: NURSE PRACTITIONER

## 2022-01-01 PROCEDURE — 5A1D70Z PERFORMANCE OF URINARY FILTRATION, INTERMITTENT, LESS THAN 6 HOURS PER DAY: ICD-10-PCS | Performed by: INTERNAL MEDICINE

## 2022-01-01 PROCEDURE — 94060 EVALUATION OF WHEEZING: CPT | Performed by: INTERNAL MEDICINE

## 2022-01-01 PROCEDURE — 99207 PR NO CHARGE LOS: CPT | Mod: GV | Performed by: NURSE PRACTITIONER

## 2022-01-01 PROCEDURE — 86850 RBC ANTIBODY SCREEN: CPT | Performed by: PHYSICIAN ASSISTANT

## 2022-01-01 PROCEDURE — 36415 COLL VENOUS BLD VENIPUNCTURE: CPT | Performed by: INTERNAL MEDICINE

## 2022-01-01 PROCEDURE — 250N000013 HC RX MED GY IP 250 OP 250 PS 637: Performed by: EMERGENCY MEDICINE

## 2022-01-01 PROCEDURE — 36415 COLL VENOUS BLD VENIPUNCTURE: CPT | Performed by: PHYSICIAN ASSISTANT

## 2022-01-01 PROCEDURE — 73030 X-RAY EXAM OF SHOULDER: CPT | Mod: LT

## 2022-01-01 PROCEDURE — 250N000013 HC RX MED GY IP 250 OP 250 PS 637: Performed by: INTERNAL MEDICINE

## 2022-01-01 PROCEDURE — 272N000001 HC OR GENERAL SUPPLY STERILE: Performed by: SURGERY

## 2022-01-01 PROCEDURE — 250N000009 HC RX 250: Performed by: NURSE ANESTHETIST, CERTIFIED REGISTERED

## 2022-01-01 PROCEDURE — 86901 BLOOD TYPING SEROLOGIC RH(D): CPT | Performed by: EMERGENCY MEDICINE

## 2022-01-01 PROCEDURE — 250N000011 HC RX IP 250 OP 636: Performed by: NURSE ANESTHETIST, CERTIFIED REGISTERED

## 2022-01-01 PROCEDURE — 82310 ASSAY OF CALCIUM: CPT | Performed by: INTERNAL MEDICINE

## 2022-01-01 PROCEDURE — 360N000075 HC SURGERY LEVEL 2, PER MIN: Performed by: SURGERY

## 2022-01-01 PROCEDURE — 250N000011 HC RX IP 250 OP 636: Performed by: NURSE PRACTITIONER

## 2022-01-01 PROCEDURE — G0463 HOSPITAL OUTPT CLINIC VISIT: HCPCS

## 2022-01-01 PROCEDURE — 82040 ASSAY OF SERUM ALBUMIN: CPT | Performed by: INTERNAL MEDICINE

## 2022-01-01 PROCEDURE — 85610 PROTHROMBIN TIME: CPT | Performed by: EMERGENCY MEDICINE

## 2022-01-01 PROCEDURE — 250N000011 HC RX IP 250 OP 636: Performed by: INTERNAL MEDICINE

## 2022-01-01 PROCEDURE — 80053 COMPREHEN METABOLIC PANEL: CPT | Performed by: EMERGENCY MEDICINE

## 2022-01-01 PROCEDURE — 258N000003 HC RX IP 258 OP 636: Performed by: NURSE ANESTHETIST, CERTIFIED REGISTERED

## 2022-01-01 PROCEDURE — 250N000011 HC RX IP 250 OP 636: Performed by: ORTHOPAEDIC SURGERY

## 2022-01-01 PROCEDURE — 120N000001 HC R&B MED SURG/OB

## 2022-01-01 PROCEDURE — 80048 BASIC METABOLIC PNL TOTAL CA: CPT | Performed by: HOSPITALIST

## 2022-01-01 PROCEDURE — 97110 THERAPEUTIC EXERCISES: CPT | Mod: GP

## 2022-01-01 PROCEDURE — 97162 PT EVAL MOD COMPLEX 30 MIN: CPT | Mod: GP

## 2022-01-01 PROCEDURE — C1776 JOINT DEVICE (IMPLANTABLE): HCPCS | Performed by: ORTHOPAEDIC SURGERY

## 2022-01-01 PROCEDURE — 80069 RENAL FUNCTION PANEL: CPT | Performed by: INTERNAL MEDICINE

## 2022-01-01 PROCEDURE — 93288 INTERROG EVL PM/LDLS PM IP: CPT

## 2022-01-01 PROCEDURE — 94726 PLETHYSMOGRAPHY LUNG VOLUMES: CPT | Performed by: INTERNAL MEDICINE

## 2022-01-01 PROCEDURE — 85027 COMPLETE CBC AUTOMATED: CPT | Performed by: NURSE PRACTITIONER

## 2022-01-01 PROCEDURE — 99207 PR SC NO CHARGE VISIT: CPT | Performed by: STUDENT IN AN ORGANIZED HEALTH CARE EDUCATION/TRAINING PROGRAM

## 2022-01-01 PROCEDURE — U0005 INFEC AGEN DETEC AMPLI PROBE: HCPCS | Performed by: NURSE PRACTITIONER

## 2022-01-01 PROCEDURE — 70450 CT HEAD/BRAIN W/O DYE: CPT

## 2022-01-01 PROCEDURE — 99207 PR APP CREDIT; MD BILLING SHARED VISIT: CPT | Performed by: INTERNAL MEDICINE

## 2022-01-01 PROCEDURE — 99221 1ST HOSP IP/OBS SF/LOW 40: CPT | Performed by: SURGERY

## 2022-01-01 PROCEDURE — 12011 RPR F/E/E/N/L/M 2.5 CM/<: CPT

## 2022-01-01 PROCEDURE — 97530 THERAPEUTIC ACTIVITIES: CPT | Mod: GP

## 2022-01-01 PROCEDURE — 93005 ELECTROCARDIOGRAM TRACING: CPT | Mod: 76

## 2022-01-01 PROCEDURE — 96375 TX/PRO/DX INJ NEW DRUG ADDON: CPT | Mod: 59

## 2022-01-01 PROCEDURE — 85014 HEMATOCRIT: CPT | Performed by: INTERNAL MEDICINE

## 2022-01-01 PROCEDURE — 82310 ASSAY OF CALCIUM: CPT | Performed by: NURSE PRACTITIONER

## 2022-01-01 PROCEDURE — 343N000001 HC RX 343: Performed by: INTERNAL MEDICINE

## 2022-01-01 PROCEDURE — 0SRS019 REPLACEMENT OF LEFT HIP JOINT, FEMORAL SURFACE WITH METAL SYNTHETIC SUBSTITUTE, CEMENTED, OPEN APPROACH: ICD-10-PCS | Performed by: ORTHOPAEDIC SURGERY

## 2022-01-01 PROCEDURE — G0257 UNSCHED DIALYSIS ESRD PT HOS: HCPCS

## 2022-01-01 PROCEDURE — 90471 IMMUNIZATION ADMIN: CPT | Performed by: EMERGENCY MEDICINE

## 2022-01-01 PROCEDURE — 250N000013 HC RX MED GY IP 250 OP 250 PS 637: Performed by: STUDENT IN AN ORGANIZED HEALTH CARE EDUCATION/TRAINING PROGRAM

## 2022-01-01 PROCEDURE — 99231 SBSQ HOSP IP/OBS SF/LOW 25: CPT | Performed by: INTERNAL MEDICINE

## 2022-01-01 PROCEDURE — 370N000017 HC ANESTHESIA TECHNICAL FEE, PER MIN: Performed by: SURGERY

## 2022-01-01 PROCEDURE — 99217 PR OBSERVATION CARE DISCHARGE: CPT | Performed by: INTERNAL MEDICINE

## 2022-01-01 PROCEDURE — 99223 1ST HOSP IP/OBS HIGH 75: CPT | Performed by: REGISTERED NURSE

## 2022-01-01 PROCEDURE — 97597 DBRDMT OPN WND 1ST 20 CM/<: CPT | Performed by: SURGERY

## 2022-01-01 PROCEDURE — 360N000077 HC SURGERY LEVEL 4, PER MIN: Performed by: ORTHOPAEDIC SURGERY

## 2022-01-01 PROCEDURE — 85610 PROTHROMBIN TIME: CPT | Performed by: PHYSICIAN ASSISTANT

## 2022-01-01 PROCEDURE — 36415 COLL VENOUS BLD VENIPUNCTURE: CPT | Performed by: HOSPITALIST

## 2022-01-01 PROCEDURE — 84484 ASSAY OF TROPONIN QUANT: CPT | Performed by: EMERGENCY MEDICINE

## 2022-01-01 PROCEDURE — 72125 CT NECK SPINE W/O DYE: CPT

## 2022-01-01 PROCEDURE — 99291 CRITICAL CARE FIRST HOUR: CPT | Performed by: STUDENT IN AN ORGANIZED HEALTH CARE EDUCATION/TRAINING PROGRAM

## 2022-01-01 PROCEDURE — 96376 TX/PRO/DX INJ SAME DRUG ADON: CPT

## 2022-01-01 PROCEDURE — 99233 SBSQ HOSP IP/OBS HIGH 50: CPT | Mod: 25 | Performed by: PSYCHIATRY & NEUROLOGY

## 2022-01-01 PROCEDURE — 85014 HEMATOCRIT: CPT | Performed by: NURSE PRACTITIONER

## 2022-01-01 PROCEDURE — 85027 COMPLETE CBC AUTOMATED: CPT | Performed by: INTERNAL MEDICINE

## 2022-01-01 PROCEDURE — C9803 HOPD COVID-19 SPEC COLLECT: HCPCS

## 2022-01-01 PROCEDURE — 87636 SARSCOV2 & INF A&B AMP PRB: CPT | Performed by: EMERGENCY MEDICINE

## 2022-01-01 PROCEDURE — 250N000009 HC RX 250: Performed by: INTERNAL MEDICINE

## 2022-01-01 PROCEDURE — 97530 THERAPEUTIC ACTIVITIES: CPT | Mod: GP | Performed by: PHYSICAL THERAPIST

## 2022-01-01 PROCEDURE — 258N000003 HC RX IP 258 OP 636: Performed by: HOSPITALIST

## 2022-01-01 PROCEDURE — 250N000025 HC SEVOFLURANE, PER MIN: Performed by: SURGERY

## 2022-01-01 PROCEDURE — G1004 CDSM NDSC: HCPCS | Performed by: INTERNAL MEDICINE

## 2022-01-01 PROCEDURE — 96361 HYDRATE IV INFUSION ADD-ON: CPT

## 2022-01-01 PROCEDURE — 80048 BASIC METABOLIC PNL TOTAL CA: CPT | Performed by: STUDENT IN AN ORGANIZED HEALTH CARE EDUCATION/TRAINING PROGRAM

## 2022-01-01 PROCEDURE — 83690 ASSAY OF LIPASE: CPT | Performed by: EMERGENCY MEDICINE

## 2022-01-01 PROCEDURE — 99204 OFFICE O/P NEW MOD 45 MIN: CPT | Performed by: STUDENT IN AN ORGANIZED HEALTH CARE EDUCATION/TRAINING PROGRAM

## 2022-01-01 PROCEDURE — 84295 ASSAY OF SERUM SODIUM: CPT | Performed by: INTERNAL MEDICINE

## 2022-01-01 PROCEDURE — 82306 VITAMIN D 25 HYDROXY: CPT | Performed by: PHYSICIAN ASSISTANT

## 2022-01-01 PROCEDURE — G0239 OTH RESP PROC, GROUP: HCPCS

## 2022-01-01 PROCEDURE — 370N000017 HC ANESTHESIA TECHNICAL FEE, PER MIN: Performed by: ORTHOPAEDIC SURGERY

## 2022-01-01 PROCEDURE — 99203 OFFICE O/P NEW LOW 30 MIN: CPT | Performed by: PHYSICIAN ASSISTANT

## 2022-01-01 PROCEDURE — 99222 1ST HOSP IP/OBS MODERATE 55: CPT | Performed by: INTERNAL MEDICINE

## 2022-01-01 PROCEDURE — 85018 HEMOGLOBIN: CPT | Performed by: PHYSICIAN ASSISTANT

## 2022-01-01 PROCEDURE — 36415 COLL VENOUS BLD VENIPUNCTURE: CPT | Performed by: STUDENT IN AN ORGANIZED HEALTH CARE EDUCATION/TRAINING PROGRAM

## 2022-01-01 PROCEDURE — 74177 CT ABD & PELVIS W/CONTRAST: CPT

## 2022-01-01 PROCEDURE — 70544 MR ANGIOGRAPHY HEAD W/O DYE: CPT

## 2022-01-01 PROCEDURE — 72125 CT NECK SPINE W/O DYE: CPT | Mod: ME

## 2022-01-01 PROCEDURE — 93005 ELECTROCARDIOGRAM TRACING: CPT | Mod: XU

## 2022-01-01 PROCEDURE — 93294 REM INTERROG EVL PM/LDLS PM: CPT | Performed by: INTERNAL MEDICINE

## 2022-01-01 PROCEDURE — 93018 CV STRESS TEST I&R ONLY: CPT | Mod: MG | Performed by: INTERNAL MEDICINE

## 2022-01-01 PROCEDURE — 93005 ELECTROCARDIOGRAM TRACING: CPT | Mod: 59

## 2022-01-01 PROCEDURE — 999N000049 HC STATISTIC ECHO STRESS OR NM NPI

## 2022-01-01 PROCEDURE — 82310 ASSAY OF CALCIUM: CPT | Performed by: EMERGENCY MEDICINE

## 2022-01-01 PROCEDURE — 11042 DBRDMT SUBQ TIS 1ST 20SQCM/<: CPT | Performed by: SURGERY

## 2022-01-01 PROCEDURE — 250N000011 HC RX IP 250 OP 636: Performed by: SURGERY

## 2022-01-01 PROCEDURE — 86706 HEP B SURFACE ANTIBODY: CPT | Performed by: INTERNAL MEDICINE

## 2022-01-01 PROCEDURE — 999N000141 HC STATISTIC PRE-PROCEDURE NURSING ASSESSMENT: Performed by: SURGERY

## 2022-01-01 PROCEDURE — 250N000009 HC RX 250: Performed by: SURGERY

## 2022-01-01 PROCEDURE — 99233 SBSQ HOSP IP/OBS HIGH 50: CPT | Performed by: INTERNAL MEDICINE

## 2022-01-01 PROCEDURE — 0JBD0ZZ EXCISION OF RIGHT UPPER ARM SUBCUTANEOUS TISSUE AND FASCIA, OPEN APPROACH: ICD-10-PCS | Performed by: SURGERY

## 2022-01-01 PROCEDURE — 86481 TB AG RESPONSE T-CELL SUSP: CPT | Performed by: NURSE PRACTITIONER

## 2022-01-01 PROCEDURE — 82040 ASSAY OF SERUM ALBUMIN: CPT | Performed by: EMERGENCY MEDICINE

## 2022-01-01 PROCEDURE — 70547 MR ANGIOGRAPHY NECK W/O DYE: CPT

## 2022-01-01 PROCEDURE — 272N000001 HC OR GENERAL SUPPLY STERILE: Performed by: ORTHOPAEDIC SURGERY

## 2022-01-01 PROCEDURE — A9502 TC99M TETROFOSMIN: HCPCS | Performed by: INTERNAL MEDICINE

## 2022-01-01 PROCEDURE — 200N000001 HC R&B ICU

## 2022-01-01 PROCEDURE — 30283B1 TRANSFUSION OF NONAUTOLOGOUS 4-FACTOR PROTHROMBIN COMPLEX CONCENTRATE INTO VEIN, PERCUTANEOUS APPROACH: ICD-10-PCS | Performed by: STUDENT IN AN ORGANIZED HEALTH CARE EDUCATION/TRAINING PROGRAM

## 2022-01-01 PROCEDURE — 250N000009 HC RX 250: Performed by: EMERGENCY MEDICINE

## 2022-01-01 PROCEDURE — 710N000009 HC RECOVERY PHASE 1, LEVEL 1, PER MIN: Performed by: SURGERY

## 2022-01-01 PROCEDURE — 84484 ASSAY OF TROPONIN QUANT: CPT | Mod: 91 | Performed by: INTERNAL MEDICINE

## 2022-01-01 PROCEDURE — 73552 X-RAY EXAM OF FEMUR 2/>: CPT | Mod: LT

## 2022-01-01 PROCEDURE — 250N000011 HC RX IP 250 OP 636: Performed by: HOSPITALIST

## 2022-01-01 PROCEDURE — 87086 URINE CULTURE/COLONY COUNT: CPT | Performed by: EMERGENCY MEDICINE

## 2022-01-01 PROCEDURE — 93016 CV STRESS TEST SUPVJ ONLY: CPT | Performed by: INTERNAL MEDICINE

## 2022-01-01 PROCEDURE — U0003 INFECTIOUS AGENT DETECTION BY NUCLEIC ACID (DNA OR RNA); SEVERE ACUTE RESPIRATORY SYNDROME CORONAVIRUS 2 (SARS-COV-2) (CORONAVIRUS DISEASE [COVID-19]), AMPLIFIED PROBE TECHNIQUE, MAKING USE OF HIGH THROUGHPUT TECHNOLOGIES AS DESCRIBED BY CMS-2020-01-R: HCPCS | Performed by: INTERNAL MEDICINE

## 2022-01-01 PROCEDURE — 999N000063 XR PELVIS AND HIP PORTABLE LEFT 1 VIEW

## 2022-01-01 PROCEDURE — 81001 URINALYSIS AUTO W/SCOPE: CPT | Performed by: EMERGENCY MEDICINE

## 2022-01-01 PROCEDURE — 93296 REM INTERROG EVL PM/IDS: CPT | Performed by: INTERNAL MEDICINE

## 2022-01-01 PROCEDURE — U0005 INFEC AGEN DETEC AMPLI PROBE: HCPCS | Mod: ORL | Performed by: NURSE PRACTITIONER

## 2022-01-01 PROCEDURE — 99291 CRITICAL CARE FIRST HOUR: CPT | Performed by: INTERNAL MEDICINE

## 2022-01-01 PROCEDURE — 99239 HOSP IP/OBS DSCHRG MGMT >30: CPT | Performed by: HOSPITALIST

## 2022-01-01 PROCEDURE — 250N000025 HC SEVOFLURANE, PER MIN: Performed by: ORTHOPAEDIC SURGERY

## 2022-01-01 PROCEDURE — 70486 CT MAXILLOFACIAL W/O DYE: CPT

## 2022-01-01 PROCEDURE — 84132 ASSAY OF SERUM POTASSIUM: CPT | Performed by: ORTHOPAEDIC SURGERY

## 2022-01-01 PROCEDURE — 94729 DIFFUSING CAPACITY: CPT | Performed by: INTERNAL MEDICINE

## 2022-01-01 PROCEDURE — 90715 TDAP VACCINE 7 YRS/> IM: CPT | Performed by: EMERGENCY MEDICINE

## 2022-01-01 PROCEDURE — 93005 ELECTROCARDIOGRAM TRACING: CPT

## 2022-01-01 PROCEDURE — 258N000003 HC RX IP 258 OP 636: Performed by: EMERGENCY MEDICINE

## 2022-01-01 PROCEDURE — 278N000051 HC OR IMPLANT GENERAL: Performed by: ORTHOPAEDIC SURGERY

## 2022-01-01 PROCEDURE — 99233 SBSQ HOSP IP/OBS HIGH 50: CPT | Performed by: STUDENT IN AN ORGANIZED HEALTH CARE EDUCATION/TRAINING PROGRAM

## 2022-01-01 PROCEDURE — 250N000013 HC RX MED GY IP 250 OP 250 PS 637: Performed by: PHYSICIAN ASSISTANT

## 2022-01-01 PROCEDURE — 80048 BASIC METABOLIC PNL TOTAL CA: CPT | Performed by: EMERGENCY MEDICINE

## 2022-01-01 PROCEDURE — 78452 HT MUSCLE IMAGE SPECT MULT: CPT | Mod: 26 | Performed by: INTERNAL MEDICINE

## 2022-01-01 PROCEDURE — 99308 SBSQ NF CARE LOW MDM 20: CPT | Performed by: INTERNAL MEDICINE

## 2022-01-01 PROCEDURE — 72170 X-RAY EXAM OF PELVIS: CPT

## 2022-01-01 PROCEDURE — 99220 PR INITIAL OBSERVATION CARE,LEVEL III: CPT | Performed by: INTERNAL MEDICINE

## 2022-01-01 PROCEDURE — 71045 X-RAY EXAM CHEST 1 VIEW: CPT

## 2022-01-01 PROCEDURE — 0JCD0ZZ EXTIRPATION OF MATTER FROM RIGHT UPPER ARM SUBCUTANEOUS TISSUE AND FASCIA, OPEN APPROACH: ICD-10-PCS | Performed by: SURGERY

## 2022-01-01 PROCEDURE — 93880 EXTRACRANIAL BILAT STUDY: CPT

## 2022-01-01 PROCEDURE — 250N000011 HC RX IP 250 OP 636: Performed by: STUDENT IN AN ORGANIZED HEALTH CARE EDUCATION/TRAINING PROGRAM

## 2022-01-01 PROCEDURE — 710N000009 HC RECOVERY PHASE 1, LEVEL 1, PER MIN: Performed by: ORTHOPAEDIC SURGERY

## 2022-01-01 PROCEDURE — 84100 ASSAY OF PHOSPHORUS: CPT | Performed by: PHYSICIAN ASSISTANT

## 2022-01-01 PROCEDURE — 84295 ASSAY OF SERUM SODIUM: CPT | Performed by: ORTHOPAEDIC SURGERY

## 2022-01-01 PROCEDURE — 258N000001 HC RX 258: Performed by: ORTHOPAEDIC SURGERY

## 2022-01-01 PROCEDURE — 10140 I&D HMTMA SEROMA/FLUID COLLJ: CPT | Performed by: SURGERY

## 2022-01-01 PROCEDURE — 70450 CT HEAD/BRAIN W/O DYE: CPT | Mod: 77

## 2022-01-01 PROCEDURE — 99239 HOSP IP/OBS DSCHRG MGMT >30: CPT | Performed by: INTERNAL MEDICINE

## 2022-01-01 PROCEDURE — 82040 ASSAY OF SERUM ALBUMIN: CPT | Performed by: PHYSICIAN ASSISTANT

## 2022-01-01 PROCEDURE — P9604 ONE-WAY ALLOW PRORATED TRIP: HCPCS | Mod: ORL | Performed by: NURSE PRACTITIONER

## 2022-01-01 PROCEDURE — 84484 ASSAY OF TROPONIN QUANT: CPT | Mod: 91 | Performed by: EMERGENCY MEDICINE

## 2022-01-01 PROCEDURE — U0003 INFECTIOUS AGENT DETECTION BY NUCLEIC ACID (DNA OR RNA); SEVERE ACUTE RESPIRATORY SYNDROME CORONAVIRUS 2 (SARS-COV-2) (CORONAVIRUS DISEASE [COVID-19]), AMPLIFIED PROBE TECHNIQUE, MAKING USE OF HIGH THROUGHPUT TECHNOLOGIES AS DESCRIBED BY CMS-2020-01-R: HCPCS | Performed by: EMERGENCY MEDICINE

## 2022-01-01 PROCEDURE — 12001 RPR S/N/AX/GEN/TRNK 2.5CM/<: CPT

## 2022-01-01 PROCEDURE — U0003 INFECTIOUS AGENT DETECTION BY NUCLEIC ACID (DNA OR RNA); SEVERE ACUTE RESPIRATORY SYNDROME CORONAVIRUS 2 (SARS-COV-2) (CORONAVIRUS DISEASE [COVID-19]), AMPLIFIED PROBE TECHNIQUE, MAKING USE OF HIGH THROUGHPUT TECHNOLOGIES AS DESCRIBED BY CMS-2020-01-R: HCPCS | Mod: ORL | Performed by: NURSE PRACTITIONER

## 2022-01-01 PROCEDURE — 99205 OFFICE O/P NEW HI 60 MIN: CPT | Performed by: INTERNAL MEDICINE

## 2022-01-01 PROCEDURE — 85730 THROMBOPLASTIN TIME PARTIAL: CPT | Performed by: SURGERY

## 2022-01-01 PROCEDURE — 250N000009 HC RX 250: Performed by: PHYSICIAN ASSISTANT

## 2022-01-01 PROCEDURE — 97161 PT EVAL LOW COMPLEX 20 MIN: CPT | Mod: GP

## 2022-01-01 PROCEDURE — 93990 DOPPLER FLOW TESTING: CPT

## 2022-01-01 PROCEDURE — 87340 HEPATITIS B SURFACE AG IA: CPT | Performed by: INTERNAL MEDICINE

## 2022-01-01 PROCEDURE — 71046 X-RAY EXAM CHEST 2 VIEWS: CPT | Performed by: RADIOLOGY

## 2022-01-01 PROCEDURE — 258N000003 HC RX IP 258 OP 636: Performed by: SURGERY

## 2022-01-01 PROCEDURE — 99308 SBSQ NF CARE LOW MDM 20: CPT | Performed by: NURSE PRACTITIONER

## 2022-01-01 PROCEDURE — 99309 SBSQ NF CARE MODERATE MDM 30: CPT | Performed by: INTERNAL MEDICINE

## 2022-01-01 DEVICE — IMP CENTRALIZER DISTAL BI POLAR SNN INVIS 12MM 71313212: Type: IMPLANTABLE DEVICE | Site: HIP | Status: FUNCTIONAL

## 2022-01-01 DEVICE — IMPLANTABLE DEVICE: Type: IMPLANTABLE DEVICE | Site: HIP | Status: FUNCTIONAL

## 2022-01-01 DEVICE — IMP HEAD FEMORAL SNR COBALT 28MM -3 71302803: Type: IMPLANTABLE DEVICE | Site: HIP | Status: FUNCTIONAL

## 2022-01-01 DEVICE — IMP CUP SNN BIPOLAR TANDEM 46MM: Type: IMPLANTABLE DEVICE | Site: HIP | Status: FUNCTIONAL

## 2022-01-01 DEVICE — BONE CEMENT SIMPLEX FULL DOSE 6191-1-001: Type: IMPLANTABLE DEVICE | Site: HIP | Status: FUNCTIONAL

## 2022-01-01 DEVICE — BONE CEMENT SIMPLEX W/TOBRAMYCIN 6197-9-001: Type: IMPLANTABLE DEVICE | Site: HIP | Status: FUNCTIONAL

## 2022-01-01 RX ORDER — HYDRALAZINE HYDROCHLORIDE 20 MG/ML
10-20 INJECTION INTRAMUSCULAR; INTRAVENOUS
Status: DISCONTINUED | OUTPATIENT
Start: 2022-01-01 | End: 2022-01-01 | Stop reason: HOSPADM

## 2022-01-01 RX ORDER — PANTOPRAZOLE SODIUM 40 MG/1
40 TABLET, DELAYED RELEASE ORAL AT BEDTIME
Status: DISCONTINUED | OUTPATIENT
Start: 2022-01-01 | End: 2022-01-01

## 2022-01-01 RX ORDER — DIPHENHYDRAMINE HCL 25 MG
25 CAPSULE ORAL EVERY 6 HOURS PRN
Status: DISCONTINUED | OUTPATIENT
Start: 2022-01-01 | End: 2022-01-01 | Stop reason: HOSPADM

## 2022-01-01 RX ORDER — IOPAMIDOL 755 MG/ML
68 INJECTION, SOLUTION INTRAVASCULAR ONCE
Status: COMPLETED | OUTPATIENT
Start: 2022-01-01 | End: 2022-01-01

## 2022-01-01 RX ORDER — HYDROMORPHONE HCL IN WATER/PF 6 MG/30 ML
0.2 PATIENT CONTROLLED ANALGESIA SYRINGE INTRAVENOUS
Status: DISCONTINUED | OUTPATIENT
Start: 2022-01-01 | End: 2022-01-01

## 2022-01-01 RX ORDER — ACETAMINOPHEN 650 MG/1
650 SUPPOSITORY RECTAL EVERY 6 HOURS PRN
Status: DISCONTINUED | OUTPATIENT
Start: 2022-01-01 | End: 2022-01-01 | Stop reason: HOSPADM

## 2022-01-01 RX ORDER — SODIUM CHLORIDE 9 MG/ML
INJECTION, SOLUTION INTRAVENOUS CONTINUOUS
Status: DISCONTINUED | OUTPATIENT
Start: 2022-01-01 | End: 2022-01-01

## 2022-01-01 RX ORDER — LEVETIRACETAM 100 MG/ML
500 SOLUTION ORAL DAILY
Status: DISPENSED | OUTPATIENT
Start: 2022-01-01 | End: 2022-01-01

## 2022-01-01 RX ORDER — TORSEMIDE 20 MG/1
40 TABLET ORAL DAILY
Status: DISCONTINUED | OUTPATIENT
Start: 2022-01-01 | End: 2022-01-01 | Stop reason: HOSPADM

## 2022-01-01 RX ORDER — ACETAMINOPHEN 325 MG/1
650 TABLET ORAL 2 TIMES DAILY
COMMUNITY
End: 2022-01-01

## 2022-01-01 RX ORDER — NALOXONE HYDROCHLORIDE 0.4 MG/ML
0.2 INJECTION, SOLUTION INTRAMUSCULAR; INTRAVENOUS; SUBCUTANEOUS
Status: DISCONTINUED | OUTPATIENT
Start: 2022-01-01 | End: 2022-01-01 | Stop reason: HOSPADM

## 2022-01-01 RX ORDER — CEFAZOLIN SODIUM 1 G/3ML
INJECTION, POWDER, FOR SOLUTION INTRAMUSCULAR; INTRAVENOUS PRN
Status: DISCONTINUED | OUTPATIENT
Start: 2022-01-01 | End: 2022-01-01

## 2022-01-01 RX ORDER — PROCHLORPERAZINE 25 MG
12.5 SUPPOSITORY, RECTAL RECTAL EVERY 12 HOURS PRN
Status: DISCONTINUED | OUTPATIENT
Start: 2022-01-01 | End: 2022-01-01 | Stop reason: HOSPADM

## 2022-01-01 RX ORDER — HEPARIN SODIUM 1000 [USP'U]/ML
INJECTION, SOLUTION INTRAVENOUS; SUBCUTANEOUS PRN
Status: DISCONTINUED | OUTPATIENT
Start: 2022-01-01 | End: 2022-01-01 | Stop reason: HOSPADM

## 2022-01-01 RX ORDER — CALCIUM ACETATE 667 MG/1
667 CAPSULE ORAL
Status: DISCONTINUED | OUTPATIENT
Start: 2022-01-01 | End: 2022-01-01

## 2022-01-01 RX ORDER — ONDANSETRON 4 MG/1
4 TABLET, ORALLY DISINTEGRATING ORAL EVERY 6 HOURS PRN
Status: DISCONTINUED | OUTPATIENT
Start: 2022-01-01 | End: 2022-01-01

## 2022-01-01 RX ORDER — CAFFEINE CITRATE 20 MG/ML
60 SOLUTION INTRAVENOUS
Status: DISCONTINUED | OUTPATIENT
Start: 2022-01-01 | End: 2022-01-01

## 2022-01-01 RX ORDER — SODIUM CHLORIDE, SODIUM LACTATE, POTASSIUM CHLORIDE, CALCIUM CHLORIDE 600; 310; 30; 20 MG/100ML; MG/100ML; MG/100ML; MG/100ML
INJECTION, SOLUTION INTRAVENOUS CONTINUOUS
Status: DISCONTINUED | OUTPATIENT
Start: 2022-01-01 | End: 2022-01-01

## 2022-01-01 RX ORDER — ASPIRIN 81 MG/1
162 TABLET ORAL ONCE
Status: COMPLETED | OUTPATIENT
Start: 2022-01-01 | End: 2022-01-01

## 2022-01-01 RX ORDER — ACETAMINOPHEN 325 MG/1
650 TABLET ORAL EVERY 6 HOURS PRN
Status: DISCONTINUED | OUTPATIENT
Start: 2022-01-01 | End: 2022-01-01 | Stop reason: HOSPADM

## 2022-01-01 RX ORDER — LORAZEPAM 2 MG/ML
0.5 CONCENTRATE ORAL EVERY 4 HOURS PRN
COMMUNITY

## 2022-01-01 RX ORDER — NALOXONE HYDROCHLORIDE 0.4 MG/ML
0.4 INJECTION, SOLUTION INTRAMUSCULAR; INTRAVENOUS; SUBCUTANEOUS
Status: DISCONTINUED | OUTPATIENT
Start: 2022-01-01 | End: 2022-01-01 | Stop reason: HOSPADM

## 2022-01-01 RX ORDER — ONDANSETRON 2 MG/ML
4 INJECTION INTRAMUSCULAR; INTRAVENOUS EVERY 30 MIN PRN
Status: DISCONTINUED | OUTPATIENT
Start: 2022-01-01 | End: 2022-01-01 | Stop reason: HOSPADM

## 2022-01-01 RX ORDER — HEPARIN SODIUM 1000 [USP'U]/ML
500 INJECTION, SOLUTION INTRAVENOUS; SUBCUTANEOUS CONTINUOUS
Status: DISCONTINUED | OUTPATIENT
Start: 2022-01-01 | End: 2022-01-01

## 2022-01-01 RX ORDER — ALLOPURINOL 100 MG/1
100 TABLET ORAL
Status: CANCELLED | OUTPATIENT
Start: 2022-01-01

## 2022-01-01 RX ORDER — HYDROXYZINE HYDROCHLORIDE 10 MG/1
10 TABLET, FILM COATED ORAL EVERY 6 HOURS PRN
DISCHARGE
Start: 2022-01-01 | End: 2022-01-01

## 2022-01-01 RX ORDER — CEFAZOLIN SODIUM/WATER 2 G/20 ML
2 SYRINGE (ML) INTRAVENOUS SEE ADMIN INSTRUCTIONS
Status: DISCONTINUED | OUTPATIENT
Start: 2022-01-01 | End: 2022-01-01 | Stop reason: HOSPADM

## 2022-01-01 RX ORDER — OXYCODONE HYDROCHLORIDE 5 MG/1
5 TABLET ORAL EVERY 4 HOURS PRN
Status: DISCONTINUED | OUTPATIENT
Start: 2022-01-01 | End: 2022-01-01 | Stop reason: HOSPADM

## 2022-01-01 RX ORDER — FAMOTIDINE 20 MG/1
20 TABLET, FILM COATED ORAL EVERY EVENING
Status: DISCONTINUED | OUTPATIENT
Start: 2022-01-01 | End: 2022-01-01

## 2022-01-01 RX ORDER — ONDANSETRON 2 MG/ML
4 INJECTION INTRAMUSCULAR; INTRAVENOUS EVERY 6 HOURS PRN
Status: DISCONTINUED | OUTPATIENT
Start: 2022-01-01 | End: 2022-01-01

## 2022-01-01 RX ORDER — ONDANSETRON 2 MG/ML
4 INJECTION INTRAMUSCULAR; INTRAVENOUS EVERY 30 MIN PRN
Status: DISCONTINUED | OUTPATIENT
Start: 2022-01-01 | End: 2022-01-01

## 2022-01-01 RX ORDER — LEVETIRACETAM 500 MG/1
500 TABLET ORAL DAILY
Status: DISPENSED | OUTPATIENT
Start: 2022-01-01 | End: 2022-01-01

## 2022-01-01 RX ORDER — HYDRALAZINE HYDROCHLORIDE 20 MG/ML
10-20 INJECTION INTRAMUSCULAR; INTRAVENOUS
Status: DISCONTINUED | OUTPATIENT
Start: 2022-01-01 | End: 2022-01-01

## 2022-01-01 RX ORDER — GLYCOPYRROLATE 0.2 MG/ML
INJECTION, SOLUTION INTRAMUSCULAR; INTRAVENOUS PRN
Status: DISCONTINUED | OUTPATIENT
Start: 2022-01-01 | End: 2022-01-01

## 2022-01-01 RX ORDER — FENTANYL CITRATE 50 UG/ML
INJECTION, SOLUTION INTRAMUSCULAR; INTRAVENOUS PRN
Status: DISCONTINUED | OUTPATIENT
Start: 2022-01-01 | End: 2022-01-01

## 2022-01-01 RX ORDER — OXYCODONE HYDROCHLORIDE 5 MG/1
2.5 TABLET ORAL EVERY 4 HOURS PRN
Qty: 10 TABLET | Refills: 0 | Status: SHIPPED | OUTPATIENT
Start: 2022-01-01 | End: 2022-01-01

## 2022-01-01 RX ORDER — MAGNESIUM HYDROXIDE/ALUMINUM HYDROXICE/SIMETHICONE 120; 1200; 1200 MG/30ML; MG/30ML; MG/30ML
30 SUSPENSION ORAL EVERY 4 HOURS PRN
Status: DISCONTINUED | OUTPATIENT
Start: 2022-01-01 | End: 2022-01-01 | Stop reason: HOSPADM

## 2022-01-01 RX ORDER — LORATADINE 10 MG/1
10 TABLET ORAL AT BEDTIME
Status: DISCONTINUED | OUTPATIENT
Start: 2022-01-01 | End: 2022-01-01

## 2022-01-01 RX ORDER — OXYCODONE HYDROCHLORIDE 5 MG/1
5 TABLET ORAL EVERY 4 HOURS PRN
Status: DISCONTINUED | OUTPATIENT
Start: 2022-01-01 | End: 2022-01-01

## 2022-01-01 RX ORDER — HYDROMORPHONE HCL IN WATER/PF 6 MG/30 ML
0.2 PATIENT CONTROLLED ANALGESIA SYRINGE INTRAVENOUS
Status: DISCONTINUED | OUTPATIENT
Start: 2022-01-01 | End: 2022-01-01 | Stop reason: HOSPADM

## 2022-01-01 RX ORDER — FENTANYL CITRATE 0.05 MG/ML
50 INJECTION, SOLUTION INTRAMUSCULAR; INTRAVENOUS EVERY 5 MIN PRN
Status: DISCONTINUED | OUTPATIENT
Start: 2022-01-01 | End: 2022-01-01 | Stop reason: HOSPADM

## 2022-01-01 RX ORDER — DIMENHYDRINATE 50 MG/ML
25 INJECTION, SOLUTION INTRAMUSCULAR; INTRAVENOUS
Status: DISCONTINUED | OUTPATIENT
Start: 2022-01-01 | End: 2022-01-01 | Stop reason: HOSPADM

## 2022-01-01 RX ORDER — B COMPLEX, C NO.20/FOLIC ACID 1 MG
1 CAPSULE ORAL DAILY
Start: 2022-01-01 | End: 2022-05-27

## 2022-01-01 RX ORDER — HYDROXYZINE HYDROCHLORIDE 10 MG/1
10 TABLET, FILM COATED ORAL EVERY 6 HOURS PRN
Status: DISCONTINUED | OUTPATIENT
Start: 2022-01-01 | End: 2022-01-01

## 2022-01-01 RX ORDER — ATORVASTATIN CALCIUM 40 MG/1
40 TABLET, FILM COATED ORAL DAILY
Status: DISCONTINUED | OUTPATIENT
Start: 2022-01-01 | End: 2022-01-01 | Stop reason: HOSPADM

## 2022-01-01 RX ORDER — CALCIUM ACETATE 667 MG/1
667 TABLET ORAL
Status: DISCONTINUED | OUTPATIENT
Start: 2022-01-01 | End: 2022-01-01

## 2022-01-01 RX ORDER — CEFAZOLIN SODIUM 2 G/100ML
2 INJECTION, SOLUTION INTRAVENOUS SEE ADMIN INSTRUCTIONS
Status: DISCONTINUED | OUTPATIENT
Start: 2022-01-01 | End: 2022-01-01 | Stop reason: HOSPADM

## 2022-01-01 RX ORDER — LABETALOL HYDROCHLORIDE 5 MG/ML
10-20 INJECTION, SOLUTION INTRAVENOUS EVERY 10 MIN PRN
Status: DISCONTINUED | OUTPATIENT
Start: 2022-01-01 | End: 2022-01-01

## 2022-01-01 RX ORDER — ACETAMINOPHEN 325 MG/1
650 TABLET ORAL EVERY 6 HOURS PRN
Status: DISCONTINUED | OUTPATIENT
Start: 2022-01-01 | End: 2022-01-01

## 2022-01-01 RX ORDER — CEFAZOLIN SODIUM 1 G/3ML
1 INJECTION, POWDER, FOR SOLUTION INTRAMUSCULAR; INTRAVENOUS
Status: DISCONTINUED | OUTPATIENT
Start: 2022-01-01 | End: 2022-01-01

## 2022-01-01 RX ORDER — MORPHINE SULFATE 20 MG/ML
5 SOLUTION ORAL EVERY 6 HOURS
COMMUNITY
End: 2022-01-01

## 2022-01-01 RX ORDER — GLYCOPYRROLATE AND FORMOTEROL FUMARATE 9; 4.8 UG/1; UG/1
2 AEROSOL, METERED RESPIRATORY (INHALATION) 2 TIMES DAILY
Qty: 10.7 G | Refills: 4 | Status: ON HOLD | OUTPATIENT
Start: 2022-01-01 | End: 2022-01-01

## 2022-01-01 RX ORDER — NEOSTIGMINE METHYLSULFATE 1 MG/ML
VIAL (ML) INJECTION PRN
Status: DISCONTINUED | OUTPATIENT
Start: 2022-01-01 | End: 2022-01-01

## 2022-01-01 RX ORDER — LIDOCAINE 40 MG/G
CREAM TOPICAL
Status: DISCONTINUED | OUTPATIENT
Start: 2022-01-01 | End: 2022-01-01 | Stop reason: HOSPADM

## 2022-01-01 RX ORDER — ACETAMINOPHEN 325 MG/1
975 TABLET ORAL EVERY 8 HOURS
Status: COMPLETED | OUTPATIENT
Start: 2022-01-01 | End: 2022-01-01

## 2022-01-01 RX ORDER — CALCITRIOL 0.5 UG/1
0.5 CAPSULE, LIQUID FILLED ORAL
Status: DISCONTINUED | OUTPATIENT
Start: 2022-01-01 | End: 2022-01-01 | Stop reason: HOSPADM

## 2022-01-01 RX ORDER — BISACODYL 10 MG
10 SUPPOSITORY, RECTAL RECTAL DAILY PRN
Status: DISCONTINUED | OUTPATIENT
Start: 2022-01-01 | End: 2022-01-01 | Stop reason: HOSPADM

## 2022-01-01 RX ORDER — HYDRALAZINE HYDROCHLORIDE 20 MG/ML
2.5-5 INJECTION INTRAMUSCULAR; INTRAVENOUS EVERY 10 MIN PRN
Status: DISCONTINUED | OUTPATIENT
Start: 2022-01-01 | End: 2022-01-01 | Stop reason: HOSPADM

## 2022-01-01 RX ORDER — CALCIUM ACETATE 667 MG/1
667 CAPSULE ORAL
Status: DISCONTINUED | OUTPATIENT
Start: 2022-01-01 | End: 2022-01-01 | Stop reason: HOSPADM

## 2022-01-01 RX ORDER — LEVETIRACETAM 5 MG/ML
500 INJECTION INTRAVASCULAR EVERY 12 HOURS
Status: DISCONTINUED | OUTPATIENT
Start: 2022-01-01 | End: 2022-01-01

## 2022-01-01 RX ORDER — B COMPLEX, C NO.20/FOLIC ACID 1 MG
1 CAPSULE ORAL DAILY
Status: ON HOLD | COMMUNITY
End: 2022-01-01

## 2022-01-01 RX ORDER — ASPIRIN 81 MG/1
81 TABLET ORAL DAILY
Status: DISCONTINUED | OUTPATIENT
Start: 2022-01-01 | End: 2022-01-01 | Stop reason: HOSPADM

## 2022-01-01 RX ORDER — PROCHLORPERAZINE MALEATE 5 MG
5 TABLET ORAL EVERY 6 HOURS PRN
Status: DISCONTINUED | OUTPATIENT
Start: 2022-01-01 | End: 2022-01-01 | Stop reason: HOSPADM

## 2022-01-01 RX ORDER — ALLOPURINOL 100 MG/1
100 TABLET ORAL
Status: DISCONTINUED | OUTPATIENT
Start: 2022-01-01 | End: 2022-01-01 | Stop reason: HOSPADM

## 2022-01-01 RX ORDER — SODIUM CHLORIDE 9 MG/ML
INJECTION, SOLUTION INTRAVENOUS CONTINUOUS
Status: DISCONTINUED | OUTPATIENT
Start: 2022-01-01 | End: 2022-01-01 | Stop reason: HOSPADM

## 2022-01-01 RX ORDER — PROPOFOL 10 MG/ML
INJECTION, EMULSION INTRAVENOUS PRN
Status: DISCONTINUED | OUTPATIENT
Start: 2022-01-01 | End: 2022-01-01

## 2022-01-01 RX ORDER — ACETAMINOPHEN 325 MG/1
650 TABLET ORAL ONCE
Status: COMPLETED | OUTPATIENT
Start: 2022-01-01 | End: 2022-01-01

## 2022-01-01 RX ORDER — METOPROLOL SUCCINATE 25 MG/1
12.5 TABLET, EXTENDED RELEASE ORAL 2 TIMES DAILY
Qty: 60 TABLET | Refills: 4 | Status: SHIPPED | OUTPATIENT
Start: 2022-01-01 | End: 2022-05-27

## 2022-01-01 RX ORDER — ACETAMINOPHEN 325 MG/1
650 TABLET ORAL EVERY 4 HOURS PRN
Status: DISCONTINUED | OUTPATIENT
Start: 2022-01-01 | End: 2022-01-01 | Stop reason: HOSPADM

## 2022-01-01 RX ORDER — ALBUTEROL SULFATE 90 UG/1
2 AEROSOL, METERED RESPIRATORY (INHALATION) EVERY 4 HOURS PRN
Status: DISCONTINUED | OUTPATIENT
Start: 2022-01-01 | End: 2022-01-01 | Stop reason: HOSPADM

## 2022-01-01 RX ORDER — VANCOMYCIN HYDROCHLORIDE 1 G/20ML
INJECTION, POWDER, LYOPHILIZED, FOR SOLUTION INTRAVENOUS PRN
Status: DISCONTINUED | OUTPATIENT
Start: 2022-01-01 | End: 2022-01-01 | Stop reason: HOSPADM

## 2022-01-01 RX ORDER — LABETALOL HYDROCHLORIDE 5 MG/ML
10-20 INJECTION, SOLUTION INTRAVENOUS EVERY 10 MIN PRN
Status: DISCONTINUED | OUTPATIENT
Start: 2022-01-01 | End: 2022-01-01 | Stop reason: HOSPADM

## 2022-01-01 RX ORDER — LIDOCAINE HYDROCHLORIDE 20 MG/ML
INJECTION, SOLUTION INFILTRATION; PERINEURAL PRN
Status: DISCONTINUED | OUTPATIENT
Start: 2022-01-01 | End: 2022-01-01

## 2022-01-01 RX ORDER — HYDRALAZINE HYDROCHLORIDE 10 MG/1
10 TABLET, FILM COATED ORAL EVERY 4 HOURS PRN
Status: DISCONTINUED | OUTPATIENT
Start: 2022-01-01 | End: 2022-01-01 | Stop reason: HOSPADM

## 2022-01-01 RX ORDER — CEFAZOLIN SODIUM/WATER 2 G/20 ML
2 SYRINGE (ML) INTRAVENOUS
Status: DISCONTINUED | OUTPATIENT
Start: 2022-01-01 | End: 2022-01-01 | Stop reason: HOSPADM

## 2022-01-01 RX ORDER — TRANEXAMIC ACID 10 MG/ML
1 INJECTION, SOLUTION INTRAVENOUS ONCE
Status: COMPLETED | OUTPATIENT
Start: 2022-01-01 | End: 2022-01-01

## 2022-01-01 RX ORDER — SODIUM CHLORIDE, SODIUM LACTATE, POTASSIUM CHLORIDE, CALCIUM CHLORIDE 600; 310; 30; 20 MG/100ML; MG/100ML; MG/100ML; MG/100ML
INJECTION, SOLUTION INTRAVENOUS CONTINUOUS
Status: DISCONTINUED | OUTPATIENT
Start: 2022-01-01 | End: 2022-01-01 | Stop reason: HOSPADM

## 2022-01-01 RX ORDER — LABETALOL HYDROCHLORIDE 5 MG/ML
10 INJECTION, SOLUTION INTRAVENOUS
Status: DISCONTINUED | OUTPATIENT
Start: 2022-01-01 | End: 2022-01-01 | Stop reason: HOSPADM

## 2022-01-01 RX ORDER — DEXAMETHASONE SODIUM PHOSPHATE 4 MG/ML
INJECTION, SOLUTION INTRA-ARTICULAR; INTRALESIONAL; INTRAMUSCULAR; INTRAVENOUS; SOFT TISSUE PRN
Status: DISCONTINUED | OUTPATIENT
Start: 2022-01-01 | End: 2022-01-01

## 2022-01-01 RX ORDER — CALCIUM ACETATE 667 MG/1
667 TABLET ORAL
COMMUNITY
Start: 2022-01-01 | End: 2022-05-27

## 2022-01-01 RX ORDER — AMLODIPINE BESYLATE 5 MG/1
5 TABLET ORAL DAILY
Status: DISCONTINUED | OUTPATIENT
Start: 2022-01-01 | End: 2022-01-01 | Stop reason: HOSPADM

## 2022-01-01 RX ORDER — HYDROMORPHONE HCL IN WATER/PF 6 MG/30 ML
0.2 PATIENT CONTROLLED ANALGESIA SYRINGE INTRAVENOUS EVERY 5 MIN PRN
Status: DISCONTINUED | OUTPATIENT
Start: 2022-01-01 | End: 2022-01-01 | Stop reason: HOSPADM

## 2022-01-01 RX ORDER — NALOXONE HYDROCHLORIDE 0.4 MG/ML
0.2 INJECTION, SOLUTION INTRAMUSCULAR; INTRAVENOUS; SUBCUTANEOUS
Status: DISCONTINUED | OUTPATIENT
Start: 2022-01-01 | End: 2022-01-01

## 2022-01-01 RX ORDER — MAGNESIUM HYDROXIDE 1200 MG/15ML
LIQUID ORAL PRN
Status: DISCONTINUED | OUTPATIENT
Start: 2022-01-01 | End: 2022-01-01 | Stop reason: HOSPADM

## 2022-01-01 RX ORDER — LEVETIRACETAM 5 MG/ML
500 INJECTION INTRAVASCULAR DAILY
Status: DISPENSED | OUTPATIENT
Start: 2022-01-01 | End: 2022-01-01

## 2022-01-01 RX ORDER — LIDOCAINE 40 MG/G
CREAM TOPICAL
Status: DISCONTINUED | OUTPATIENT
Start: 2022-01-01 | End: 2022-01-01

## 2022-01-01 RX ORDER — HYDROMORPHONE HYDROCHLORIDE 1 MG/ML
0.5 INJECTION, SOLUTION INTRAMUSCULAR; INTRAVENOUS; SUBCUTANEOUS ONCE
Status: COMPLETED | OUTPATIENT
Start: 2022-01-01 | End: 2022-01-01

## 2022-01-01 RX ORDER — ALBUMIN (HUMAN) 12.5 G/50ML
50 SOLUTION INTRAVENOUS
Status: DISCONTINUED | OUTPATIENT
Start: 2022-01-01 | End: 2022-01-01

## 2022-01-01 RX ORDER — PANTOPRAZOLE SODIUM 40 MG/1
40 TABLET, DELAYED RELEASE ORAL DAILY
Status: DISCONTINUED | OUTPATIENT
Start: 2022-01-01 | End: 2022-01-01 | Stop reason: HOSPADM

## 2022-01-01 RX ORDER — POLYETHYLENE GLYCOL 3350 17 G/17G
17 POWDER, FOR SOLUTION ORAL DAILY
Qty: 510 G | DISCHARGE
Start: 2022-01-01

## 2022-01-01 RX ORDER — TORSEMIDE 20 MG/1
40 TABLET ORAL
Status: DISCONTINUED | OUTPATIENT
Start: 2022-01-01 | End: 2022-01-01 | Stop reason: HOSPADM

## 2022-01-01 RX ORDER — FAMOTIDINE 10 MG
10 TABLET ORAL EVERY EVENING
Status: DISCONTINUED | OUTPATIENT
Start: 2022-01-01 | End: 2022-01-01 | Stop reason: HOSPADM

## 2022-01-01 RX ORDER — REGADENOSON 0.08 MG/ML
0.4 INJECTION, SOLUTION INTRAVENOUS ONCE
Status: COMPLETED | OUTPATIENT
Start: 2022-01-01 | End: 2022-01-01

## 2022-01-01 RX ORDER — AMOXICILLIN 500 MG/1
500 CAPSULE ORAL
Qty: 5 CAPSULE | Refills: 0 | Status: SHIPPED | OUTPATIENT
Start: 2022-01-01 | End: 2022-01-01

## 2022-01-01 RX ORDER — LORAZEPAM 2 MG/ML
0.5 INJECTION INTRAMUSCULAR ONCE
Status: DISCONTINUED | OUTPATIENT
Start: 2022-01-01 | End: 2022-01-01

## 2022-01-01 RX ORDER — HYDROMORPHONE HCL IN WATER/PF 6 MG/30 ML
0.2 PATIENT CONTROLLED ANALGESIA SYRINGE INTRAVENOUS ONCE
Status: COMPLETED | OUTPATIENT
Start: 2022-01-01 | End: 2022-01-01

## 2022-01-01 RX ORDER — AMOXICILLIN 250 MG
1 CAPSULE ORAL 2 TIMES DAILY PRN
Status: DISCONTINUED | OUTPATIENT
Start: 2022-01-01 | End: 2022-01-01

## 2022-01-01 RX ORDER — DOXYCYCLINE HYCLATE 100 MG
100 TABLET ORAL 2 TIMES DAILY
Qty: 10 TABLET | Refills: 0 | Status: SHIPPED | OUTPATIENT
Start: 2022-01-01 | End: 2022-01-01

## 2022-01-01 RX ORDER — NALOXONE HYDROCHLORIDE 0.4 MG/ML
0.4 INJECTION, SOLUTION INTRAMUSCULAR; INTRAVENOUS; SUBCUTANEOUS
Status: DISCONTINUED | OUTPATIENT
Start: 2022-01-01 | End: 2022-01-01

## 2022-01-01 RX ORDER — AMOXICILLIN 250 MG
1 CAPSULE ORAL 2 TIMES DAILY
Status: DISCONTINUED | OUTPATIENT
Start: 2022-01-01 | End: 2022-01-01 | Stop reason: HOSPADM

## 2022-01-01 RX ORDER — PANTOPRAZOLE SODIUM 40 MG/1
40 TABLET, DELAYED RELEASE ORAL AT BEDTIME
Status: CANCELLED | OUTPATIENT
Start: 2022-01-01

## 2022-01-01 RX ORDER — ACETAMINOPHEN 325 MG/1
TABLET ORAL
Qty: 120 TABLET | Refills: 98 | Status: SHIPPED | OUTPATIENT
Start: 2022-01-01 | End: 2022-05-27

## 2022-01-01 RX ORDER — SODIUM CHLORIDE 9 MG/ML
INJECTION, SOLUTION INTRAVENOUS CONTINUOUS PRN
Status: DISCONTINUED | OUTPATIENT
Start: 2022-01-01 | End: 2022-01-01

## 2022-01-01 RX ORDER — ACYCLOVIR 200 MG/1
0-1 CAPSULE ORAL
Status: DISCONTINUED | OUTPATIENT
Start: 2022-01-01 | End: 2022-01-01

## 2022-01-01 RX ORDER — DOXERCALCIFEROL 4 UG/2ML
4 INJECTION INTRAVENOUS
Status: COMPLETED | OUTPATIENT
Start: 2022-01-01 | End: 2022-01-01

## 2022-01-01 RX ORDER — ONDANSETRON 4 MG/1
4 TABLET, ORALLY DISINTEGRATING ORAL EVERY 30 MIN PRN
Status: DISCONTINUED | OUTPATIENT
Start: 2022-01-01 | End: 2022-01-01 | Stop reason: HOSPADM

## 2022-01-01 RX ORDER — OXYCODONE HYDROCHLORIDE 5 MG/1
2.5 TABLET ORAL EVERY 4 HOURS PRN
COMMUNITY
End: 2022-05-27

## 2022-01-01 RX ORDER — CEFAZOLIN SODIUM 2 G/100ML
2 INJECTION, SOLUTION INTRAVENOUS
Status: COMPLETED | OUTPATIENT
Start: 2022-01-01 | End: 2022-01-01

## 2022-01-01 RX ORDER — SODIUM CHLORIDE 9 MG/ML
INJECTION, SOLUTION INTRAVENOUS CONTINUOUS
Status: ACTIVE | OUTPATIENT
Start: 2022-01-01 | End: 2022-01-01

## 2022-01-01 RX ORDER — LORAZEPAM 2 MG/ML
0.5 INJECTION INTRAMUSCULAR ONCE
Status: COMPLETED | OUTPATIENT
Start: 2022-01-01 | End: 2022-01-01

## 2022-01-01 RX ORDER — AMINOPHYLLINE 25 MG/ML
50-100 INJECTION, SOLUTION INTRAVENOUS
Status: DISCONTINUED | OUTPATIENT
Start: 2022-01-01 | End: 2022-01-01

## 2022-01-01 RX ORDER — POLYETHYLENE GLYCOL 3350 17 G/17G
17 POWDER, FOR SOLUTION ORAL DAILY
Status: DISCONTINUED | OUTPATIENT
Start: 2022-01-01 | End: 2022-01-01 | Stop reason: HOSPADM

## 2022-01-01 RX ORDER — ACETAMINOPHEN 500 MG
1000 TABLET ORAL ONCE
Status: COMPLETED | OUTPATIENT
Start: 2022-01-01 | End: 2022-01-01

## 2022-01-01 RX ORDER — LEVETIRACETAM 100 MG/ML
500 SOLUTION ORAL EVERY 12 HOURS
Status: DISCONTINUED | OUTPATIENT
Start: 2022-01-01 | End: 2022-01-01

## 2022-01-01 RX ORDER — BUPIVACAINE HYDROCHLORIDE 5 MG/ML
INJECTION, SOLUTION PERINEURAL PRN
Status: DISCONTINUED | OUTPATIENT
Start: 2022-01-01 | End: 2022-01-01 | Stop reason: HOSPADM

## 2022-01-01 RX ORDER — ONDANSETRON 2 MG/ML
INJECTION INTRAMUSCULAR; INTRAVENOUS PRN
Status: DISCONTINUED | OUTPATIENT
Start: 2022-01-01 | End: 2022-01-01

## 2022-01-01 RX ORDER — ASPIRIN 81 MG/1
162 TABLET, CHEWABLE ORAL ONCE
Status: DISCONTINUED | OUTPATIENT
Start: 2022-01-01 | End: 2022-01-01

## 2022-01-01 RX ORDER — CEFAZOLIN SODIUM 1 G/3ML
1 INJECTION, POWDER, FOR SOLUTION INTRAMUSCULAR; INTRAVENOUS EVERY 24 HOURS
Status: COMPLETED | OUTPATIENT
Start: 2022-01-01 | End: 2022-01-01

## 2022-01-01 RX ORDER — ALBUTEROL SULFATE 90 UG/1
1-2 AEROSOL, METERED RESPIRATORY (INHALATION) EVERY 4 HOURS PRN
Status: DISCONTINUED | OUTPATIENT
Start: 2022-01-01 | End: 2022-01-01

## 2022-01-01 RX ORDER — AMLODIPINE BESYLATE 5 MG/1
5 TABLET ORAL DAILY
Start: 2022-01-01 | End: 2022-05-27

## 2022-01-01 RX ORDER — ACETAMINOPHEN 325 MG/1
650 TABLET ORAL 2 TIMES DAILY PRN
COMMUNITY
End: 2022-05-27

## 2022-01-01 RX ORDER — ALBUTEROL SULFATE 90 UG/1
2 AEROSOL, METERED RESPIRATORY (INHALATION) EVERY 5 MIN PRN
Status: DISCONTINUED | OUTPATIENT
Start: 2022-01-01 | End: 2022-01-01

## 2022-01-01 RX ORDER — NITROGLYCERIN 0.4 MG/1
0.4 TABLET SUBLINGUAL EVERY 5 MIN PRN
Status: DISCONTINUED | OUTPATIENT
Start: 2022-01-01 | End: 2022-01-01 | Stop reason: HOSPADM

## 2022-01-01 RX ORDER — LANOLIN ALCOHOL/MO/W.PET/CERES
3 CREAM (GRAM) TOPICAL
Status: DISCONTINUED | OUTPATIENT
Start: 2022-01-01 | End: 2022-01-01

## 2022-01-01 RX ORDER — ACETAMINOPHEN 325 MG/1
975 TABLET ORAL ONCE
Status: DISCONTINUED | OUTPATIENT
Start: 2022-01-01 | End: 2022-01-01 | Stop reason: HOSPADM

## 2022-01-01 RX ORDER — GLYCOPYRROLATE AND FORMOTEROL FUMARATE 9; 4.8 UG/1; UG/1
1 AEROSOL, METERED RESPIRATORY (INHALATION) DAILY
Start: 2022-01-01

## 2022-01-01 RX ORDER — TRIAMCINOLONE ACETONIDE 1 MG/G
CREAM TOPICAL
Qty: 454 G | Refills: 2 | Status: SHIPPED | OUTPATIENT
Start: 2022-01-01

## 2022-01-01 RX ORDER — LEVETIRACETAM 500 MG/1
500 TABLET ORAL EVERY 12 HOURS
Status: DISCONTINUED | OUTPATIENT
Start: 2022-01-01 | End: 2022-01-01

## 2022-01-01 RX ORDER — AMOXICILLIN 250 MG
2 CAPSULE ORAL 2 TIMES DAILY PRN
Status: DISCONTINUED | OUTPATIENT
Start: 2022-01-01 | End: 2022-01-01

## 2022-01-01 RX ADMIN — LIDOCAINE HYDROCHLORIDE 100 MG: 20 INJECTION, SOLUTION INFILTRATION; PERINEURAL at 17:06

## 2022-01-01 RX ADMIN — ACETAMINOPHEN 650 MG: 325 TABLET ORAL at 10:27

## 2022-01-01 RX ADMIN — SENNOSIDES AND DOCUSATE SODIUM 1 TABLET: 50; 8.6 TABLET ORAL at 15:53

## 2022-01-01 RX ADMIN — OXYCODONE HYDROCHLORIDE 5 MG: 5 TABLET ORAL at 09:44

## 2022-01-01 RX ADMIN — LEVETIRACETAM 500 MG: 500 TABLET, FILM COATED ORAL at 20:41

## 2022-01-01 RX ADMIN — TORSEMIDE 40 MG: 20 TABLET ORAL at 11:45

## 2022-01-01 RX ADMIN — METOPROLOL SUCCINATE 12.5 MG: 25 TABLET, EXTENDED RELEASE ORAL at 22:03

## 2022-01-01 RX ADMIN — SODIUM CHLORIDE 250 ML: 9 INJECTION, SOLUTION INTRAVENOUS at 11:45

## 2022-01-01 RX ADMIN — ACETAMINOPHEN 650 MG: 325 TABLET ORAL at 19:55

## 2022-01-01 RX ADMIN — FAMOTIDINE 10 MG: 10 TABLET ORAL at 20:39

## 2022-01-01 RX ADMIN — ATORVASTATIN CALCIUM 40 MG: 40 TABLET, FILM COATED ORAL at 15:43

## 2022-01-01 RX ADMIN — PANTOPRAZOLE SODIUM 40 MG: 40 TABLET, DELAYED RELEASE ORAL at 11:46

## 2022-01-01 RX ADMIN — PROPOFOL 150 MG: 10 INJECTION, EMULSION INTRAVENOUS at 16:17

## 2022-01-01 RX ADMIN — ATORVASTATIN CALCIUM 40 MG: 40 TABLET, FILM COATED ORAL at 10:11

## 2022-01-01 RX ADMIN — TORSEMIDE 40 MG: 20 TABLET ORAL at 08:08

## 2022-01-01 RX ADMIN — TRANEXAMIC ACID 1 G: 10 INJECTION, SOLUTION INTRAVENOUS at 16:36

## 2022-01-01 RX ADMIN — SODIUM CHLORIDE 300 ML: 9 INJECTION, SOLUTION INTRAVENOUS at 08:16

## 2022-01-01 RX ADMIN — CLOSTRIDIUM TETANI TOXOID ANTIGEN (FORMALDEHYDE INACTIVATED), CORYNEBACTERIUM DIPHTHERIAE TOXOID ANTIGEN (FORMALDEHYDE INACTIVATED), BORDETELLA PERTUSSIS TOXOID ANTIGEN (GLUTARALDEHYDE INACTIVATED), BORDETELLA PERTUSSIS FILAMENTOUS HEMAGGLUTININ ANTIGEN (FORMALDEHYDE INACTIVATED), BORDETELLA PERTUSSIS PERTACTIN ANTIGEN, AND BORDETELLA PERTUSSIS FIMBRIAE 2/3 ANTIGEN 0.5 ML: 5; 2; 2.5; 5; 3; 5 INJECTION, SUSPENSION INTRAMUSCULAR at 07:42

## 2022-01-01 RX ADMIN — SODIUM CHLORIDE 300 ML: 9 INJECTION, SOLUTION INTRAVENOUS at 10:38

## 2022-01-01 RX ADMIN — CALCIUM ACETATE 667 MG: 667 CAPSULE ORAL at 12:34

## 2022-01-01 RX ADMIN — ACETAMINOPHEN 975 MG: 325 TABLET ORAL at 04:50

## 2022-01-01 RX ADMIN — FAMOTIDINE 10 MG: 10 TABLET ORAL at 19:38

## 2022-01-01 RX ADMIN — HYDRALAZINE HYDROCHLORIDE 10 MG: 20 INJECTION INTRAMUSCULAR; INTRAVENOUS at 02:09

## 2022-01-01 RX ADMIN — SENNOSIDES AND DOCUSATE SODIUM 1 TABLET: 50; 8.6 TABLET ORAL at 20:18

## 2022-01-01 RX ADMIN — CALCIUM ACETATE 667 MG: 667 CAPSULE ORAL at 13:02

## 2022-01-01 RX ADMIN — AMLODIPINE BESYLATE 5 MG: 5 TABLET ORAL at 10:27

## 2022-01-01 RX ADMIN — ATORVASTATIN CALCIUM 40 MG: 40 TABLET, FILM COATED ORAL at 08:46

## 2022-01-01 RX ADMIN — PANTOPRAZOLE SODIUM 40 MG: 40 TABLET, DELAYED RELEASE ORAL at 08:46

## 2022-01-01 RX ADMIN — SODIUM CHLORIDE 250 ML: 9 INJECTION, SOLUTION INTRAVENOUS at 10:40

## 2022-01-01 RX ADMIN — AMLODIPINE BESYLATE 5 MG: 5 TABLET ORAL at 15:44

## 2022-01-01 RX ADMIN — GLYCOPYRROLATE 0.4 MG: 0.2 INJECTION, SOLUTION INTRAMUSCULAR; INTRAVENOUS at 17:48

## 2022-01-01 RX ADMIN — ASPIRIN 81 MG: 81 TABLET, COATED ORAL at 08:19

## 2022-01-01 RX ADMIN — ACETAMINOPHEN 650 MG: 325 TABLET ORAL at 17:55

## 2022-01-01 RX ADMIN — CEFAZOLIN 1 G: 1 INJECTION, POWDER, FOR SOLUTION INTRAMUSCULAR; INTRAVENOUS at 21:43

## 2022-01-01 RX ADMIN — HEPARIN SODIUM 500 UNITS/HR: 1000 INJECTION INTRAVENOUS; SUBCUTANEOUS at 14:59

## 2022-01-01 RX ADMIN — SENNOSIDES AND DOCUSATE SODIUM 1 TABLET: 50; 8.6 TABLET ORAL at 10:12

## 2022-01-01 RX ADMIN — Medication 1 CAPSULE: at 08:49

## 2022-01-01 RX ADMIN — REGADENOSON 0.4 MG: 0.08 INJECTION, SOLUTION INTRAVENOUS at 11:09

## 2022-01-01 RX ADMIN — PROPOFOL 30 MG: 10 INJECTION, EMULSION INTRAVENOUS at 17:08

## 2022-01-01 RX ADMIN — ATORVASTATIN CALCIUM 40 MG: 40 TABLET, FILM COATED ORAL at 12:54

## 2022-01-01 RX ADMIN — APIXABAN 2.5 MG: 2.5 TABLET, FILM COATED ORAL at 21:09

## 2022-01-01 RX ADMIN — CALCIUM ACETATE 667 MG: 667 CAPSULE ORAL at 09:06

## 2022-01-01 RX ADMIN — SODIUM CHLORIDE 250 ML: 9 INJECTION, SOLUTION INTRAVENOUS at 13:08

## 2022-01-01 RX ADMIN — LEVETIRACETAM 500 MG: 500 TABLET, FILM COATED ORAL at 08:47

## 2022-01-01 RX ADMIN — LORATADINE 10 MG: 10 TABLET ORAL at 22:03

## 2022-01-01 RX ADMIN — ACETAMINOPHEN 1000 MG: 500 TABLET ORAL at 01:14

## 2022-01-01 RX ADMIN — Medication 30 ML: at 16:05

## 2022-01-01 RX ADMIN — APIXABAN 2.5 MG: 2.5 TABLET, FILM COATED ORAL at 08:19

## 2022-01-01 RX ADMIN — Medication 3 ML: at 07:33

## 2022-01-01 RX ADMIN — OXYCODONE HYDROCHLORIDE 2.5 MG: 5 TABLET ORAL at 09:05

## 2022-01-01 RX ADMIN — SODIUM CHLORIDE: 9 INJECTION, SOLUTION INTRAVENOUS at 16:09

## 2022-01-01 RX ADMIN — ALLOPURINOL 100 MG: 100 TABLET ORAL at 18:24

## 2022-01-01 RX ADMIN — LIDOCAINE HYDROCHLORIDE 100 MG: 20 INJECTION, SOLUTION INFILTRATION; PERINEURAL at 16:17

## 2022-01-01 RX ADMIN — ACETAMINOPHEN 650 MG: 325 TABLET ORAL at 05:57

## 2022-01-01 RX ADMIN — ACETAMINOPHEN 650 MG: 325 TABLET ORAL at 03:32

## 2022-01-01 RX ADMIN — HYDROXYZINE HYDROCHLORIDE 10 MG: 10 TABLET ORAL at 21:00

## 2022-01-01 RX ADMIN — FAMOTIDINE 10 MG: 10 TABLET ORAL at 19:36

## 2022-01-01 RX ADMIN — CALCIUM ACETATE 667 MG: 667 CAPSULE ORAL at 11:46

## 2022-01-01 RX ADMIN — HYDRALAZINE HYDROCHLORIDE 10 MG: 10 TABLET, FILM COATED ORAL at 00:57

## 2022-01-01 RX ADMIN — LEVETIRACETAM 500 MG: 500 TABLET, FILM COATED ORAL at 12:25

## 2022-01-01 RX ADMIN — METOPROLOL SUCCINATE 12.5 MG: 25 TABLET, EXTENDED RELEASE ORAL at 21:10

## 2022-01-01 RX ADMIN — ACETAMINOPHEN 650 MG: 325 TABLET ORAL at 00:38

## 2022-01-01 RX ADMIN — ROCURONIUM BROMIDE 20 MG: 50 INJECTION, SOLUTION INTRAVENOUS at 17:04

## 2022-01-01 RX ADMIN — ACETAMINOPHEN 975 MG: 325 TABLET ORAL at 12:32

## 2022-01-01 RX ADMIN — LABETALOL HYDROCHLORIDE 10 MG: 5 INJECTION INTRAVENOUS at 06:45

## 2022-01-01 RX ADMIN — HYDROMORPHONE HYDROCHLORIDE 0.2 MG: 0.2 INJECTION, SOLUTION INTRAMUSCULAR; INTRAVENOUS; SUBCUTANEOUS at 02:44

## 2022-01-01 RX ADMIN — ATORVASTATIN CALCIUM 40 MG: 40 TABLET, FILM COATED ORAL at 07:45

## 2022-01-01 RX ADMIN — ATORVASTATIN CALCIUM 40 MG: 40 TABLET, FILM COATED ORAL at 08:47

## 2022-01-01 RX ADMIN — APIXABAN 2.5 MG: 2.5 TABLET, FILM COATED ORAL at 12:04

## 2022-01-01 RX ADMIN — METOPROLOL SUCCINATE 12.5 MG: 25 TABLET, EXTENDED RELEASE ORAL at 20:36

## 2022-01-01 RX ADMIN — AMLODIPINE BESYLATE 5 MG: 5 TABLET ORAL at 10:06

## 2022-01-01 RX ADMIN — SODIUM CHLORIDE 300 ML: 9 INJECTION, SOLUTION INTRAVENOUS at 07:30

## 2022-01-01 RX ADMIN — HEPARIN SODIUM 3000 UNITS: 1000 INJECTION INTRAVENOUS; SUBCUTANEOUS at 12:04

## 2022-01-01 RX ADMIN — HEPARIN SODIUM 3000 UNITS: 1000 INJECTION INTRAVENOUS; SUBCUTANEOUS at 10:26

## 2022-01-01 RX ADMIN — ATORVASTATIN CALCIUM 40 MG: 40 TABLET, FILM COATED ORAL at 10:27

## 2022-01-01 RX ADMIN — ASPIRIN 162 MG: 81 TABLET, COATED ORAL at 05:03

## 2022-01-01 RX ADMIN — LORAZEPAM 0.5 MG: 2 INJECTION INTRAMUSCULAR; INTRAVENOUS at 12:04

## 2022-01-01 RX ADMIN — CALCIUM ACETATE 667 MG: 667 CAPSULE ORAL at 08:49

## 2022-01-01 RX ADMIN — SENNOSIDES AND DOCUSATE SODIUM 1 TABLET: 50; 8.6 TABLET ORAL at 10:27

## 2022-01-01 RX ADMIN — ACETAMINOPHEN 650 MG: 325 TABLET, FILM COATED ORAL at 09:04

## 2022-01-01 RX ADMIN — OXYCODONE HYDROCHLORIDE 2.5 MG: 5 TABLET ORAL at 04:01

## 2022-01-01 RX ADMIN — FENTANYL CITRATE 50 MCG: 50 INJECTION, SOLUTION INTRAMUSCULAR; INTRAVENOUS at 17:06

## 2022-01-01 RX ADMIN — SENNOSIDES AND DOCUSATE SODIUM 1 TABLET: 50; 8.6 TABLET ORAL at 19:36

## 2022-01-01 RX ADMIN — UMECLIDINIUM BROMIDE AND VILANTEROL TRIFENATATE 1 PUFF: 62.5; 25 POWDER RESPIRATORY (INHALATION) at 21:10

## 2022-01-01 RX ADMIN — Medication: at 20:50

## 2022-01-01 RX ADMIN — UMECLIDINIUM BROMIDE AND VILANTEROL TRIFENATATE 1 PUFF: 62.5; 25 POWDER RESPIRATORY (INHALATION) at 09:21

## 2022-01-01 RX ADMIN — TETROFOSMIN 26.1 MCI.: 1.38 INJECTION, POWDER, LYOPHILIZED, FOR SOLUTION INTRAVENOUS at 11:10

## 2022-01-01 RX ADMIN — FAMOTIDINE 20 MG: 20 TABLET ORAL at 20:34

## 2022-01-01 RX ADMIN — OXYCODONE HYDROCHLORIDE 2.5 MG: 5 TABLET ORAL at 19:36

## 2022-01-01 RX ADMIN — SODIUM CHLORIDE 60 ML: 9 INJECTION, SOLUTION INTRAVENOUS at 17:07

## 2022-01-01 RX ADMIN — LEVETIRACETAM 500 MG: 500 TABLET, FILM COATED ORAL at 10:28

## 2022-01-01 RX ADMIN — CEFAZOLIN 1 G: 1 INJECTION, POWDER, FOR SOLUTION INTRAMUSCULAR; INTRAVENOUS at 16:26

## 2022-01-01 RX ADMIN — SODIUM CHLORIDE 300 ML: 9 INJECTION, SOLUTION INTRAVENOUS at 11:45

## 2022-01-01 RX ADMIN — FAMOTIDINE 10 MG: 10 TABLET ORAL at 19:42

## 2022-01-01 RX ADMIN — ACETAMINOPHEN 650 MG: 325 TABLET ORAL at 09:05

## 2022-01-01 RX ADMIN — OXYCODONE HYDROCHLORIDE 2.5 MG: 5 TABLET ORAL at 23:49

## 2022-01-01 RX ADMIN — SENNOSIDES AND DOCUSATE SODIUM 1 TABLET: 50; 8.6 TABLET ORAL at 21:07

## 2022-01-01 RX ADMIN — PHENYLEPHRINE HYDROCHLORIDE 200 MCG: 10 INJECTION INTRAVENOUS at 17:06

## 2022-01-01 RX ADMIN — PROPOFOL 100 MG: 10 INJECTION, EMULSION INTRAVENOUS at 17:06

## 2022-01-01 RX ADMIN — SENNOSIDES AND DOCUSATE SODIUM 1 TABLET: 50; 8.6 TABLET ORAL at 10:02

## 2022-01-01 RX ADMIN — ACETAMINOPHEN 650 MG: 325 TABLET ORAL at 17:49

## 2022-01-01 RX ADMIN — CALCIUM ACETATE 667 MG: 667 CAPSULE ORAL at 08:46

## 2022-01-01 RX ADMIN — HEPARIN SODIUM 3000 UNITS: 1000 INJECTION, SOLUTION INTRAVENOUS; SUBCUTANEOUS at 10:11

## 2022-01-01 RX ADMIN — UMECLIDINIUM BROMIDE AND VILANTEROL TRIFENATATE 1 PUFF: 62.5; 25 POWDER RESPIRATORY (INHALATION) at 08:54

## 2022-01-01 RX ADMIN — CEFAZOLIN 1 G: 1 INJECTION, POWDER, FOR SOLUTION INTRAMUSCULAR; INTRAVENOUS at 20:18

## 2022-01-01 RX ADMIN — LEVETIRACETAM 500 MG: 500 TABLET, FILM COATED ORAL at 10:12

## 2022-01-01 RX ADMIN — METOPROLOL SUCCINATE 12.5 MG: 25 TABLET, EXTENDED RELEASE ORAL at 09:06

## 2022-01-01 RX ADMIN — UMECLIDINIUM BROMIDE AND VILANTEROL TRIFENATATE 1 PUFF: 62.5; 25 POWDER RESPIRATORY (INHALATION) at 10:24

## 2022-01-01 RX ADMIN — Medication 1 CAPSULE: at 09:06

## 2022-01-01 RX ADMIN — ACETAMINOPHEN 975 MG: 325 TABLET ORAL at 12:21

## 2022-01-01 RX ADMIN — HYDROMORPHONE HYDROCHLORIDE 0.5 MG: 1 INJECTION, SOLUTION INTRAMUSCULAR; INTRAVENOUS; SUBCUTANEOUS at 18:01

## 2022-01-01 RX ADMIN — ATORVASTATIN CALCIUM 40 MG: 40 TABLET, FILM COATED ORAL at 08:40

## 2022-01-01 RX ADMIN — HEPARIN SODIUM 3000 UNITS: 1000 INJECTION INTRAVENOUS; SUBCUTANEOUS at 12:03

## 2022-01-01 RX ADMIN — DOXERCALCIFEROL 4 MCG: 4 INJECTION, SOLUTION INTRAVENOUS at 09:01

## 2022-01-01 RX ADMIN — ACETAMINOPHEN 650 MG: 325 TABLET ORAL at 03:00

## 2022-01-01 RX ADMIN — SODIUM CHLORIDE 250 ML: 9 INJECTION, SOLUTION INTRAVENOUS at 12:04

## 2022-01-01 RX ADMIN — HYDROMORPHONE HYDROCHLORIDE 0.5 MG: 1 INJECTION, SOLUTION INTRAMUSCULAR; INTRAVENOUS; SUBCUTANEOUS at 19:26

## 2022-01-01 RX ADMIN — SENNOSIDES AND DOCUSATE SODIUM 1 TABLET: 50; 8.6 TABLET ORAL at 20:34

## 2022-01-01 RX ADMIN — ACETAMINOPHEN 975 MG: 325 TABLET ORAL at 12:05

## 2022-01-01 RX ADMIN — SENNOSIDES AND DOCUSATE SODIUM 1 TABLET: 50; 8.6 TABLET ORAL at 20:42

## 2022-01-01 RX ADMIN — TETROFOSMIN 9.6 MCI.: 1.38 INJECTION, POWDER, LYOPHILIZED, FOR SOLUTION INTRAVENOUS at 09:00

## 2022-01-01 RX ADMIN — FAMOTIDINE 10 MG: 10 INJECTION, SOLUTION INTRAVENOUS at 21:27

## 2022-01-01 RX ADMIN — OXYCODONE HYDROCHLORIDE 2.5 MG: 5 TABLET ORAL at 19:38

## 2022-01-01 RX ADMIN — ACETAMINOPHEN 975 MG: 325 TABLET ORAL at 04:40

## 2022-01-01 RX ADMIN — FAMOTIDINE 10 MG: 10 INJECTION, SOLUTION INTRAVENOUS at 19:12

## 2022-01-01 RX ADMIN — HEPARIN SODIUM 3000 UNITS: 1000 INJECTION INTRAVENOUS; SUBCUTANEOUS at 09:52

## 2022-01-01 RX ADMIN — ACETAMINOPHEN 650 MG: 325 TABLET ORAL at 19:38

## 2022-01-01 RX ADMIN — DEXAMETHASONE SODIUM PHOSPHATE 4 MG: 4 INJECTION, SOLUTION INTRA-ARTICULAR; INTRALESIONAL; INTRAMUSCULAR; INTRAVENOUS; SOFT TISSUE at 17:11

## 2022-01-01 RX ADMIN — ACETAMINOPHEN 650 MG: 325 TABLET ORAL at 11:50

## 2022-01-01 RX ADMIN — SODIUM CHLORIDE 250 ML: 9 INJECTION, SOLUTION INTRAVENOUS at 10:08

## 2022-01-01 RX ADMIN — ACETAMINOPHEN 650 MG: 325 TABLET, FILM COATED ORAL at 00:31

## 2022-01-01 RX ADMIN — ATORVASTATIN CALCIUM 40 MG: 40 TABLET, FILM COATED ORAL at 12:27

## 2022-01-01 RX ADMIN — ONDANSETRON 4 MG: 2 INJECTION INTRAMUSCULAR; INTRAVENOUS at 01:14

## 2022-01-01 RX ADMIN — ATORVASTATIN CALCIUM 40 MG: 40 TABLET, FILM COATED ORAL at 10:06

## 2022-01-01 RX ADMIN — APIXABAN 2.5 MG: 2.5 TABLET, FILM COATED ORAL at 08:43

## 2022-01-01 RX ADMIN — METOPROLOL SUCCINATE 12.5 MG: 25 TABLET, EXTENDED RELEASE ORAL at 11:46

## 2022-01-01 RX ADMIN — POLYETHYLENE GLYCOL 3350 17 G: 17 POWDER, FOR SOLUTION ORAL at 10:12

## 2022-01-01 RX ADMIN — HYDROMORPHONE HYDROCHLORIDE 0.2 MG: 0.2 INJECTION, SOLUTION INTRAMUSCULAR; INTRAVENOUS; SUBCUTANEOUS at 03:32

## 2022-01-01 RX ADMIN — CALCIUM ACETATE 667 MG: 667 CAPSULE ORAL at 17:44

## 2022-01-01 RX ADMIN — ACETAMINOPHEN 975 MG: 325 TABLET ORAL at 20:34

## 2022-01-01 RX ADMIN — OXYCODONE HYDROCHLORIDE 2.5 MG: 5 TABLET ORAL at 08:49

## 2022-01-01 RX ADMIN — ACETAMINOPHEN 650 MG: 325 TABLET ORAL at 08:13

## 2022-01-01 RX ADMIN — ATORVASTATIN CALCIUM 40 MG: 40 TABLET, FILM COATED ORAL at 15:53

## 2022-01-01 RX ADMIN — SODIUM CHLORIDE 300 ML: 9 INJECTION, SOLUTION INTRAVENOUS at 14:53

## 2022-01-01 RX ADMIN — PANTOPRAZOLE SODIUM 40 MG: 40 TABLET, DELAYED RELEASE ORAL at 22:03

## 2022-01-01 RX ADMIN — ACETAMINOPHEN 975 MG: 325 TABLET ORAL at 06:16

## 2022-01-01 RX ADMIN — METOPROLOL SUCCINATE 12.5 MG: 25 TABLET, EXTENDED RELEASE ORAL at 20:14

## 2022-01-01 RX ADMIN — CALCIUM ACETATE 667 MG: 667 CAPSULE ORAL at 13:46

## 2022-01-01 RX ADMIN — PANTOPRAZOLE SODIUM 40 MG: 40 TABLET, DELAYED RELEASE ORAL at 08:43

## 2022-01-01 RX ADMIN — FAMOTIDINE 10 MG: 10 TABLET ORAL at 20:18

## 2022-01-01 RX ADMIN — LEVETIRACETAM 500 MG: 500 TABLET, FILM COATED ORAL at 08:40

## 2022-01-01 RX ADMIN — POLYETHYLENE GLYCOL 3350 17 G: 17 POWDER, FOR SOLUTION ORAL at 10:01

## 2022-01-01 RX ADMIN — NEOSTIGMINE METHYLSULFATE 4 MG: 1 INJECTION, SOLUTION INTRAVENOUS at 17:48

## 2022-01-01 RX ADMIN — SENNOSIDES AND DOCUSATE SODIUM 1 TABLET: 50; 8.6 TABLET ORAL at 20:39

## 2022-01-01 RX ADMIN — SODIUM CHLORIDE 250 ML: 9 INJECTION, SOLUTION INTRAVENOUS at 08:16

## 2022-01-01 RX ADMIN — METOPROLOL SUCCINATE 12.5 MG: 25 TABLET, EXTENDED RELEASE ORAL at 00:35

## 2022-01-01 RX ADMIN — APIXABAN 2.5 MG: 2.5 TABLET, FILM COATED ORAL at 20:18

## 2022-01-01 RX ADMIN — LABETALOL HYDROCHLORIDE 10 MG: 5 INJECTION INTRAVENOUS at 07:47

## 2022-01-01 RX ADMIN — POLYETHYLENE GLYCOL 3350 17 G: 17 POWDER, FOR SOLUTION ORAL at 10:27

## 2022-01-01 RX ADMIN — LORAZEPAM 0.5 MG: 2 INJECTION INTRAMUSCULAR; INTRAVENOUS at 13:00

## 2022-01-01 RX ADMIN — ACETAMINOPHEN 650 MG: 325 TABLET ORAL at 01:13

## 2022-01-01 RX ADMIN — HYDRALAZINE HYDROCHLORIDE 10 MG: 20 INJECTION INTRAMUSCULAR; INTRAVENOUS at 01:20

## 2022-01-01 RX ADMIN — HEPARIN SODIUM 500 UNITS: 1000 INJECTION INTRAVENOUS; SUBCUTANEOUS at 14:58

## 2022-01-01 RX ADMIN — Medication: at 03:34

## 2022-01-01 RX ADMIN — AMLODIPINE BESYLATE 5 MG: 5 TABLET ORAL at 15:53

## 2022-01-01 RX ADMIN — METOPROLOL SUCCINATE 12.5 MG: 25 TABLET, EXTENDED RELEASE ORAL at 08:18

## 2022-01-01 RX ADMIN — ACETAMINOPHEN 975 MG: 325 TABLET ORAL at 20:18

## 2022-01-01 RX ADMIN — CEFAZOLIN SODIUM 2 G: 2 INJECTION, SOLUTION INTRAVENOUS at 17:05

## 2022-01-01 RX ADMIN — HYDROMORPHONE HYDROCHLORIDE 0.2 MG: 0.2 INJECTION, SOLUTION INTRAMUSCULAR; INTRAVENOUS; SUBCUTANEOUS at 01:38

## 2022-01-01 RX ADMIN — HYDROMORPHONE HYDROCHLORIDE 0.2 MG: 0.2 INJECTION, SOLUTION INTRAMUSCULAR; INTRAVENOUS; SUBCUTANEOUS at 12:34

## 2022-01-01 RX ADMIN — HEPARIN SODIUM 3000 UNITS: 1000 INJECTION, SOLUTION INTRAVENOUS; SUBCUTANEOUS at 10:10

## 2022-01-01 RX ADMIN — ONDANSETRON 4 MG: 2 INJECTION INTRAMUSCULAR; INTRAVENOUS at 17:27

## 2022-01-01 RX ADMIN — PANTOPRAZOLE SODIUM 40 MG: 40 TABLET, DELAYED RELEASE ORAL at 12:55

## 2022-01-01 RX ADMIN — METOPROLOL SUCCINATE 12.5 MG: 25 TABLET, EXTENDED RELEASE ORAL at 08:49

## 2022-01-01 RX ADMIN — IOPAMIDOL 68 ML: 755 INJECTION, SOLUTION INTRAVENOUS at 17:07

## 2022-01-01 RX ADMIN — PHENYLEPHRINE HYDROCHLORIDE 50 MCG: 10 INJECTION INTRAVENOUS at 17:22

## 2022-01-01 RX ADMIN — PROCHLORPERAZINE MALEATE 5 MG: 5 TABLET ORAL at 08:58

## 2022-01-01 RX ADMIN — SODIUM CHLORIDE 250 ML: 9 INJECTION, SOLUTION INTRAVENOUS at 14:56

## 2022-01-01 RX ADMIN — CEFAZOLIN 1 G: 1 INJECTION, POWDER, FOR SOLUTION INTRAMUSCULAR; INTRAVENOUS at 16:25

## 2022-01-01 RX ADMIN — SODIUM CHLORIDE 250 ML: 9 INJECTION, SOLUTION INTRAVENOUS at 08:25

## 2022-01-01 RX ADMIN — METOPROLOL SUCCINATE 12.5 MG: 25 TABLET, EXTENDED RELEASE ORAL at 08:08

## 2022-01-01 RX ADMIN — ATORVASTATIN CALCIUM 40 MG: 40 TABLET, FILM COATED ORAL at 08:18

## 2022-01-01 RX ADMIN — ACETAMINOPHEN 975 MG: 325 TABLET ORAL at 20:41

## 2022-01-01 RX ADMIN — ATORVASTATIN CALCIUM 40 MG: 40 TABLET, FILM COATED ORAL at 08:08

## 2022-01-01 RX ADMIN — SODIUM CHLORIDE 300 ML: 9 INJECTION, SOLUTION INTRAVENOUS at 12:00

## 2022-01-01 RX ADMIN — SODIUM CHLORIDE 300 ML: 9 INJECTION, SOLUTION INTRAVENOUS at 10:07

## 2022-01-01 RX ADMIN — PANTOPRAZOLE SODIUM 40 MG: 40 TABLET, DELAYED RELEASE ORAL at 22:18

## 2022-01-01 RX ADMIN — CALCIUM ACETATE 667 MG: 667 CAPSULE ORAL at 17:49

## 2022-01-01 RX ADMIN — SODIUM CHLORIDE: 9 INJECTION, SOLUTION INTRAVENOUS at 01:27

## 2022-01-01 RX ADMIN — PANTOPRAZOLE SODIUM 40 MG: 40 TABLET, DELAYED RELEASE ORAL at 08:09

## 2022-01-01 RX ADMIN — TORSEMIDE 40 MG: 20 TABLET ORAL at 12:54

## 2022-01-01 RX ADMIN — TORSEMIDE 40 MG: 20 TABLET ORAL at 08:45

## 2022-01-01 RX ADMIN — SODIUM CHLORIDE: 9 INJECTION, SOLUTION INTRAVENOUS at 19:25

## 2022-01-01 RX ADMIN — ACETAMINOPHEN 650 MG: 325 TABLET ORAL at 07:27

## 2022-01-01 RX ADMIN — Medication: at 16:19

## 2022-01-01 RX ADMIN — ATORVASTATIN CALCIUM 40 MG: 40 TABLET, FILM COATED ORAL at 11:46

## 2022-01-01 RX ADMIN — ACETAMINOPHEN 650 MG: 325 TABLET ORAL at 23:29

## 2022-01-01 RX ADMIN — LORATADINE 10 MG: 10 TABLET ORAL at 22:18

## 2022-01-01 RX ADMIN — METOPROLOL SUCCINATE 12.5 MG: 25 TABLET, EXTENDED RELEASE ORAL at 12:54

## 2022-01-01 RX ADMIN — OXYCODONE HYDROCHLORIDE 2.5 MG: 5 TABLET ORAL at 08:58

## 2022-01-01 RX ADMIN — ATORVASTATIN CALCIUM 40 MG: 40 TABLET, FILM COATED ORAL at 18:24

## 2022-01-01 RX ADMIN — OXYCODONE HYDROCHLORIDE 2.5 MG: 5 TABLET ORAL at 17:49

## 2022-01-01 RX ADMIN — ROCURONIUM BROMIDE 50 MG: 50 INJECTION, SOLUTION INTRAVENOUS at 16:17

## 2022-01-01 RX ADMIN — ACETAMINOPHEN 650 MG: 325 TABLET ORAL at 19:12

## 2022-01-01 RX ADMIN — SODIUM CHLORIDE: 9 INJECTION, SOLUTION INTRAVENOUS at 17:00

## 2022-01-01 RX ADMIN — PANTOPRAZOLE SODIUM 40 MG: 40 TABLET, DELAYED RELEASE ORAL at 08:19

## 2022-01-01 RX ADMIN — FAMOTIDINE 10 MG: 10 TABLET ORAL at 21:07

## 2022-01-01 RX ADMIN — LEVETIRACETAM 500 MG: 500 TABLET, FILM COATED ORAL at 15:53

## 2022-01-01 RX ADMIN — FAMOTIDINE 10 MG: 10 TABLET ORAL at 20:02

## 2022-01-01 RX ADMIN — ACETAMINOPHEN 650 MG: 325 TABLET ORAL at 05:28

## 2022-01-01 RX ADMIN — CALCITRIOL 0.5 MCG: 0.5 CAPSULE ORAL at 18:24

## 2022-01-01 RX ADMIN — ACETAMINOPHEN 650 MG: 325 TABLET ORAL at 08:55

## 2022-01-01 RX ADMIN — TRANEXAMIC ACID 1 G: 10 INJECTION, SOLUTION INTRAVENOUS at 17:34

## 2022-01-01 RX ADMIN — SODIUM CHLORIDE 300 ML: 9 INJECTION, SOLUTION INTRAVENOUS at 11:20

## 2022-01-01 RX ADMIN — ATORVASTATIN CALCIUM 40 MG: 40 TABLET, FILM COATED ORAL at 09:01

## 2022-01-01 RX ADMIN — METOPROLOL SUCCINATE 12.5 MG: 25 TABLET, EXTENDED RELEASE ORAL at 08:46

## 2022-01-01 RX ADMIN — FAMOTIDINE 10 MG: 10 TABLET ORAL at 20:42

## 2022-01-01 RX ADMIN — TORSEMIDE 40 MG: 20 TABLET ORAL at 08:26

## 2022-01-01 RX ADMIN — LEVETIRACETAM 500 MG: 5 INJECTION INTRAVENOUS at 12:15

## 2022-01-01 ASSESSMENT — ACTIVITIES OF DAILY LIVING (ADL)
ADLS_ACUITY_SCORE: 40
FALL_HISTORY_WITHIN_LAST_SIX_MONTHS: YES
ADLS_ACUITY_SCORE: 43
ADLS_ACUITY_SCORE: 10
ADLS_ACUITY_SCORE: 10
ADLS_ACUITY_SCORE: 8
DRESS: 1-->ASSISTANCE (EQUIPMENT/PERSON) NEEDED (NOT DEVELOPMENTALLY APPROPRIATE)
ADLS_ACUITY_SCORE: 44
ADLS_ACUITY_SCORE: 6
ADLS_ACUITY_SCORE: 40
ADLS_ACUITY_SCORE: 10
ADLS_ACUITY_SCORE: 40
ADLS_ACUITY_SCORE: 40
ADLS_ACUITY_SCORE: 10
ADLS_ACUITY_SCORE: 10
ADLS_ACUITY_SCORE: 8
ADLS_ACUITY_SCORE: 10
ADLS_ACUITY_SCORE: 32
CHANGE_IN_FUNCTIONAL_STATUS_SINCE_ONSET_OF_CURRENT_ILLNESS/INJURY: YES
ADLS_ACUITY_SCORE: 40
ADLS_ACUITY_SCORE: 43
ADLS_ACUITY_SCORE: 40
DRESS: 1-->ASSISTANCE (EQUIPMENT/PERSON) NEEDED
NUMBER_OF_TIMES_PATIENT_HAS_FALLEN_WITHIN_LAST_SIX_MONTHS: 1
ADLS_ACUITY_SCORE: 40
ADLS_ACUITY_SCORE: 4
ADLS_ACUITY_SCORE: 14
ADLS_ACUITY_SCORE: 10
ADLS_ACUITY_SCORE: 10
ADLS_ACUITY_SCORE: 40
ADLS_ACUITY_SCORE: 40
ADLS_ACUITY_SCORE: 42
ADLS_ACUITY_SCORE: 40
ADLS_ACUITY_SCORE: 40
ADLS_ACUITY_SCORE: 10
ADLS_ACUITY_SCORE: 40
ADLS_ACUITY_SCORE: 41
ADLS_ACUITY_SCORE: 41
ADLS_ACUITY_SCORE: 43
HEARING_DIFFICULTY_OR_DEAF: NO
ADLS_ACUITY_SCORE: 42
WEAR_GLASSES_OR_BLIND: NO
ADLS_ACUITY_SCORE: 6
ADLS_ACUITY_SCORE: 9
EQUIPMENT_CURRENTLY_USED_AT_HOME: WALKER, ROLLING;WHEELCHAIR, MANUAL
ADLS_ACUITY_SCORE: 10
ADLS_ACUITY_SCORE: 40
ADLS_ACUITY_SCORE: 8
ADLS_ACUITY_SCORE: 10
ADLS_ACUITY_SCORE: 8
ADLS_ACUITY_SCORE: 41
ADLS_ACUITY_SCORE: 10
ADLS_ACUITY_SCORE: 40
ADLS_ACUITY_SCORE: 40
ADLS_ACUITY_SCORE: 10
TOILETING_ISSUES: NO
ADLS_ACUITY_SCORE: 43
ADLS_ACUITY_SCORE: 12
ADLS_ACUITY_SCORE: 40
ADLS_ACUITY_SCORE: 40
ADLS_ACUITY_SCORE: 42
ADLS_ACUITY_SCORE: 43
HEARING_DIFFICULTY_OR_DEAF: NO
ADLS_ACUITY_SCORE: 10
FALL_HISTORY_WITHIN_LAST_SIX_MONTHS: YES
ADLS_ACUITY_SCORE: 43
ADLS_ACUITY_SCORE: 40
ADLS_ACUITY_SCORE: 44
ADLS_ACUITY_SCORE: 40
ADLS_ACUITY_SCORE: 38
ADLS_ACUITY_SCORE: 38
ADLS_ACUITY_SCORE: 42
DIFFICULTY_EATING/SWALLOWING: NO
ADLS_ACUITY_SCORE: 43
ADLS_ACUITY_SCORE: 10
ADLS_ACUITY_SCORE: 6
ADLS_ACUITY_SCORE: 43
ADLS_ACUITY_SCORE: 14
ADLS_ACUITY_SCORE: 8
ADLS_ACUITY_SCORE: 42
ADLS_ACUITY_SCORE: 8
ADLS_ACUITY_SCORE: 40
ADLS_ACUITY_SCORE: 44
ADLS_ACUITY_SCORE: 40
ADLS_ACUITY_SCORE: 8
ADLS_ACUITY_SCORE: 44
ADLS_ACUITY_SCORE: 10
ADLS_ACUITY_SCORE: 40
ADLS_ACUITY_SCORE: 10
DRESSING/BATHING_DIFFICULTY: NO
ADLS_ACUITY_SCORE: 40
ADLS_ACUITY_SCORE: 40
ADLS_ACUITY_SCORE: 43
ADLS_ACUITY_SCORE: 40
ADLS_ACUITY_SCORE: 42
ADLS_ACUITY_SCORE: 40
ADLS_ACUITY_SCORE: 43
ADLS_ACUITY_SCORE: 40
TRANSFERRING: 2-->COMPLETELY DEPENDENT
ADLS_ACUITY_SCORE: 8
ADLS_ACUITY_SCORE: 40
ADLS_ACUITY_SCORE: 7
ADLS_ACUITY_SCORE: 40
ADLS_ACUITY_SCORE: 4
EQUIPMENT_CURRENTLY_USED_AT_HOME: WALKER, STANDARD
ADLS_ACUITY_SCORE: 12
ADLS_ACUITY_SCORE: 42
ADLS_ACUITY_SCORE: 32
WALKING_OR_CLIMBING_STAIRS_DIFFICULTY: YES
TRANSFERRING: 2-->COMPLETELY DEPENDENT (NOT DEVELOPMENTALLY APPROPRIATE)
ADLS_ACUITY_SCORE: 40
ADLS_ACUITY_SCORE: 40
ADLS_ACUITY_SCORE: 8
ADLS_ACUITY_SCORE: 14
ADLS_ACUITY_SCORE: 8
ADLS_ACUITY_SCORE: 10
ADLS_ACUITY_SCORE: 40
ADLS_ACUITY_SCORE: 10
ADLS_ACUITY_SCORE: 40
ADLS_ACUITY_SCORE: 43
ADLS_ACUITY_SCORE: 10
ADLS_ACUITY_SCORE: 44
ADLS_ACUITY_SCORE: 8
ADLS_ACUITY_SCORE: 40
ADLS_ACUITY_SCORE: 8
ADLS_ACUITY_SCORE: 10
CHANGE_IN_FUNCTIONAL_STATUS_SINCE_ONSET_OF_CURRENT_ILLNESS/INJURY: NO
ADLS_ACUITY_SCORE: 14
ADLS_ACUITY_SCORE: 42
ADLS_ACUITY_SCORE: 40
ADLS_ACUITY_SCORE: 8
ADLS_ACUITY_SCORE: 10
ADLS_ACUITY_SCORE: 44
ADLS_ACUITY_SCORE: 8
ADLS_ACUITY_SCORE: 40
BATHING: 1-->ASSISTANCE NEEDED
ADLS_ACUITY_SCORE: 43
ADLS_ACUITY_SCORE: 8
ADLS_ACUITY_SCORE: 10
TOILETING_ISSUES: NO
ADLS_ACUITY_SCORE: 40
ADLS_ACUITY_SCORE: 40
NUMBER_OF_TIMES_PATIENT_HAS_FALLEN_WITHIN_LAST_SIX_MONTHS: 5
ADLS_ACUITY_SCORE: 40
ADLS_ACUITY_SCORE: 10
ADLS_ACUITY_SCORE: 40
ADLS_ACUITY_SCORE: 42
ADLS_ACUITY_SCORE: 40
ADLS_ACUITY_SCORE: 8
ADLS_ACUITY_SCORE: 6
ADLS_ACUITY_SCORE: 40
DOING_ERRANDS_INDEPENDENTLY_DIFFICULTY: NO
ADLS_ACUITY_SCORE: 42
ADLS_ACUITY_SCORE: 41
ADLS_ACUITY_SCORE: 10
ADLS_ACUITY_SCORE: 40
ADLS_ACUITY_SCORE: 43
FALL_HISTORY_WITHIN_LAST_SIX_MONTHS: YES
ADLS_ACUITY_SCORE: 40
ADLS_ACUITY_SCORE: 43
ADLS_ACUITY_SCORE: 40
ADLS_ACUITY_SCORE: 43
ADLS_ACUITY_SCORE: 40
ADLS_ACUITY_SCORE: 43
TRANSFERRING: 0-->INDEPENDENT
ADLS_ACUITY_SCORE: 10
ADLS_ACUITY_SCORE: 44
ADLS_ACUITY_SCORE: 6
ADLS_ACUITY_SCORE: 10
ADLS_ACUITY_SCORE: 44
ADLS_ACUITY_SCORE: 40
ADLS_ACUITY_SCORE: 43
ADLS_ACUITY_SCORE: 40
ADLS_ACUITY_SCORE: 40
WALKING_OR_CLIMBING_STAIRS_DIFFICULTY: NO
ADLS_ACUITY_SCORE: 41
ADLS_ACUITY_SCORE: 10
ADLS_ACUITY_SCORE: 43
ADLS_ACUITY_SCORE: 41
ADLS_ACUITY_SCORE: 10
ADLS_ACUITY_SCORE: 9
ADLS_ACUITY_SCORE: 40
ADLS_ACUITY_SCORE: 40
ADLS_ACUITY_SCORE: 8
ADLS_ACUITY_SCORE: 40
ADLS_ACUITY_SCORE: 14
CONCENTRATING,_REMEMBERING_OR_MAKING_DECISIONS_DIFFICULTY: NO
ADLS_ACUITY_SCORE: 43
ADLS_ACUITY_SCORE: 40
ADLS_ACUITY_SCORE: 43
ADLS_ACUITY_SCORE: 8
ADLS_ACUITY_SCORE: 40
ADLS_ACUITY_SCORE: 43
ADLS_ACUITY_SCORE: 40
ADLS_ACUITY_SCORE: 10
ADLS_ACUITY_SCORE: 40
ADLS_ACUITY_SCORE: 10
ADLS_ACUITY_SCORE: 8
ADLS_ACUITY_SCORE: 40
ADLS_ACUITY_SCORE: 43
ADLS_ACUITY_SCORE: 43
ADLS_ACUITY_SCORE: 40
ADLS_ACUITY_SCORE: 8
ADLS_ACUITY_SCORE: 40
DRESSING/BATHING_DIFFICULTY: NO
ADLS_ACUITY_SCORE: 40
ADLS_ACUITY_SCORE: 43
ADLS_ACUITY_SCORE: 10
ADLS_ACUITY_SCORE: 40
ADLS_ACUITY_SCORE: 10
ADLS_ACUITY_SCORE: 43
ADLS_ACUITY_SCORE: 39
ADLS_ACUITY_SCORE: 43
ADLS_ACUITY_SCORE: 10
ADLS_ACUITY_SCORE: 44
ADLS_ACUITY_SCORE: 40
WEAR_GLASSES_OR_BLIND: YES
ADLS_ACUITY_SCORE: 44
ADLS_ACUITY_SCORE: 40
ADLS_ACUITY_SCORE: 44
ADLS_ACUITY_SCORE: 43
TRANSFERRING: 0-->INDEPENDENT
ADLS_ACUITY_SCORE: 10
ADLS_ACUITY_SCORE: 9
WALKING_OR_CLIMBING_STAIRS: AMBULATION DIFFICULTY, REQUIRES EQUIPMENT
ADLS_ACUITY_SCORE: 12
CONCENTRATING,_REMEMBERING_OR_MAKING_DECISIONS_DIFFICULTY: NO
ADLS_ACUITY_SCORE: 44
ADLS_ACUITY_SCORE: 10
DIFFICULTY_EATING/SWALLOWING: NO
DIFFICULTY_COMMUNICATING: NO
ADLS_ACUITY_SCORE: 40
ADLS_ACUITY_SCORE: 9
ADLS_ACUITY_SCORE: 8
ADLS_ACUITY_SCORE: 8
ADLS_ACUITY_SCORE: 6
ADLS_ACUITY_SCORE: 7
ADLS_ACUITY_SCORE: 40
ADLS_ACUITY_SCORE: 8
ADLS_ACUITY_SCORE: 43
ADLS_ACUITY_SCORE: 40
ADLS_ACUITY_SCORE: 8
ADLS_ACUITY_SCORE: 14
DOING_ERRANDS_INDEPENDENTLY_DIFFICULTY: YES
ADLS_ACUITY_SCORE: 14
ADLS_ACUITY_SCORE: 40
ADLS_ACUITY_SCORE: 10
ADLS_ACUITY_SCORE: 40
ADLS_ACUITY_SCORE: 6
ADLS_ACUITY_SCORE: 43
ADLS_ACUITY_SCORE: 38
ADLS_ACUITY_SCORE: 10
ADLS_ACUITY_SCORE: 43
ADLS_ACUITY_SCORE: 10
ADLS_ACUITY_SCORE: 8
ADLS_ACUITY_SCORE: 40
ADLS_ACUITY_SCORE: 10
ADLS_ACUITY_SCORE: 6
ADLS_ACUITY_SCORE: 10
ADLS_ACUITY_SCORE: 42
ADLS_ACUITY_SCORE: 43
ADLS_ACUITY_SCORE: 14
ADLS_ACUITY_SCORE: 10
ADLS_ACUITY_SCORE: 40
ADLS_ACUITY_SCORE: 44
ADLS_ACUITY_SCORE: 43
ADLS_ACUITY_SCORE: 44
ADLS_ACUITY_SCORE: 44
ADLS_ACUITY_SCORE: 40
ADLS_ACUITY_SCORE: 43
ADLS_ACUITY_SCORE: 40
ADLS_ACUITY_SCORE: 43
ADLS_ACUITY_SCORE: 8
ADLS_ACUITY_SCORE: 8
ADLS_ACUITY_SCORE: 43
ADLS_ACUITY_SCORE: 40
ADLS_ACUITY_SCORE: 8
ADLS_ACUITY_SCORE: 8
ADLS_ACUITY_SCORE: 43
ADLS_ACUITY_SCORE: 40
ADLS_ACUITY_SCORE: 41
ADLS_ACUITY_SCORE: 40
WALKING_OR_CLIMBING_STAIRS: AMBULATION DIFFICULTY, REQUIRES EQUIPMENT
ADLS_ACUITY_SCORE: 40
ADLS_ACUITY_SCORE: 10
ADLS_ACUITY_SCORE: 40
ADLS_ACUITY_SCORE: 8
ADLS_ACUITY_SCORE: 8
ADLS_ACUITY_SCORE: 14
ADLS_ACUITY_SCORE: 40
ADLS_ACUITY_SCORE: 43
ADLS_ACUITY_SCORE: 40

## 2022-01-01 ASSESSMENT — ENCOUNTER SYMPTOMS
ARTHRALGIAS: 1
SHORTNESS OF BREATH: 0
BACK PAIN: 0
NECK PAIN: 0
VOMITING: 1
SORE THROAT: 0
FEVER: 0
FEVER: 0
DYSRHYTHMIAS: 1
BACK PAIN: 0
LIGHT-HEADEDNESS: 1
NAUSEA: 1
VOMITING: 0
SHORTNESS OF BREATH: 1
CHILLS: 0
COUGH: 0
DYSRHYTHMIAS: 1
ABDOMINAL PAIN: 0
HEADACHES: 0
CONFUSION: 0
DIARRHEA: 0
VOMITING: 1
SHORTNESS OF BREATH: 1
DIARRHEA: 0
DIZZINESS: 1
COUGH: 1
NAUSEA: 0
WOUND: 1
NECK PAIN: 0

## 2022-01-01 ASSESSMENT — MIFFLIN-ST. JEOR
SCORE: 1090.09
SCORE: 1075.12
SCORE: 1075.12
SCORE: 1094.63
SCORE: 1094.63
SCORE: 1075.12
SCORE: 1074.21
SCORE: 1074.21
SCORE: 1112.77
SCORE: 1110.5
SCORE: 1074.21
SCORE: 1074.21

## 2022-01-01 ASSESSMENT — LIFESTYLE VARIABLES
TOBACCO_USE: 1
TOBACCO_USE: 1

## 2022-01-01 ASSESSMENT — COPD QUESTIONNAIRES
CAT_SEVERITY: MODERATE
COPD: 1
COPD: 1
CAT_SEVERITY: MODERATE

## 2022-01-03 NOTE — PROGRESS NOTES
Pulmonary Hypertension Clinic  Provider:  Dr SHILO Holt  Visit Date:  1 / 3 / 2021  Location:  [facility]  Review of After Visit Summary (AVS) / Instructions from provider:   The AVS and instructions from the provider were reviewed in detail with the patient following their office visit with Dr. Holt.   Family present: Daughter  The patient was educated on the outlined plan of care including tests, labs, medication changes, and follow up. Patient verbalized understanding of the information and stated agreement to the plan.   Instructed patient to call with any questions or concerns.   Return appointment:   Patient was given instructions on when and how to schedule their next office visit with the clinic.  MARY JO Persaud, BSN, RN  RN Care Coordinator  [facility] locations  [phone]

## 2022-01-03 NOTE — PROGRESS NOTES
Clinic visit note dictated. Dictation reference number - [id number]      Today's clinic visit entailed:  The following tests were independently interpreted by me as noted in my documentation: ECG, labs, echocardiogram.  Ordering of each unique test  Prescription drug management  45 minutes spent on the date of the encounter doing chart review, history and exam, documentation and further activities per the note  Provider  Link to [facility] Help Grid     The level of medical decision making during this visit was of high complexity.        REVIEW OF SYSTEMS:  A comprehensive 10-point review of systems was completed and the pertinent positives are documented in the history of present illness.    Skin:        Eyes:       ENT:       Respiratory:    sleep apnea;shortness of breath;dyspnea on exertion (COPD, weaning off O2, 2L)  Cardiovascular:  fatigue;exercise intolerance    Gastroenterology: Positive for poor appetite  Genitourinary:  Positive for nocturia  Musculoskeletal:  Positive for muscular weakness  Neurologic:  Positive for memory problems  Psychiatric:  Positive for excessive stress;sleep disturbances  Heme/Lymph/Imm:  not assessed    Endocrine:  Negative      CURRENT MEDICATIONS:  Current Outpatient Medications   Medication Sig Dispense Refill     acetaminophen (TYLENOL) 325 MG tablet Take 2 tablets (650 mg) by mouth 2 times daily. May also take 2 tablets (650 mg) 2 times daily as needed for pain. 120 tablet 98     albuterol (PROAIR HFA/PROVENTIL HFA/VENTOLIN HFA) 108 (90 Base) MCG/ACT inhaler Inhale 2 puffs into the lungs every 4 hours as needed       allopurinol (ZYLOPRIM) 100 MG tablet TAKE ONE TABLET BY MOUTH THREE TIMES A WEEK ON MONDAY, WEDNESDAY AND FRIDAY AFTER HD SESSION 12 tablet 97     apixaban ANTICOAGULANT (ELIQUIS) 2.5 MG tablet Take 1 tablet (2.5 mg) by mouth 2 times daily 60 tablet 11     artificial saliva (BIOTENE MT) AERS spray Take 1 spray by mouth every 6 hours as needed for dry mouth MAY KEEP AT  BEDSIDE 30 mL 89     atorvastatin (LIPITOR) 40 MG tablet Take 1 tablet (40 mg) by mouth daily 30 tablet 11     diphenhydrAMINE (BENADRYL) 2 % external cream Apply topically 3 times daily as needed for itching 30 g 98     loratadine (CLARITIN) 10 MG tablet Take 1 tablet (10 mg) by mouth At Bedtime 30 tablet 98     Melatonin 10 MG TABS tablet Take 10 mg by mouth nightly as needed       metoprolol succinate ER (TOPROL-XL) 25 MG 24 hr tablet Take 0.5 tablets (12.5 mg) by mouth 2 times daily 60 tablet 4     multivitamin RENAL (RENAVITE RX/NEPHROVITE) 1 MG tablet Take 1 tablet by mouth daily       pantoprazole (PROTONIX) 40 MG EC tablet Take 1 tablet (40 mg) by mouth daily 30 tablet 11     sodium chloride (OCEAN) 0.65 % nasal spray Spray 1 spray into both nostrils every 2 hours as needed for congestion MAY KEEP AT BEDSIDE 50 mL 98     tiotropium-olodaterol (STIOLTO RESPIMAT) 2.5-2.5 MCG/ACT AERS Inhale 2 puffs into the lungs daily       torsemide (DEMADEX) 20 MG tablet Take 2 tablets (40 mg) by mouth daily Take at 8 AM and 1 PM. 120 tablet 4     ACE/ARB/ARNI NOT PRESCRIBED (INTENTIONAL) Please choose reason not prescribed from choices below. (Patient not taking: Reported on 12/27/2021)           ALLERGIES:  Allergies   Allergen Reactions     Sotalol Other (See Comments)     Prolonged QT     Bupropion Other (See Comments)     Na 118 acutely. Comorbid diuresis for heart failure       PAST MEDICAL HISTORY:    Past Medical History:   Diagnosis Date     Acute renal failure (ARF) (H) 06/22/2021     Anemia in chronic kidney disease, on chronic dialysis (H) 07/14/2021     Atrophy of left kidney 05/18/2017     Chronic atrial fibrillation (H) 04/01/2013     Chronic kidney disease, stage III (moderate) (H) 11/13/2019    Formatting of this note might be different from the original. Cr=1.2     COPD mixed type (H) 04/06/2013    Formatting of this note might be different from the original. On Qvar and combivent, hx of cor pulmonale      Cor pulmonale (chronic) (H) 09/20/2021     Depressive disorder, not elsewhere classified      Dermatitis of lower extremity 05/05/2017     Elevated blood pressure reading without diagnosis of hypertension 12/10/2004     HTN (hypertension) 09/07/2011     Hx of gout 08/13/2014     Hypercalcemia 07/16/2018    Formatting of this note might be different from the original. 7/16/18  Ca++=10.8   Alb=3.8   7/17/18:  PTH=95.6  Ca++=10.1 (after diuresis), some renal impairment, was on vit D and calcium outpt     Hypertensive heart and kidney disease 07/16/2018    Formatting of this note might be different from the original. Atrophic left kidney, rise in Cr with increase in bumex 2018     Hyponatremia 06/22/2021     Intermediate stage nonexudative age-related macular degeneration of both eyes 01/13/2021     Lung nodule 04/28/2021     Migraine variant 12/10/2004     Problem list name updated by automated process. Provider to review     Neoplasm of skin of neck 06/06/2014     NSVT (nonsustained ventricular tachycardia) (H) 04/09/2020     SKY (obstructive sleep apnea) 04/08/2020     Osteoporosis 11/10/2004     Problem list name updated by automated process. Provider to review     Osteoporosis, unspecified dx 8/02     Pacemaker 07/14/2021    Formatting of this note might be different from the original. 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 On sotolol     Tobacco use disorder      Variants of migraine, not elsewhere classified, without mention of intractable migraine without mention of status migrainosus     flashing lights       PAST SURGICAL HISTORY:    Past Surgical History:   Procedure Laterality Date     ZZC NONSPECIFIC PROCEDURE  1990's    right ankle fracture     ZZC NONSPECIFIC PROCEDURE      spontaneous AB     ZZC NONSPECIFIC PROCEDURE       tonsillectomy     ZZC NONSPECIFIC PROCEDURE      tubal ligation       FAMILY HISTORY:    Family History   Problem Relation Age of Onset     Hypertension Mother      Arthritis Mother      Circulatory Mother      Heart Disease Mother      Lipids Mother      Osteoporosis Mother      Alcohol/Drug Father         ?MI     Circulatory Father      Heart Disease Father      Hypertension Sister      Allergies Sister         thyroid dz     Arthritis Sister      Osteoporosis Sister      Obesity Brother      Alcohol/Drug Brother         dm, ptsd, cad     Bladder Cancer Brother      Diabetes Brother      Myocardial Infarction Brother      Prostate Cancer Brother      Cerebrovascular Disease Maternal Grandmother      Breast Cancer Maternal Grandmother         94     Cancer - colorectal Maternal Grandmother         97     Arthritis Maternal Grandmother      Gastrointestinal Disease Maternal Grandmother      Osteoporosis Maternal Grandmother      Thyroid Disease Maternal Grandmother      Osteoarthritis Maternal Grandfather      C.A.D. Maternal Grandfather      Heart Failure Paternal Grandmother      Hyperlipidemia Maternal Uncle      Alzheimer Disease Maternal Uncle      Thyroid Disease Other         niece       SOCIAL HISTORY:    Social History     Socioeconomic History     Marital status: Legally      Spouse name: Derrick Chris     Number of children: 0     Years of education: None     Highest education level: None   Occupational History     Employer: [employer]   Tobacco Use     Smoking status: Former Smoker     Packs/day: 1.00     Years: 43.00     Pack years: 43.00     Types: Cigarettes     Quit date: 2021     Years since quittin.7     Smokeless tobacco: Never Used   Substance and Sexual Activity     Alcohol use: Yes     Comment: occ. glass of wine     Drug use: No     Sexual activity: None   Other Topics Concern     None   Social History Narrative    She was  to Derrick Chris, and she  " from him for over 20 years, and she has not had contact with him, and does not know where he is. She never officially  him.    She had a long term relationship with Ventura Lopez, who she considered herself to be  to, but because she was legally  to Derrick, she could not officially  him. Also Ventura was a quad, and legally marrying him would disrupt his benefits, as Francine was his PCA. Ventura  around .     Social Determinants of Health     Financial Resource Strain: Not on file   Food Insecurity: Not on file   Transportation Needs: Not on file   Physical Activity: Not on file   Stress: Not on file   Social Connections: Not on file   Intimate Partner Violence: Not on file   Housing Stability: Not on file       PHYSICAL EXAM:    Vitals: BP (!) 140/61   Pulse 89   Ht 1.651 m (5' 5\")   Wt 59.9 kg (132 lb)   SpO2 97%   BMI 21.97 kg/m    Wt Readings from Last 5 Encounters:   22 59.9 kg (132 lb)   21 60.2 kg (132 lb 12.8 oz)   12/15/21 59 kg (130 lb)   21 59 kg (130 lb)   21 55.5 kg (122 lb 6.4 oz)             Encounter Diagnoses   Name Primary?     Severe tricuspid valve regurgitation Yes     Cor pulmonale (chronic) (H)      Pulmonary hypertension (H)      Chronic atrial fibrillation (H)      Pacemaker s/p AV Node Ablation      ESRD (end stage renal disease) on dialysis (H)      Frail elderly      Former heavy tobacco smoker        Orders Placed This Encounter   Procedures     Follow-Up with [facility] - [facility]IMBER[facility] visit       "

## 2022-01-03 NOTE — PATIENT INSTRUCTIONS
1. Pacemaker interrogation today.    2.  Start metoprolol XL.  Take half a tablet (12.5 mg) 2 times daily.  New prescription sent.    3.  Increase your fluid intake to 50 ounces daily.    4.  Follow-up with cardiology KIM[facility] in [facility] in 4 months.    5.  Talk to your primary provider or your dialysis doctor about the restless legs and tingling in the fingers.    If you have any questions or concerns, please call my nurse Ekaterina Merino at [phone].

## 2022-01-03 NOTE — PROGRESS NOTES
Service Date: 2022    PRIMARY CARE PROVIDER:  JUSTO Payan, CNP    REASON FOR VISIT:  Severe tricuspid valve regurgitation and pulmonary hypertension after visit with cardiovascular surgeon, Dr. Sanderson    HISTORY OF PRESENT ILLNESS:    It was my pleasure to follow up with Francine, who was accompanied by her sister and primary caregiver, Tereza.  Francine is a much older-appearing 76-year-old  lady, resides in [facility] living (previously followed at [facility]).    She has complex and multiple comorbidities includin.  Severe tricuspid valve regurgitation with mildly decreased right ventricular systolic function and elevated RVSP of 55 mmHg.  Mechanism is primarily annular dilatation.  Note, the severe TR preceded the pacemaker lead implantation.  No clinical improvement with sildenafil and ambrisentan (trialed at [facility]), therefore prior authorization denied.  2.  End-stage renal disease, on dialysis via AV fistula on Monday, Wednesday and Friday.  3.  Permanent atrial fibrillation, status post AV luis ablation and a single chamber VVI pacemaker on 2021 in [facility].  4.  Former heavy tobacco user of 43 pack years.  Quit in 2021.  5.  Pacemaker in situ.    Following a recent visit, I had referred her to see Dr. Janet Sanderson in CV Surgery.  After a detailed evaluation and discussion with the patient, he did not deem her to be a surgical candidate due to very high risk.  I agree with this.  Conservative management has been recommended.    At her last visit, we had made adjustments to her torsemide dosage.  She takes 40 mg b.i.d. 4 days a week and skips the morning dose on dialysis days.  She has been doing okay with this and her weight is stable at 132 pounds.    Her primary symptoms are significant leg cramps, tingling in both hands.  Also, when she was eating Joanie dinner, she had a witnessed presyncope.  Her sister says the patient was sitting and eating and  "suddenly slumped forward, but did not choke on her food.  She appeared ashen but came around very quickly and reported \"feeling cold and frozen\" but no other symptoms.    DIAGNOSES:    1.  Severe functional tricuspid valve regurgitation that preceded placement of a ventricular pacemaker lead, deemed unsuitable for surgery due to high risk.  2.  Right heart failure with decreased systolic function.  3.  History of non sustained ventricular tachycardia with recent syncope with no arrhythmias on pacemaker interrogation.  4.  Dialysis dependent end-stage renal disease.    ASSESSMENT:    Francine had a concerning syncope without warning while eating [place] dinner.  I had her pacemaker evaluated in clinic today and it did not show any arrhythmias on Napoleon Gi or Joanie, or in the in the daysdays.  She is known to have brief episodes of nonsustained VT, but the last one was in the early part of December.    I suspect that her symptoms of leg cramps, tingling and generally feeling fatigued may be related to fluid shifts and electrolyte imbalances in the context of dialysis.  I have advised her to increase her daily fluid intake and touch base with her primary provider.    PLAN:    1.  Start low-dose metoprolol XL 12.5 mg 2 times daily for nonsustained VT.  New prescriptions given.  2.  Increase fluid intake from current 38 ounces to 50 ounces daily.  3.  Continue current diuretic regimen of torsemide 20 mg b.i.d. and skip the morning dose on dialysis days.  4.  Establish care with Cardiology [facility] for management of severe tricuspid valve regurgitation and right heart failure.  5.  Patient will touch base with her primary provider and nephrologist about restless legs and potential fluid/electrolyte shift management.  6.  Reiterated to Francine that our goal will be to manage her severe tricuspid valve regurgitation conservatively.  She is not a candidate for surgery due to high risk.  Catheter based " therapies are still in evolution for management of functional TR in the presence of a pacemaker lead.  She understands.  7.  I will see her in yearly or as needed.      Alex Holt MD        D: 2022   T: 2022   MT: [id number]    Name:     TIFFANIE CHO  MRN:      [id number]        Account:      [id number]   :      1945           Service Date: 2022       Document: [id number]

## 2022-01-03 NOTE — LETTER
1/3/2022    Eileen CASTANEDANarayan Arredondo, APRN CNP  [address]  [facility] [address]    RE: Francine Chris     Dear Colleague,     I had the pleasure of seeing Francine Chris in the [facility].  Clinic visit note dictated. Dictation reference number - [id number]    Today's clinic visit entailed:  The following tests were independently interpreted by me as noted in my documentation: ECG, labs, echocardiogram.  Ordering of each unique test  Prescription drug management  45 minutes spent on the date of the encounter doing chart review, history and exam, documentation and further activities per the note  Provider  Link to [facility] Help Grid     The level of medical decision making during this visit was of high complexity.      REVIEW OF SYSTEMS:  A comprehensive 10-point review of systems was completed and the pertinent positives are documented in the history of present illness.    Skin:        Eyes:       ENT:       Respiratory:    sleep apnea;shortness of breath;dyspnea on exertion (COPD, weaning off O2, 2L)  Cardiovascular:  fatigue;exercise intolerance    Gastroenterology: Positive for poor appetite  Genitourinary:  Positive for nocturia  Musculoskeletal:  Positive for muscular weakness  Neurologic:  Positive for memory problems  Psychiatric:  Positive for excessive stress;sleep disturbances  Heme/Lymph/Imm:  not assessed    Endocrine:  Negative      CURRENT MEDICATIONS:  Current Outpatient Medications   Medication Sig Dispense Refill     acetaminophen (TYLENOL) 325 MG tablet Take 2 tablets (650 mg) by mouth 2 times daily. May also take 2 tablets (650 mg) 2 times daily as needed for pain. 120 tablet 98     albuterol (PROAIR HFA/PROVENTIL HFA/VENTOLIN HFA) 108 (90 Base) MCG/ACT inhaler Inhale 2 puffs into the lungs every 4 hours as needed       allopurinol (ZYLOPRIM) 100 MG tablet TAKE ONE TABLET BY MOUTH THREE TIMES A WEEK ON MONDAY, WEDNESDAY AND FRIDAY AFTER HD SESSION 12 tablet 97     apixaban ANTICOAGULANT  (ELIQUIS) 2.5 MG tablet Take 1 tablet (2.5 mg) by mouth 2 times daily 60 tablet 11     artificial saliva (BIOTENE MT) AERS spray Take 1 spray by mouth every 6 hours as needed for dry mouth MAY KEEP AT BEDSIDE 30 mL 89     atorvastatin (LIPITOR) 40 MG tablet Take 1 tablet (40 mg) by mouth daily 30 tablet 11     diphenhydrAMINE (BENADRYL) 2 % external cream Apply topically 3 times daily as needed for itching 30 g 98     loratadine (CLARITIN) 10 MG tablet Take 1 tablet (10 mg) by mouth At Bedtime 30 tablet 98     Melatonin 10 MG TABS tablet Take 10 mg by mouth nightly as needed       metoprolol succinate ER (TOPROL-XL) 25 MG 24 hr tablet Take 0.5 tablets (12.5 mg) by mouth 2 times daily 60 tablet 4     multivitamin RENAL (RENAVITE RX/NEPHROVITE) 1 MG tablet Take 1 tablet by mouth daily       pantoprazole (PROTONIX) 40 MG EC tablet Take 1 tablet (40 mg) by mouth daily 30 tablet 11     sodium chloride (OCEAN) 0.65 % nasal spray Spray 1 spray into both nostrils every 2 hours as needed for congestion MAY KEEP AT BEDSIDE 50 mL 98     tiotropium-olodaterol (STIOLTO RESPIMAT) 2.5-2.5 MCG/ACT AERS Inhale 2 puffs into the lungs daily       torsemide (DEMADEX) 20 MG tablet Take 2 tablets (40 mg) by mouth daily Take at 8 AM and 1 PM. 120 tablet 4     ACE/ARB/ARNI NOT PRESCRIBED (INTENTIONAL) Please choose reason not prescribed from choices below. (Patient not taking: Reported on 12/27/2021)           ALLERGIES:  Allergies   Allergen Reactions     Sotalol Other (See Comments)     Prolonged QT     Bupropion Other (See Comments)     Na 118 acutely. Comorbid diuresis for heart failure       PAST MEDICAL HISTORY:    Past Medical History:   Diagnosis Date     Acute renal failure (ARF) (H) 06/22/2021     Anemia in chronic kidney disease, on chronic dialysis (H) 07/14/2021     Atrophy of left kidney 05/18/2017     Chronic atrial fibrillation (H) 04/01/2013     Chronic kidney disease, stage III (moderate) (H) 11/13/2019    Formatting of  this note might be different from the original. Cr=1.2     COPD mixed type (H) 04/06/2013    Formatting of this note might be different from the original. On Qvar and combivent, hx of cor pulmonale     Cor pulmonale (chronic) (H) 09/20/2021     Depressive disorder, not elsewhere classified      Dermatitis of lower extremity 05/05/2017     Elevated blood pressure reading without diagnosis of hypertension 12/10/2004     HTN (hypertension) 09/07/2011     Hx of gout 08/13/2014     Hypercalcemia 07/16/2018    Formatting of this note might be different from the original. 7/16/18  Ca++=10.8   Alb=3.8   7/17/18:  PTH=95.6  Ca++=10.1 (after diuresis), some renal impairment, was on vit D and calcium outpt     Hypertensive heart and kidney disease 07/16/2018    Formatting of this note might be different from the original. Atrophic left kidney, rise in Cr with increase in bumex 2018     Hyponatremia 06/22/2021     Intermediate stage nonexudative age-related macular degeneration of both eyes 01/13/2021     Lung nodule 04/28/2021     Migraine variant 12/10/2004     Problem list name updated by automated process. Provider to review     Neoplasm of skin of neck 06/06/2014     NSVT (nonsustained ventricular tachycardia) (H) 04/09/2020     SKY (obstructive sleep apnea) 04/08/2020     Osteoporosis 11/10/2004     Problem list name updated by automated process. Provider to review     Osteoporosis, unspecified dx 8/02     Pacemaker 07/14/2021    Formatting of this note might be different from the original. 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 On sotolol     Tobacco use disorder      Variants of migraine, not elsewhere classified, without mention of intractable migraine without mention of status migrainosus     flashing lights       PAST SURGICAL  HISTORY:    Past Surgical History:   Procedure Laterality Date     ZZC NONSPECIFIC PROCEDURE  's    right ankle fracture     ZZC NONSPECIFIC PROCEDURE      spontaneous AB     ZZC NONSPECIFIC PROCEDURE      tonsillectomy     ZZC NONSPECIFIC PROCEDURE      tubal ligation       FAMILY HISTORY:    Family History   Problem Relation Age of Onset     Hypertension Mother      Arthritis Mother      Circulatory Mother      Heart Disease Mother      Lipids Mother      Osteoporosis Mother      Alcohol/Drug Father         ?MI     Circulatory Father      Heart Disease Father      Hypertension Sister      Allergies Sister         thyroid dz     Arthritis Sister      Osteoporosis Sister      Obesity Brother      Alcohol/Drug Brother         dm, ptsd, cad     Bladder Cancer Brother      Diabetes Brother      Myocardial Infarction Brother      Prostate Cancer Brother      Cerebrovascular Disease Maternal Grandmother      Breast Cancer Maternal Grandmother         94     Cancer - colorectal Maternal Grandmother         97     Arthritis Maternal Grandmother      Gastrointestinal Disease Maternal Grandmother      Osteoporosis Maternal Grandmother      Thyroid Disease Maternal Grandmother      Osteoarthritis Maternal Grandfather      C.A.D. Maternal Grandfather      Heart Failure Paternal Grandmother      Hyperlipidemia Maternal Uncle      Alzheimer Disease Maternal Uncle      Thyroid Disease Other         niece       SOCIAL HISTORY:    Social History     Socioeconomic History     Marital status: Legally      Spouse name: Derrick Chris     Number of children: 0     Years of education: None     Highest education level: None   Occupational History     Employer: [employer]   Tobacco Use     Smoking status: Former Smoker     Packs/day: 1.00     Years: 43.00     Pack years: 43.00     Types: Cigarettes     Quit date: 2021     Years since quittin.7     Smokeless tobacco: Never Used   Substance and Sexual Activity  "    Alcohol use: Yes     Comment: occ. glass of wine     Drug use: No     Sexual activity: None   Other Topics Concern     None   Social History Narrative    She was  to Derrick Chris, and she  from him for over 20 years, and she has not had contact with him, and does not know where he is. She never officially  him.    She had a long term relationship with Ventura Lopez, who she considered herself to be  to, but because she was legally  to Derrick, she could not officially  him. Also Ventura was a quad, and legally marrying him would disrupt his benefits, as Francine was his PCA. Ventura  around .     Social Determinants of Health     Financial Resource Strain: Not on file   Food Insecurity: Not on file   Transportation Needs: Not on file   Physical Activity: Not on file   Stress: Not on file   Social Connections: Not on file   Intimate Partner Violence: Not on file   Housing Stability: Not on file     PHYSICAL EXAM:    Vitals: BP (!) 140/61   Pulse 89   Ht 1.651 m (5' 5\")   Wt 59.9 kg (132 lb)   SpO2 97%   BMI 21.97 kg/m    Wt Readings from Last 5 Encounters:   22 59.9 kg (132 lb)   21 60.2 kg (132 lb 12.8 oz)   12/15/21 59 kg (130 lb)   21 59 kg (130 lb)   21 55.5 kg (122 lb 6.4 oz)     Encounter Diagnoses   Name Primary?     Severe tricuspid valve regurgitation Yes     Cor pulmonale (chronic) (H)      Pulmonary hypertension (H)      Chronic atrial fibrillation (H)      Pacemaker s/p AV Node Ablation      ESRD (end stage renal disease) on dialysis (H)      Frail elderly      Former heavy tobacco smoker      Orders Placed This Encounter   Procedures     Follow-Up with [facility] visit       Service Date: 2022    PRIMARY CARE PROVIDER:  JUSTO Payan, CNP    REASON FOR VISIT:  Severe tricuspid valve regurgitation and pulmonary hypertension after visit with cardiovascular surgeon, Dr. Sanderson    HISTORY OF PRESENT ILLNESS:  " "  It was my pleasure to follow up with Francine, who was accompanied by her sister and primary caregiver, Tereza.  Francine is a much older-appearing 76-year-old  lady, resides in [facility] living (previously followed at [facility]).    She has complex and multiple comorbidities includin.  Severe tricuspid valve regurgitation with mildly decreased right ventricular systolic function and elevated RVSP of 55 mmHg.  Mechanism is primarily annular dilatation.  Note, the severe TR preceded the pacemaker lead implantation.  No clinical improvement with sildenafil and ambrisentan (trialed at [facility]), therefore prior authorization denied.  2.  End-stage renal disease, on dialysis via AV fistula on Monday, Wednesday and Friday.  3.  Permanent atrial fibrillation, status post AV luis ablation and a single chamber VVI pacemaker on 2021 in [facility].  4.  Former heavy tobacco user of 43 pack years.  Quit in 2021.  5.  Pacemaker in situ.    Following a recent visit, I had referred her to see Dr. Janet Sanderson in CV Surgery.  After a detailed evaluation and discussion with the patient, he did not deem her to be a surgical candidate due to very high risk.  I agree with this.  Conservative management has been recommended.    At her last visit, we had made adjustments to her torsemide dosage.  She takes 40 mg b.i.d. 4 days a week and skips the morning dose on dialysis days.  She has been doing okay with this and her weight is stable at 132 pounds.    Her primary symptoms are significant leg cramps, tingling in both hands.  Also, when she was eating [place] dinner, she had a witnessed presyncope.  Her sister says the patient was sitting and eating and suddenly slumped forward, but did not choke on her food.  She appeared ashen but came around very quickly and reported \"feeling cold and frozen\" but no other symptoms.    DIAGNOSES:    1.  Severe functional tricuspid valve regurgitation that " preceded placement of a ventricular pacemaker lead, deemed unsuitable for surgery due to high risk.  2.  Right heart failure with decreased systolic function.  3.  History of non sustained ventricular tachycardia with recent syncope with no arrhythmias on pacemaker interrogation.  4.  Dialysis dependent end-stage renal disease.    ASSESSMENT:    Francine had a concerning syncope without warning while eating Joanie dinner.  I had her pacemaker evaluated in clinic today and it did not show any arrhythmias on Joanie Gi or Joanie, or in the in the daysdays.  She is known to have brief episodes of nonsustained VT, but the last one was in the early part of December.    I suspect that her symptoms of leg cramps, tingling and generally feeling fatigued may be related to fluid shifts and electrolyte imbalances in the context of dialysis.  I have advised her to increase her daily fluid intake and touch base with her primary provider.    PLAN:    1.  Start low-dose metoprolol XL 12.5 mg 2 times daily for nonsustained VT.  New prescriptions given.  2.  Increase fluid intake from current 38 ounces to 50 ounces daily.  3.  Continue current diuretic regimen of torsemide 20 mg b.i.d. and skip the morning dose on dialysis days.  4.  Establish care with Cardiology [facility] in [facility] for management of severe tricuspid valve regurgitation and right heart failure.  5.  Patient will touch base with her primary provider and nephrologist about restless legs and potential fluid/electrolyte shift management.  6.  Reiterated to Francine that our goal will be to manage her severe tricuspid valve regurgitation conservatively.  She is not a candidate for surgery due to high risk.  Catheter based therapies are still in evolution for management of functional TR in the presence of a pacemaker lead.  She understands.  7.  I will see her in yearly or as needed.      Alex Holt MD    D: 01/03/2022   T: 01/03/2022   MT: [id number]  Name:      TIFFANIE CHO  MRN:      [id number]        Account:      [id number]   :      1945           Service Date: 2022   Document: [id number]

## 2022-01-04 NOTE — PROGRESS NOTES
----- Message -----   From: Alex Holt MD   Sent: 1/3/2022   4:46 PM CST   To: Emilee Merino RN   Subject: [facility]                                     Hi Ekaterina   Please schedule in [facility] with Eileen Fuentes, Eileen South or Julia Serrato.   Thanks   SJ   ==============    Message from Dr Holt after visit and device interrogation. Plan per office note to establish care with Cardiology [facility] in [facility] for management of severe tricuspid valve regurgitation and right heart failure.    Called to patient to set up visit to establish care in [facility]. Patient and sister agree. Messaged to [facility] team and scheduling - orders placed.  Ekaterina Hebert RN 01/04/22 9:28 AM

## 2022-01-04 NOTE — LETTER
1/4/2022         RE: Francine Chris  [facility]  [address]  [facility] [address]        Dear Colleague,    Thank you for referring your patient, Francine Chris, to the [facility]. Please see a copy of my visit note below.    HPI:   Chief complaints: Francine Chris is a pleasant 76 year old female who presents for evaluation of diffuse itching. For the past 3 months she itches from head to toe. Initially she had a rash but this has resolved. It seemed to start after she switched dialysis centers.       PHYSICAL EXAM:    BP (!) 140/72   Pulse 80   Skin exam performed as follows: Type 2 skin. Mood appropriate  Alert and Oriented X 3. Well developed, well nourished in no distress.  General appearance: Normal  Head including face: Normal  Eyes: conjunctiva and lids: Normal  Mouth: Lips, teeth, gums: Normal  Neck: Normal  Cardiovascular: Exam of peripheral vascular system by observation for swelling, varicosities, edema: Normal  Right upper extremity: Normal  Left upper extremity: Normal  Right lower extremity: Normal  Left lower extremity: Normal  Skin: Scalp and body hair: See below    Skin clear    ASSESSMENT/PLAN:     1. Favor uremic pruritus   --Start TAC BID x 1-2 weeks then PRN  --Thick emollients daily  --Discussed [facility] but transportation is difficult for her        Follow-up: 1-2 months  CC:   Scribed By: Sophia Damico, MS, PA-C          Again, thank you for allowing me to participate in the care of your patient.        Sincerely,        Sophia Damico PA-C

## 2022-01-04 NOTE — PATIENT INSTRUCTIONS
I suspect the itching is from dryness and kidneys    Apply triamcinolone cream to itchy areas twice per day for 1-2 weeks then try to take a break for 1-2 weeks    Apply a thick moisturizer every day (over the prescription) - CeraVe cream, Cetaphil cream, Eucerin cream, Vanicream and Gold Bond     Follow-up in 2-3 months

## 2022-01-04 NOTE — TELEPHONE ENCOUNTER
It depends on her sx.   Use for 1 week and if still itchy then she can continue for 1 additional week.   Then she should take a break for 1-2 weeks.     If itchy again, then she can resume for 1-2 weeks tapering with improvement.

## 2022-01-04 NOTE — PROGRESS NOTES
HPI:   Chief complaints: Francine Chris is a pleasant 76 year old female who presents for evaluation of diffuse itching. For the past 3 months she itches from head to toe. Initially she had a rash but this has resolved. It seemed to start after she switched dialysis centers.       PHYSICAL EXAM:    BP (!) 140/72   Pulse 80   Skin exam performed as follows: Type 2 skin. Mood appropriate  Alert and Oriented X 3. Well developed, well nourished in no distress.  General appearance: Normal  Head including face: Normal  Eyes: conjunctiva and lids: Normal  Mouth: Lips, teeth, gums: Normal  Neck: Normal  Cardiovascular: Exam of peripheral vascular system by observation for swelling, varicosities, edema: Normal  Right upper extremity: Normal  Left upper extremity: Normal  Right lower extremity: Normal  Left lower extremity: Normal  Skin: Scalp and body hair: See below    Skin clear    ASSESSMENT/PLAN:     1. Favor uremic pruritus   --Start TAC BID x 1-2 weeks then PRN  --Thick emollients daily  --Discussed [facility] but transportation is difficult for her        Follow-up: 1-2 months  CC:   Scribed By: Sophia Damico, MS, PA-C

## 2022-01-04 NOTE — TELEPHONE ENCOUNTER
[facility] Call Center    Phone Message    May a detailed message be left on voicemail: yes     Reason for Call: Medication Question or concern regarding medication   Prescription Clarification  Name of Medication:   triamcinolone (KENALOG) 0.1 % external cream     Prescribing Provider:   Sophia Damico     Pharmacy: NA      What on the order needs clarification?   Living facility requests clarification of directions. Should Rx be used for 1 week or 2 weeks?    Please return call to advise. Thanks!      Action Taken: Other: [facility]    Travel Screening: Not Applicable

## 2022-01-05 NOTE — TELEPHONE ENCOUNTER
Called and spoke to Fabby (RN @[facility]).    Verbalized directions for triamcinolone:  -Use for 1 week and if still itchy can continue for 1 additional week  -Then take a break for 1-2 weeks    Fabby is going to write up orders for one week and call back for verbal orders if additional week is needed.    Fabby voiced understanding.    IDANIA Frias-BSN-PHN  [facility]  [phone]

## 2022-01-05 NOTE — TELEPHONE ENCOUNTER
Fabby from [facility] returning triage call. Please call her back at # [phone]  Ok to leave detailed message on Fabby Secured voicemail re: medication.    Thank you  An Le

## 2022-01-05 NOTE — PROGRESS NOTES
"[facility]  [facility] Record Number:  [id number]  Place of Service where encounter took place:  [facility] ([facility]) [61]  Chief Complaint   Patient presents with     RECHECK     neuropathy       HPI:    Francine Chris  is a 76 year old (1945) female with hx of ESRD on HD, afib s/p AV node ablation and pacemaker on DOAC (dx 2013), HTN, combined systolic and diastolic HF, mod-severe tricuspid regurgitation not candidate for surgical repair, mid ascending aortic enlargement, BL LE edema, PAD, QTc prolongation, COPD, pulmonary HTN, hx of smoking, macular degeneration, anxiety/depression, SKY on CPAP, PUD, who is being seen today for an episodic care visit.       HPI information obtained from: facility chart records, facility staff, patient report, [facility] chart review, Care Everywhere [facility] chart review and family/first contact sister Tereza \"Mugs\" report.     Recent history reviewed, hospitalized at [facility] from 6/21/21 to 7/5/21 with nausea/weakness/dairrhea and acute renal failure along with supratheraputic INR, hyperkalemia, and digoxin toxicity. Per nephrology note unclear precipitating event for SILAS but likely severe ATN in setting of hypotension related to N/V and afib with RVR. Imaging negative for acute process.  Required emergent femoral line for HD on admission, was transitioned to right chest wall tunneled catheter on 6/29/21 as renal function did not return to baseline prior to discharge and HD still required. Now getting HD MWF at [facility] via R CW tunneled cath under care of Nephrologist Dr. Derrick Ro MD.  R brachiobasilic fistula placed 10/21/21 by Dr. Arredondo at [facility] without complication, 2nd stage of brachiobasilic fistula 12/01/21 also w/o complication. During hospitalization in June 2021 coumadin was switched to apixaban.  Hospital stay significant for fluid overload and was restarted torsemide. Continues on " "PRN supplemental oxygen, has upcoming pulmonology visit. Permanent pacemaker placed 6/25/21 s/p ablation due to inability to use digoxin in setting of renal failure. Recovered at [facility] and discharged to [facility]. Per hospital discharge was supposed to follow-up at [facility] for Pacemaker check, but lost to follow-up. Now receives cardiology care in [facility] with Dr. Holt.     Today's concern is:  Follow-up today schedule to address neuropathy, but several other issues more pressing.    Presented to the ER yesterday (1/10/22) after fall, reported tripping over boots. Fall presumed mechanical in nature, denies LOC, dizziness, but did hit her head. Swelling and small cut above left lateral eyebrow.  Reported some chronic SOB to ER physician and new symptom of hematemesis  Per ER notes, reported this has been occurring 1-2 times per week. Physician offered admission but patient declined.  Discussed futher today, occurs in the morning, coughing, then sputum has blood in it. No nausea or vomiting. No abd pain. Hgb dropped from 11-12 range in Nov 2021 to ~ 9 over last two months. . Hgb 9.8 in ER yesterday.     Labs also significant for leukocytosis WBC 12.7 K. No fever/chills, but reports weakness since 1/7/22 so much so that she skipped her HD run on 1/7/22 and was volume overloaded on Monday 1/10/22. Came back from HD just prior to fall and was feeling very tired. COVID-19 positive residents in building, no COVID-19 swab in ER.     Also concerning is pre-syncopal episode Dr. Holt's note from 1/3/22, occurred at hospitals dinner. Pacemaker interrogation completed no arrhythmias.     Reports today no HA, able to eat breakfast.   Very tired, came back ER at 9 am last night.  No more pre-syncopal episodes.  Didn't want to stay in the hospital, \"hospitals and I don't get along.\"     Breathing \"doing okay.\"  Getting light headed when she gets up too quickly.   Fall " "occurred after dialysis run.   Resident feels cardiology and nephrology not in agreement about volume status and this is causing stress.    Mentioned she skipped HD on 1/7 due to fatigue.   Fluid up on Monday after missing run.   Missed pulmonology test yesterday.  Significant SANCHEZ but no hypoxia.    Spoke to sister Jhoan shortly after visit. Notes \"huge decline\" since holidays.   Continued weakness. Not eating well. Feeling guilt about her diet due to fluid issues.   Sister feels like hematemesis report x2 in last month.   Reports resident using oxygen overnight for comfort.   At end of discussion sister reports episode of chest pain (patient did not report this during visit) and the pacemaker team also called, but sister and facility staff are not clear on why they called, no notes in Epic.     Recent weights:      Past Medical and Surgical History reviewed in Epic today.    MEDICATIONS:  Current Outpatient Medications   Medication Sig Dispense Refill     ACE/ARB/ARNI NOT PRESCRIBED (INTENTIONAL) Please choose reason not prescribed from choices below.       acetaminophen (TYLENOL) 325 MG tablet Take 2 tablets (650 mg) by mouth 2 times daily. May also take 2 tablets (650 mg) 2 times daily as needed for pain. 120 tablet 98     albuterol (PROAIR HFA/PROVENTIL HFA/VENTOLIN HFA) 108 (90 Base) MCG/ACT inhaler Inhale 2 puffs into the lungs every 4 hours as needed       allopurinol (ZYLOPRIM) 100 MG tablet TAKE ONE TABLET BY MOUTH THREE TIMES A WEEK ON MONDAY, WEDNESDAY AND FRIDAY AFTER HD SESSION 12 tablet 97     apixaban ANTICOAGULANT (ELIQUIS) 2.5 MG tablet Take 1 tablet (2.5 mg) by mouth 2 times daily 60 tablet 11     artificial saliva (BIOTENE MT) AERS spray Take 1 spray by mouth every 6 hours as needed for dry mouth MAY KEEP AT BEDSIDE 30 mL 89     atorvastatin (LIPITOR) 40 MG tablet Take 1 tablet (40 mg) by mouth daily 30 tablet 11     diphenhydrAMINE (BENADRYL) 2 % external cream Apply topically 3 times daily as " "needed for itching 30 g 98     loratadine (CLARITIN) 10 MG tablet Take 1 tablet (10 mg) by mouth At Bedtime 30 tablet 98     Melatonin 10 MG TABS tablet Take 10 mg by mouth nightly as needed       metoprolol succinate ER (TOPROL-XL) 25 MG 24 hr tablet Take 0.5 tablets (12.5 mg) by mouth 2 times daily 60 tablet 4     multivitamin RENAL (RENAVITE RX/NEPHROVITE) 1 MG tablet Take 1 tablet by mouth daily       pantoprazole (PROTONIX) 40 MG EC tablet Take 1 tablet (40 mg) by mouth daily 30 tablet 11     sodium chloride (OCEAN) 0.65 % nasal spray Spray 1 spray into both nostrils every 2 hours as needed for congestion MAY KEEP AT BEDSIDE 50 mL 98     tiotropium-olodaterol (STIOLTO RESPIMAT) 2.5-2.5 MCG/ACT AERS Inhale 2 puffs into the lungs daily       torsemide (DEMADEX) 20 MG tablet Take 2 tablets (40 mg) by mouth daily Take at 8 AM and 1 PM. (Patient taking differently: Take 40 mg by mouth daily Take at 8 AM x4 on TuThSaSu (non-HD days) and 1 PM all other days.) 120 tablet 4     triamcinolone (KENALOG) 0.1 % external cream Apply to AA BID x 1-2 week then PRN only 454 g 2     REVIEW OF SYSTEMS:  Limited secondary to cognitive impairment but today pt reports history as per HPI.    Objective:  /57   Pulse 77   Temp 96.8  F (36  C)   Resp 20   Ht 1.651 m (5' 5\")   Wt 61.7 kg (136 lb)   SpO2 99%   BMI 22.63 kg/m    Exam:  GENERAL APPEARANCE:  NAD, ashen, chronically ill appearing  HEENT: about 1 cm x 2 cm area of soft tissue swelling superior to left orbit with small scab, sclera clear and conjunctiva normal, EOM in tact, PERRL BL, MMM, R TM intact with good cone of light, L TM obstructed with wax.  RESP:  Non-labored breathing, palpation of chest w/ L CW pacer, also R tunnel cath with dressing CDI, no chest wall tenderness, no respiratory distress, Lung sounds clear, SpO2 98% on room air.  + SANCHEZ on exertion when stands to get something, resolves with rest  CV:  Palpation - no murmur/non-displaced PMI, L CW " pacemaker and R tunneled cath with dressing CDI no erythema or edema. Auscultation - rate and rhythm irregular. HR 76.     Standing /70.  VASCULAR: R UE fistula with well approximated incision, + bruit/+thrill. Trace edema bilateral lower extremities.  ABDOMEN:  Normal bowel sounds, soft, nontender, no grimacing or guarding with palpation.   M/S:  +khypohosis, gait and station furniture walks around room with unsteady gait, Scoliosis thoracic spine.  SKIN:  Inspection - Chest wall tunneled cath with CDI dressing no erythema or edema. Dry skin. No excoriations. Palpation- no increased warmth, skin is dry and non-tender.  NEURO: Cranial nerves II-XII grossly intact except hearing, no facial asymmetry, follows simple commands, moves all extremities symmetrically, normal tone, no tremor noted today.    Labs:   Recent labs in [facility] reviewed by me today.    CBC RESULTS: Recent Labs   Lab Test 01/10/22  1755 11/11/21  0700   WBC 12.7* 6.0   RBC 2.80* 3.88   HGB 9.8* 13.0   HCT 29.2* 40.1   * 103*   MCH 35.0* 33.5*   MCHC 33.6 32.4   RDW 14.9 14.0    201     Last Basic Metabolic Panel:  Recent Labs   Lab Test 01/10/22  1756 01/10/22  1623 12/09/21  0716   *  --  130*   POTASSIUM 3.8  --  3.4   CHLORIDE 91*  --  92*   SILVIA 9.4  --  10.4*   CO2 27  --  30   BUN 39*  --  41*   CR 2.66*  --  2.79*   * 167* 96         Echocardiogram 12/07/21  Interpretation Summary  Left ventricular systolic function is low normal. The visual ejection fraction is 50-55%.  The right ventricle is mildly dilated.The right ventricular systolic function  is mildly reduced.  The left atrium is severely dilated. The right atrium is severely dilated.  There is severe (4+) tricuspid regurgitation. Mechanism seems to be poor coaptation the TV leaflets due to impingement by the pacemaker wire. The tip of pacemaker wire seems to be angled towards the base-mid interventricular septum instead of RV apex.  There is mild (1+)  aortic and mild-moderate mitral regurgitation.  The right ventricular systolic pressure is approximated at 60-65 mmHg based on a RA pressure of 15 mmHg  Elevated vright atrial pressure of >15 mmHg.  No prior study for comparison.  Findings discussed with Dr. Holt.    EXAM: CT CHEST WO   LOCATION: [facility]ICAL [facility]AG[facility]   DATE/TIME: 4/28/2021 4:00 PM     INDICATION: Abnormal chest x-ray with right lower lung nodules.   COMPARISON: CTA chest 04/06/2013, chest x-ray 4/28/2021 and 4/7/2020.   TECHNIQUE: CT chest without IV contrast. Multiplanar reformats were obtained. Dose reduction techniques were used.   CONTRAST: None.     FINDINGS:   LUNGS AND PLEURA: Scattered strands of fibrotic appearing scarring bilaterally most marked in the lingula and lower lobes. No lesions are seen to correspond with the nodular opacity on chest x-ray and these are likely due to overlapping structures. There are a few small bilateral pulmonary nodules including a 5 mm nodule right lower lobe on series 3 image 177 and 5 mm nodule left lower lobe on image 179 unchanged going back to 2013 and can be considered benign.     MEDIASTINUM/AXILLAE: No mediastinal, hilar or axillary adenopathy. Normal caliber esophagus. No pericardial effusion. Cardiac enlargement. Aortic arterial vascular calcifications.     CORONARY ARTERY CALCIFICATION: Severe.     UPPER ABDOMEN: Marked cortical atrophy left kidney. Tiny amount of free fluid adjacent to the anterior liver.     MUSCULOSKELETAL: Degenerative change thoracic spine.     IMPRESSION:   1.  Scattered strands of fibrotic appearing scarring with benign small bilateral nodules stable going back to 2013. No further followup needed.   2.  No worrisome pulmonary mass or infiltrate.    CHEST TWO VIEW 1/4/2022 1:31 PM       INDICATION: COPD mixed type.     COMPARISON: None                                                                    IMPRESSION: Cardiomegaly with single lead cardiac pacemaker. Right internal  jugular dialysis catheter tip near the superior atriocaval junction.  Scoliosis. The pulmonary vascularity is within normal limits. No  pulmonary edema. Lungs are mildly hyperinflated but are otherwise  clear. No pleural effusion.     KYMBERLY DAUGHERTY MD      SYSTEM ID:  [id number]    CT SCAN OF THE HEAD WITHOUT CONTRAST   1/10/2022 4:46 PM     HISTORY: Head trauma, minor (age >= 65 years).     TECHNIQUE: Axial images of the head and coronal reformations without  IV contrast material. Radiation dose for this scan was reduced using  automated exposure control, adjustment of the mA and/or kV according  to patient size, or iterative reconstruction technique.     COMPARISON: None.     FINDINGS: Mild volume loss is present. White matter hypoattenuation  likely represents mild chronic small vessel ischemic change. Possible  tiny old right cerebellar infarct. Possible old basal ganglia lacunar  infarcts. No evidence of acute ischemia, hemorrhage, mass, mass effect  or hydrocephalus. The visualized calvarium, tympanic cavities, mastoid  cavities, and paranasal sinuses are unremarkable. Bilateral lens  replacements.                                                                 IMPRESSION: No acute intracranial abnormality.     RENATO GREEN MD     ASSESSMENT/PLAN:  (R26.9) Abnormal gait  (primary encounter diagnosis)  Comment: Presumed mechanical fall, but several other medical issues may have contributed including low blood pressure, physical deconditioning.   Plan:   - Address medical concerns as below  - If increasing weakness, low threshold to return to ER  - Offered home health therapy, but would like to continue outpatient cardiac rehab   - Fall precautions: reduce  apartment, use walker when up, change position slowly    (D72.829) Leukocytosis, unspecified type  Comment: Etiology unclear, afebrile, + fatigue. Central line looks to be intact without infection, no apparent skin concerns. Reviewed with   Tom.     1/10 - negative    CXR 1/4 negative  Plan:   - Repeat mobile CXR  - Will attempt to monitor interval CBC and BMP with HD >> call them when open tomorrow  - Swab for COVID-19, multiple COVID positive patients in assisted living facility     (D53.9) Macrocytic anemia  (R04.2) Hemoptysis  (K92.0) Hematemesis without nausea  Comment: Hgb dropped from 11-12 range in Nov 2021 to ~ 9 over last two months. . Hgb 9.8 in ER yesterday. Now reporting vomiting blood and blood in sputum.  Hgb as low as 7.3 in July 2021, slowly increased to 12.2 on 10/11/21 over last two months.   9/20/21: Iron 49 WNL, Ferritin 836 H  7/5/21: B12 305 WNL   Plan:   - CXR due to possible blood in sputum  - Pulmonology follow-up 1/26/22, consider chest CT given smoking history  - Adult Gastro Ref - Consult Only, [place] [phone], sister to schedule, consider upper and lower scopes  - Sent referral to  to assist with getting to [facility]  - If recurrent episodes recommend back to ER for possible hospitalization as appropriate, declined admission yesterday          (N18.6) ESRD (end stage renal disease) (H)  (E87.1) Hyponatremia  Comment: Newly diagnosed ESRD with SILAS on CKD stage III June 2021 etiology unclear but likely severe ATN with vomiting and afib with RVR/hypotension prior.   Dry weight 53 kg or 116#, now 136# today, trouble following diet and missed HD run 1/7.   Chronically low sodium last 128, selective serotonin reuptake inhibitor stopped in Dec 2021.  Plan:   - HD tomorrow AM, will attempt to call to discuss call and issues with diet, current oral fluid restriction - 1400 ml/day fluid including food   - R CW tunneled cath care by HD nursing, goal to remove once fistula vs graft matured to reduce infection/bleeding risk  - Renally dose medications and avoid nephrotoxins  - MTM PharmD following    (I50.812) Chronic right-sided heart failure (H)  (I07.1) Tricuspid valve insufficiency, unspecified  etiology  (I27.20) Pulmonary hypertension (H)  Comment: Chronic, established care with Dr. Holt in [facility] system, she discontinued sildenafil and ambrisentan due to lack of efficacy. Slightly volume up today after missing HD run.  HFpEF with tricuspid valve regurgitation, RV dysfunction, pulmonary hypertension.  Last echo 12/7/21.  Not candidate for tricuspid valve repair due to frailty - saw Dr. Harding 12/27/21.  Dry weight 53 kg (116#), today 61 kg (136#).  No SOB, orthopnea, PND, leg swelling. Chronic SANCHEZ, slightly worse today.  Started cardio-pulmonary rehab.   Possible episode of chest pain this morning, reported by sister.  Plan:   - Instructed to return to ER if continued chest pain  - Torsemide 40 mg daily at 1 pm and in addition 40 mg daily at 8 am on Tu, Th, Sa, Sun (non-HD days)   - Monday and Thursday weights, notify NP if weight > 125#  - Continue PRN oxygen   - Follow-up with [facility] this month  - Routine weights labs VS   - Continue cardio-pulmonary rehab     (I48.91) Atrial fibrillation, unspecified type (H)  Comment: Chronic afib with digoxin toxicity in June 2021.   S/p ablation and pacemaker placement 6/25/21 anticoagulated in apixaban.    Plan:    - Continue apixaban  - Facility nursing to clarify issue with pacemaker and call today, attempt to call #s provided by sister, but both incorrect  - Follow-up [facility] 1/27/22    (J44.9) Chronic obstructive pulmonary disease, unspecified COPD type (H)  (G47.33) SKY  Comment: Stable. No cough, sputum production, purulence, but possible hemoptysis. Using oxygen 2 L/min PRN. Recently stopped smoking (June 2021) after 60 year history of 1-2 PPD. Followed by pulmonology in past in [facility]. Using inhalers without issue, administered by nursing staff at [facility]. Wants oxygen out of room, taking up too much space in small apartment, but still using PRN for SOB episodes.   Plan:   - Pulmonology referral - fist appointment 1/26/22, plan to get PFTs  prior  - Does not want CPAP, would benefit from nocturnal oxygen study, defer to pulmonology to setup   - Continue PRN albuterol  - Continue Stiolto respimat daily   - PRN supplemental oxygen in place    (H61.22) Impacted cerumen of left ear  Comment: Mild cerumen impaction left ear  Plan:   - Carbamide peroxide (DEBROX) 6.5 % at HS x 4 days    (Z71.89) Advance Care Planning  Comment: Frail resident with multiple acute and chronic health concerns. Family requested discussion of treatment options, including hospice care and move to higher level care for increased supports - currently full code. Sister concerned about renal diet and fluid restriction, [facility] does offer support in this area but LTC would. Sister is becoming new grandmother and concerned she will not be able to support resident as much in coming months and LTC team may be able to fill in the gaps. Discussed quality of life and what that means for resident.  Plan:  - Resident and sister to discuss possible move to long-term care. Mugs to enlist assistance of [facility].    Total time with a new patient visit in the assisted livin minutes including discussions about the POC and care coordination with the patient, first contact, facility staff and attempted to call pacemaker clinic. Greater than 50% of total time spent with counseling and coordinating care due to resident with multiple comorbid conditions with several acute medical concerns, need to discuss plan fo care with patient and family. 45/60 minutes in counseling and care coordination.  12:00 - 12:15 PM (15 minutes) Patient visit for H +P, discussion with facility nursing staff regarding clinical status.  2:55 - 3:05 PM (10 minutes) Discuss case with attending physician Dr. Santos due to medical complexity.  3:11 PM - 3:56 PM (45 minutes) Spoke to sister Jhoan regarding multiple medical concerns. Reviewed status of multiple diagnoses including recent diagnostic  testing/labs, risk/beneit of treatment options, medication risk/benefit including DOAC, prognosis, and instructions for follow-up care including upcoming specialist appointments.    Electronically signed by:  JUSTO Jerry CNP

## 2022-01-05 NOTE — TELEPHONE ENCOUNTER
Called and LM for Fabby (RN @[facility]) to call back.    IDANIA Frias-BSN-PHN  [facility]  [phone]

## 2022-01-10 NOTE — ED NOTES
Bed: ED01  Expected date:   Expected time:   Means of arrival:   Comments:  Cristo 594 75 YO F fall ETA 1600

## 2022-01-10 NOTE — ED TRIAGE NOTES
Pt had dialysis today, states she felt fine then, was at home in her assisted living and she tripped on a rug and fell to her knees, hit her head, no loc. Has been weak since the fall

## 2022-01-10 NOTE — ED NOTES
Patient was able to walk with a walker around the room, she noted that she felt dizzy but was able to walk with out assistance.

## 2022-01-10 NOTE — ED PROVIDER NOTES
History   Chief Complaint:  Fall and Generalized Weakness       HPI   Francine Chris is a 76 year old female anticoagulated on Eliquis with history of end stage renal disease and osteoporosis who presents with a fall. The patient states she tripped over her boots today around 1330 and fell down. She says she was feeling fine prior to the fall and had no dizziness or lightheadedness. She did hit her head but denies any loss of consciousness. After the fall she reports she felt some lightheadedness which she still is feeling right now. No fever, chest pain, nausea, or diarrhea. She reports some shortness of breath but attributes this to COPD. She also states that she vomited this morning which contained blood. She says this happens once or twice a week but has never seen a GI doctor or gotten an endoscopy for this. The patient is on dialysis and goes M/W/F. She went today and had a full run of 5 lbs which is normal for her. She says her cardiologist wants her to increase this, but her nephrologist is in disagreement. She takes a blood thinner for A fib which she states she is always in. She uses a walker and says she was using it when she fell.     Review of Systems   Constitutional: Negative for fever.   Respiratory: Positive for shortness of breath.    Cardiovascular: Negative for chest pain.   Gastrointestinal: Positive for vomiting (With blood). Negative for diarrhea and nausea.   Neurological: Positive for light-headedness. Negative for syncope.   All other systems reviewed and are negative.    Allergies:  Sotalol  Bupropion    Medications:  Tylenol  Zyloprim  Eliquis  Lipitor  Toprol  Protonix  Demadex  Albuterol  Stiolto respimat    Past Medical History:     Acute renal failure  Anemia in end stage renal disease  Atrophy of left kidney  Chronic A fib  COPD  Depression  Hypertension  Gout  Hypercalcemia  Lung nodule  Malignant melanoma  Migraine  Osteoporosis  PAD  Polyclonal gammopathy  Tobacco use  "disorder  Neoplasm of skin of neck  Pulmonary hypertension  Age-related macular degeneration  Atrophy of left kidney    Past Surgical History:    Right ankle fracture  Spontaneous AB  Tonsillectomy  Tubal ligation  Hysterectomy  [place] teeth extraction  Small bowel resection  Cataract removal    Family History:    Mother: Hypertension, Arthritis, Circulatory disease, Heart disease, Lipids, Osteoporosis  Father: Alcohol/drug abuse, Circulatory disease, Heart disease  Sister: Hypertension, Thyroid disease, Arthritis, Osteoporosis  Brother: Obesity, Alcohol/drug abuse, PTSD, CAD, Bladder cancer, Diabetes, MI, Prostate cancer    Social History:  The patient presents alone. Former smoker.    Physical Exam     Patient Vitals for the past 24 hrs:   BP Temp Pulse Resp SpO2 Height Weight   01/10/22 1937 114/66 -- 74 20 98 % -- --   01/10/22 1815 -- -- -- -- 99 % -- --   01/10/22 1750 121/56 -- 76 -- -- -- --   01/10/22 1617 137/56 97.7  F (36.5  C) 80 20 100 % 1.651 m (5' 5\") 59.9 kg (132 lb)     Physical Exam  General: Patient is alert and normal appearing.  HEENT: Head atraumatic    Eyes: pupils equal and reactive. Conjunctiva clear   Nares: patent   Oropharynx: no lesions, uvula midline, no palatal draping, normal voice, no trismus  Neck: Supple without lymphadenopathy, no meningismus  Chest: Heart regular rate and rhythm.  Dialysis dialysis catheter in the right chest wall with no erythema or warmth surrounding  Lungs: Equal clear to auscultation with no wheeze or rales  Abdomen: Soft, non tender, nondistended, normal bowel sounds  Back: No costovertebral angle tenderness, no midline C, T or L spine tenderness  Neuro: Grossly nonfocal, normal speech, strength equal bilaterally, CN 2-12 intact  Extremities: No deformities, equal radial and DP pulses. No clubbing, cyanosis.  No edema  Skin: Warm and dry with no rash.       Emergency Department Course   ECG  ECG obtained at 1618, ECG read at 1623  Atrial fibrillation with " frequent ventricular-paced complexes  Left axis deviation  ST & T wave abnormality, consider inferior ischemia  Abnormal ECG  Rate 77 bpm. TN interval * ms. QRS duration 80 ms. QT/QTc 408/461 ms. P-R-T axes * -33 -82.     Imaging:  CT Head w/o Contrast:  No acute intracranial abnormality.  Report per radiology    Laboratory:  Glucose by meter: 167 (H)     BMP: Sodium 128 (L), Chloride 91 (L), Urea Nitrogen 39 (H), Creatinine 2.66 (H), Glucose 145 (H), GFR Estimate 18 (L), o/w WNL    CBC: WBC 12.7 (H), HGB 9.8 (L),     UA with microscopic: Orange Color (A), Cloudy Appearance (A), Large Blood (A), Protein Albumin 200 (A), Few Bacteria (A), Moderate Budding Yeast (A), RBC >182 (H), WBC 43 (H), o/w WNL    Emergency Department Course:  Reviewed:  I reviewed nursing notes, vitals, past medical history and Care Everywhere    Assessments:  1615 I obtained history and examined the patient as noted above.   1936 I rechecked the patient and explained findings.    Disposition:  The patient was discharged to home.     Impression & Plan   Medical Decision Making:  Patient is a 76-year-old female on Eliquis who presents following a mechanical fall with some associated lightheadedness following the fall.  She had no neck pain or back pain.  No pelvis or hip pain.  Patient normally walks with a walker.  Patient went to dialysis today and had a full run with she thinks 5 to 6 pounds removed.  Here the patient's work-up was undertaken as noted above.  Head CT scan without acute intracranial trauma.  She understands possibility of remote bleed and return precautions.  Patient did endorse that she has intermittent hematemesis.  She had one episode today.  Her hemoglobin is 9.9 and it is the same as it was 1 week ago.  I offered to admit her for upper endoscopy and GI evaluation but she declines this and wishes to go home.  She ambulates with a walker here with steady and strong gait and feels that she is at her baseline.  She  understands need for follow-up with GI and if she has any further hematemesis or bloody or black stool need she needs to return immediately to the emergency department.  She denies any bloody or black stool.  She has a benign abdominal exam and evaluation.  Patient's sodium is slightly low which I suspect is likely related to dialysis today.  That will continue to be followed.  She is afebrile and hemodynamically stable.  No significant evidence of UTI and urine cultures pending.  Return precautions the emergency department reviewed at length.  Follow-up instructions discussed and all questions and concerns addressed.      Diagnosis:    ICD-10-CM    1. Fall, initial encounter  W19.XXXA    2. Closed head injury, initial encounter  S09.90XA    3. End stage renal failure on dialysis (H)  N18.6     Z99.2      Scribe Disclosure:  Mackenzie BARKER, am serving as a scribe at 4:17 PM on 1/10/2022 to document services personally performed by Angela Leal MD based on my observations and the provider's statements to me.      Angela Leal MD  01/10/22 2050

## 2022-01-11 NOTE — ED NOTES
The patient states that her legs were starting to get restless and would like to walk. Patient walked with her walker very well and stated that she felt almost back to normal. She reports that she is able to make urine and can try and give a sample.

## 2022-01-11 NOTE — PROGRESS NOTES
[facility] Care Coordination Contact  CC received notification of Emergency Room visit.  ER visit occurred on 1/10/22 at [facility] with Dx of fall.    CC contacted Member's sister, Tereza and reviewed discharge summary. Tereza has a lot of concerns regarding member's ability to remain at the [facility] based on her medical care needs. Reviewed with Tereza that the hospital staff had offered to keep member at the hospital to investigate the blood in vomit that member reported, but that member declined wanting to return home. Tereza inquired if member would be appropriate for hospice, and this Care Coordinator encouraged Tereza to speak with the PCP regarding this. Had a discuss with Tereza if it would be appropriate for member to move to LTC at the NH, based on her medical care needs. Also encouraged Tereza to talk with the PCP regarding this, as the PCP would have good insight as to the [facility] ability to manage member's medical care needs. Reviewed with Tereza that this Care Coordinator did find a meal option place that offers ESRD diets. Reviewed with Tereza that [facility] would do meal delivery but that the meals are delivered in bulk for the week and member would need a place to store the meals and be able to heat the meals up on her own. Emailed Tereza the information to determine if member would be able to manage this.   Member has a follow-up appointment with PCP: Yes: scheduled on 1/11/22  Member has had a change in condition: No  New referrals placed: No  Home Visit Needed: No  Care plan reviewed and updated.  PCP notified of ED visit via EMR.  GAYLE Coleman  [facility]  [phone]

## 2022-01-11 NOTE — LETTER
"    1/6/2022        RE: [facility]  [address]  [facility] [address]        [facility]  [place] [facility] Record Number:  [id number]  Place of Service where encounter took place:  [facility] ([facility]) [61]  Chief Complaint   Patient presents with     RECHECK     neuropathy       HPI:    Francine Chris  is a 76 year old (1945) female with hx of ESRD on HD, afib s/p AV node ablation and pacemaker on DOAC (dx 2013), HTN, combined systolic and diastolic HF, mod-severe tricuspid regurgitation not candidate for surgical repair, mid ascending aortic enlargement, BL LE edema, PAD, QTc prolongation, COPD, pulmonary HTN, hx of smoking, macular degeneration, anxiety/depression, SKY on CPAP, PUD, who is being seen today for an episodic care visit.       HPI information obtained from: facility chart records, facility staff, patient report, [facility] chart review, Care Everywhere Epic chart review and family/first contact sister Tereza \"Mugs\" report.     Recent history reviewed, hospitalized at [facility] from 6/21/21 to 7/5/21 with nausea/weakness/dairrhea and acute renal failure along with supratheraputic INR, hyperkalemia, and digoxin toxicity. Per nephrology note unclear precipitating event for SILAS but likely severe ATN in setting of hypotension related to N/V and afib with RVR. Imaging negative for acute process.  Required emergent femoral line for HD on admission, was transitioned to right chest wall tunneled catheter on 6/29/21 as renal function did not return to baseline prior to discharge and HD still required. Now getting HD MWF at [facility] via R CW tunneled cath under care of Nephrologist Dr. Derrick Ro MD.  R brachiobasilic fistula placed 10/21/21 by Dr. Arredondo at [facility] without complication, 2nd stage of brachiobasilic fistula 12/01/21 also w/o complication. During hospitalization in June 2021 coumadin " "was switched to apixaban.  Hospital stay significant for fluid overload and was restarted torsemide. Continues on PRN supplemental oxygen, has upcoming pulmonology visit. Permanent pacemaker placed 6/25/21 s/p ablation due to inability to use digoxin in setting of renal failure. Recovered at [facility] and discharged to [facility]. Per hospital discharge was supposed to follow-up at [facility] for Pacemaker check, but lost to follow-up. Now receives cardiology care in [facility] with Dr. Holt.     Today's concern is:  Follow-up today schedule to address neuropathy, but several other issues more pressing.    Presented to the ER yesterday (1/10/22) after fall, reported tripping over boots. Fall presumed mechanical in nature, denies LOC, dizziness, but did hit her head. Swelling and small cut above left lateral eyebrow.  Reported some chronic SOB to ER physician and new symptom of hematemesis  Per ER notes, reported this has been occurring 1-2 times per week. Physician offered admission but patient declined.  Discussed futher today, occurs in the morning, coughing, then sputum has blood in it. No nausea or vomiting. No abd pain. Hgb dropped from 11-12 range in Nov 2021 to ~ 9 over last two months. . Hgb 9.8 in ER yesterday.     Labs also significant for leukocytosis WBC 12.7 K. No fever/chills, but reports weakness since 1/7/22 so much so that she skipped her HD run on 1/7/22 and was volume overloaded on Monday 1/10/22. Came back from HD just prior to fall and was feeling very tired. COVID-19 positive residents in building, no COVID-19 swab in ER.     Also concerning is pre-syncopal episode Dr. Holt's note from 1/3/22, occurred at Chirstmas dinner. Pacemaker interrogation completed no arrhythmias.     Reports today no HA, able to eat breakfast.   Very tired, came back ER at 9 am last night.  No more pre-syncopal episodes.  Didn't want to stay in the hospital, \"hospitals and " "I don't get along.\"     Breathing \"doing okay.\"  Getting light headed when she gets up too quickly.   Fall occurred after dialysis run.   Resident feels cardiology and nephrology not in agreement about volume status and this is causing stress.    Mentioned she skipped HD on 1/7 due to fatigue.   Fluid up on Monday after missing run.   Missed pulmonology test yesterday.  Significant SANCHEZ but no hypoxia.    Spoke to sister Jhoan shortly after visit. Notes \"huge decline\" since holidays.   Continued weakness. Not eating well. Feeling guilt about her diet due to fluid issues.   Sister feels like hematemesis report x2 in last month.   Reports resident using oxygen overnight for comfort.   At end of discussion sister reports episode of chest pain (patient did not report this during visit) and the pacemaker team also called, but sister and facility staff are not clear on why they called, no notes in Epic.     Recent weights:      Past Medical and Surgical History reviewed in Epic today.    MEDICATIONS:  Current Outpatient Medications   Medication Sig Dispense Refill     ACE/ARB/ARNI NOT PRESCRIBED (INTENTIONAL) Please choose reason not prescribed from choices below.       acetaminophen (TYLENOL) 325 MG tablet Take 2 tablets (650 mg) by mouth 2 times daily. May also take 2 tablets (650 mg) 2 times daily as needed for pain. 120 tablet 98     albuterol (PROAIR HFA/PROVENTIL HFA/VENTOLIN HFA) 108 (90 Base) MCG/ACT inhaler Inhale 2 puffs into the lungs every 4 hours as needed       allopurinol (ZYLOPRIM) 100 MG tablet TAKE ONE TABLET BY MOUTH THREE TIMES A WEEK ON MONDAY, WEDNESDAY AND FRIDAY AFTER HD SESSION 12 tablet 97     apixaban ANTICOAGULANT (ELIQUIS) 2.5 MG tablet Take 1 tablet (2.5 mg) by mouth 2 times daily 60 tablet 11     artificial saliva (BIOTENE MT) AERS spray Take 1 spray by mouth every 6 hours as needed for dry mouth MAY KEEP AT BEDSIDE 30 mL 89     atorvastatin (LIPITOR) 40 MG tablet Take 1 tablet (40 mg) by " "mouth daily 30 tablet 11     diphenhydrAMINE (BENADRYL) 2 % external cream Apply topically 3 times daily as needed for itching 30 g 98     loratadine (CLARITIN) 10 MG tablet Take 1 tablet (10 mg) by mouth At Bedtime 30 tablet 98     Melatonin 10 MG TABS tablet Take 10 mg by mouth nightly as needed       metoprolol succinate ER (TOPROL-XL) 25 MG 24 hr tablet Take 0.5 tablets (12.5 mg) by mouth 2 times daily 60 tablet 4     multivitamin RENAL (RENAVITE RX/NEPHROVITE) 1 MG tablet Take 1 tablet by mouth daily       pantoprazole (PROTONIX) 40 MG EC tablet Take 1 tablet (40 mg) by mouth daily 30 tablet 11     sodium chloride (OCEAN) 0.65 % nasal spray Spray 1 spray into both nostrils every 2 hours as needed for congestion MAY KEEP AT BEDSIDE 50 mL 98     tiotropium-olodaterol (STIOLTO RESPIMAT) 2.5-2.5 MCG/ACT AERS Inhale 2 puffs into the lungs daily       torsemide (DEMADEX) 20 MG tablet Take 2 tablets (40 mg) by mouth daily Take at 8 AM and 1 PM. (Patient taking differently: Take 40 mg by mouth daily Take at 8 AM x4 on TuThSaSu (non-HD days) and 1 PM all other days.) 120 tablet 4     triamcinolone (KENALOG) 0.1 % external cream Apply to AA BID x 1-2 week then PRN only 454 g 2     REVIEW OF SYSTEMS:  Limited secondary to cognitive impairment but today pt reports history as per HPI.    Objective:  /57   Pulse 77   Temp 96.8  F (36  C)   Resp 20   Ht 1.651 m (5' 5\")   Wt 61.7 kg (136 lb)   SpO2 99%   BMI 22.63 kg/m    Exam:  GENERAL APPEARANCE:  NAD, ashen, chronically ill appearing  HEENT: about 1 cm x 2 cm area of soft tissue swelling superior to left orbit with small scab, sclera clear and conjunctiva normal, EOM in tact, PERRL BL, MMM, R TM intact with good cone of light, L TM obstructed with wax.  RESP:  Non-labored breathing, palpation of chest w/ L CW pacer, also R tunnel cath with dressing CDI, no chest wall tenderness, no respiratory distress, Lung sounds clear, SpO2 98% on room air.  + SANCHEZ on " exertion when stands to get something, resolves with rest  CV:  Palpation - no murmur/non-displaced PMI, L CW pacemaker and R tunneled cath with dressing CDI no erythema or edema. Auscultation - rate and rhythm irregular. HR 76.     Standing /70.  VASCULAR: R UE fistula with well approximated incision, + bruit/+thrill. Trace edema bilateral lower extremities.  ABDOMEN:  Normal bowel sounds, soft, nontender, no grimacing or guarding with palpation.   M/S:  +khypohosis, gait and station furniture walks around room with unsteady gait, Scoliosis thoracic spine.  SKIN:  Inspection - Chest wall tunneled cath with CDI dressing no erythema or edema. Dry skin. No excoriations. Palpation- no increased warmth, skin is dry and non-tender.  NEURO: Cranial nerves II-XII grossly intact except hearing, no facial asymmetry, follows simple commands, moves all extremities symmetrically, normal tone, no tremor noted today.    Labs:   Recent labs in [facility] reviewed by me today.    CBC RESULTS: Recent Labs   Lab Test 01/10/22  1755 11/11/21  0700   WBC 12.7* 6.0   RBC 2.80* 3.88   HGB 9.8* 13.0   HCT 29.2* 40.1   * 103*   MCH 35.0* 33.5*   MCHC 33.6 32.4   RDW 14.9 14.0    201     Last Basic Metabolic Panel:  Recent Labs   Lab Test 01/10/22  1756 01/10/22  1623 12/09/21  0716   *  --  130*   POTASSIUM 3.8  --  3.4   CHLORIDE 91*  --  92*   SILVIA 9.4  --  10.4*   CO2 27  --  30   BUN 39*  --  41*   CR 2.66*  --  2.79*   * 167* 96         Echocardiogram 12/07/21  Interpretation Summary  Left ventricular systolic function is low normal. The visual ejection fraction is 50-55%.  The right ventricle is mildly dilated.The right ventricular systolic function  is mildly reduced.  The left atrium is severely dilated. The right atrium is severely dilated.  There is severe (4+) tricuspid regurgitation. Mechanism seems to be poor coaptation the TV leaflets due to impingement by the pacemaker wire. The tip of pacemaker  wire seems to be angled towards the base-mid interventricular septum instead of RV apex.  There is mild (1+) aortic and mild-moderate mitral regurgitation.  The right ventricular systolic pressure is approximated at 60-65 mmHg based on a RA pressure of 15 mmHg  Elevated vright atrial pressure of >15 mmHg.  No prior study for comparison.  Findings discussed with Dr. Holt.    EXAM: CT CHEST WO   LOCATION: [facility] MEDICAL IMAGING   DATE/TIME: 4/28/2021 4:00 PM     INDICATION: Abnormal chest x-ray with right lower lung nodules.   COMPARISON: CTA chest 04/06/2013, chest x-ray 4/28/2021 and 4/7/2020.   TECHNIQUE: CT chest without IV contrast. Multiplanar reformats were obtained. Dose reduction techniques were used.   CONTRAST: None.     FINDINGS:   LUNGS AND PLEURA: Scattered strands of fibrotic appearing scarring bilaterally most marked in the lingula and lower lobes. No lesions are seen to correspond with the nodular opacity on chest x-ray and these are likely due to overlapping structures. There are a few small bilateral pulmonary nodules including a 5 mm nodule right lower lobe on series 3 image 177 and 5 mm nodule left lower lobe on image 179 unchanged going back to 2013 and can be considered benign.     MEDIASTINUM/AXILLAE: No mediastinal, hilar or axillary adenopathy. Normal caliber esophagus. No pericardial effusion. Cardiac enlargement. Aortic arterial vascular calcifications.     CORONARY ARTERY CALCIFICATION: Severe.     UPPER ABDOMEN: Marked cortical atrophy left kidney. Tiny amount of free fluid adjacent to the anterior liver.     MUSCULOSKELETAL: Degenerative change thoracic spine.     IMPRESSION:   1.  Scattered strands of fibrotic appearing scarring with benign small bilateral nodules stable going back to 2013. No further followup needed.   2.  No worrisome pulmonary mass or infiltrate.    CHEST TWO VIEW 1/4/2022 1:31 PM       INDICATION: COPD mixed type.     COMPARISON: None                                                                     IMPRESSION: Cardiomegaly with single lead cardiac pacemaker. Right internal jugular dialysis catheter tip near the superior atriocaval junction.  Scoliosis. The pulmonary vascularity is within normal limits. No  pulmonary edema. Lungs are mildly hyperinflated but are otherwise  clear. No pleural effusion.     KYMBERLY DAUGHERTY MD      SYSTEM ID:  [id number]    CT SCAN OF THE HEAD WITHOUT CONTRAST   1/10/2022 4:46 PM     HISTORY: Head trauma, minor (age >= 65 years).     TECHNIQUE: Axial images of the head and coronal reformations without  IV contrast material. Radiation dose for this scan was reduced using  automated exposure control, adjustment of the mA and/or kV according  to patient size, or iterative reconstruction technique.     COMPARISON: None.     FINDINGS: Mild volume loss is present. White matter hypoattenuation  likely represents mild chronic small vessel ischemic change. Possible  tiny old right cerebellar infarct. Possible old basal ganglia lacunar  infarcts. No evidence of acute ischemia, hemorrhage, mass, mass effect  or hydrocephalus. The visualized calvarium, tympanic cavities, mastoid  cavities, and paranasal sinuses are unremarkable. Bilateral lens  replacements.                                                                 IMPRESSION: No acute intracranial abnormality.     RENATO GREEN MD     ASSESSMENT/PLAN:  (R26.9) Abnormal gait  (primary encounter diagnosis)  Comment: Presumed mechanical fall, but several other medical issues may have contributed including low blood pressure, physical deconditioning.   Plan:   - Address medical concerns as below  - If increasing weakness, low threshold to return to ER  - Offered home health therapy, but would like to continue outpatient cardiac rehab   - Fall precautions: reduce  apartment, use walker when up, change position slowly    (D72.829) Leukocytosis, unspecified type  Comment: Etiology unclear, afebrile, +  fatigue. Central line looks to be intact without infection, no apparent skin concerns. Reviewed with Dr. Santos.    UC 1/10 - negative    CXR 1/4 negative  Plan:   - Repeat mobile CXR  - Will attempt to monitor interval CBC and BMP with HD >> call them when open tomorrow  - Swab for COVID-19, multiple COVID positive patients in assisted living facility     (D53.9) Macrocytic anemia  (R04.2) Hemoptysis  (K92.0) Hematemesis without nausea  Comment: Hgb dropped from 11-12 range in Nov 2021 to ~ 9 over last two months. . Hgb 9.8 in ER yesterday. Now reporting vomiting blood and blood in sputum.  Hgb as low as 7.3 in July 2021, slowly increased to 12.2 on 10/11/21 over last two months.   9/20/21: Iron 49 WNL, Ferritin 836 H  7/5/21: B12 305 WNL   Plan:   - CXR due to possible blood in sputum  - Pulmonology follow-up 1/26/22, consider chest CT given smoking history  - Adult Gastro Ref - Consult Only, [phone], sister to schedule, consider upper and lower scopes  - Sent referral to  to assist with getting to [facility]  - If recurrent episodes recommend back to ER for possible hospitalization as appropriate, declined admission yesterday          (N18.6) ESRD (end stage renal disease) (H)  (E87.1) Hyponatremia  Comment: Newly diagnosed ESRD with SILAS on CKD stage III June 2021 etiology unclear but likely severe ATN with vomiting and afib with RVR/hypotension prior.   Dry weight 53 kg or 116#, now 136# today, trouble following diet and missed HD run 1/7.   Chronically low sodium last 128, selective serotonin reuptake inhibitor stopped in Dec 2021.  Plan:   - HD tomorrow AM, will attempt to call to discuss call and issues with diet, current oral fluid restriction - 1400 ml/day fluid including food   - R CW tunneled cath care by HD nursing, goal to remove once fistula vs graft matured to reduce infection/bleeding risk  - Renally dose medications and avoid nephrotoxins  - [facility]  following    (I50.812) Chronic right-sided heart failure (H)  (I07.1) Tricuspid valve insufficiency, unspecified etiology  (I27.20) Pulmonary hypertension (H)  Comment: Chronic, established care with Dr. Holt in [facility], she discontinued sildenafil and ambrisentan due to lack of efficacy. Slightly volume up today after missing HD run.  HFpEF with tricuspid valve regurgitation, RV dysfunction, pulmonary hypertension.  Last echo 12/7/21.  Not candidate for tricuspid valve repair due to frailty - saw Dr. Harding 12/27/21.  Dry weight 53 kg (116#), today 61 kg (136#).  No SOB, orthopnea, PND, leg swelling. Chronic SANCHEZ, slightly worse today.  Started cardio-pulmonary rehab.   Possible episode of chest pain this morning, reported by sister.  Plan:   - Instructed to return to ER if continued chest pain  - Torsemide 40 mg daily at 1 pm and in addition 40 mg daily at 8 am on Tu, Th, Sa, Sun (non-HD days)   - Monday and Thursday weights, notify NP if weight > 125#  - Continue PRN oxygen   - Follow-up with [facility] this month  - Routine weights labs VS   - Continue cardio-pulmonary rehab     (I48.91) Atrial fibrillation, unspecified type (H)  Comment: Chronic afib with digoxin toxicity in June 2021.   S/p ablation and pacemaker placement 6/25/21 anticoagulated in apixaban.    Plan:    - Continue apixaban  - Facility nursing to clarify issue with pacemaker and call today, attempt to call #s provided by sister, but both incorrect  - Follow-up [facility] 1/27/22    (J44.9) Chronic obstructive pulmonary disease, unspecified COPD type (H)  (G47.33) SKY  Comment: Stable. No cough, sputum production, purulence, but possible hemoptysis. Using oxygen 2 L/min PRN. Recently stopped smoking (June 2021) after 60 year history of 1-2 PPD. Followed by pulmonology in past in [facility]. Using inhalers without issue, administered by nursing staff at [facility]. Wants oxygen out of room, taking up too much space in small apartment, but  still using PRN for SOB episodes.   Plan:   - Pulmonology referral - fist appointment 22, plan to get PFTs prior  - Does not want CPAP, would benefit from nocturnal oxygen study, defer to pulmonology to setup   - Continue PRN albuterol  - Continue Stiolto respimat daily   - PRN supplemental oxygen in place    (H61.22) Impacted cerumen of left ear  Comment: Mild cerumen impaction left ear  Plan:   - Carbamide peroxide (DEBROX) 6.5 % at HS x 4 days    (Z71.89) Advance Care Planning  Comment: Frail resident with multiple acute and chronic health concerns. Family requested discussion of treatment options, including hospice care and move to higher level care for increased supports - currently full code. Sister concerned about renal diet and fluid restriction, [facility] does offer support in this area but LTC would. Sister is becoming new grandmother and concerned she will not be able to support resident as much in coming months and LTC team may be able to fill in the gaps. Discussed quality of life and what that means for resident.  Plan:  - Resident and sister to discuss possible move to long-term care. Mugs to enlist assistance of [employer] , Jesica Whitehead.    Total time with a new patient visit in the assisted livin minutes including discussions about the POC and care coordination with the patient, first contact, facility staff and attempted to call pacemaker clinic. Greater than 50% of total time spent with counseling and coordinating care due to resident with multiple comorbid conditions with several acute medical concerns, need to discuss plan fo care with patient and family. 45/60 minutes in counseling and care coordination.  12:00 - 12:15 PM (15 minutes) Patient visit for H +P, discussion with facility nursing staff regarding clinical status.  2:55 - 3:05 PM (10 minutes) Discuss case with attending physician Dr. Santos due to medical complexity.  3:11 PM - 3:56 PM (45 minutes) Spoke to  sister Mugs regarding multiple medical concerns. Reviewed status of multiple diagnoses including recent diagnostic testing/labs, risk/beneit of treatment options, medication risk/benefit including DOAC, prognosis, and instructions for follow-up care including upcoming specialist appointments.    Electronically signed by:  JUSTO Jerry CNP                     Sincerely,        JUSTO Jerry CNP

## 2022-01-12 PROBLEM — R07.9 CHEST PAIN, UNSPECIFIED TYPE: Status: ACTIVE | Noted: 2022-01-01

## 2022-01-12 PROBLEM — E87.1 HYPONATREMIA: Status: ACTIVE | Noted: 2021-01-01

## 2022-01-12 NOTE — ED NOTES
Bed: ED20  Expected date: 1/12/22  Expected time: 12:25 AM  Means of arrival: Ambulance  Comments:  Cristo  [address] 76F CP; rashida

## 2022-01-12 NOTE — ED PROVIDER NOTES
History     Chief Complaint:  Chest Pain      HPI   Francine Chris is a 76 year old female who takes Eliquis and has a history of CHF, CKD, atrial fibrillation, has a pacemaker, and presents with chest pain. The patient has had left sided chest pain that started this morning. She was seen yesterday in the ED for a fall, but did not have chest pain then. She stated that she was vomiting, and coughed up blood yesterday however. The chest pain is intermittent, and the episodes last anywhere from a couple minutes up to 10 minutes. She has some trouble breathing. Furthermore, she has no cough today, but had two nosebleeds this morning. She has no sore throat, but has some nausea. She had her dialysis yesterday, and has been taking her eliquis as prescribed.    Review of Systems   HENT: Negative for sore throat.    Respiratory: Positive for cough (coughing up blood) and shortness of breath.    Cardiovascular: Positive for chest pain.   Gastrointestinal: Positive for nausea and vomiting.   All other systems reviewed and are negative.    Allergies:  Sotalol  Bupropion    Medications:    albuterol  allopurinol   Eliquis  atorvastatin  Benadryl  metoprolol succinate ER   pantoprazole   tiotropium-olodaterol  torsemide   triamcinolone  Citalopram  ambrisentan  Sildenafil    Past Medical History:    Acute renal failure  CKD, on dialysis  Atrophy of left kidney  CKD  COPD  Cor pulmonale  Depression  dermatitis of lower extremity  Hypertension  Hypercalcemia  NSVT  SKY  malignant melanoma  Hyponatremia  Macular degeneration  Osteoporosis  Pacemaker  PAD  Polyclonal gammopathy  Migraines  Tobacco use disorder   Atrial fibrillation with RVR  Nummular eczema  Systolic and diastolic CHF  Tricuspid insufficiency    Past Surgical History:    Right ankle surgery  Tubal ligation  Spontaneous AB  tonsillectomy    Family History:    Mother: hypertension, arthritis, circulatory, heart disease, lipids, osteoporosis  Father: alcohol/drug,  circulatory, heart disease  Sister: hypertension, allergies, arthritis, osteoporosis  Brother: obesity, alcohol/drug, bladder cancer, diabetes, myocardial infarction, prostate cancer  Maternal grandmother: cerebrovascular disease, breast cancer, colorectal cancer, arthritis, gastrointestinal disease, thyroid disease  Maternal grandfather: osteoarthritis, CAD  Paternal grandmother: heart failure  Maternal Uncle: hyperlipidemia, alzheimer's disease  Other: thyroid disease    Social History:  Patient presents to the ED alone  Patient is a dialysis patient  Physical Exam     Patient Vitals for the past 24 hrs:   BP Temp Temp src Pulse Resp SpO2 Weight   01/12/22 0200 120/71 -- -- 69 18 99 % --   01/12/22 0130 128/73 -- -- 71 19 96 % --   01/12/22 0100 128/68 -- -- 71 13 97 % --   01/12/22 0044 -- -- -- -- -- -- 64 kg (141 lb)   01/12/22 0040 131/72 97.4  F (36.3  C) Oral 76 20 97 % --       Physical Exam  General: Sitting up in bed  Eyes:  The pupils are equal and round    Conjunctivae and sclerae are normal  ENT:    Wearing a mask  Neck:  Normal range of motion  CV:  Regular rate, regular rhythm     Skin warm and well perfused     Dialysis catheter on right side of chest wall    Pacemaker on left side of chest wall  Resp:  Non labored breathing on room air    No tachypnea    No cough heard    Lungs clear bilaterally  GI:  Abdomen is soft, there is no rigidity    No distension    No rebound tenderness     No abdominal tenderness  MS:  Normal muscular tone  Skin:  No rash or acute skin lesions noted  Neuro:   Awake, alert.      Speech is normal and fluent.    Face is symmetric.     Moves all extremities equally  Psych: Normal affect.  Appropriate interactions.    Emergency Department Course   ECG:  ECG taken at 0103, ECG read at 0117   Atrial-sensed ventricular-paced rhythm with prolonged AV conduction  Rate 71 bpm. NY interval 264 ms. QRS duration 132 ms. QT/QTc 484/525 ms. P-R-T axes 77 -51 115.    ECG:  ECG taken at  0234, ECG read at 0256  Ventricular-paced rhythm  Abnormal ECG  Rate 72 bpm. SC interval * ms. QRS duration 130 ms. QT/QTc 472/516 ms. P-R-T axes * -55 111.     Imaging:  XR Chest Port 1 View   Final Result   IMPRESSION: Right sided dialysis catheter with tip over right atrium. Left subclavian approach pacing device.      Result bilateral pleural effusions. No pleural effusion or pneumothorax. Stable mildly enlarged heart. Central pulmonary vasculature within normal limits.          Laboratory:  Labs Ordered and Resulted from Time of ED Arrival to Time of ED Departure   COMPREHENSIVE METABOLIC PANEL - Abnormal       Result Value    Sodium 124 (*)     Potassium 4.0      Chloride 88 (*)     Carbon Dioxide (CO2) 24      Anion Gap 12      Urea Nitrogen 61 (*)     Creatinine 4.01 (*)     Calcium 9.3      Glucose 110 (*)     Alkaline Phosphatase 148      AST 76 (*)     ALT 49      Protein Total 7.6      Albumin 3.1 (*)     Bilirubin Total 0.9      GFR Estimate 11 (*)    TROPONIN I - Abnormal    Troponin I High Sensitivity 54 (*)    CBC WITH PLATELETS AND DIFFERENTIAL - Abnormal    WBC Count 8.5      RBC Count 2.70 (*)     Hemoglobin 9.5 (*)     Hematocrit 28.4 (*)      (*)     MCH 35.2 (*)     MCHC 33.5      RDW 15.9 (*)     Platelet Count 163      % Neutrophils 74      % Lymphocytes 14      % Monocytes 10      % Eosinophils 1      % Basophils 0      % Immature Granulocytes 1      NRBCs per 100 WBC 0      Absolute Neutrophils 6.4      Absolute Lymphocytes 1.2      Absolute Monocytes 0.8      Absolute Eosinophils 0.1      Absolute Basophils 0.0      Absolute Immature Granulocytes 0.1      Absolute NRBCs 0.0     LIPASE - Normal    Lipase 317     INFLUENZA A/B & SARS-COV2 PCR MULTIPLEX - Normal    Influenza A PCR Negative      Influenza B PCR Negative      SARS CoV2 PCR Negative       Emergency Department Course:    Reviewed:  I reviewed nursing notes, vitals, past history and care everywhere    Assessments:  0056 I  obtained history and examined the patient as noted above.   0316 I rechecked the patient and explained findings.   0320 I rechecked the patient.    Consults:   0322 I spoke with Dr. Calloway of the Hospitalist service to discuss the patient's findings, presentation, and plan of care.    Interventions:  0114 Zofran 4 mg IV   0114 Tylenol 1000 mg PO  0244 Dilaudid 0.2 mg IV  0503 Aspirin EC tablet 162 mg PO    Disposition:  The patient was admitted to the hospital under the care of Dr. Calloway.    Impression & Plan       Medical Decision Making:  Francine Chris is a 76 year old female who presented to the ED with chest pain. Patient with chest pain on left side. Broad differential considered. Troponin mildly elevated. Chest xray with pleural effusions. Doubt PE. Unclear if this is cardiac related pain versus possible that she could have injured herself after the fall yesterday. No fractures on imaging. Patient improved in ED. No abdominal tenderness on exam. Spoke to hospitalist for admission. Patient agreeable to staying in hospital. I am not entirely clear if she had a little blood in cough or vomit yesterday. She also had nose bleed which may have also contributed. Hemoglobin mildly lower than yesterday but no signs of active bleeding currently.    Covid-19  Francine Chris was evaluated during a global COVID-19 pandemic, which necessitated consideration that the patient might be at risk for infection with the SARS-CoV-2 virus that causes COVID-19.   Applicable protocols for evaluation were followed during the patient's care.   COVID-19 was considered as part of the patient's evaluation. The plan for testing is:  a test was obtained during this visit. The test result is negative.  Diagnosis:    ICD-10-CM    1. Hyponatremia  E87.1    2. Chest pain, unspecified type  R07.9      Scribe Disclosure:  Bill BARKER, am serving as a scribe at 12:39 AM on 1/12/2022 to document services personally performed by  Angela Austin MD based on my observations and the provider's statements to me.      Angela Austin MD  01/12/22 0615

## 2022-01-12 NOTE — PHARMACY-ADMISSION MEDICATION HISTORY
Pharmacy Medication History  Admission medication history interview status for the 1/12/2022  admission is complete. See EPIC admission navigator for prior to admission medications     Location of Interview: Outside patient room but on unit  Medication history sources: MAR ([facility])    Significant changes made to the medication list:  None    In the past week, patient estimated taking medication this percent of the time: greater than 90%    Additional medication history information:   None    Medication reconciliation completed by provider prior to medication history? Yes    Time spent in this activity: 15 mins    Prior to Admission medications    Medication Sig Last Dose Taking? Auth Provider   acetaminophen (TYLENOL) 325 MG tablet Take 2 tablets (650 mg) by mouth 2 times daily. May also take 2 tablets (650 mg) 2 times daily as needed for pain. 1/10/2022 at 2100 Yes Eileen Arredondo APRN CNP   albuterol (PROAIR HFA/PROVENTIL HFA/VENTOLIN HFA) 108 (90 Base) MCG/ACT inhaler Inhale 2 puffs into the lungs every 4 hours as needed  at PRN Yes Reported, Patient   allopurinol (ZYLOPRIM) 100 MG tablet TAKE ONE TABLET BY MOUTH THREE TIMES A WEEK ON MONDAY, WEDNESDAY AND FRIDAY AFTER HD SESSION 1/10/2022 at 1200 Yes Eileen Arredondo APRN CNP   apixaban ANTICOAGULANT (ELIQUIS) 2.5 MG tablet Take 1 tablet (2.5 mg) by mouth 2 times daily 1/11/2022 at 1200 Yes Eileen Arredondo APRN CNP   artificial saliva (BIOTENE MT) AERS spray Take 1 spray by mouth every 6 hours as needed for dry mouth MAY KEEP AT BEDSIDE  at PRN Yes Eileen Arredondo APRN CNP   atorvastatin (LIPITOR) 40 MG tablet Take 1 tablet (40 mg) by mouth daily 1/10/2022 at 2100 Yes Eileen Arredondo APRN CNP   diphenhydrAMINE (BENADRYL) 2 % external cream Apply topically 3 times daily as needed for itching  at PRN Yes Eileen Arredondo APRN CNP   loratadine (CLARITIN) 10 MG tablet Take 1 tablet (10 mg) by mouth At Bedtime 1/10/2022 at  2100 Yes Eileen Arredondo APRN CNP   Melatonin 10 MG TABS tablet Take 10 mg by mouth nightly as needed  at PRN Yes Reported, Patient   metoprolol succinate ER (TOPROL-XL) 25 MG 24 hr tablet Take 0.5 tablets (12.5 mg) by mouth 2 times daily 1/11/2022 at 1200 Yes Alex Holt MD   multivitamin RENAL (RENAVITE RX/NEPHROVITE) 1 MG tablet Take 1 tablet by mouth daily 1/10/2022 at 2100 Yes Reported, Patient   pantoprazole (PROTONIX) 40 MG EC tablet Take 1 tablet (40 mg) by mouth daily 1/10/2022 at 1600 Yes Eileen Arredondo APRN CNP   sodium chloride (OCEAN) 0.65 % nasal spray Spray 1 spray into both nostrils every 2 hours as needed for congestion MAY KEEP AT BEDSIDE  at PRN Yes Eileen Arredondo APRN CNP   tiotropium-olodaterol (STIOLTO RESPIMAT) 2.5-2.5 MCG/ACT AERS Inhale 2 puffs into the lungs daily 1/11/2022 at 1200 Yes Reported, Patient   torsemide (DEMADEX) 20 MG tablet Take 2 tablets (40 mg) by mouth daily Take at 8 AM and 1 PM.  Patient taking differently: Take 40 mg by mouth daily Take at 8 AM x4 on TuThSaSu (non-HD days) and 1 PM all other days. 1/11/2022 at 0800 Yes Alex Holt MD   triamcinolone (KENALOG) 0.1 % external cream Apply to AA BID x 1-2 week then PRN only 1/11/2022 at 1200 Yes Sophia Damico PA-C   ACE/ARB/ARNI NOT PRESCRIBED (INTENTIONAL) Please choose reason not prescribed from choices below.   Eileen Arredondo APRN CNP       The information provided in this note is only as accurate as the sources available at the time of update(s)     Eileen Alberto PharmD

## 2022-01-12 NOTE — PROGRESS NOTES
[facility] Care Coordination Contact    TRANSITIONS OF CARE (SUZAN) LOG   SUZAN tasks should be completed by the CC within one (1) business day of notification of each transition. Follow up contact with member is required after return to their usual care setting.  Note:  If CC finds out about the transitions fifteen (15) days or more after the member has returned to their usual care setting, no SUZAN log is needed. However, the CC should check in with the member to discuss the transition process, any changes needed to the care plan and document it in a case note.    Member Name:  Francine Chris MCO Name:  [employer]ady[employer] Member ID#: [id number]   Product: [employer] Care Coordinator Contact:  GAYLE Coleman Agency/County/Care System: [facility]   Transition Communication Actions from Care Management Contact   Transition #1   Notification Date: 1/12/22 Transition Date:   1/12/22 Transition From: Assisted Living, [place]     Is this the member s usual care setting?               yes Transition To: [facility]   Transition Type:  Unplanned  Reason for Admission/Comments:  hyponatermia     Shared CC contact info, care plan/services with receiving setting--Date completed: 1/12/22  CC contacted Hospital /discharge planner via the [employer] [facility] Hand-In Process, with community care plan included.  CC reached out to sister, Tereza regarding transition and offered support as needed. Tereza talked about her concerns of member living in an AL and if member may require a higher level of care for her medical needs. Encouraged Tereza to talk with the MDs at the hospital and inquire about TCU stay. Tereza shared that she would speak with them.   Reviewed and update care plan as needed.  Notified community service providers and placed services Assisted Living on hold as needed.  Transition log initiated.   PCP, Eileen Arredondo, notified of hospitalization via  EMR.  Jesica Whitehead [facility]  [phone]    1/13/22: Received a call from the hospital Mavis RAMOS. Updated her on the conversations with the sister, and the health concerns brought up by the sister. Mavis thanked this Care Coordinator for the update. Will continue to monitor via Epic.  Jesica Whitehead [facility]  [phone]        Notified PCP, Eileen Arredondo, of transition--Date completed:  1/12/22     via  EMR   Transition #2   Transition #3  (if applicable)   Notification Date: 1/13/2022        Transition To:  [facility]  Transition Date: 1/13/2022     Transition Type:    Planned  Notified PCP, Christina Arredondo -- Date completed: 1/13/2022              Shared CC contact info, care plan/services with receiving setting or, if applicable, home care agency--Date completed:  1/13/2022  *Complete additional tasks below, if this transition is a return to usual care setting.      Comments:    OBRA 1 right faxed to [facility] admissions office.   CC contacted Nursing Home LSW ([facility]) via email with this CC contact information, reviewed community POC as well requested to be notified of concerns, care conferences and discharge planning.  CC reached out to family sister, Tereza, regarding transition and offered support as needed.  Tereza talked about how difficult it has been today to get member to dialysis as her ride didn't show up, but even if it had member requires a wheelchair transport. Tereza shared that she took member to dialysis. Tereza shared that she talked with member this morning and is thinking that LTC may be a better option for member.   Transition log updated. Emailed the transition hand in to [facility].  PCP, Christina Arredondo, notified of transition to TCU via EMR.  Jesica Whitehead [facility]  [phone]    1/26/22: Received notification from [facility] that member was in the ED today due to a fall with laceration, requiring 3  staples. Received a call from member's sister, Tereza, regarding concerns again that member wants to return back to the AL. Tereza talked about not being able to continue to support member with transportation and management of her health. Tereza shared that member missed dialysis today and dialysis said that member needed to come today at 1pm. [facility] is not able to take member. Encourage her to talk with the TCU regarding transportation. Tereza does believe that member should remain LTC at the NH.  Jesica Whitehead [facility]  [phone]    2/7/22: received a voice mail message from member's sister, Tereza inquiring on any updates. Called Tereza back and updated her that this Care Coordinator has not heard any updates. Reviewed that this Care Coordinator spoke with member's primary GNP on 2/4/22 and reviewed with her the concerns for member to return home. Updated sister that member's GNP has the same concerns as she does. Reviewed with Tereza that the elderly waiver will end on 2/11/22 and if member discharges back to the AL after this date that this Care Coordinator will need to do a rescreening. Tereza stated that she understood. No further questions. [facility] a [facility].  Jesica Whitehead [facility]  [phone]    2/11/22: member has been in the TCU for 30 days, requiring elderly waiver to close. Closed elderly waiver in [place] [facility]. Right Faxed [facility] the 2171 form with notification that member's Elderly Waiver was closed effective 1/13/22. Gave DTR to Violet trinidad. Emailed [facility] notification of elderly waiver closure and called member's sister and updated her on the elderly waiver closure and DTRs completed. Member's sister continued to talk about her recommendation of member remaining LTC at the NH. Reviewed that this Care Coordinator agrees.  Jesica Whitehead [facility]  [phone]    2/24/22: Moved to  LTC at the NH.  Jesica Whitehead [facility]  [phone]       Notification Date:          Transition To:    Transition Date:              Transition Type:    Notified PCP--Date completed:          Shared CC contact info, care plan/services with receiving setting or, if applicable, home care agency--Date completed:       *Complete additional tasks below, if this transition is a return to usual care setting.      Comments:       *Complete tasks below when the member is discharging TO their usual care setting within one (1) business day of notification.  For situations where the Care Coordinator is notified of the discharge prior to the date of discharge, the Care Coordinator must follow up with the member or designated representative to confirm that discharge actually occurred and discuss required SUZAN tasks as outlined in the SUZAN Instructions.  (This includes situations where it may be a  new  usual care setting for the member. (i.e., a community member who decides upon permanent nursing home placement following hospitalization and rehab).    Date completed: NA Communicated with member or their designated representative about the following:  care transition process; about changes to the member s health status; plan of care updates; education about transitions and how to prevent unplanned transitions/readmissions  Four Pillars for Optimal Transition:    Check  Yes  - if the member, family member and/or SNF/facility staff manages the following:    If  No  provide explanation in the comments section.          [x]  Yes     []  No     Does the member have a follow-up appointment scheduled with primary care or specialist? (Mental health hospitalizations--the appt. should be w/in 7 days)   [x]  Yes     []  No     Can the member manage their medications or is there a system in place to manage medications (e.g. home care set-up)?         [x]  Yes     []  No     Can the member verbalize warning signs and symptoms to  watch for and how to respond?         [x]  Yes     []  No     Does the member use a Personal Health Care Record?  Check  Yes  if visit summary, discharge summary, and/or healthcare summary are being used as a PHR.                                                                                                                                                                                    [x] Yes      [] No      Have you updated the member s care plan?  If  No  provide explanation in comments.   Comments:  NA- remains LTC at NH.

## 2022-01-12 NOTE — PROGRESS NOTES
[facility]    Medicine Progress Note - Hospitalist Service       Date of Admission:  1/12/2022    Assessment & Plan           Francine Chris is a 76 year old female who presents with chest pain    Note: pt is a poor historian    Chest pain  Seen in ED day prior 2/2 fall, no CP at that time. Morning of presentation with pain in area of ppm, states was associated SOB. Difficult to further delinate. Vitals stable in ED. Initial troponin 54 (just detectable but dialysis pt). CXR without acute process or CHF. EKG without ischemic changes.   - telemetry  - aspirin x 1  - serial troponins are negatvie  - Lexiscan stress test is negative, and clinical suspicion for ACS is low, no further workup planned at this point    ESKD  Hyponatremia  During hospitalization with severe hypotension/ afib with RVR in 6/2021 developed SILAS necessitating initiation of hemodialysis. Dialyzes MWF under care of Dr. Ro. Na 124 in ED (recent baseline runs low).  - nephrology consult for dialysis today  - per Dr. Palmer, hyponatremia is due to large intradialytic weight gains.  5 kg is a typical goal in HD for fluid removal.  - fluid restrict  - recheck sodium in a.m.    Permanent atrial fibrillation  S/p AVN ablation with ppm 6/2021  [needs rec- apixaban 2.5 mg BID]  - resume    Severe tricuspid regurgitation   Pulmonary hypertension  Chronic R heart failure  HLD  [needs rec- atorvastatin 40 mg daily, metoprolol 12.5 mg BID, torsemide 40 mg daily]  Echo with EF 50-55%, nl LV fn, severe TR, pulmonary pressures 60-65 mmHg. Seen by CV surgery, deemed to be too high of a surgical risk for valve replacement.   - resume meds     Anemia  Hgb at 9.5 on presentation, stable from recent  - defer to nephrology for management    COPD  [needs rec- prn albuterol, tiotropium-olodaterol 2 puffs daily]  - prn albuterol available  - resume meds     SKY  No longer wears CPAP    Anxiety  Depression   Doesn't appear to currently be on  "meds for this    Gout  - resume home allopurinol 100 mg MWF after dialysis     PUD  - resume home pantoprazole 40 mg daily     PAD  noted    COVID-19 negative    DVT Prophylaxis: DOAC  Code Status: Full Code      Disposition Plan   Expected Discharge: 01/13/2022     Anticipated discharge location:  Awaiting care coordination huddle  Delays:            The patient's care was discussed with the Bedside Nurse and Patient.    Emily Castle MD  Hospitalist Service  [facility]  Securely message with the Vocera Web Console (learn more here)  Text page via [employer] Paging/wireLawyery      Clinically Significant Risk Factors Present on Admission         # Hyponatremia: Na = 124 mmol/L (Ref range: 133 - 144 mmol/L) on admission, will monitor as appropriate     # Coagulation Defect: home medication list includes an anticoagulant medication        ______________________________________________________________________    Interval History   \"I'd really like to go home today.\"  Patient seen on hemodialysis run, she is pleased that her stress test is negative.  She says her chest pain has mostly resolved, no new respiratory or GI complaints    Data reviewed today: I reviewed all medications, new labs and imaging results over the last 24 hours. I personally reviewed the chest x-ray image(s) showing Result bilateral pleural effusions. No pleural effusion or pneumothorax. Stable mildly enlarged heart. Central pulmonary vasculature within normal limits..    Physical Exam   Vital Signs: Temp: 97.4  F (36.3  C) Temp src: Oral BP: 124/68 Pulse: 74   Resp: 15 SpO2: 95 % O2 Device: None (Room air)    Weight: 141 lbs 0 oz  Constitutional: Awake, alert, cooperative, no apparent distress  Respiratory: Clear to auscultation bilaterally,  Cardiovascular: irregularly irregular rhythm  GI: Normal bowel sounds, soft, non-distended, non-tender  Skin/Integumen: No rash on exposed skin.  She has bilateral lower extremity " edema  Other: Mood is pleasant      Data   Recent Labs   Lab 01/12/22  0113 01/10/22  1756 01/10/22  1755 01/10/22  1623   WBC 8.5  --  12.7*  --    HGB 9.5*  --  9.8*  --    *  --  104*  --      --  186  --    * 128*  --   --    POTASSIUM 4.0 3.8  --   --    CHLORIDE 88* 91*  --   --    CO2 24 27  --   --    BUN 61* 39*  --   --    CR 4.01* 2.66*  --   --    ANIONGAP 12 10  --   --    SILVIA 9.3 9.4  --   --    * 145*  --  167*   ALBUMIN 3.1*  --   --   --    PROTTOTAL 7.6  --   --   --    BILITOTAL 0.9  --   --   --    ALKPHOS 148  --   --   --    ALT 49  --   --   --    AST 76*  --   --   --    LIPASE 317  --   --   --      Recent Results (from the past 24 hour(s))   XR Chest Port 1 View    Narrative    EXAM: XR CHEST PORT 1 VIEW  LOCATION: [facility]  DATE/TIME: 1/12/2022 1:21 AM    INDICATION: chest pain  COMPARISON: 07/03/2021      Impression    IMPRESSION: Right sided dialysis catheter with tip over right atrium. Left subclavian approach pacing device.    Result bilateral pleural effusions. No pleural effusion or pneumothorax. Stable mildly enlarged heart. Central pulmonary vasculature within normal limits.     Medications       - MEDICATION INSTRUCTIONS for Dialysis Patients -   Does not apply See Admin Instructions     apixaban ANTICOAGULANT  2.5 mg Oral BID     [START ON 1/13/2022] aspirin  81 mg Oral Daily     metoprolol succinate ER  12.5 mg Oral BID     pantoprazole  40 mg Oral Daily     regadenoson  0.4 mg Intravenous Once     technetium Tc 99m tetrofosmin 2UD study  3-42 mCi Intravenous every 2 hours

## 2022-01-12 NOTE — H&P
[facility]    History and Physical  Hospitalist       Date of Admission:  1/12/2022  Date of Service (when I saw the patient): 01/12/22    Assessment & Plan   Francine Chris is a 76 year old female who presents with chest pain    Note: pt is a poor historian    Chest pain  Seen in ED day prior 2/2 fall, no CP at that time. Morning of presentation with pain in area of ppm, states was associated SOB. Difficult to further delinate. Vitals stable in ED. Initial troponin 54 (just detectable but dialysis pt). CXR without acute process or CHF. EKG without ischemic changes.   - telemetry  - aspirin x 1  - serial troponins  - plan lexiscan stress test, although suspicion for ACS if trops negative is low    ESKD  Hyponatremia  During hospitalization with severe hypotension/ afib with RVR in 6/2021 developed SILAS necessitating initiation of hemodialysis. Dialyzes MWF under care of Dr. Ro. Na 124 in ED (recent baseline runs low).  - nephrology consult for dialysis today  - fluid restrict  - monitor Na    Permanent atrial fibrillation  S/p AVN ablation with ppm 6/2021  [needs rec- apixaban 2.5 mg BID]  - resume with rec    Severe tricuspid regurgitation   Pulmonary hypertension  Chronic R heart failure  HLD  [needs rec- atorvastatin 40 mg daily, metoprolol 12.5 mg BID, torsemide 40 mg daily]  Echo with EF 50-55%, nl LV fn, severe TR, pulmonary pressures 60-65 mmHg. Seen by CV surgery, deemed to be too high of a surgical risk for valve replacement.   - resume meds with rec    Anemia  Hgb at 9.5 on presentation, stable from recent  - defer to nephrology for management    COPD  [needs rec- prn albuterol, tiotropium-olodaterol 2 puffs daily]  - prn albuterol available  - resume meds with rec    SYK  No longer wears CPAP    Anxiety  Depression   Doesn't appear to currently be on meds for this    Gout  - resume home allopurinol 100 mg MWF after dialysis with rec    PUD  - resume home pantoprazole 40 mg  daily with rec    PAD  noted    COVID-19 negative    DVT Prophylaxis: DOAC  Code Status: Full Code    Disposition: Expected discharge in 1-2 days     Ian Calloway MD  [phone] (P)  Text Page     Primary Care Physician   Eileen Arredondo    Chief Complaint   Chest pain    History is obtained from the patient and medical records. Pt is a difficult historian    History of Present Illness   Francine Chris is a 76 year old female who presents with chest pain.  She had been in the emergency department the day prior after a mechanical fall.  She did not endorse chest pain at that time.  She reports in the morning of presentation she had chest pain.  She is very vague on its characteristics but she does point over her pacemaker site.  She thinks it may be lasted 10 minutes.  She thinks she may have had shortness of breath.  It sounds like the pain may have come back later in the day although this is not clear.  She states that pain over her pacemaker site then progressed to body aches all over.  She currently is complaining of right shoulder pain which is improved with an ice pack.  She states she might of been slightly nauseous with the chest pain but is not clear.  She also states she may have had some mild abdominal pain when she got to the emergency department.  She denies any cardiac history.  She still has her dialysis catheter in right upper chest but is using her fistula in her right arm.  She still does make urine.  She thinks her edema is stable from previous but this is not clear.    Past Medical History    I have reviewed this patient's medical history and updated it with pertinent information if needed.   Past Medical History:   Diagnosis Date     Acute renal failure (ARF) (H) 06/22/2021     Anemia in chronic kidney disease, on chronic dialysis (H) 07/14/2021     Atrophy of left kidney 05/18/2017     Chronic atrial fibrillation (H) 04/01/2013     Chronic kidney disease, stage III (moderate) (H)  11/13/2019    Formatting of this note might be different from the original. Cr=1.2     COPD mixed type (H) 04/06/2013    Formatting of this note might be different from the original. On Qvar and combivent, hx of cor pulmonale     Cor pulmonale (chronic) (H) 09/20/2021     Depressive disorder, not elsewhere classified      Dermatitis of lower extremity 05/05/2017     Elevated blood pressure reading without diagnosis of hypertension 12/10/2004     HTN (hypertension) 09/07/2011     Hx of gout 08/13/2014     Hypercalcemia 07/16/2018    Formatting of this note might be different from the original. 7/16/18  Ca++=10.8   Alb=3.8   7/17/18:  PTH=95.6  Ca++=10.1 (after diuresis), some renal impairment, was on vit D and calcium outpt     Hypertensive heart and kidney disease 07/16/2018    Formatting of this note might be different from the original. Atrophic left kidney, rise in Cr with increase in bumex 2018     Hyponatremia 06/22/2021     Intermediate stage nonexudative age-related macular degeneration of both eyes 01/13/2021     Lung nodule 04/28/2021     Malignant melanoma (H)      Migraine variant 12/10/2004     Problem list name updated by automated process. Provider to review     Neoplasm of skin of neck 06/06/2014     NSVT (nonsustained ventricular tachycardia) (H) 04/09/2020     SKY (obstructive sleep apnea) 04/08/2020     Osteoporosis 11/10/2004     Problem list name updated by automated process. Provider to review     Osteoporosis, unspecified dx 8/02     Pacemaker 07/14/2021    Formatting of this note might be different from the original. 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 On sotolol     Tobacco use disorder      Variants of migraine, not elsewhere classified, without mention of intractable migraine without mention of  status migrainosus     flashing lights       Past Surgical History   I have reviewed this patient's surgical history and updated it with pertinent information if needed.  Past Surgical History:   Procedure Laterality Date     ZZC NONSPECIFIC PROCEDURE  1990's    right ankle fracture     ZZC NONSPECIFIC PROCEDURE      spontaneous AB     ZZC NONSPECIFIC PROCEDURE      tonsillectomy     ZZC NONSPECIFIC PROCEDURE  1980's    tubal ligation       Prior to Admission Medications   Prior to Admission Medications   Prescriptions Last Dose Informant Patient Reported? Taking?   ACE/ARB/ARNI NOT PRESCRIBED (INTENTIONAL)   No No   Sig: Please choose reason not prescribed from choices below.   Melatonin 10 MG TABS tablet   Yes No   Sig: Take 10 mg by mouth nightly as needed   acetaminophen (TYLENOL) 325 MG tablet   No No   Sig: Take 2 tablets (650 mg) by mouth 2 times daily. May also take 2 tablets (650 mg) 2 times daily as needed for pain.   albuterol (PROAIR HFA/PROVENTIL HFA/VENTOLIN HFA) 108 (90 Base) MCG/ACT inhaler   Yes No   Sig: Inhale 2 puffs into the lungs every 4 hours as needed   allopurinol (ZYLOPRIM) 100 MG tablet   No No   Sig: TAKE ONE TABLET BY MOUTH THREE TIMES A WEEK ON MONDAY, WEDNESDAY AND FRIDAY AFTER HD SESSION   apixaban ANTICOAGULANT (ELIQUIS) 2.5 MG tablet   No No   Sig: Take 1 tablet (2.5 mg) by mouth 2 times daily   artificial saliva (BIOTENE MT) AERS spray   No No   Sig: Take 1 spray by mouth every 6 hours as needed for dry mouth MAY KEEP AT BEDSIDE   atorvastatin (LIPITOR) 40 MG tablet   No No   Sig: Take 1 tablet (40 mg) by mouth daily   diphenhydrAMINE (BENADRYL) 2 % external cream   No No   Sig: Apply topically 3 times daily as needed for itching   loratadine (CLARITIN) 10 MG tablet   No No   Sig: Take 1 tablet (10 mg) by mouth At Bedtime   metoprolol succinate ER (TOPROL-XL) 25 MG 24 hr tablet   No No   Sig: Take 0.5 tablets (12.5 mg) by mouth 2 times daily   multivitamin RENAL (RENAVITE  RX/NEPHROVITE) 1 MG tablet   Yes No   Sig: Take 1 tablet by mouth daily   pantoprazole (PROTONIX) 40 MG EC tablet   No No   Sig: Take 1 tablet (40 mg) by mouth daily   sodium chloride (OCEAN) 0.65 % nasal spray   No No   Sig: Spray 1 spray into both nostrils every 2 hours as needed for congestion MAY KEEP AT BEDSIDE   tiotropium-olodaterol (STIOLTO RESPIMAT) 2.5-2.5 MCG/ACT AERS   Yes No   Sig: Inhale 2 puffs into the lungs daily   torsemide (DEMADEX) 20 MG tablet   No No   Sig: Take 2 tablets (40 mg) by mouth daily Take at 8 AM and 1 PM.   Patient taking differently: Take 40 mg by mouth daily Take at 8 AM x4 on TuThSaSu (non-HD days) and 1 PM all other days.   triamcinolone (KENALOG) 0.1 % external cream   No No   Sig: Apply to AA BID x 1-2 week then PRN only      Facility-Administered Medications: None     Allergies   Allergies   Allergen Reactions     Sotalol Other (See Comments)     Prolonged QT     Bupropion Other (See Comments)     Na 118 acutely. Comorbid diuresis for heart failure       Social History   I have reviewed this patient's social history and updated it with pertinent information if needed. Francine Chris  reports that she quit smoking about 9 months ago. Her smoking use included cigarettes. She has a 43.00 pack-year smoking history. She has never used smokeless tobacco. She reports current alcohol use. She reports that she does not use drugs.    Family History   I have reviewed this patient's family history and updated it with pertinent information if needed.   Family History   Problem Relation Age of Onset     Hypertension Mother      Arthritis Mother      Circulatory Mother      Heart Disease Mother      Lipids Mother      Osteoporosis Mother      Alcohol/Drug Father         ?MI     Circulatory Father      Heart Disease Father      Hypertension Sister      Allergies Sister         thyroid dz     Arthritis Sister      Osteoporosis Sister      Obesity Brother      Alcohol/Drug Brother          dm, ptsd, cad     Bladder Cancer Brother      Diabetes Brother      Myocardial Infarction Brother      Prostate Cancer Brother      Cerebrovascular Disease Maternal Grandmother      Breast Cancer Maternal Grandmother         94     Cancer - colorectal Maternal Grandmother         97     Arthritis Maternal Grandmother      Gastrointestinal Disease Maternal Grandmother      Osteoporosis Maternal Grandmother      Thyroid Disease Maternal Grandmother      Osteoarthritis Maternal Grandfather      C.A.D. Maternal Grandfather      Heart Failure Paternal Grandmother      Hyperlipidemia Maternal Uncle      Alzheimer Disease Maternal Uncle      Thyroid Disease Other         niece       Review of Systems   The 10 point Review of Systems is negative other than noted in the HPI or here.     Physical Exam   Temp: 97.4  F (36.3  C) Temp src: Oral BP: 120/71 Pulse: 69   Resp: 18 SpO2: 99 % O2 Device: None (Room air)    Vital Signs with Ranges  141 lbs 0 oz    Constitutional: alert, oriented and in no acute distress  Eyes: EOMI, PERRL  HEENT: OP clear  Respiratory: CTA B without w/c  Cardiovascular: RRR no murmur. 3+ edema.  GI: soft, nontender, nondistended, BS present  Lymph/Hematologic: no cervical LAD  Genitourinary: deferred  Skin: no rashes or lesions grossly. R dialysis catheter site c/d/i  Musculoskeletal: no deformities or arthritis. R arm fistula with good thrill  Neurologic: CN II-XII, LAZO  Psychiatric: mood and affect wnl    Data   Data reviewed today:  I personally reviewed the EKG tracing showing paced, no ischemic changes and the chest x-ray image(s) showing no acute process or CHF.  Recent Labs   Lab 01/12/22  0113 01/10/22  1756 01/10/22  1755 01/10/22  1623   WBC 8.5  --  12.7*  --    HGB 9.5*  --  9.8*  --    *  --  104*  --      --  186  --    * 128*  --   --    POTASSIUM 4.0 3.8  --   --    CHLORIDE 88* 91*  --   --    CO2 24 27  --   --    BUN 61* 39*  --   --    CR 4.01* 2.66*  --   --     ANIONGAP 12 10  --   --    SILVIA 9.3 9.4  --   --    * 145*  --  167*   ALBUMIN 3.1*  --   --   --    PROTTOTAL 7.6  --   --   --    BILITOTAL 0.9  --   --   --    ALKPHOS 148  --   --   --    ALT 49  --   --   --    AST 76*  --   --   --    LIPASE 317  --   --   --        Recent Results (from the past 24 hour(s))   XR Chest Port 1 View    Narrative    EXAM: XR CHEST PORT 1 VIEW  LOCATION: [facility]  DATE/TIME: 1/12/2022 1:21 AM    INDICATION: chest pain  COMPARISON: 07/03/2021      Impression    IMPRESSION: Right sided dialysis catheter with tip over right atrium. Left subclavian approach pacing device.    Result bilateral pleural effusions. No pleural effusion or pneumothorax. Stable mildly enlarged heart. Central pulmonary vasculature within normal limits.

## 2022-01-12 NOTE — PROGRESS NOTES
Potassium   Date Value Ref Range Status   01/12/2022 4.0 3.4 - 5.3 mmol/L Final   01/12/2005 4.5 3.4 - 5.3 mmol/L Final     Hemoglobin   Date Value Ref Range Status   01/12/2022 9.5 (L) 11.7 - 15.7 g/dL Final   01/12/2005 15.6 11.7 - 15.7 g/dL Final     Creatinine   Date Value Ref Range Status   01/12/2022 4.01 (H) 0.52 - 1.04 mg/dL Final   01/12/2005 0.80 0.60 - 1.30 mg/dL Final     Urea Nitrogen   Date Value Ref Range Status   01/12/2022 61 (H) 7 - 30 mg/dL Final   01/12/2005 16 7 - 30 mg/dL Final     Sodium   Date Value Ref Range Status   01/12/2022 124 (L) 133 - 144 mmol/L Final   01/12/2005 141 133 - 144 mmol/L Final     No results found for: INR    DIALYSIS PROCEDURE NOTE  Hepatitis status of previous patient on machine log was checked and verified ok to use with this patients hepatitis status.  Patient dialyzed for3.5 hrs. on a K3 bath Na 132 Bicarb 35 with a net fluid removal of  4L.  A BFR of 350 ml/min was obtained via a RIJ.     The treatment plan was discussed with Dr. Palmer during the treatment.    Total heparin received during the treatment: 1500 units.   Line flushed, clamped and capped with heparin 1:1000 1.6 mL (1600 units) per lumen    Meds  given: NONE   Complications: NONE      Person educated: pt. Knowledge base substantial. Barriers to learning: none. Educated on fluids via verbal mode. patient verbalized understanding. Pt prefers verbal education style.     ICEBOAT? Timeout performed pre-treatment  I: Patient was identified using 2 identifiers  C:  Consent Signed Yes  E: Equipment preventative maintenance is current and dialysis delivery system OK to use  B: Hepatitis B Surface Antigen: unknown; Draw Date: 1.12.22      Hepatitis B Surface Antibody: unknown; Draw Date: 1/12/2022  O: Dialysis orders present and complete prior to treatment  A: Vascular access verified and assessed prior to treatment  T: Treatment was performed at a clinically appropriate time  ?: Patient was allowed to ask  questions and address concerns prior to treatment  See flowsheet in EPIC for further details and post assessment.  Machine water alarm in place and functioning. Transducer pods intact and checked every 15min.   Pt returned via cart.  Chlorine/Chloramine water system checked every 4 hours.  Outpatient Dialysis at [facility]

## 2022-01-12 NOTE — ED TRIAGE NOTES
"Patient presents via EMS with complaints of chest pain, starting this morning. Was seen in ED yesterday after a fall, didn't have the chest pain then. States the pain has been intermittent. Hasn't had chest pain like this before. Patient states she has a hx of COPD and \"a leaky heart valve\". States she uses 2L NC oxygen at night, but has been wearing it during the day today. Endorses nausea with no vomiting. Patient does dialysis MWF, went yesterday (but missed Friday).  Has a pacemaker in place, currently 100% paced. Upon arrival to ED, patient is breathing evenly, non labored, and is 98% on room air.   "

## 2022-01-12 NOTE — ED NOTES
"  [facility]  ED Nurse Handoff Report    ED Chief complaint: Chest Pain      ED Diagnosis:   Final diagnoses:   Hyponatremia   Chest pain, unspecified type       Code Status: to be decided by admitting    Allergies:   Allergies   Allergen Reactions     Sotalol Other (See Comments)     Prolonged QT     Bupropion Other (See Comments)     Na 118 acutely. Comorbid diuresis for heart failure       Patient Story:   Patient presents via EMS with complaints of chest pain, starting this morning. Was seen in ED yesterday after a fall, didn't have the chest pain then. States the pain has been intermittent. Hasn't had chest pain like this before. Patient states she has a hx of COPD and \"a leaky heart valve\". States she uses 2L NC oxygen at night, but has been wearing it during the day today. Endorses nausea with no vomiting. Patient does dialysis MWF, went yesterday (but missed Friday).  Has a pacemaker in place, currently 100% paced. Upon arrival to ED, patient is breathing evenly, non labored, and is 98% on room air.     Focused Assessment:  See above    Treatments and/or interventions provided:     Tylenol, zofran, and dilaudid     Patient's response to treatments and/or interventions:     Some improvement to pain    Troponin at 54    To be done/followed up on inpatient unit:  N/A    Does this patient have any cognitive concerns?: none    Activity level - Baseline/Home:  Walker  Activity Level - Current:   Unknown    Patient's Preferred language: English   Needed?: No    Isolation: None  Infection: Not Applicable  Patient tested for COVID 19 prior to admission: YES  Bariatric?: No    Vital Signs:   Vitals:    01/12/22 0044 01/12/22 0100 01/12/22 0130 01/12/22 0200   BP:  128/68 128/73 120/71   Pulse:  71 71 69   Resp:  13 19 18   Temp:       TempSrc:       SpO2:  97% 96% 99%   Weight: 64 kg (141 lb)          Cardiac Rhythm: NSR, 100% paced    Was the PSS-3 completed:   Yes  What interventions are " required if any?               Family Comments: lives at assisted living  OBS brochure/video discussed/provided to patient/family: Yes              Name of person given brochure if not patient: N/A              Relationship to patient: N/A    For the majority of the shift this patient's behavior was Green.   Behavioral interventions performed were none.    ED NURSE PHONE NUMBER: [id number]

## 2022-01-12 NOTE — PROGRESS NOTES
RECEIVING UNIT ED HANDOFF REVIEW    ED Nurse Handoff Report was reviewed by: Jana Pedro RN on January 12, 2022 at 7:40 AM

## 2022-01-12 NOTE — PROGRESS NOTES
Lexiscan Stress: Pt tolerated well. VSS. Pt does endorse some  right shoulder pain prior to injection. Pt denied chest pain or pressure prior to injection. After injection Lexiscan, pt reports no new sx.    1120 Pt transferred back to  5522-1  per w/c. Detailed report called to iliana Bates  RN.

## 2022-01-12 NOTE — ED NOTES
Patient complaining of 7/10 R shoulder/upper back pain. States this is new for her. Unable to find a comfortable position. Medicated per MAR. Repeat EKG obtained.

## 2022-01-12 NOTE — CONSULTS
[facility]    Nephrology Consultation     Date of Admission:  1/12/2022    Assessment & Plan     Francine Chris is a 76 year old female with ESRD who was admitted on 1/12/2022.     1) Falls at home    2) Chest Pain:  Due to fall ?  EKG is a paced rhythm. Troponin is flat.  She has a NM stress test ordered.     3) ESRD:  She runs at [facility] under direction of Dr. Ro.  She runs 3H via R CVC (R AVF not yet used) at a  to a target weight of 57.5 kg.  She has large IDWG and finished last at 60 kg.      4) Anemia:  HGB 9.5.  She has Mircera 50 mcg Q 4 weeks.    5) MBD:  She takes calcitriol 0.5 mcg MWF.  .      6) Hyponatremia:  This is due to large intradialytic weight gains.  5 kg is a typical goal in HD for fluid removal.    Plan:    HD today  Lower Na in dialysate to prevent over rapid correction.  UF 4-5 kg.        Maikel Palmer MD  [facility] - Nephrology  [phone]    Reason for Consult     I was asked to see the patient for ESRD.    Primary Care Physician     Eileen Arredondo    Chief Complaint     Fall/chest pain.      History is obtained from the patient and chart review.      History of Present Illness     Francine Chris is a 76 year old female with ESRD who presents with fall and chest pain.      She was admitted 1/12/22 via ED after presenting with chest pain after a fall.    She was seen in ED 1/10/22 after a mechanical fall.  She was evaluated and released.    She returned as she had some chest pain in area of PPM, with some SOB.  Troponin is flat.      She has a number of chronic health conditions including:    ESRD - [facility] direction of Dr. oR.  COPD  Severe TR/pulm htn/RHF  AFIB on apixaban  Hyponatremia - this is related to high fluid gains between HD runs.          Past Medical History   I have reviewed this patient's medical history and updated it with pertinent information if needed.   Past  Medical History:   Diagnosis Date     Acute renal failure (ARF) (H) 06/22/2021     Anemia in chronic kidney disease, on chronic dialysis (H) 07/14/2021     Atrophy of left kidney 05/18/2017     Chronic atrial fibrillation (H) 04/01/2013     Chronic kidney disease, stage III (moderate) (H) 11/13/2019    Formatting of this note might be different from the original. Cr=1.2     COPD mixed type (H) 04/06/2013    Formatting of this note might be different from the original. On Qvar and combivent, hx of cor pulmonale     Cor pulmonale (chronic) (H) 09/20/2021     Depressive disorder, not elsewhere classified      Dermatitis of lower extremity 05/05/2017     Elevated blood pressure reading without diagnosis of hypertension 12/10/2004     HTN (hypertension) 09/07/2011     Hx of gout 08/13/2014     Hypercalcemia 07/16/2018    Formatting of this note might be different from the original. 7/16/18  Ca++=10.8   Alb=3.8   7/17/18:  PTH=95.6  Ca++=10.1 (after diuresis), some renal impairment, was on vit D and calcium outpt     Hypertensive heart and kidney disease 07/16/2018    Formatting of this note might be different from the original. Atrophic left kidney, rise in Cr with increase in bumex 2018     Hyponatremia 06/22/2021     Intermediate stage nonexudative age-related macular degeneration of both eyes 01/13/2021     Lung nodule 04/28/2021     Malignant melanoma (H)      Migraine variant 12/10/2004     Problem list name updated by automated process. Provider to review     Neoplasm of skin of neck 06/06/2014     NSVT (nonsustained ventricular tachycardia) (H) 04/09/2020     SKY (obstructive sleep apnea) 04/08/2020     Osteoporosis 11/10/2004     Problem list name updated by automated process. Provider to review     Osteoporosis, unspecified dx 8/02     Pacemaker 07/14/2021    Formatting of this note might be different from the original. 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 On sotolol     Tobacco use disorder      Variants of migraine, not elsewhere classified, without mention of intractable migraine without mention of status migrainosus     flashing lights       Past Surgical History   I have reviewed this patient's surgical history and updated it with pertinent information if needed.  Past Surgical History:   Procedure Laterality Date     ZZC NONSPECIFIC PROCEDURE  1990's    right ankle fracture     ZZC NONSPECIFIC PROCEDURE      spontaneous AB     ZZC NONSPECIFIC PROCEDURE      tonsillectomy     ZZC NONSPECIFIC PROCEDURE  1980's    tubal ligation       Prior to Admission Medications   Prior to Admission Medications   Prescriptions Last Dose Informant Patient Reported? Taking?   ACE/ARB/ARNI NOT PRESCRIBED (INTENTIONAL)   No No   Sig: Please choose reason not prescribed from choices below.   Melatonin 10 MG TABS tablet   Yes No   Sig: Take 10 mg by mouth nightly as needed   acetaminophen (TYLENOL) 325 MG tablet   No No   Sig: Take 2 tablets (650 mg) by mouth 2 times daily. May also take 2 tablets (650 mg) 2 times daily as needed for pain.   albuterol (PROAIR HFA/PROVENTIL HFA/VENTOLIN HFA) 108 (90 Base) MCG/ACT inhaler   Yes No   Sig: Inhale 2 puffs into the lungs every 4 hours as needed   allopurinol (ZYLOPRIM) 100 MG tablet   No No   Sig: TAKE ONE TABLET BY MOUTH THREE TIMES A WEEK ON MONDAY, WEDNESDAY AND FRIDAY AFTER HD SESSION   apixaban ANTICOAGULANT (ELIQUIS) 2.5 MG tablet   No No   Sig: Take 1 tablet (2.5 mg) by mouth 2 times daily   artificial saliva (BIOTENE MT) AERS spray   No No   Sig: Take 1 spray by mouth every 6 hours as needed for dry mouth MAY KEEP AT BEDSIDE   atorvastatin (LIPITOR) 40 MG tablet   No No   Sig: Take 1 tablet (40 mg) by mouth daily   diphenhydrAMINE (BENADRYL) 2 % external cream   No No   Sig: Apply topically 3  times daily as needed for itching   loratadine (CLARITIN) 10 MG tablet   No No   Sig: Take 1 tablet (10 mg) by mouth At Bedtime   metoprolol succinate ER (TOPROL-XL) 25 MG 24 hr tablet   No No   Sig: Take 0.5 tablets (12.5 mg) by mouth 2 times daily   multivitamin RENAL (RENAVITE RX/NEPHROVITE) 1 MG tablet   Yes No   Sig: Take 1 tablet by mouth daily   pantoprazole (PROTONIX) 40 MG EC tablet   No No   Sig: Take 1 tablet (40 mg) by mouth daily   sodium chloride (OCEAN) 0.65 % nasal spray   No No   Sig: Spray 1 spray into both nostrils every 2 hours as needed for congestion MAY KEEP AT BEDSIDE   tiotropium-olodaterol (STIOLTO RESPIMAT) 2.5-2.5 MCG/ACT AERS   Yes No   Sig: Inhale 2 puffs into the lungs daily   torsemide (DEMADEX) 20 MG tablet   No No   Sig: Take 2 tablets (40 mg) by mouth daily Take at 8 AM and 1 PM.   Patient taking differently: Take 40 mg by mouth daily Take at 8 AM x4 on TuThSaSu (non-HD days) and 1 PM all other days.   triamcinolone (KENALOG) 0.1 % external cream   No No   Sig: Apply to AA BID x 1-2 week then PRN only      Facility-Administered Medications: None     Allergies   Allergies   Allergen Reactions     Sotalol Other (See Comments)     Prolonged QT     Bupropion Other (See Comments)     Na 118 acutely. Comorbid diuresis for heart failure       Social History   I have reviewed this patient's social history and updated it with pertinent information if needed. Francine Chris  reports that she quit smoking about 9 months ago. Her smoking use included cigarettes. She has a 43.00 pack-year smoking history. She has never used smokeless tobacco. She reports current alcohol use. She reports that she does not use drugs.    Family History   I have reviewed this patient's family history and updated it with pertinent information if needed.   Family History   Problem Relation Age of Onset     Hypertension Mother      Arthritis Mother      Circulatory Mother      Heart Disease Mother      Lipids Mother       Osteoporosis Mother      Alcohol/Drug Father         ?MI     Circulatory Father      Heart Disease Father      Hypertension Sister      Allergies Sister         thyroid dz     Arthritis Sister      Osteoporosis Sister      Obesity Brother      Alcohol/Drug Brother         dm, ptsd, cad     Bladder Cancer Brother      Diabetes Brother      Myocardial Infarction Brother      Prostate Cancer Brother      Cerebrovascular Disease Maternal Grandmother      Breast Cancer Maternal Grandmother         94     Cancer - colorectal Maternal Grandmother         97     Arthritis Maternal Grandmother      Gastrointestinal Disease Maternal Grandmother      Osteoporosis Maternal Grandmother      Thyroid Disease Maternal Grandmother      Osteoarthritis Maternal Grandfather      C.A.D. Maternal Grandfather      Heart Failure Paternal Grandmother      Hyperlipidemia Maternal Uncle      Alzheimer Disease Maternal Uncle      Thyroid Disease Other         niece       Review of Systems   The 10 point Review of Systems is negative other than noted in the HPI.     Physical Exam   Temp: 97.4  F (36.3  C) Temp src: Oral BP: 122/56 Pulse: 71   Resp: 16 SpO2: 100 % O2 Device: Nasal cannula Oxygen Delivery: 2 LPM  Vital Signs with Ranges  Temp:  [97.4  F (36.3  C)-97.5  F (36.4  C)] 97.4  F (36.3  C)  Pulse:  [69-78] 71  Resp:  [13-20] 16  BP: (115-132)/(56-82) 122/56  SpO2:  [93 %-100 %] 100 %  141 lbs 0 oz    GENERAL: healthy, alert, NAD  HEENT:  Normocephalic. No gross abnormalities.  Pupils equal.  MMM.  Dentition is ok.  CV: RRR, systole M, no clicks, gallops, or rubs, 2-3+ BLE edema, JVD due to TR  RESP: Clear bilaterally with good efforts  GI: Abdomen soft/nt/nd, BS normal. No masses, organomegaly  MUSCULOSKELETAL: extremities nl - no gross deformities noted  SKIN: no suspicious lesions or rashes, dry to touch  NEURO:  Strength normal and symmetric.   PSYCH: mood good, affect appropriate  LYMPH: No palpable ant/post cervical and  supraclavicular adenopathy    Data   BMP  Recent Labs   Lab 01/12/22  0113 01/10/22  1756 01/10/22  1623   * 128*  --    POTASSIUM 4.0 3.8  --    CHLORIDE 88* 91*  --    SILVIA 9.3 9.4  --    CO2 24 27  --    BUN 61* 39*  --    CR 4.01* 2.66*  --    * 145* 167*     Phos@[email or web address](phos:4)  CBC)  Recent Labs   Lab 01/12/22  0113 01/10/22  1755   WBC 8.5 12.7*   HGB 9.5* 9.8*   HCT 28.4* 29.2*   * 104*    186     Recent Labs   Lab 01/12/22 0113   AST 76*   ALT 49   ALKPHOS 148   BILITOTAL 0.9

## 2022-01-12 NOTE — PROGRESS NOTES
[facility] Care Coordination Contact    [facility]  Ambulatory Care Coordination to Inpatient Care Management   Hand-In Communication    Date:  January 12, 2022  Name: Francine Chris is enrolled in [facility] Care Coordination program and I am the Lead Care Coordinator.  CC Contact Information:. [phone]  Payor Source: Payor: MEDICARE / Plan: MEDICARE / Product Type: Medicare /   Current services in place:  Assisted Living service through [facility].   Please see the CC Snaphot and Care Management Flowsheets for specific details of this Francine Chris care plan.   Additional details/specific concerns r/t this admission:   Family has concerns that Francine requires a higher level of care due to her medical care needs, than an AL is able to provide. Concerns that Francine isn't able to get a renal diet at her assisted living, making it hard for Francine to make good food/nutrition choices to adhere to her renal diet. Concern for readmission: Francine was just in the ED on 1/10/22.    I will follow this admission in Epic. Please feel free to contact me with questions or for further collaboration in discharge planning.    GAYLE Coleman  [facility]  [phone]

## 2022-01-13 NOTE — PROGRESS NOTES
[facility]      OUTPATIENT PHYSICAL THERAPY EVALUATION  PLAN OF TREATMENT FOR OUTPATIENT REHABILITATION  (COMPLETE FOR INITIAL CLAIMS ONLY)  Patient's Last Name, First Name, M.I.  YOB: 1945  Francine Chris  DANE                        Provider's Name  [facility] Medical Record No.  [id number]                               Onset Date:  01/12/22   Start of Care Date:  01/13/22      Type:     _X_PT   ___OT   ___SLP Medical Diagnosis:  Chest pain, hyponatremia, fall 1 day prior to admit                        PT Diagnosis:  Difficulty ambulating   Visits from SOC:  1   _________________________________________________________________________________  Plan of Treatment/Functional Goals    Planned Interventions: bed mobility training,gait training,neuromuscular re-education,strengthening,patient/family education,transfer training     Goals: See Physical Therapy Goals on Care Plan in QuickProNotes electronic health record.    Therapy Frequency: 5x/week  Predicted Duration of Therapy Intervention: 1 week  _________________________________________________________________________________    I CERTIFY THE NEED FOR THESE SERVICES FURNISHED UNDER        THIS PLAN OF TREATMENT AND WHILE UNDER MY CARE     (Physician co-signature of this document indicates review and certification of the therapy plan).                Certification date from: 01/13/22, Certification date to: 01/20/22    Referring Physician: Emily Castle MD            Initial Assessment        See Physical Therapy evaluation dated 01/13/22 in Epic electronic health record.

## 2022-01-13 NOTE — PROGRESS NOTES
Observation goals  PRIOR TO DISCHARGE        Comments: List all goals to be met before discharge home:   - Serial troponins and stress test complete: Met  - Seen and cleared by consultant if applicable: Met  - Adequate pain control on oral analgesia: Met  - Vital signs normal or at patient baseline: Met  - Safe disposition plan has been identified: Not met

## 2022-01-13 NOTE — PROGRESS NOTES
Clinic Care Coordination Contact  Clinic Care Coordination Contact   Patient, is already actively enrolled in  [facility], so will defer to their team for lead CC, but remain available to the patient/care team if there are any outstanding follow up needs that CPN can support.     Melissa Behl BSN, RN, PHN, [facility]  RN Clinical Product Navigator  [phone]

## 2022-01-13 NOTE — PLAN OF CARE
A&O.  AVSS.  RA.   Dialysis diet.  Fluid restriction. Fall risk.  Dialysis pt.   SW/OT/PT consult.

## 2022-01-13 NOTE — DISCHARGE SUMMARY
VSS. Pt is discharging to [facility] with sister as transport. IV discontinued. AVS printed and packet given to pt. All questions answered and pt educated.

## 2022-01-13 NOTE — PLAN OF CARE
Observation goals  PRIOR TO DISCHARGE        Comments: List all goals to be met before discharge home:   - Serial troponins and stress test complete.  Met  - Seen and cleared by consultant if applicable  Met  - Adequate pain control on oral analgesia  Met  - Vital signs normal or at patient baseline   Met  - Safe disposition plan has been identified   Met  - Nurse to notify provider when observation goals have been met and patient is ready for discharge.   Yes

## 2022-01-13 NOTE — PLAN OF CARE
Occupational Therapy: Orders received. Chart reviewed and discussed with care team.? Occupational Therapy not indicated due to pt discharging and will defer OT needs to TCU at this time.? Defer discharge recommendations to PT and care team.? Will complete orders.

## 2022-01-13 NOTE — PROGRESS NOTES
A/Ox4. VSS. Ax1 to SBA. Dialysis diet. Has dialysis on MWF. Denies chest pain, SOB, dizziness. Tele is paced (has pacemaker). Had echo and stress test today. Now on 1200 ml restriction. Plan for PT/OT/SW tomorrow and possible discharge.

## 2022-01-13 NOTE — PLAN OF CARE
Up A1 GB&W. A&Ox4. VSS RA. Denies CP, SOB, HA, dizziness. Skin with scattered bruising. BLE edema +2, ashen. R CVC dressing CDI. Dialysis diet w/1200ml FR. Plan for PT/OT and SW. Discharge pending placement.

## 2022-01-13 NOTE — PROGRESS NOTES
Care Management Discharge Note    Discharge Date: 01/13/2022     Discharge Disposition: Transitional Care  Discharge Services:    Discharge DME:    Discharge Transportation: family or friend will provide    Private pay costs discussed: transportation costs    PAS Confirmation Code: [id number]  Patient/family educated on Medicare website which has current facility and service quality ratings: yes    Education Provided on the Discharge Plan:    Persons Notified of Discharge Plans: Hospitalist, Charge, HUC  Patient/Family in Agreement with the Plan: yes    Handoff Referral Completed: Yes    Additional Information:  Patient was accepted into [facility] for today 1/13. Patients sister, Tereza will transport patient at 1400. Tereza plans to pull her vehicle to door 2 and have help from staff change and bring patient down to door 2. [facility] plans to accomodates patients diet upon completion of TCU.     RICHARD will continue to follow.       GAYLE Lopez

## 2022-01-13 NOTE — PROVIDER NOTIFICATION
Requested pt's telemetry to be discontinue.  Pt discharging today.  Pt's tele is paced.  Pt stable.  In need of telemetry monitors on unit

## 2022-01-13 NOTE — PROGRESS NOTES
01/13/22 1100   Quick Adds   Quick Adds Certification   Type of Visit Initial PT Evaluation   Living Environment   People in home alone   Current Living Arrangements assisted living  ([facility])   Home Accessibility no concerns   Self-Care   Usual Activity Tolerance moderate   Current Activity Tolerance fair   Regular Exercise Yes   Activity/Exercise Type walking;other (see comments)  (walked halls in her building, goes to OP pulm 2x/wk)   Exercise Amount/Frequency other (see comments)   Equipment Currently Used at Home walker, rolling   Activity/Exercise/Self-Care Comment Reports she has assist for showers, some cleaning, and gets 3 meals/day at her [facility].   Disability/Function   Fall history within last six months yes   General Information   Onset of Illness/Injury or Date of Surgery 01/12/22   Referring Physician Emily Castle MD   Patient/Family Therapy Goals Statement (PT) go to TCU   Pertinent History of Current Problem (include personal factors and/or comorbidities that impact the POC) Pt admitted under observation status with chest pain, hyponatremia. Pt was in the ED 1/11 after a fall and discharged back to [facility]. PMH: ESRD with dialysis, afib, ppm, pulm HTN, R sided heart failure, severe tricuspid regurgitation, anemia, COPD, PAD.   Existing Precautions/Restrictions fall   Weight-Bearing Status - LLE full weight-bearing   Weight-Bearing Status - RLE full weight-bearing   General Observations Sister present   Cognition   Orientation Status (Cognition) oriented x 3   Affect/Mental Status (Cognition) WFL   Follows Commands (Cognition) WFL   Pain Assessment   Patient Currently in Pain No   Integumentary/Edema   Integumentary/Edema Comments Chronic B LE edema, at baseline currently   Posture    Posture Forward head position;Protracted shoulders   Range of Motion (ROM)   ROM Quick Adds ROM WFL   Strength   Strength Comments B hip flex 3+/5, knee ext 4/5, DF 4/5   Bed Mobility   Comment (Bed Mobility) NT    Transfers   Transfer Safety Comments Sit to stand with FWW and SBA   Gait/Stairs (Locomotion)   Comment (Gait/Stairs) Pt amb 10 ft with FWW and CGA, slow pace, easily fatigued and SOB   Balance   Balance Comments Steady balance with FWW   Clinical Impression   Criteria for Skilled Therapeutic Intervention yes, treatment indicated   PT Diagnosis (PT) Difficulty ambulating   Influenced by the following impairments Dec strength, balance, activity tolerance, SOB   Functional limitations due to impairments Difficulty ambulating and transferring   Clinical Presentation Stable/Uncomplicated   Clinical Presentation Rationale medically improved   Clinical Decision Making (Complexity) low complexity   Therapy Frequency (PT) 5x/week   Predicted Duration of Therapy Intervention (days/wks) 1 week   Planned Therapy Interventions (PT) bed mobility training;gait training;neuromuscular re-education;strengthening;patient/family education;transfer training   Risk & Benefits of therapy have been explained evaluation/treatment results reviewed;care plan/treatment goals reviewed;risks/benefits reviewed;current/potential barriers reviewed;participants voiced agreement with care plan   PT Discharge Planning    PT Discharge Recommendation (DC Rec) Transitional Care Facility;home with home care physical therapy   PT Rationale for DC Rec Pt has been having a hard time caring for herself at home due to her illness, requires assist with mobility and ADLs at this time and is at risk for falls. Would benefit from continued skilled PT services at [facility] to improve this. If pt returns to [facility], then recommend home PT and assist with more taxing ADLs and long distance gait.   Therapy Certification   Start of care date 01/13/22   Certification date from 01/13/22   Certification date to 01/20/22   Medical Diagnosis Chest pain, hyponatremia, fall 1 day prior to admit   Total Evaluation Time   Total Evaluation Time (Minutes) 15

## 2022-01-13 NOTE — DISCHARGE SUMMARY
[facility]  Hospitalist Discharge Summary      Date of Admission:  1/12/2022  Date of Discharge:  1/13/2022  Discharging Provider: Emily Castle MD      Discharge Diagnoses   Chest pain, possibly musculoskeletal  ESRD, on hemodialysis  Acute on chronic hyponatremia  Permanent atrial fibrillation  S/P AVN ablation with PPM 6/2021  Severe tricuspid regurgitation  Pulmonary hypertension  Chronic right heart failure  Hyperlipidemia  Anemia of chronic kidney disease  COPD  Obstructive sleep apnea  Anxiety  Depression  Gout  PUD  PAD    Follow-ups Needed After Discharge   Follow-up Appointments     Follow-up and recommended labs and tests       Follow up with TCU physician, check BMP.  Follow up with Dr. Ro at   next scheduled hemodialysis run on Friday, 1/14/2021.             Unresulted Labs Ordered in the Past 30 Days of this Admission     No orders found for last 31 day(s).          Discharge Disposition   Discharged to short-term care facility  Condition at discharge: Stable      Hospital Course            Francine Chris is a 76 year old female who presents with chest pain.    Chest pain, possibly musculoskeletal  Seen in ED day prior 2/2 fall, no CP at that time. Morning of presentation with pain in area of ppm, states was associated SOB. Difficult to further delinate. Vitals stable in ED. Initial troponin 54 (just detectable but dialysis pt). CXR without acute process or CHF. EKG without ischemic changes.   - telemetry  - aspirin x 1  - serial troponins are negatvie  - Lexiscan stress test is negative, and clinical suspicion for ACS is low, no further workup planned at this point    ESKD  Hyponatremia  During hospitalization with severe hypotension/ afib with RVR in 6/2021 developed SILAS necessitating initiation of hemodialysis. Dialyzes MWF under care of Dr. Ro. Na 124 in ED (recent baseline runs low).  - nephrology consult for dialysis today  - per Dr. Palmer, hyponatremia is  due to large intradialytic weight gains.  5 kg is a typical goal in HD for fluid removal.  - fluid restrict  - repeat sodium is somewhat higher, continue fluid restriction    Permanent atrial fibrillation  S/p AVN ablation with ppm 6/2021  [needs rec- apixaban 2.5 mg BID]  - resume    Severe tricuspid regurgitation   Pulmonary hypertension  Chronic R heart failure, compensated  HLD  [needs rec- atorvastatin 40 mg daily, metoprolol 12.5 mg BID, torsemide 40 mg daily]  Echo with EF 50-55%, nl LV fn, severe TR, pulmonary pressures 60-65 mmHg. Seen by CV surgery, deemed to be too high of a surgical risk for valve replacement.   - resume meds     Anemia  Hgb at 9.5 on presentation, stable from recent  - defer to nephrology for management    COPD  [needs rec- prn albuterol, tiotropium-olodaterol 2 puffs daily]  - prn albuterol available  - resume meds     SKY  No longer wears CPAP    Anxiety  Depression   Doesn't appear to currently be on meds for this    Gout  - resume home allopurinol 100 mg MWF after dialysis     PUD  - resume home pantoprazole 40 mg daily     PAD  noted    COVID-19 negative    DVT Prophylaxis: DOAC  Code Status: Full Code        Consultations This Hospital Stay   NEPHROLOGY IP CONSULT  PHYSICAL THERAPY ADULT IP CONSULT  OCCUPATIONAL THERAPY ADULT IP CONSULT  SOCIAL WORK IP CONSULT  CARE MANAGEMENT / SOCIAL WORK IP CONSULT  PHYSICAL THERAPY ADULT IP CONSULT  OCCUPATIONAL THERAPY ADULT IP CONSULT    Code Status   Full Code    Time Spent on this Encounter   I, Emily Castle MD, personally saw the patient today and spent greater than 30 minutes discharging this patient.       Emily Castle MD  [facility]TEND[facility]ORT STAY  [address] [place] [id number]  Phone: [phone]  ______________________________________________________________________    Physical Exam   Vital Signs: Temp: (!) 95.7  F (35.4  C) Temp src: Axillary BP: 119/62 Pulse: 72   Resp: 18  SpO2: 98 % O2 Device: None (Room air)    Weight: 141 lbs 0 oz  I saw and examined the patient on the date of discharge.         Primary Care Physician   Eileen Arredondo    Discharge Orders      Reason for your hospital stay    You had chest pain.     Follow-up and recommended labs and tests     Follow up with TCU physician, check BMP.  Follow up with Dr. Ro at next scheduled hemodialysis run on Friday, 1/14/2021.     General info for SNF    Length of Stay Estimate: Short Term Care: Estimated # of Days <30  Condition at Discharge: Improving  Level of care:skilled   Rehabilitation Potential: Excellent  Admission H&P remains valid and up-to-date: Yes  Recent Chemotherapy: N/A  Use Nursing Home Standing Orders: Yes     Mantoux instructions    Give two-step Mantoux (PPD) Per Facility Policy Yes     Activity - Up with nursing assistance     Physical Therapy Adult Consult    Evaluate and treat as clinically indicated.    Reason:  deconditioning     Occupational Therapy Adult Consult    Evaluate and treat as clinically indicated.    Reason:  deconditioning     Diet    Follow this diet upon discharge: Orders Placed This Encounter      Fluid restriction 1200 ML FLUID      Dialysis Diet       Significant Results and Procedures   Most Recent 3 CBC's:Recent Labs   Lab Test 01/12/22  0113 01/10/22  1755 11/11/21  0700   WBC 8.5 12.7* 6.0   HGB 9.5* 9.8* 13.0   * 104* 103*    186 201     Most Recent 3 BMP's:Recent Labs   Lab Test 01/13/22  0632 01/12/22  0113 01/10/22  1756   * 124* 128*   POTASSIUM 4.0 4.0 3.8   CHLORIDE 91* 88* 91*   CO2 26 24 27   BUN 37* 61* 39*   CR 3.03* 4.01* 2.66*   ANIONGAP 8 12 10   SILVIA 9.3 9.3 9.4   GLC 96 110* 145*   ,   Results for orders placed or performed during the hospital encounter of 01/12/22   XR Chest Port 1 View    Narrative    EXAM: XR CHEST PORT 1 VIEW  LOCATION: [facility]  DATE/TIME: 1/12/2022 1:21 AM    INDICATION: chest pain  COMPARISON:  07/03/2021      Impression    IMPRESSION: Right sided dialysis catheter with tip over right atrium. Left subclavian approach pacing device.    Result bilateral pleural effusions. No pleural effusion or pneumothorax. Stable mildly enlarged heart. Central pulmonary vasculature within normal limits.   NM Lexiscan stress test (nuc card)     Value    Target     Baseline Systolic     Baseline Diastolic BP 66    Last Stress Systolic     Last Stress Diastolic BP 56    Baseline HR 1    Max HR  69    Max Predicted HR  48    Rate Pressure Product 8,418.0    Left Ventricular EF 76    Narrative       The nuclear stress test is probably negative for inducible myocardial   ischemia or infarction.     Left ventricular function is hyperdynamic.     The left ventricular ejection fraction at stress is 76%.     There is no prior study for comparison.           Discharge Medications   Current Discharge Medication List      CONTINUE these medications which have NOT CHANGED    Details   acetaminophen (TYLENOL) 325 MG tablet Take 2 tablets (650 mg) by mouth 2 times daily. May also take 2 tablets (650 mg) 2 times daily as needed for pain.  Qty: 120 tablet, Refills: 98    Associated Diagnoses: Pain      albuterol (PROAIR HFA/PROVENTIL HFA/VENTOLIN HFA) 108 (90 Base) MCG/ACT inhaler Inhale 2 puffs into the lungs every 4 hours as needed      allopurinol (ZYLOPRIM) 100 MG tablet TAKE ONE TABLET BY MOUTH THREE TIMES A WEEK ON MONDAY, WEDNESDAY AND FRIDAY AFTER HD SESSION  Qty: 12 tablet, Refills: 97    Associated Diagnoses: Chronic gout without tophus, unspecified cause, unspecified site      apixaban ANTICOAGULANT (ELIQUIS) 2.5 MG tablet Take 1 tablet (2.5 mg) by mouth 2 times daily  Qty: 60 tablet, Refills: 11    Associated Diagnoses: Chronic atrial fibrillation (H)      artificial saliva (BIOTENE MT) AERS spray Take 1 spray by mouth every 6 hours as needed for dry mouth MAY KEEP AT BEDSIDE  Qty: 30 mL, Refills: 89     Associated Diagnoses: Dry mouth      atorvastatin (LIPITOR) 40 MG tablet Take 1 tablet (40 mg) by mouth daily  Qty: 30 tablet, Refills: 11    Associated Diagnoses: Hyperlipidemia      diphenhydrAMINE (BENADRYL) 2 % external cream Apply topically 3 times daily as needed for itching  Qty: 30 g, Refills: 98    Associated Diagnoses: Pruritic disorder      loratadine (CLARITIN) 10 MG tablet Take 1 tablet (10 mg) by mouth At Bedtime  Qty: 30 tablet, Refills: 98    Associated Diagnoses: Itching      Melatonin 10 MG TABS tablet Take 10 mg by mouth nightly as needed      metoprolol succinate ER (TOPROL-XL) 25 MG 24 hr tablet Take 0.5 tablets (12.5 mg) by mouth 2 times daily  Qty: 60 tablet, Refills: 4    Associated Diagnoses: Chronic atrial fibrillation (H)      multivitamin RENAL (RENAVITE RX/NEPHROVITE) 1 MG tablet Take 1 tablet by mouth daily      pantoprazole (PROTONIX) 40 MG EC tablet Take 1 tablet (40 mg) by mouth daily  Qty: 30 tablet, Refills: 11    Associated Diagnoses: GERD (gastroesophageal reflux disease)      sodium chloride (OCEAN) 0.65 % nasal spray Spray 1 spray into both nostrils every 2 hours as needed for congestion MAY KEEP AT BEDSIDE  Qty: 50 mL, Refills: 98    Associated Diagnoses: Nose dryness      tiotropium-olodaterol (STIOLTO RESPIMAT) 2.5-2.5 MCG/ACT AERS Inhale 2 puffs into the lungs daily      torsemide (DEMADEX) 20 MG tablet Take 2 tablets (40 mg) by mouth daily Take at 8 AM and 1 PM.  Qty: 120 tablet, Refills: 4      triamcinolone (KENALOG) 0.1 % external cream Apply to AA BID x 1-2 week then PRN only  Qty: 454 g, Refills: 2    Associated Diagnoses: Itching      ACE/ARB/ARNI NOT PRESCRIBED (INTENTIONAL) Please choose reason not prescribed from choices below.    Associated Diagnoses: End stage renal disease (H)           Allergies   Allergies   Allergen Reactions     Sotalol Other (See Comments)     Prolonged QT     Bupropion Other (See Comments)     Na 118 acutely. Comorbid diuresis for heart  failure

## 2022-01-13 NOTE — CONSULTS
Care Management Initial Consult    General Information  Assessment completed with: Care Team Member,Family,VM-chart review,    Type of CM/SW Visit: Initial Assessment    Primary Care Provider verified and updated as needed: Yes   Readmission within the last 30 days: previous discharge plan unsuccessful      Reason for Consult: discharge planning  Advance Care Planning: Advance Care Planning Reviewed: present on chart        Communication Assessment  Patient's communication style: spoken language (English or Bilingual)    Hearing Difficulty or Deaf: no   Wear Glasses or Blind: no    Cognitive  Cognitive/Neuro/Behavioral: WDL                      Living Environment:   People in home: alone     Current living Arrangements: assisted living  Name of Facility:  [facility])   Able to return to prior arrangements: yes     Family/Social Support:  Care provided by: self,other (see comments) (Facility staff)  Provides care for: no one  Marital Status:   Sibling(s)          Description of Support System: Supportive,Involved    Support Assessment: Adequate family and caregiver support,Adequate social supports    Current Resources:   Patient receiving home care services:       Community Resources:    Equipment currently used at home: walker, rolling  Supplies currently used at home:      Employment/Financial:  Employment Status: retired        Financial Concerns:       Lifestyle & Psychosocial Needs:  Social Determinants of Health     Tobacco Use: Medium Risk     Smoking Tobacco Use: Former Smoker     Smokeless Tobacco Use: Never Used   Alcohol Use: Not on file   Financial Resource Strain: Not on file   Food Insecurity: Not on file   Transportation Needs: Not on file   Physical Activity: Not on file   Stress: Not on file   Social Connections: Not on file   Intimate Partner Violence: Not on file   Depression: Not on file   Housing Stability: Not on file     Functional Status:  Prior to admission patient needed  assistance:   Mental Health Status:  Chemical Dependency Status:  Values/Beliefs:  Spiritual, Cultural Beliefs, Zoroastrianism Practices, Values that affect care:               Additional Information:  Patient comes from [facility]. Per care management / social work consult for discharge SW met with patient and family. SW placed call to patients care manager who stated concerns of patient going back to her [facility] since they are unaccommodating to her renal diet. SW spoke with patients sister who stated she would like patient to go to [facility]. SW called [facility] who stated they could accept patient into their TCU (will accomodate her diet) and they will work on accommodating her diet at [facility], if not, patient would be allowed to transition into LTC. Patient is set to discharge today, patients sister will provide transportation at 1400.    RICHARD will continue to follow.    GAYLE Lopez

## 2022-01-14 NOTE — PROGRESS NOTES
[place] [facility]  PRIMARY CARE PROVIDER AND CLINIC:  Eileen Arredondo, JUSTO CNP, [address] / [place] [address]  Chief Complaint   Patient presents with     [facility] [place] [facility]     [place] Medical Record Number:  [id number]  Place of Service where encounter took place:  [facility] (TCU) [[id number]]    Francine Chris  is a 76 year old  (1945), admitted to the above facility from  [facility]. Hospital stay 1/12/22 through 1/13/22..  Admitted to this facility for  rehab, medical management and nursing care.    HPI:    HPI information obtained from: facility chart records, facility staff and patient report.   Brief Summary of Hospital Course:   Updates on Status Since Skilled nursing Admission:     Patient Francine Chris is a 76 yr old female admitted to [facility] for rehabilitation s/p hospitalization [facility] 1/12-1/13/22 for chest pain and thought to be musculoskeletal s/p fall. Unremarkable chest xray, EKG and serial troponins  Lexiscan stress test is negative, and clinical suspicion for ACS is low, no further workup planned at this point    PMHx Afib, s/p ablation and PPM, ESRD on dialysis, pulmonary hypertension, severe tricuspid regurgitation, CHF (RIGHT), PAD, anemia, COPD, SKY, GERD, anxiety, depression and gout    TODAY  Patient returned from dialysis. States dialysis went fine. Reports after dialysis she has legs cramps sometimes and applies topical rub with relief  She is hungry. Was not given breakfast prior to dialysis pickup. Consulted with charge nurse regarding. Dialysis not allow eating due to need to wear masks. A breakfast is to be offered prior to dialysis. Lunch is being served when she returns  Health Unit Coordinator working transportation as this is difficult due to early start time. Sister brought patient to dialysis today    She is using wheelchair , goal is to use walker  Sister reports she is weaned off oxygen and had not  been using walker prior to hospitalization  Wishes to be Full code     Consulted with nursing and no acute concerns     Denies chest pain today or shortness of breath   Reports regular elimination and control bowel & bladder   Reports in general decreased appetite       CODE STATUS/ADVANCE DIRECTIVES DISCUSSION:   CPR/Full code   Patient's living condition: lives in an assisted living facility  ALLERGIES: Sotalol and Bupropion  PAST MEDICAL HISTORY:  has a past medical history of Acute renal failure (ARF) (H) (06/22/2021), Anemia in chronic kidney disease, on chronic dialysis (H) (07/14/2021), Atrophy of left kidney (05/18/2017), Chronic atrial fibrillation (H) (04/01/2013), Chronic kidney disease, stage III (moderate) (H) (11/13/2019), COPD mixed type (H) (04/06/2013), Cor pulmonale (chronic) (H) (09/20/2021), Depressive disorder, not elsewhere classified, Dermatitis of lower extremity (05/05/2017), Elevated blood pressure reading without diagnosis of hypertension (12/10/2004), HTN (hypertension) (09/07/2011), gout (08/13/2014), Hypercalcemia (07/16/2018), Hypertensive heart and kidney disease (07/16/2018), Hyponatremia (06/22/2021), Intermediate stage nonexudative age-related macular degeneration of both eyes (01/13/2021), Lung nodule (04/28/2021), Malignant melanoma (H), Migraine variant (12/10/2004), Neoplasm of skin of neck (06/06/2014), NSVT (nonsustained ventricular tachycardia) (H) (04/09/2020), SKY (obstructive sleep apnea) (04/08/2020), Osteoporosis (11/10/2004), Osteoporosis, unspecified (dx 8/02), Pacemaker (07/14/2021), PAD (peripheral artery disease) (H) (02/08/2019), 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  PAST SURGICAL HISTORY:   has a past surgical history that includes NONSPECIFIC PROCEDURE (1990's); NONSPECIFIC  PROCEDURE; NONSPECIFIC PROCEDURE; and NONSPECIFIC PROCEDURE (1980's).  FAMILY HISTORY: family history includes Alcohol/Drug in her brother and father; Allergies in her sister; Alzheimer Disease in her maternal uncle; Arthritis in her maternal grandmother, mother, and sister; Bladder Cancer in her brother; Breast Cancer in her maternal grandmother; C.A.D. in her maternal grandfather; Cancer - colorectal in her maternal grandmother; Cerebrovascular Disease in her maternal grandmother; Circulatory in her father and mother; Diabetes in her brother; Gastrointestinal Disease in her maternal grandmother; Heart Disease in her father and mother; Heart Failure in her paternal grandmother; Hyperlipidemia in her maternal uncle; Hypertension in her mother and sister; Lipids in her mother; Myocardial Infarction in her brother; Obesity in her brother; Osteoarthritis in her maternal grandfather; Osteoporosis in her maternal grandmother, mother, and sister; Prostate Cancer in her brother; Thyroid Disease in her maternal grandmother and another family member.  SOCIAL HISTORY:   reports that she quit smoking about 9 months ago. Her smoking use included cigarettes. She has a 43.00 pack-year smoking history. She has never used smokeless tobacco. She reports current alcohol use. She reports that she does not use drugs.    Post Discharge Medication Reconciliation Status: discharge medications reconciled and changed, per note/orders    Current Outpatient Medications   Medication Sig Dispense Refill     ACE/ARB/ARNI NOT PRESCRIBED (INTENTIONAL) Please choose reason not prescribed from choices below.       acetaminophen (TYLENOL) 325 MG tablet Take 2 tablets (650 mg) by mouth 2 times daily. May also take 2 tablets (650 mg) 2 times daily as needed for pain. 120 tablet 98     albuterol (PROAIR HFA/PROVENTIL HFA/VENTOLIN HFA) 108 (90 Base) MCG/ACT inhaler Inhale 2 puffs into the lungs every 4 hours as needed       allopurinol (ZYLOPRIM) 100 MG  tablet TAKE ONE TABLET BY MOUTH THREE TIMES A WEEK ON MONDAY, WEDNESDAY AND FRIDAY AFTER HD SESSION 12 tablet 97     apixaban ANTICOAGULANT (ELIQUIS) 2.5 MG tablet Take 1 tablet (2.5 mg) by mouth 2 times daily 60 tablet 11     artificial saliva (BIOTENE MT) AERS spray Take 1 spray by mouth every 6 hours as needed for dry mouth MAY KEEP AT BEDSIDE 30 mL 89     atorvastatin (LIPITOR) 40 MG tablet Take 1 tablet (40 mg) by mouth daily 30 tablet 11     diphenhydrAMINE (BENADRYL) 2 % external cream Apply topically 3 times daily as needed for itching 30 g 98     loratadine (CLARITIN) 10 MG tablet Take 1 tablet (10 mg) by mouth At Bedtime 30 tablet 98     Melatonin 10 MG TABS tablet Take 10 mg by mouth nightly as needed       metoprolol succinate ER (TOPROL-XL) 25 MG 24 hr tablet Take 0.5 tablets (12.5 mg) by mouth 2 times daily 60 tablet 4     multivitamin RENAL (RENAVITE RX/NEPHROVITE) 1 MG tablet Take 1 tablet by mouth daily       pantoprazole (PROTONIX) 40 MG EC tablet Take 1 tablet (40 mg) by mouth daily 30 tablet 11     sodium chloride (OCEAN) 0.65 % nasal spray Spray 1 spray into both nostrils every 2 hours as needed for congestion MAY KEEP AT BEDSIDE 50 mL 98     tiotropium-olodaterol (STIOLTO RESPIMAT) 2.5-2.5 MCG/ACT AERS Inhale 2 puffs into the lungs daily       torsemide (DEMADEX) 20 MG tablet Take 2 tablets (40 mg) by mouth daily Take at 8 AM and 1 PM. (Patient taking differently: Take 40 mg by mouth daily Take at 8 AM x4 on TuThSaSu (non-HD days) and 1 PM all other days.) 120 tablet 4     triamcinolone (KENALOG) 0.1 % external cream Apply to AA BID x 1-2 week then PRN only 454 g 2       ROS:  10 point ROS of systems including Constitutional, Eyes, Respiratory, Cardiovascular, Gastroenterology, Genitourinary, Integumentary, Musculoskeletal, Psychiatric were all negative except for pertinent positives noted in my HPI.    Vitals:  BP (!) 143/85   Pulse 73   Temp 97.6  F (36.4  C)   Resp 16   Ht 1.626 m (5'  "4\")   Wt 63 kg (139 lb)   SpO2 95%   BMI 23.86 kg/m     139 lbs 0 oz    Exam:  GENERAL APPEARANCE:  Alert, in no distress  ENT:  Mouth and posterior oropharynx normal, moist mucous membranes, has dentures  EYES:  EOM, conjunctivae, lids, pupils and irises normal  NECK:  No adenopathy,masses or thyromegaly  RESP:  respiratory effort and palpation of chest normal, lungs clear to auscultation , no respiratory distress  CV:  Palpation and auscultation of heart done , regular rate and rhythm, no murmur, rub, or gallop  ABDOMEN:  normal bowel sounds, soft, nontender, no hepatosplenomegaly or other masses  M/S:   transfer 1 assist  SKIN:  Inspection of skin and subcutaneous tissue + 1 edema bilateral LE  NEURO:   Cranial nerves 2-12 are normal tested and grossly at patient's baseline  PSYCH:  oriented X 3, affect and mood normal    Lab/Diagnostic data:  Labs done in SNF are in [place] EPIC. Please refer to them using [employer]/Care Everywhere.    ASSESSMENT/PLAN:  Chest pain, possibly musculoskeletal  Fall  Denies chest pain today  Continue therapies  Continue tylenol for pain  Fall prevention    ESKD  Hyponatremia  Per hospital note  During hospitalization with severe hypotension/ afib with RVR in 6/2021 developed SILAS necessitating initiation of hemodialysis.   Dialyzes MWF under care of Dr. Ro.   Na 124 in ED (recent baseline runs low).per nephrologist Dr. Palmer in hospital: hyponatremia is due to large intradialytic weight gains.    5 kg is a typical goal in HD for fluid removal.  - fluid restrict, - repeat sodium is somewhat higher, continue fluid restriction  renal diet, 1200cc fluid restriction     Permanent atrial fibrillation  S/p AVN ablation with ppm 6/2021  Continue Eliquis, signs and symptoms bleeding   Hr regular  Continue Metoprolol as ordered   Follow up as directed  Pacemaker check this afternoon. Charge nurse updated. Sister provide equipment  Monitor      Severe tricuspid regurgitation   Pulmonary " hypertension  Chronic R heart failure, compensated  HTN  HLD  Echo with EF 50-55%, nl LV fn, severe TR, pulmonary pressures 60-65 mmHg. Seen by CV surgery, deemed to be too high of a surgical risk for valve replacement.   Dry weight ? 131lb today at TCU scale; weight 141lb hospital discharge   Dialysis to guide fluid management on dialysis runs  Continue fluid restriction  Continue Metoprolol, Torsemide and Lipitor     Anemia  Hgb at 9.5, near baseline  defer to nephrology for management      COPD  History smoking  Denies shortness of breath   Continue tiotropium-olodaterol and as needed albuterol  Follow up pulmonologist as ordered     SKY  Monitor for hypoxia  No longer wears CPAP     Anxiety  Depression   Mood stable today, just returned from dialysis and sister present  Not on medications to treat   Consider house psychologist referral      Gout  Denies pain  Continue allopurinol 100 mg MWF after dialysis      PUD  Denies gastrointestinal upset  Continue pantoprazole 40 mg daily      PAD  Noted    Deconditioned  Comment: d/t recent hospitalization & comorbidity, expect delay rehabilitation d/t age & comorbidity  Plan: PT/OT eval & treat, monitor    Goals of care   Full code in hospital         follow up nephrologist   And follow up pulmonlogist          Total time spent with patient visit at the skilled nursing facility was 39 min including patient visit, review of past records and discussion with sister. Greater than 50% of total time spent with counseling and coordinating care due to discussion on goals of care, functional goals, pain management and consultation with nursing as noted .     Electronically signed by:  JUSTO Ward CNP

## 2022-01-14 NOTE — PLAN OF CARE
Physical Therapy Discharge Summary    Reason for therapy discharge:    Discharged to transitional care facility.    Progress towards therapy goal(s). See goals on Care Plan in [facility] electronic health record for goal details.  Goals not met.  Barriers to achieving goals:   discharge from facility.    Therapy recommendation(s):    Continued therapy is recommended.  Rationale/Recommendations:  Pt would benefit from continued skilled PT services via TCU to address improve activity tolerance, balance, and IND with safety and functional mobility.

## 2022-01-14 NOTE — LETTER
1/14/2022        RE: Francine Chris  [facility]  [address]  [facility] [address]        [place] [facility]  PRIMARY CARE PROVIDER AND CLINIC:  JUSTO Jerry CNP, [address] [facility] [address] / [place] [address]  Chief Complaint   Patient presents with     [facility] Care     [place] Medical Record Number:  [id number]  Place of Service where encounter took place:  [facility] (U) [[id number]]    Francine Chris  is a 76 year old  (1945), admitted to the above facility from  [facility]. Hospital stay 1/12/22 through 1/13/22..  Admitted to this facility for  rehab, medical management and nursing care.    HPI:    HPI information obtained from: facility chart records, facility staff and patient report.   Brief Summary of Hospital Course:   Updates on Status Since Skilled nursing Admission:     Patient Francine Chris is a 76 yr old female admitted to [facility] for rehabilitation s/p hospitalization [facility] 1/12-1/13/22 for chest pain and thought to be musculoskeletal s/p fall. Unremarkable chest xray, EKG and serial troponins  Lexiscan stress test is negative, and clinical suspicion for ACS is low, no further workup planned at this point    PMHx Afib, s/p ablation and PPM, ESRD on dialysis, pulmonary hypertension, severe tricuspid regurgitation, CHF (RIGHT), PAD, anemia, COPD, SKY, GERD, anxiety, depression and gout    TODAY  Patient returned from dialysis. States dialysis went fine. Reports after dialysis she has legs cramps sometimes and applies topical rub with relief  She is hungry. Was not given breakfast prior to dialysis pickup. Consulted with charge nurse regarding. Dialysis not allow eating due to need to wear masks. A breakfast is to be offered prior to dialysis. Lunch is being served when she returns  Health Unit Coordinator working transportation as this is difficult due to early start time. Sister brought patient to  dialysis today    She is using wheelchair , goal is to use walker  Sister reports she is weaned off oxygen and had not been using walker prior to hospitalization  Wishes to be Full code     Consulted with nursing and no acute concerns     Denies chest pain today or shortness of breath   Reports regular elimination and control bowel & bladder   Reports in general decreased appetite       CODE STATUS/ADVANCE DIRECTIVES DISCUSSION:   CPR/Full code   Patient's living condition: lives in an assisted living facility  ALLERGIES: Sotalol and Bupropion  PAST MEDICAL HISTORY:  has a past medical history of Acute renal failure (ARF) (H) (06/22/2021), Anemia in chronic kidney disease, on chronic dialysis (H) (07/14/2021), Atrophy of left kidney (05/18/2017), Chronic atrial fibrillation (H) (04/01/2013), Chronic kidney disease, stage III (moderate) (H) (11/13/2019), COPD mixed type (H) (04/06/2013), Cor pulmonale (chronic) (H) (09/20/2021), Depressive disorder, not elsewhere classified, Dermatitis of lower extremity (05/05/2017), Elevated blood pressure reading without diagnosis of hypertension (12/10/2004), HTN (hypertension) (09/07/2011), gout (08/13/2014), Hypercalcemia (07/16/2018), Hypertensive heart and kidney disease (07/16/2018), Hyponatremia (06/22/2021), Intermediate stage nonexudative age-related macular degeneration of both eyes (01/13/2021), Lung nodule (04/28/2021), Malignant melanoma (H), Migraine variant (12/10/2004), Neoplasm of skin of neck (06/06/2014), NSVT (nonsustained ventricular tachycardia) (H) (04/09/2020), SKY (obstructive sleep apnea) (04/08/2020), Osteoporosis (11/10/2004), Osteoporosis, unspecified (dx 8/02), Pacemaker (07/14/2021), PAD (peripheral artery disease) (H) (02/08/2019), 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  PAST SURGICAL HISTORY:   has a past surgical history that includes NONSPECIFIC PROCEDURE (1990's); NONSPECIFIC PROCEDURE; NONSPECIFIC PROCEDURE; and NONSPECIFIC PROCEDURE (1980's).  FAMILY HISTORY: family history includes Alcohol/Drug in her brother and father; Allergies in her sister; Alzheimer Disease in her maternal uncle; Arthritis in her maternal grandmother, mother, and sister; Bladder Cancer in her brother; Breast Cancer in her maternal grandmother; C.A.D. in her maternal grandfather; Cancer - colorectal in her maternal grandmother; Cerebrovascular Disease in her maternal grandmother; Circulatory in her father and mother; Diabetes in her brother; Gastrointestinal Disease in her maternal grandmother; Heart Disease in her father and mother; Heart Failure in her paternal grandmother; Hyperlipidemia in her maternal uncle; Hypertension in her mother and sister; Lipids in her mother; Myocardial Infarction in her brother; Obesity in her brother; Osteoarthritis in her maternal grandfather; Osteoporosis in her maternal grandmother, mother, and sister; Prostate Cancer in her brother; Thyroid Disease in her maternal grandmother and another family member.  SOCIAL HISTORY:   reports that she quit smoking about 9 months ago. Her smoking use included cigarettes. She has a 43.00 pack-year smoking history. She has never used smokeless tobacco. She reports current alcohol use. She reports that she does not use drugs.    Post Discharge Medication Reconciliation Status: discharge medications reconciled and changed, per note/orders    Current Outpatient Medications   Medication Sig Dispense Refill     ACE/ARB/ARNI NOT PRESCRIBED (INTENTIONAL) Please choose reason not prescribed from choices below.       acetaminophen (TYLENOL) 325 MG tablet Take 2 tablets (650 mg) by mouth 2 times daily. May also take 2 tablets (650 mg) 2 times daily as needed for pain. 120 tablet 98     albuterol (PROAIR HFA/PROVENTIL HFA/VENTOLIN  HFA) 108 (90 Base) MCG/ACT inhaler Inhale 2 puffs into the lungs every 4 hours as needed       allopurinol (ZYLOPRIM) 100 MG tablet TAKE ONE TABLET BY MOUTH THREE TIMES A WEEK ON MONDAY, WEDNESDAY AND FRIDAY AFTER HD SESSION 12 tablet 97     apixaban ANTICOAGULANT (ELIQUIS) 2.5 MG tablet Take 1 tablet (2.5 mg) by mouth 2 times daily 60 tablet 11     artificial saliva (BIOTENE MT) AERS spray Take 1 spray by mouth every 6 hours as needed for dry mouth MAY KEEP AT BEDSIDE 30 mL 89     atorvastatin (LIPITOR) 40 MG tablet Take 1 tablet (40 mg) by mouth daily 30 tablet 11     diphenhydrAMINE (BENADRYL) 2 % external cream Apply topically 3 times daily as needed for itching 30 g 98     loratadine (CLARITIN) 10 MG tablet Take 1 tablet (10 mg) by mouth At Bedtime 30 tablet 98     Melatonin 10 MG TABS tablet Take 10 mg by mouth nightly as needed       metoprolol succinate ER (TOPROL-XL) 25 MG 24 hr tablet Take 0.5 tablets (12.5 mg) by mouth 2 times daily 60 tablet 4     multivitamin RENAL (RENAVITE RX/NEPHROVITE) 1 MG tablet Take 1 tablet by mouth daily       pantoprazole (PROTONIX) 40 MG EC tablet Take 1 tablet (40 mg) by mouth daily 30 tablet 11     sodium chloride (OCEAN) 0.65 % nasal spray Spray 1 spray into both nostrils every 2 hours as needed for congestion MAY KEEP AT BEDSIDE 50 mL 98     tiotropium-olodaterol (STIOLTO RESPIMAT) 2.5-2.5 MCG/ACT AERS Inhale 2 puffs into the lungs daily       torsemide (DEMADEX) 20 MG tablet Take 2 tablets (40 mg) by mouth daily Take at 8 AM and 1 PM. (Patient taking differently: Take 40 mg by mouth daily Take at 8 AM x4 on TuThSaSu (non-HD days) and 1 PM all other days.) 120 tablet 4     triamcinolone (KENALOG) 0.1 % external cream Apply to AA BID x 1-2 week then PRN only 454 g 2       ROS:  10 point ROS of systems including Constitutional, Eyes, Respiratory, Cardiovascular, Gastroenterology, Genitourinary, Integumentary, Musculoskeletal, Psychiatric were all negative except for  "pertinent positives noted in my HPI.    Vitals:  BP (!) 143/85   Pulse 73   Temp 97.6  F (36.4  C)   Resp 16   Ht 1.626 m (5' 4\")   Wt 63 kg (139 lb)   SpO2 95%   BMI 23.86 kg/m     139 lbs 0 oz    Exam:  GENERAL APPEARANCE:  Alert, in no distress  ENT:  Mouth and posterior oropharynx normal, moist mucous membranes, has dentures  EYES:  EOM, conjunctivae, lids, pupils and irises normal  NECK:  No adenopathy,masses or thyromegaly  RESP:  respiratory effort and palpation of chest normal, lungs clear to auscultation , no respiratory distress  CV:  Palpation and auscultation of heart done , regular rate and rhythm, no murmur, rub, or gallop  ABDOMEN:  normal bowel sounds, soft, nontender, no hepatosplenomegaly or other masses  M/S:   transfer 1 assist  SKIN:  Inspection of skin and subcutaneous tissue + 1 edema bilateral LE  NEURO:   Cranial nerves 2-12 are normal tested and grossly at patient's baseline  PSYCH:  oriented X 3, affect and mood normal    Lab/Diagnostic data:  Labs done in SNF are in [place] EPIC. Please refer to them using [employer]/Care Everywhere.    ASSESSMENT/PLAN:  Chest pain, possibly musculoskeletal  Fall  Denies chest pain today  Continue therapies  Continue tylenol for pain  Fall prevention    ESKD  Hyponatremia  Per hospital note  During hospitalization with severe hypotension/ afib with RVR in 6/2021 developed SILAS necessitating initiation of hemodialysis.   Dialyzes MWF under care of Dr. Ro.   Na 124 in ED (recent baseline runs low).per nephrologist Dr. Palmer in hospital: hyponatremia is due to large intradialytic weight gains.    5 kg is a typical goal in HD for fluid removal.  - fluid restrict, - repeat sodium is somewhat higher, continue fluid restriction  renal diet, 1200cc fluid restriction     Permanent atrial fibrillation  S/p AVN ablation with ppm 6/2021  Continue Eliquis, signs and symptoms bleeding   Hr regular  Continue Metoprolol as ordered   Follow up as directed  Pacemaker " check this afternoon. Charge nurse updated. Sister provide equipment  Monitor      Severe tricuspid regurgitation   Pulmonary hypertension  Chronic R heart failure, compensated  HTN  HLD  Echo with EF 50-55%, nl LV fn, severe TR, pulmonary pressures 60-65 mmHg. Seen by CV surgery, deemed to be too high of a surgical risk for valve replacement.   Dry weight ? 131lb today at TCU scale; weight 141lb hospital discharge   Dialysis to guide fluid management on dialysis runs  Continue fluid restriction  Continue Metoprolol, Torsemide and Lipitor     Anemia  Hgb at 9.5, near baseline  defer to nephrology for management      COPD  History smoking  Denies shortness of breath   Continue tiotropium-olodaterol and as needed albuterol  Follow up pulmonologist as ordered     SKY  Monitor for hypoxia  No longer wears CPAP     Anxiety  Depression   Mood stable today, just returned from dialysis and sister present  Not on medications to treat   Consider house psychologist referral      Gout  Denies pain  Continue allopurinol 100 mg MWF after dialysis      PUD  Denies gastrointestinal upset  Continue pantoprazole 40 mg daily      PAD  Noted    Deconditioned  Comment: d/t recent hospitalization & comorbidity, expect delay rehabilitation d/t age & comorbidity  Plan: PT/OT eval & treat, monitor    Goals of care   Full code in hospital         follow up nephrologist   And follow up pulmonlogist          Total time spent with patient visit at the skilled nursing facility was 39 min including patient visit, review of past records and discussion with sister. Greater than 50% of total time spent with counseling and coordinating care due to discussion on goals of care, functional goals, pain management and consultation with nursing as noted .     Electronically signed by:  JUSTO Ward CNP                           Sincerely,        JUSTO Ward CNP

## 2022-01-17 NOTE — PROGRESS NOTES
[place] [facility]  [facility] Medical Record Number:  [id number]  Place of Service where encounter took place:  [facility] (TCU) [[id number]]  Chief Complaint   Patient presents with     Nursing Home Acute       HPI:    Francine Chris  is a 76 year old (1945), who is being seen today for an episodic care visit.  HPI information obtained from: facility chart records, facility staff and patient report. Today's concern is:    Patient Francine Chris is a 76 yr old female admitted to [facility] for rehabilitation s/p hospitalization [facility] 1/12-1/13/22 for chest pain and thought to be musculoskeletal s/p fall. Unremarkable chest xray, EKG and serial troponins  Lexiscan stress test is negative, and clinical suspicion for ACS is low, no further workup planned at this point    PMHx Afib, s/p ablation and PPM, ESRD on dialysis, pulmonary hypertension, severe tricuspid regurgitation, CHF (RIGHT), PAD, anemia, COPD, SKY, GERD, anxiety, depression and gout       Chest pain, unspecified type  ESRD (end stage renal disease) (H)  Generalized muscle weakness  Other depression     Progressing in therapies. Using walker more  Tolerating dialysis with no acute concerns, states she is just bored for 3 and 1/2 hours  Denies chest pain   Reports she is eager to get back to [facility], however, aware she needs to get stronger    Past Medical and Surgical History reviewed in Epic today.    MEDICATIONS:    Current Outpatient Medications   Medication Sig Dispense Refill     ACE/ARB/ARNI NOT PRESCRIBED (INTENTIONAL) Please choose reason not prescribed from choices below.       acetaminophen (TYLENOL) 325 MG tablet Take 2 tablets (650 mg) by mouth 2 times daily. May also take 2 tablets (650 mg) 2 times daily as needed for pain. 120 tablet 98     albuterol (PROAIR HFA/PROVENTIL HFA/VENTOLIN HFA) 108 (90 Base) MCG/ACT inhaler Inhale 2 puffs into the lungs every 4 hours as  "needed       allopurinol (ZYLOPRIM) 100 MG tablet TAKE ONE TABLET BY MOUTH THREE TIMES A WEEK ON MONDAY, WEDNESDAY AND FRIDAY AFTER HD SESSION 12 tablet 97     apixaban ANTICOAGULANT (ELIQUIS) 2.5 MG tablet Take 1 tablet (2.5 mg) by mouth 2 times daily 60 tablet 11     artificial saliva (BIOTENE MT) AERS spray Take 1 spray by mouth every 6 hours as needed for dry mouth MAY KEEP AT BEDSIDE 30 mL 89     atorvastatin (LIPITOR) 40 MG tablet Take 1 tablet (40 mg) by mouth daily 30 tablet 11     diphenhydrAMINE (BENADRYL) 2 % external cream Apply topically 3 times daily as needed for itching 30 g 98     loratadine (CLARITIN) 10 MG tablet Take 1 tablet (10 mg) by mouth At Bedtime 30 tablet 98     Melatonin 10 MG TABS tablet Take 10 mg by mouth nightly as needed       metoprolol succinate ER (TOPROL-XL) 25 MG 24 hr tablet Take 0.5 tablets (12.5 mg) by mouth 2 times daily 60 tablet 4     multivitamin RENAL (RENAVITE RX/NEPHROVITE) 1 MG tablet Take 1 tablet by mouth daily       pantoprazole (PROTONIX) 40 MG EC tablet Take 1 tablet (40 mg) by mouth daily 30 tablet 11     sodium chloride (OCEAN) 0.65 % nasal spray Spray 1 spray into both nostrils every 2 hours as needed for congestion MAY KEEP AT BEDSIDE 50 mL 98     tiotropium-olodaterol (STIOLTO RESPIMAT) 2.5-2.5 MCG/ACT AERS Inhale 2 puffs into the lungs daily       torsemide (DEMADEX) 20 MG tablet Take 2 tablets (40 mg) by mouth daily Take at 8 AM and 1 PM. (Patient taking differently: Take 40 mg by mouth daily Take at 8 AM x4 on TuThSaSu (non-HD days) and 1 PM all other days.) 120 tablet 4     triamcinolone (KENALOG) 0.1 % external cream Apply to AA BID x 1-2 week then PRN only 454 g 2         REVIEW OF SYSTEMS:  4 point ROS including Respiratory, CV, GI and , other than that noted in the HPI,  is negative    Objective:  /69   Pulse 70   Temp 98.1  F (36.7  C)   Resp 18   Ht 1.626 m (5' 4\")   Wt 59.5 kg (131 lb 3.2 oz)   SpO2 97%   BMI 22.52 kg/m  "   Exam:  GENERAL APPEARANCE:  Alert, in no distress  RESP:  respiratory effort and palpation of chest normal, lungs clear to auscultation , no respiratory distress  CV:  Palpation and auscultation of heart done , regular rate and rhythm, no murmur, rub, or gallop  ABDOMEN:  normal bowel sounds, soft, nontender, no hepatosplenomegaly or other masses  M/S:   sitting in wc  SKIN:  Inspection of skin and subcutaneous tissue baseline  PSYCH:  oriented X 3    Labs:   Labs done in SNF are in [facility] EPIC. Please refer to them using EPIC/Care Everywhere.    ASSESSMENT/PLAN:     Chest pain, unspecified type  ESRD (end stage renal disease) (H)  Generalized muscle weakness  Other depression     Progressing in therapies. Using walker more  Tolerating dialysis with no acute concerns, states she is just bored for 3 and 1/2 hours  Denies chest pain   Reports she is eager to get back to [facility], however, aware she needs to get stronger  Is being followed by house psychologist for depression as she was followed at [facility] as well  Seen by activities and given art supplies   Per consult with therapies    Transfers CGA with 4WW   Bed mobility min assist   Ambulating 200 feet with 4WW SBA   Discharge plan and any ID barriers to discharge: Significant fatigue on dialysis days during therapy. Therapy plans to continue at this time.        Electronically signed by:  JUSTO Ward CNP

## 2022-01-20 NOTE — PROGRESS NOTES
[facility] Care Coordination Contact  CC attended care conference for member on 1/20/2022 at [facility]  TCU.   Present at care conference member, this care coordinator, NH  (Penny), NH RN (Leonor), NH Therapy (Shirley) and Sister (Tereza).  Therapy Report:  Member states that she would like to be walking more. Needs CGA with walker up to 184 feet. Scored 65 on the falls risk, which indicates a high falls risk. It is recommended that she have someone with her for dressing due to her high fall risk. Member doesn't want assistance with dressing. Her strength is low and has increase weakness and poor balance. Will eleanor to get her on a walking program. Therapy does not want member walking along due to her high fall risk. Therapy says physically AL would be appropriate.  Nursing Report: Member inquired about being able to keep an anti-itch cream at her bedside. RN will speak with nurse manager and GNP. Member stated that she doesn't like her scheduled inhaler and states that it tastes funny, and difficult to use. RN will follow up with GNP about options. Sister shared that there were some technical difficulties with taking the inhaler, and RN will ensure that member is properly taking. When at the AL staff just handed her the inhaler.  Dietician Report: Eating % of meals. Weight has been stable.   Social Work Report: Reviewed that at this time member wants to return to the AL.   TCU Recommendations: AL living recommended. Discussion regarding member's diet restriction and the ALs inability to do specialized diets was discussed. [facility] did review that member is able to stay at the NH if she should choose to do so.   Dialysis: When at the AL is a struggle to get her to dialysis so early in the AM.  CC Report:  Notified TCU team of 30 day waiver closure requirement, waiver will close on 1/12/22 if not discharged by 2/11/22 (30th day). Reviewed that if member discharges after the 30th  day, will need to be re-screened for elderly waiver prior to discharge.    Member shared that she has been struggling with her roommate, and her roommate did change rooms and this is better.    Jesica Whitehead TONYA  [facility]  [phone]

## 2022-01-22 NOTE — PROGRESS NOTES
[place] [facility]  PRIMARY CARE PROVIDER AND CLINIC:  Eileen Arredondo, APRN CNP, [address] / [place] [address]      Pt was seen by Dr Trotter at the [facility] on 1/22/22 for a hospital f/u visit    Francine Chris  is a 76 year old  (1945), admitted to the above facility from  [facility]. Hospital stay 1/12/22 through 1/13/22..      Pt is a 76 yr old female admitted to [facility] for rehabilitation s/p hospitalization [facility] 1/12-1/13/22 for fall, weakness, hyponatremia, chest pain and thought to be musculoskeletal.. neg stress test.  Hyponatremia felt to be secondary to excessive fluid intake between HD    PMHx Afib, s/p ablation and PPM, ESRD on dialysis, pulmonary hypertension, severe tricuspid regurgitation, CHF,  PAD, anemia, COPD, SKY, GERD, anxiety, depression.     Patient reports feeling improved, is anxious to discharge back to her assisted living residence.  She denies further chest pain.  She denies shortness of breath, cough, fevers, chills, abdominal pain.  Appetite is fair.  She has been ambulating independently about her room.          CODE STATUS/ADVANCE DIRECTIVES DISCUSSION:   CPR/Full code   Patient's living condition: lives in an assisted living facility  ALLERGIES: Sotalol and Bupropion  PAST MEDICAL HISTORY:  has a past medical history of Acute renal failure (ARF) (H) (06/22/2021), Anemia in chronic kidney disease, on chronic dialysis (H) (07/14/2021), Atrophy of left kidney (05/18/2017), Chronic atrial fibrillation (H) (04/01/2013), Chronic kidney disease, stage III (moderate) (H) (11/13/2019), COPD mixed type (H) (04/06/2013), Cor pulmonale (chronic) (H) (09/20/2021), Depressive disorder, not elsewhere classified, Dermatitis of lower extremity (05/05/2017), Elevated blood pressure reading without diagnosis of hypertension (12/10/2004), HTN (hypertension) (09/07/2011), gout (08/13/2014), Hypercalcemia (07/16/2018), Hypertensive  heart and kidney disease (07/16/2018), Hyponatremia (06/22/2021), Intermediate stage nonexudative age-related macular degeneration of both eyes (01/13/2021), Lung nodule (04/28/2021), Malignant melanoma (H), Migraine variant (12/10/2004), Neoplasm of skin of neck (06/06/2014), NSVT (nonsustained ventricular tachycardia) (H) (04/09/2020), SKY (obstructive sleep apnea) (04/08/2020), Osteoporosis (11/10/2004), Osteoporosis, unspecified (dx 8/02), Pacemaker (07/14/2021), PAD (peripheral artery disease) (H) (02/08/2019), 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  PAST SURGICAL HISTORY:   has a past surgical history that includes NONSPECIFIC PROCEDURE (1990's); NONSPECIFIC PROCEDURE; NONSPECIFIC PROCEDURE; and NONSPECIFIC PROCEDURE (1980's).  FAMILY HISTORY: family history includes Alcohol/Drug in her brother and father; Allergies in her sister; Alzheimer Disease in her maternal uncle; Arthritis in her maternal grandmother, mother, and sister; Bladder Cancer in her brother; Breast Cancer in her maternal grandmother; C.A.D. in her maternal grandfather; Cancer - colorectal in her maternal grandmother; Cerebrovascular Disease in her maternal grandmother; Circulatory in her father and mother; Diabetes in her brother; Gastrointestinal Disease in her maternal grandmother; Heart Disease in her father and mother; Heart Failure in her paternal grandmother; Hyperlipidemia in her maternal uncle; Hypertension in her mother and sister; Lipids in her mother; Myocardial Infarction in her brother; Obesity in her brother; Osteoarthritis in her maternal grandfather; Osteoporosis in her maternal grandmother, mother, and sister; Prostate Cancer in her brother; Thyroid Disease in her maternal grandmother and another family member.  SOCIAL HISTORY:   reports that  she quit smoking about 9 months ago. Her smoking use included cigarettes. She has a 43.00 pack-year smoking history. She has never used smokeless tobacco. She reports current alcohol use. She reports that she does not use drugs.      ROS:  10 point ROS of systems including Constitutional, Eyes, Respiratory, Cardiovascular, Gastroenterology, Genitourinary, Integumentary, Musculoskeletal, Psychiatric were all negative except for pertinent positives noted in my HPI.      Exam:  GENERAL APPEARANCE:  Alert, in no distress, frail, pale appearing, walking in her room  ENT: oral mucosa moist  NECK:  supple  RESP:clear  CV:  RRR, no rub  ABDOMEN:  Soft, non-distended  M/S: no edema  SKIN:  No rash  NEURO:   Alert, fully oriented,  No tremor, no focal weakness        ASSESSMENT/PLAN:    Chest pain, possibly musculoskeletal, no recurrence  Plan monitor vitals, no further evaluation planned    Fall  Circumstances not clear  Plan continue therapies, monitor vitals    ESKD  Hyponatremia  Plan fluid restriction       Permanent atrial fibrillation  S/p AVN ablation with ppm 6/2021  Plan continue metoprolol, eliquis, monitor vitals, avoid hypotension       Severe tricuspid regurgitation   Pulmonary hypertension  Chronic R heart failure, compensated on torsemide and HD  Plan continue current tx.  Fluid restriction    Anemia  Hgb 10.7 on 1/18/22, improved  Plan monitor Hgb     COPD  Stable on current regimen      Cedric Trotter MD

## 2022-01-24 NOTE — PROGRESS NOTES
[facility] Care Coordination Contact      [facility] Six-Month Telephone Assessment    6 month telephone assessment completed on 1/24/22. Completed with member's sister, Tereza.    ER visits: Yes -  [facility]  Hospitalizations: Yes -  [facility]  TCU stays: Yes -  [facility]   Significant health status changes: continues to struggle with her fluid restriction. Member continues to state that she wants to return back to the AL but the AL is not able to manage her renal diet. Sister has concerns that lack of following diet causes medical issues that have resulted in the need for higher level of care.  Falls/Injuries: Yes: Fell at TCU because she did not lock her breaks to her wheelchair when she went to transfer.  ADL/IADL changes: No. Currently is at TCU working on strengthening and endurance.  Changes in services: No    Caregiver Assessment follow up:  NA    Goals: See POC in chart for goal progress documentation. Updated sister that if member has not left the NH by the 30th day, the Waiver will close.     Will see member in 6 months for an annual health risk assessment.   Encouraged member to call CC with any questions or concerns in the meantime.     GAYLE Coleman  [facility]  [phone]

## 2022-01-25 NOTE — PROGRESS NOTES
[place] [facility]  [place] Medical Record Number:  [id number]  Place of Service where encounter took place:  [facility] (TCU) [[id number]]  Chief Complaint   Patient presents with     Nursing Home Acute       HPI:    Francine Chris  is a 76 year old (1945), who is being seen today for an episodic care visit.  HPI information obtained from: facility chart records, facility staff and patient report. Today's concern is:    Patient Francine Chris is a 76 yr old female admitted to [facility] for rehabilitation s/p hospitalization [facility] 1/12-1/13/22 for chest pain and thought to be musculoskeletal s/p fall. Unremarkable chest xray, EKG and serial troponins  Lexiscan stress test is negative, and clinical suspicion for ACS is low, no further workup planned at this point    PMHx Afib, s/p ablation and PPM, ESRD on dialysis, pulmonary hypertension, severe tricuspid regurgitation, CHF (RIGHT), PAD, anemia, COPD, SKY, GERD, anxiety, depression and        Contusion of scalp, subsequent encounter  ESRD (end stage renal disease) (H)  Generalized muscle weakness  Cognitive impairment     Patient fell this AM in bathroom and hit head resulting in laceration and sent to ER. She is on Eliquis. No internal brain bleed noted in ER. Has hematoma and received staples and dressing to head. Not thought to be syncope episode. No neuro changes    Per ER report  Head CT without acute intracranial hemorrhage or trauma.  C-spine CT without acute injury.  Remainder of head to toe trauma exam negative for any signs of injury from the fall    Patient request ice and tylenol for head  Denies nausea and vomiting and request lunch.  Denies neuro changes or change in vision      Past Medical and Surgical History reviewed in Epic today.    MEDICATIONS:    Current Outpatient Medications   Medication Sig Dispense Refill     ACE/ARB/ARNI NOT PRESCRIBED (INTENTIONAL) Please choose reason not prescribed  from choices below.       acetaminophen (TYLENOL) 325 MG tablet Take 2 tablets (650 mg) by mouth 2 times daily. May also take 2 tablets (650 mg) 2 times daily as needed for pain. 120 tablet 98     albuterol (PROAIR HFA/PROVENTIL HFA/VENTOLIN HFA) 108 (90 Base) MCG/ACT inhaler Inhale 2 puffs into the lungs every 4 hours as needed       allopurinol (ZYLOPRIM) 100 MG tablet TAKE ONE TABLET BY MOUTH THREE TIMES A WEEK ON MONDAY, WEDNESDAY AND FRIDAY AFTER HD SESSION 12 tablet 97     apixaban ANTICOAGULANT (ELIQUIS) 2.5 MG tablet Take 1 tablet (2.5 mg) by mouth 2 times daily 60 tablet 11     artificial saliva (BIOTENE MT) AERS spray Take 1 spray by mouth every 6 hours as needed for dry mouth MAY KEEP AT BEDSIDE 30 mL 89     atorvastatin (LIPITOR) 40 MG tablet Take 1 tablet (40 mg) by mouth daily 30 tablet 11     diphenhydrAMINE (BENADRYL) 2 % external cream Apply topically 3 times daily as needed for itching 30 g 98     loratadine (CLARITIN) 10 MG tablet Take 1 tablet (10 mg) by mouth At Bedtime 30 tablet 98     Melatonin 10 MG TABS tablet Take 10 mg by mouth nightly as needed       metoprolol succinate ER (TOPROL-XL) 25 MG 24 hr tablet Take 0.5 tablets (12.5 mg) by mouth 2 times daily 60 tablet 4     multivitamin RENAL (RENAVITE RX/NEPHROVITE) 1 MG tablet Take 1 tablet by mouth daily       pantoprazole (PROTONIX) 40 MG EC tablet Take 1 tablet (40 mg) by mouth daily 30 tablet 11     sodium chloride (OCEAN) 0.65 % nasal spray Spray 1 spray into both nostrils every 2 hours as needed for congestion MAY KEEP AT BEDSIDE 50 mL 98     tiotropium-olodaterol (STIOLTO RESPIMAT) 2.5-2.5 MCG/ACT AERS Inhale 2 puffs into the lungs daily       torsemide (DEMADEX) 20 MG tablet Take 2 tablets (40 mg) by mouth daily Take at 8 AM and 1 PM. (Patient taking differently: Take 40 mg by mouth daily Take at 8 AM x4 on TuThSaSu (non-HD days) and 1 PM all other days.) 120 tablet 4     triamcinolone (KENALOG) 0.1 % external cream Apply to AA BID x  "1-2 week then PRN only 454 g 2         REVIEW OF SYSTEMS:  10 point ROS of systems including Constitutional, Eyes, Respiratory, Cardiovascular, Gastroenterology, Genitourinary, Integumentary, Musculoskeletal, Psychiatric were all negative except for pertinent positives noted in my HPI.    Objective:  BP (!) 146/72   Pulse 83   Temp 97.3  F (36.3  C)   Resp 20   Ht 1.626 m (5' 4\")   Wt 59.4 kg (131 lb)   SpO2 98%   BMI 22.49 kg/m    Exam:  GENERAL APPEARANCE:  Alert, in no distress  ENT:  Mouth and posterior oropharynx normal, moist mucous membranes  EYES:  EOM, conjunctivae, lids, pupils and irises normal  NECK:  No adenopathy,masses or thyromegaly  RESP:  respiratory effort and palpation of chest normal, lungs clear to auscultation , no respiratory distress  CV:  Palpation and auscultation of heart done , regular rate and rhythm, no murmur, rub, or gallop  ABDOMEN:  normal bowel sounds, soft, nontender, no hepatosplenomegaly or other masses  M/S:   sitting in WC  SKIN:  Inspection of skin and subcutaneous tissue  ACE wrap on head  NEURO:   Cranial nerves 2-12 are normal tested and grossly at patient's baseline  PSYCH:  oriented X 3    Skin audit:  1) bruise left wrist from blood draw   2) bruise posterior left hand from blood draw   3) scattered bruises on posterior left forearm. Resident states caused by scratching her arm.   4) laceration to posterior skull. Area is covered in dry blood. 3 staples present. There is a palpable bump. Area was covered with adaptic, 4x4 gauze and wrapped with an ace bandage. Replaced dressing.      Labs:   Labs done in SNF are in [place] EPIC. Please refer to them using EPIC/Care Everywhere.    ASSESSMENT/PLAN:     Contusion of scalp, subsequent encounter  ESRD (end stage renal disease) (H)  Generalized muscle weakness  Cognitive impairment      Patient fell this AM in bathroom and hit head resulting in laceration and sent to ER. She is on Eliquis. No internal brain bleed " "noted in ER. Has hematoma and received staples and dressing to head. Not thought to be syncope episode. No neuro changes    Patient dialysis rescheduled for this afternoon  Patient had staples intact on scalp. Dressing re-dressed at TCU due to drainage noted.   Patient request ice. This was given. She states Tylenol given in ER  Denies nausea and vomiting and request lunch. Lunch given  Denies neuro changes or change in vision  Vitals stable. Monitor for any neuro changes due to concern potential slow bleed. Patient remains on blood thinner    Discussion with patient and sister Tereza on patient status and care needs. Sister feel she needs higher level of cares such as long term care to meet care needs. Tereza has been doing care coordination and providing rides.  She can no longer do this due to her own family needs.     Pending [facility] an CPT on patient . Reminded therapies that we will need this for discharge   Per therapies consult    Transfers: CGA 4WW   Bed Mobility: Min   Ambulation: 200 feet 4WW SBA   Safety Questions: 21/22   Grooming/Hygiene: Mod Ind   UB Dressing: Mod Ind   LB Dressing: SBA   Toileting: stand by assist    Consulted with  regarding patient concerns and plan for future care conference          Total time spent with patient visit at the skilled nursing facility was 38 min including patient visit, review of past records and phone call to patient contact. Greater than 50% of total time spent with counseling and coordinating care due to discussion with patient and sister \"Mug\" on pain management, functional goals and DC planning, consultation with  as noted above.    Electronically signed by:  JUSTO Ward CNP           "

## 2022-01-26 NOTE — ED PROVIDER NOTES
History     Chief Complaint:    Fall      HPI   Francine Chris is a 76 year old female who presents with head injury with scalp laceration.  Patient was up early to get ready to go to dialysis.  She was walking with her walker to the bathroom and she slipped and fell.  She denies chest pain, palpitations, dizziness or lightheadedness prior to or following the fall.  She does states she has a significant headache now.  She takes Eliquis for A. fib.  Patient states she went to dialysis on Monday and had a normal run.  She denies chest pain or shortness of breath.  Patient denies loss of consciousness.  She denies any other injuries from the fall.  Patient with chronic ongoing hyponatremia thought to be from intradialytic weight gains.  This is being addressed with her dialysis.    NM Lexiscan stress test 1/12/22       The nuclear stress test is probably negative for inducible myocardial ischemia or infarction.     Left ventricular function is hyperdynamic.     The left ventricular ejection fraction at stress is 76%.     There is no prior study for comparison.        Review of Systems    Constitutional: No fevers, sweats, chills  Cardiovascular: No dizziness, chest pain,  Respiratory: No shortness of breath  Neurologic: Headache  Skin: Laceration to the scalp in the posterior occiput  remander systems reviewed and negative    Allergies:    Sotalol  Bupropion      Medications:      ACE/ARB/ARNI NOT PRESCRIBED (INTENTIONAL)  acetaminophen (TYLENOL) 325 MG tablet  albuterol (PROAIR HFA/PROVENTIL HFA/VENTOLIN HFA) 108 (90 Base) MCG/ACT inhaler  allopurinol (ZYLOPRIM) 100 MG tablet  apixaban ANTICOAGULANT (ELIQUIS) 2.5 MG tablet  artificial saliva (BIOTENE MT) AERS spray  atorvastatin (LIPITOR) 40 MG tablet  diphenhydrAMINE (BENADRYL) 2 % external cream  loratadine (CLARITIN) 10 MG tablet  Melatonin 10 MG TABS tablet  metoprolol succinate ER (TOPROL-XL) 25 MG 24 hr tablet  multivitamin RENAL (RENAVITE RX/NEPHROVITE) 1  MG tablet  pantoprazole (PROTONIX) 40 MG EC tablet  sodium chloride (OCEAN) 0.65 % nasal spray  tiotropium-olodaterol (STIOLTO RESPIMAT) 2.5-2.5 MCG/ACT AERS  torsemide (DEMADEX) 20 MG tablet  triamcinolone (KENALOG) 0.1 % external cream        Past Medical History:      Past Medical History:   Diagnosis Date     Acute renal failure (ARF) (H) 06/22/2021     Anemia in chronic kidney disease, on chronic dialysis (H) 07/14/2021     Atrophy of left kidney 05/18/2017     Chronic atrial fibrillation (H) 04/01/2013     Chronic kidney disease, stage III (moderate) (H) 11/13/2019     COPD mixed type (H) 04/06/2013     Cor pulmonale (chronic) (H) 09/20/2021     Depressive disorder, not elsewhere classified      Dermatitis of lower extremity 05/05/2017     Elevated blood pressure reading without diagnosis of hypertension 12/10/2004     HTN (hypertension) 09/07/2011     Hx of gout 08/13/2014     Hypercalcemia 07/16/2018     Hypertensive heart and kidney disease 07/16/2018     Hyponatremia 06/22/2021     Intermediate stage nonexudative age-related macular degeneration of both eyes 01/13/2021     Lung nodule 04/28/2021     Malignant melanoma (H)      Migraine variant 12/10/2004     Neoplasm of skin of neck 06/06/2014     NSVT (nonsustained ventricular tachycardia) (H) 04/09/2020     SKY (obstructive sleep apnea) 04/08/2020     Osteoporosis 11/10/2004     Osteoporosis, unspecified dx 8/02     Pacemaker 07/14/2021     PAD (peripheral artery disease) (H) 02/08/2019     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     Prolonged QT interval 07/16/2018     Tobacco use disorder      Variants of migraine, not elsewhere classified, without mention of intractable migraine without mention of status migrainosus      Patient Active Problem List    Diagnosis Date Noted     Chest pain, unspecified type 01/12/2022     Priority: Medium     Chronic gout 11/26/2021     Priority: Medium     ESRD (end stage renal disease) on dialysis (H) 10/17/2021     Priority:  Medium     Cor pulmonale (chronic) (H) 09/20/2021     Priority: Medium     Anemia in chronic kidney disease, on chronic dialysis (H) 07/14/2021     Priority: Medium     Pacemaker 07/14/2021     Priority: Medium     Formatting of this note might be different from the original.  After AV luis ablation for AF with RVR       Hyponatremia 06/22/2021     Priority: Medium     Lung nodule 04/28/2021     Priority: Medium     Intermediate stage nonexudative age-related macular degeneration of both eyes 01/13/2021     Priority: Medium     SKY (obstructive sleep apnea) 04/08/2020     Priority: Medium     Chronic kidney disease, stage III (moderate) (H) 11/13/2019     Priority: Medium     Formatting of this note might be different from the original.  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  7/16/18  Ca++=10.8   Alb=3.8   7/17/18:  PTH=95.6  Ca++=10.1 (after diuresis), some renal impairment, was on vit D and calcium outpt       Atrophy of left kidney 05/18/2017     Priority: Medium     Dermatitis of lower extremity 05/05/2017     Priority: Medium     Hx of gout 08/13/2014     Priority: Medium     Neoplasm of skin of neck 06/06/2014     Priority: Medium     COPD mixed type (H) 04/06/2013     Priority: Medium     Formatting of this note might be different from the original.  On Qvar and combivent, hx of cor pulmonale       Migraine variant 12/10/2004     Priority: Medium     Problem list name updated by automated process. Provider to review       Depressive disorder, not elsewhere classified 11/10/2004     Priority: Medium     Osteoporosis 11/10/2004     Priority: Medium     Problem list name updated by automated process. Provider to review       Tobacco use disorder 11/10/2004     Priority: Medium        Past Surgical  "History:      Past Surgical History:   Procedure Laterality Date     ZZC NONSPECIFIC PROCEDURE  1990's    right ankle fracture     ZZC NONSPECIFIC PROCEDURE      spontaneous AB     ZZC NONSPECIFIC PROCEDURE      tonsillectomy     ZZC NONSPECIFIC PROCEDURE  1980's    tubal ligation       Family History:      Family History   Problem Relation Age of Onset     Hypertension Mother      Arthritis Mother      Circulatory Mother      Heart Disease Mother      Lipids Mother      Osteoporosis Mother      Alcohol/Drug Father         ?MI     Circulatory Father      Heart Disease Father      Hypertension Sister      Allergies Sister         thyroid dz     Arthritis Sister      Osteoporosis Sister      Obesity Brother      Alcohol/Drug Brother         dm, ptsd, cad     Bladder Cancer Brother      Diabetes Brother      Myocardial Infarction Brother      Prostate Cancer Brother      Cerebrovascular Disease Maternal Grandmother      Breast Cancer Maternal Grandmother         94     Cancer - colorectal Maternal Grandmother         97     Arthritis Maternal Grandmother      Gastrointestinal Disease Maternal Grandmother      Osteoporosis Maternal Grandmother      Thyroid Disease Maternal Grandmother      Osteoarthritis Maternal Grandfather      C.A.D. Maternal Grandfather      Heart Failure Paternal Grandmother      Hyperlipidemia Maternal Uncle      Alzheimer Disease Maternal Uncle      Thyroid Disease Other         niece       Social History:  Denies smoking.  Lives at assisted living    Physical Exam     Patient Vitals for the past 24 hrs:   BP Temp Temp src Pulse Resp SpO2 Height Weight   01/26/22 0905 134/71 98.2  F (36.8  C) Temporal 76 16 96 % -- --   01/26/22 0705 -- -- -- -- -- 96 % -- --   01/26/22 0621 (!) 156/80 97.9  F (36.6  C) Oral 83 16 100 % 1.651 m (5' 5\") 59.4 kg (131 lb)       Physical Exam  General: Patient is alert and head wrap in place with pressure dressing to risk  HEENT: Head laceration to the posterior " scalp with pressure dressing in   Eyes: pupils equal and reactive. Conjunctiva clear   Nares: patent   Oropharynx: no lesions, uvula midline, no palatal draping, normal voice, no trismus  Neck: Supple without lymphadenopathy, no meningismus  Chest: Heart regular rate and rhythm. Dialysis catheter in place with no erythema or warmth through  Lungs: Equal clear to auscultation with no wheeze or rales  Abdomen: Soft, non tender, nondistended, normal bowel sounds  Back: No costovertebral angle tenderness, no midline C, T or L spine tenderness  Neuro: Grossly nonfocal, normal speech, strength equal bilaterally, CN 2-12 intact  Extremities: No deformities, equal radial and DP pulses. No clubbing, cyanosis. Bilateral lower extremity edema with no erythema warmth or tender  Skin: Warm and dry with no rash.       Emergency Department Course   EC22  ECG taken at 0649, ECG read at 0701  Atrial fibrillation, ST and T wave abnormality consider inferior and anterior lateral ischemia  Rate 83 bpm. NV interval  ms. QRS duration 84ms. QT/QTc 414/486 ms. P-R-T axes *, -7, 240     Imaging:  CT Cervical Spine w/o Contrast   Final Result   IMPRESSION: No evidence of acute fracture or subluxation in the   cervical spine.      RENATO GREEN MD            SYSTEM ID:  [id number]      CT Head w/o Contrast   Final Result   IMPRESSION: No acute intracranial abnormality.      RENATO GREEN MD            SYSTEM ID:  [id number]          Laboratory:  Labs Ordered and Resulted from Time of ED Arrival to Time of ED Departure   COMPREHENSIVE METABOLIC PANEL - Abnormal       Result Value    Sodium 126 (*)     Potassium 3.2 (*)     Chloride 87 (*)     Carbon Dioxide (CO2) 25      Anion Gap 14      Urea Nitrogen 50 (*)     Creatinine 4.57 (*)     Calcium 9.8      Glucose 104 (*)     Alkaline Phosphatase 148      AST 43      ALT 46      Protein Total 7.6      Albumin 3.1 (*)     Bilirubin Total 1.4 (*)     GFR Estimate 9 (*)    CBC WITH PLATELETS  AND DIFFERENTIAL - Abnormal    WBC Count 6.7      RBC Count 3.32 (*)     Hemoglobin 11.6 (*)     Hematocrit 34.5 (*)      (*)     MCH 34.9 (*)     MCHC 33.6      RDW 15.8 (*)     Platelet Count 120 (*)     % Neutrophils 79      % Lymphocytes 10      % Monocytes 9      % Eosinophils 0      % Basophils 1      % Immature Granulocytes 1      NRBCs per 100 WBC 0      Absolute Neutrophils 5.3      Absolute Lymphocytes 0.7 (*)     Absolute Monocytes 0.6      Absolute Eosinophils 0.0      Absolute Basophils 0.0      Absolute Immature Granulocytes 0.0      Absolute NRBCs 0.0     TROPONIN I - Normal    Troponin I High Sensitivity 49         Procedures:    [facility] Procedure Note        Laceration Repair:    Performed by: Angela Leal MD  Authorized by: Angela Leal MD  Consent given by: Patient who states understanding of the procedure being performed after discussing the risks, benefits and alternatives.    Preparation: Patient was prepped and draped in usual sterile fashion.  Irrigation solution: saline    Body area:scalp  Laceration length: 1cm  Contamination: The wound is not contaminated.  Foreign bodies:none  Tendon involvement: none  Anesthesia: Local  Local anesthetic: Lidocaine     1%, with epinephrine  Anesthetic total: 1ml    Debridement: none  Skin closure: Closed with 3 Staples  Technique: interrupted  Approximation: close  Approximation difficulty: simple    Patient tolerance: Patient tolerated the procedure well with no immediate complications.      Emergency Department Course:           Reviewed:    I reviewed nursing notes, vitals and past history    Assessments:  0640 I obtained history and examined the patient as noted above.   0815 I rechecked the patient and explained findings.   0855 I spoke with patients sister Tereza             Interventions:    Medications   acetaminophen (TYLENOL) tablet 650 mg (650 mg Oral Given 1/26/22 0904)       Disposition:  The patient was  discharged to home.    Impression & Plan        Medical Decision Making:  Francine Chris is a 76 year old female who presents for evaluation of a fall.  This was clearly a mechanical fall, not syncope.  There were no prodromal symptoms so I doubt stroke, cardiac arrhythmia or other serious etiology.  Detailed exam shows scalp laceration hematoma to the posterior occiput.  Patient is also a dialysis dependent end-stage renal disease due for dialysis today based on this I did do basic blood work.  Patient's potassium is actually on the low side.  Her sodium is within the low range that it has been recently since starting dialysis.  Her nephrologist is aware of this per previous notes.  Patient is on Eliquis.  Wound was repaired as noted above.  She had a decent scalp hematoma to the posterior occiput.  Head CT without acute intracranial hemorrhage or trauma.  C-spine CT without acute injury.  Remainder of head to toe trauma exam negative for any signs of injury from the fall.  Possibility for delayed bleed due to Eliquis was discussed with the patient.  Patient missed dialysis this morning.  She has no evidence of fluid overload and her potassium is actually on the low side.  She does not wish to go to dialysis today and I feel she is safe to wait for her Friday dialysis given these findings.  I discussed all this with her sister as well who is going to reach out to her dialysis unit.  I had the tech ambulate the patient and she did well.  Based on this I would send home for outpatient management.  I would not do workup for syncope, stroke, ACS, PE at this point.  I doubt serious underlying fractures, intracerebral issues, spinal fractures.          Diagnosis:    ICD-10-CM    1. Scalp laceration, initial encounter  S01.01XA    2. End stage renal disease (H)  N18.6    3. Closed head injury, initial encounter  S09.90XA    4. Fall, initial encounter  W19.XXXA        Discharge Medications:  New Prescriptions    No  medications on file            Angela Leal MD  01/26/22 3776

## 2022-01-26 NOTE — ED NOTES
Bed: ED05  Expected date:   Expected time:   Means of arrival:   Comments:  [facility] 76F slipped and fell, on thinners needs diaysis eta 0602

## 2022-01-26 NOTE — ED TRIAGE NOTES
Unwitnessed fall at her care facility. Did not trip, just walking and then found herself on the floor. Hit back of head, staff applied bandage which is still in place. Any bleeding that was there is now well controlled. On eliquis

## 2022-01-28 NOTE — PROGRESS NOTES
[facility]  [facility] Record Number:  [id number]  Place of Service where encounter took place:  [facility] (TC[facility]) [[id number]]  Chief Complaint   Patient presents with     Nursing Home Acute       HPI:    Francine Chris  is a 76 year old (1945), who is being seen today for an episodic care visit.  HPI information obtained from: facility chart records, facility staff and patient report. Today's concern is:    Patient Francine Chris is a 76 yr old female admitted to [facility] for rehabilitation s/p hospitalization [facility] 1/12-1/13/22 for chest pain and thought to be musculoskeletal s/p fall. Unremarkable chest xray, EKG and serial troponins  Lexiscan stress test is negative, and clinical suspicion for ACS is low, no further workup planned at this point    PMHx Afib, s/p ablation and PPM, ESRD on dialysis, pulmonary hypertension, severe tricuspid regurgitation, CHF (RIGHT), PAD, anemia, COPD, SKY, GERD, anxiety, depression and gout       Contusion of scalp, subsequent encounter  Cognitive impairment  Atrial fibrillation, unspecified type (H)     Cognition appear at baseline  At baseline concern some cognitive impairment. Pending cognitive testing  Remains on Eliquis for Afib  Vitals stable  Went to dialysis today and therapies with no acute concerns  States had headache this morning and tylenol effective   Denies change in vision or neurological changes. No report change in status per consult with therapies     Past Medical and Surgical History reviewed in Epic today.    MEDICATIONS:    Current Outpatient Medications   Medication Sig Dispense Refill     ACE/ARB/ARNI NOT PRESCRIBED (INTENTIONAL) Please choose reason not prescribed from choices below.       acetaminophen (TYLENOL) 325 MG tablet Take 2 tablets (650 mg) by mouth 2 times daily. May also take 2 tablets (650 mg) 2 times daily as needed for pain. 120 tablet 98     albuterol (PROAIR HFA/PROVENTIL  HFA/VENTOLIN HFA) 108 (90 Base) MCG/ACT inhaler Inhale 2 puffs into the lungs every 4 hours as needed       allopurinol (ZYLOPRIM) 100 MG tablet TAKE ONE TABLET BY MOUTH THREE TIMES A WEEK ON MONDAY, WEDNESDAY AND FRIDAY AFTER HD SESSION 12 tablet 97     apixaban ANTICOAGULANT (ELIQUIS) 2.5 MG tablet Take 1 tablet (2.5 mg) by mouth 2 times daily 60 tablet 11     artificial saliva (BIOTENE MT) AERS spray Take 1 spray by mouth every 6 hours as needed for dry mouth MAY KEEP AT BEDSIDE 30 mL 89     atorvastatin (LIPITOR) 40 MG tablet Take 1 tablet (40 mg) by mouth daily 30 tablet 11     diphenhydrAMINE (BENADRYL) 2 % external cream Apply topically 3 times daily as needed for itching 30 g 98     loratadine (CLARITIN) 10 MG tablet Take 1 tablet (10 mg) by mouth At Bedtime 30 tablet 98     Melatonin 10 MG TABS tablet Take 10 mg by mouth nightly as needed       metoprolol succinate ER (TOPROL-XL) 25 MG 24 hr tablet Take 0.5 tablets (12.5 mg) by mouth 2 times daily 60 tablet 4     multivitamin RENAL (RENAVITE RX/NEPHROVITE) 1 MG tablet Take 1 tablet by mouth daily       pantoprazole (PROTONIX) 40 MG EC tablet Take 1 tablet (40 mg) by mouth daily 30 tablet 11     sodium chloride (OCEAN) 0.65 % nasal spray Spray 1 spray into both nostrils every 2 hours as needed for congestion MAY KEEP AT BEDSIDE 50 mL 98     tiotropium-olodaterol (STIOLTO RESPIMAT) 2.5-2.5 MCG/ACT AERS Inhale 2 puffs into the lungs daily       torsemide (DEMADEX) 20 MG tablet Take 2 tablets (40 mg) by mouth daily Take at 8 AM and 1 PM. (Patient taking differently: Take 40 mg by mouth daily Take at 8 AM x4 on TuThSaSu (non-HD days) and 1 PM all other days.) 120 tablet 4     triamcinolone (KENALOG) 0.1 % external cream Apply to AA BID x 1-2 week then PRN only 454 g 2         REVIEW OF SYSTEMS:  4 point ROS including Respiratory, CV, GI and , other than that noted in the HPI,  is negative    Objective:  /63   Pulse 80   Temp 98.1  F (36.7  C)   Resp  "18   Ht 1.626 m (5' 4\")   Wt 59.4 kg (131 lb)   SpO2 97%   BMI 22.49 kg/m    Exam:  GENERAL APPEARANCE:  Alert, in no distress  RESP:  respiratory effort and palpation of chest normal, lungs clear to auscultation , no respiratory distress  CV:  Palpation and auscultation of heart done , regular rate and rhythm, no murmur, rub, or gallop  ABDOMEN:  normal bowel sounds, soft, nontender, no hepatosplenomegaly or other masses  M/S:   sitting on bed  SKIN:  Inspection of skin and subcutaneous tissue small bump on back of head with scant amount dried blood, + 1-2 edema bilateral lower extremity, fistula with thrill right arm  PSYCH:  oriented X 3    Labs:   Labs done in SNF are in [place] EPIC. Please refer to them using EPIC/Care Everywhere.    ASSESSMENT/PLAN:     Contusion of scalp, subsequent encounter  Cognitive impairment  Atrial fibrillation, unspecified type (H)     Cognition appear at baseline  At baseline concern some cognitive impairment. Pending cognitive testing  Remains on Eliquis for Afib, no further signs and symptoms bleeding  Vitals stable  Went to dialysis today and therapies with no acute concerns. Labs done at dialysis       six week surgeon fistula check on 2/1/22. R brachiobasilic fistula placed 10/21/21 by Dr. Arredondo at [facility]  States had headache this morning and tylenol effective   Continue tylenol  Denies change in vision or neurological changes. No report change in status per consult with therapies          Appointment: 2/1 145pm [employer]/Dr. Ashford: [facility] [address] [place] [address] [phone] [employer] p/u at 1245/1pm [phone]    Electronically signed by:  Magali Ramirez, JUSTO CNP           "

## 2022-02-02 NOTE — PROGRESS NOTES
[place] [facility]  [place] Medical Record Number:  [id number]  Place of Service where encounter took place:  [facility] (TCU) [[id number]]  Chief Complaint   Patient presents with     Nursing Home Acute       HPI:    Francine Chris  is a 76 year old (1945), who is being seen today for an episodic care visit.  HPI information obtained from: facility chart records, facility staff and patient report. Today's concern is:    Patient Francine Chris is a 76 yr old female admitted to [facility] for rehabilitation s/p hospitalization [facility] 1/12-1/13/22 for chest pain and thought to be musculoskeletal s/p fall. Unremarkable chest xray, EKG and serial troponins  Lexiscan stress test is negative, and clinical suspicion for ACS is low, no further workup planned at this point    PMHx Afib, s/p ablation and PPM, ESRD on dialysis, pulmonary hypertension, severe tricuspid regurgitation, CHF (RIGHT), PAD, anemia, COPD, SKY, GERD, anxiety, depression and gout       Contusion of scalp, subsequent encounter  Cognitive impairment  ESRD (end stage renal disease) (H)     she is tired after dialysis   denies headache or neuro changes  She would like staples out of scalp   fistula on right arm was used today at dialysis. no acute concerns  Did not get AM snack prior to leaving for dialysis and reports being very hungry when she returns  She expresses how much she loves frozen grapes. Reports helps with dry mouth and tasty       Past Medical and Surgical History reviewed in Epic today.    MEDICATIONS:    Current Outpatient Medications   Medication Sig Dispense Refill     ACE/ARB/ARNI NOT PRESCRIBED (INTENTIONAL) Please choose reason not prescribed from choices below.       acetaminophen (TYLENOL) 325 MG tablet Take 2 tablets (650 mg) by mouth 2 times daily. May also take 2 tablets (650 mg) 2 times daily as needed for pain. 120 tablet 98     albuterol (PROAIR HFA/PROVENTIL HFA/VENTOLIN HFA)  108 (90 Base) MCG/ACT inhaler Inhale 2 puffs into the lungs every 4 hours as needed       allopurinol (ZYLOPRIM) 100 MG tablet TAKE ONE TABLET BY MOUTH THREE TIMES A WEEK ON MONDAY, WEDNESDAY AND FRIDAY AFTER HD SESSION 12 tablet 97     apixaban ANTICOAGULANT (ELIQUIS) 2.5 MG tablet Take 1 tablet (2.5 mg) by mouth 2 times daily 60 tablet 11     artificial saliva (BIOTENE MT) AERS spray Take 1 spray by mouth every 6 hours as needed for dry mouth MAY KEEP AT BEDSIDE 30 mL 89     atorvastatin (LIPITOR) 40 MG tablet Take 1 tablet (40 mg) by mouth daily 30 tablet 11     diphenhydrAMINE (BENADRYL) 2 % external cream Apply topically 3 times daily as needed for itching 30 g 98     loratadine (CLARITIN) 10 MG tablet Take 1 tablet (10 mg) by mouth At Bedtime 30 tablet 98     Melatonin 10 MG TABS tablet Take 10 mg by mouth nightly as needed       metoprolol succinate ER (TOPROL-XL) 25 MG 24 hr tablet Take 0.5 tablets (12.5 mg) by mouth 2 times daily 60 tablet 4     multivitamin RENAL (RENAVITE RX/NEPHROVITE) 1 MG tablet Take 1 tablet by mouth daily       pantoprazole (PROTONIX) 40 MG EC tablet Take 1 tablet (40 mg) by mouth daily 30 tablet 11     sodium chloride (OCEAN) 0.65 % nasal spray Spray 1 spray into both nostrils every 2 hours as needed for congestion MAY KEEP AT BEDSIDE 50 mL 98     tiotropium-olodaterol (STIOLTO RESPIMAT) 2.5-2.5 MCG/ACT AERS Inhale 2 puffs into the lungs daily       torsemide (DEMADEX) 20 MG tablet Take 2 tablets (40 mg) by mouth daily Take at 8 AM and 1 PM. (Patient taking differently: Take 40 mg by mouth daily Take at 8 AM x4 on TuThSaSu (non-HD days) and 1 PM all other days.) 120 tablet 4     triamcinolone (KENALOG) 0.1 % external cream Apply to AA BID x 1-2 week then PRN only 454 g 2         REVIEW OF SYSTEMS:  4 point ROS including Respiratory, CV, GI and , other than that noted in the HPI,  is negative    Objective:  /68   Pulse 85   Temp 97.9  F (36.6  C)   Resp 18   Ht 1.626 m  "(5' 4\")   Wt 59.4 kg (131 lb)   SpO2 98%   BMI 22.49 kg/m    Exam:  GENERAL APPEARANCE:  Alert, in no distress  RESP:  respiratory effort and palpation of chest normal, lungs clear to auscultation , no respiratory distress  CV:  Palpation and auscultation of heart done , regular rate and rhythm, no murmur, rub, or gallop  ABDOMEN:  normal bowel sounds, soft, nontender, no hepatosplenomegaly or other masses  M/S:   sitting in WC  SKIN:  Inspection of skin and subcutaneous tissue baseline, bump on back of head and 2 staples noted, fistula noted right arm  PSYCH:  oriented X 3    Labs:   Labs done in SNF are in [place] EPIC. Please refer to them using [employer] Everywhere.    ASSESSMENT/PLAN:     Contusion of scalp, subsequent encounter  Cognitive impairment  ESRD (end stage renal disease) (H)     she is tired after dialysis   She typically rests for 1 hour when she returns prior to doing therapies    denies headache or neuro changes  No signs and symptoms bleeding v=  Vitals stable   Monitor     She would like staples out of scalp; staples due to be removed-updated charge nurse    fistula on right arm was used today at dialysis. no acute concerns  In future plans to have dialysis access site on chest removed. She is excited about this     Did not get AM snack prior to leaving for dialysis and reports being very hungry when she returns. Updated charge nurse regarding        Electronically signed by:  JUSTO Ward CNP           "

## 2022-02-09 NOTE — PROGRESS NOTES
[place] [facility]  [place] [facility] Record Number:  [id number]  Place of Service where encounter took place:  [facility] (TCU) [[id number]]  Chief Complaint   Patient presents with     Nursing Home Acute       HPI:    Francine Chris  is a 76 year old (1945), who is being seen today for an episodic care visit.  HPI information obtained from: facility chart records, facility staff and patient report. Today's concern is:    Patient Francine Chris is a 76 yr old female admitted to [facility] for rehabilitation s/p hospitalization [facility] 1/12-1/13/22 for chest pain and thought to be musculoskeletal s/p fall. Unremarkable chest xray, EKG and serial troponins  Lexiscan stress test is negative, and clinical suspicion for ACS is low, no further workup planned at this point    PMHx Afib, s/p ablation and PPM, ESRD on dialysis, pulmonary hypertension, severe tricuspid regurgitation, CHF (RIGHT), PAD, anemia, COPD, SKY, GERD, anxiety, depression and gout       Contusion of scalp, subsequent encounter  Cognitive impairment  ESRD (end stage renal disease) (H)  Atrial fibrillation, unspecified type (H)  Generalized muscle weakness    Contusion on head resolving   Vitals stable room air   Mood stable  Hr stable, on Metoprolol   Participating in therapies  She would like to go home to assistive living, however, considering long term care due to care needs      Past Medical and Surgical History reviewed in Epic today.    MEDICATIONS:    Current Outpatient Medications   Medication Sig Dispense Refill     ACE/ARB/ARNI NOT PRESCRIBED (INTENTIONAL) Please choose reason not prescribed from choices below.       acetaminophen (TYLENOL) 325 MG tablet Take 2 tablets (650 mg) by mouth 2 times daily. May also take 2 tablets (650 mg) 2 times daily as needed for pain. 120 tablet 98     albuterol (PROAIR HFA/PROVENTIL HFA/VENTOLIN HFA) 108 (90 Base) MCG/ACT inhaler Inhale 2 puffs into the lungs  every 4 hours as needed       allopurinol (ZYLOPRIM) 100 MG tablet TAKE ONE TABLET BY MOUTH THREE TIMES A WEEK ON MONDAY, WEDNESDAY AND FRIDAY AFTER HD SESSION 12 tablet 97     apixaban ANTICOAGULANT (ELIQUIS) 2.5 MG tablet Take 1 tablet (2.5 mg) by mouth 2 times daily 60 tablet 11     artificial saliva (BIOTENE MT) AERS spray Take 1 spray by mouth every 6 hours as needed for dry mouth MAY KEEP AT BEDSIDE 30 mL 89     atorvastatin (LIPITOR) 40 MG tablet Take 1 tablet (40 mg) by mouth daily 30 tablet 11     diphenhydrAMINE (BENADRYL) 2 % external cream Apply topically 3 times daily as needed for itching 30 g 98     folic acid-vit B6-vit B12 (FOLGARD) 0.8-10-0.115 MG TABS per tablet Take 1 tablet by mouth daily       Glycopyrrolate-Formoterol (BEVESPI AEROSPHERE) 9-4.8 MCG/ACT oral inhaler Inhale 2 puffs into the lungs 2 times daily 10.7 g 4     loratadine (CLARITIN) 10 MG tablet Take 1 tablet (10 mg) by mouth At Bedtime 30 tablet 98     Melatonin 10 MG TABS tablet Take 10 mg by mouth nightly as needed       metoprolol succinate ER (TOPROL-XL) 25 MG 24 hr tablet Take 0.5 tablets (12.5 mg) by mouth 2 times daily 60 tablet 4     multivitamin RENAL (RENAVITE RX/NEPHROVITE) 1 MG tablet Take 1 tablet by mouth daily       pantoprazole (PROTONIX) 40 MG EC tablet Take 1 tablet (40 mg) by mouth daily 30 tablet 11     sodium chloride (OCEAN) 0.65 % nasal spray Spray 1 spray into both nostrils every 2 hours as needed for congestion MAY KEEP AT BEDSIDE 50 mL 98     tiotropium-olodaterol (STIOLTO RESPIMAT) 2.5-2.5 MCG/ACT AERS Inhale 2 puffs into the lungs daily       torsemide (DEMADEX) 20 MG tablet Take 2 tablets (40 mg) by mouth daily Take at 8 AM and 1 PM. (Patient taking differently: Take 40 mg by mouth daily Take at 8 AM x4 on TuThSaSu (non-HD days) and 1 PM all other days.) 120 tablet 4     triamcinolone (KENALOG) 0.1 % external cream Apply to AA BID x 1-2 week then PRN only 454 g 2         REVIEW OF SYSTEMS:  4 point ROS  "including Respiratory, CV, GI and , other than that noted in the HPI,  is negative    Objective:  /66   Pulse 79   Temp 98.1  F (36.7  C)   Resp 18   Ht 1.626 m (5' 4\")   Wt 59.5 kg (131 lb 3.2 oz)   SpO2 95%   BMI 22.52 kg/m     Wt Readings from Last 2 Encounters:   02/09/22 59.4 kg (131 lb)   02/09/22 59.5 kg (131 lb 3.2 oz)       Exam:  GENERAL APPEARANCE:  Alert, in no distress  ENT:  Mouth and posterior oropharynx normal, moist mucous membranes  RESP:  respiratory effort and palpation of chest normal, lungs clear to auscultation , no respiratory distress  CV:  Palpation and auscultation of heart done , regular rate and rhythm, no murmur, rub, or gallop  ABDOMEN:  normal bowel sounds, soft, nontender, no hepatosplenomegaly or other masses  M/S:   Gait and station normal  SKIN: right arm dressing intake  NEURO:   Cranial nerves 2-12 are normal tested and grossly at patient's baseline  PSYCH:  oriented X 3    Labs:   Labs done in SNF are in [place] EPIC. Please refer to them using [employer] Everywhere.     Lab Results   Component Value Date    WBC 6.7 01/26/2022    WBC 7.4 01/12/2005     Lab Results   Component Value Date    RBC 3.32 01/26/2022    RBC 4.76 01/12/2005     Lab Results   Component Value Date    HGB 11.6 01/26/2022    HGB 15.6 01/12/2005     Lab Results   Component Value Date    HCT 34.5 01/26/2022    HCT 46.9 01/12/2005     No components found for: MCT  Lab Results   Component Value Date     01/26/2022    MCV 99 01/12/2005     Lab Results   Component Value Date    MCH 34.9 01/26/2022    MCH 32.8 01/12/2005     Lab Results   Component Value Date    MCHC 33.6 01/26/2022    MCHC 33.3 01/12/2005     Lab Results   Component Value Date    RDW 15.8 01/26/2022    RDW 12.9 01/12/2005     Lab Results   Component Value Date     01/26/2022     01/12/2005       Last Comprehensive Metabolic Panel:  Sodium   Date Value Ref Range Status   01/26/2022 126 (L) 133 - 144 mmol/L Final "   01/12/2005 141 133 - 144 mmol/L Final     Potassium   Date Value Ref Range Status   01/26/2022 3.2 (L) 3.4 - 5.3 mmol/L Final   01/12/2005 4.5 3.4 - 5.3 mmol/L Final     Chloride   Date Value Ref Range Status   01/26/2022 87 (L) 94 - 109 mmol/L Final   01/12/2005 105 94 - 109 mmol/L Final     Carbon Dioxide   Date Value Ref Range Status   01/12/2005 27 20 - 32 mmol/L Final     Carbon Dioxide (CO2)   Date Value Ref Range Status   01/26/2022 25 20 - 32 mmol/L Final     Anion Gap   Date Value Ref Range Status   01/26/2022 14 3 - 14 mmol/L Final   01/12/2005 9 6 - 17 mmol/L Final     Glucose   Date Value Ref Range Status   01/26/2022 104 (H) 70 - 99 mg/dL Final   01/12/2005 89 60 - 110 mg/dL Final     Urea Nitrogen   Date Value Ref Range Status   01/26/2022 50 (H) 7 - 30 mg/dL Final   01/12/2005 16 7 - 30 mg/dL Final     Creatinine   Date Value Ref Range Status   01/26/2022 4.57 (H) 0.52 - 1.04 mg/dL Final   01/12/2005 0.80 0.60 - 1.30 mg/dL Final     GFR Estimate   Date Value Ref Range Status   01/26/2022 9 (L) >60 mL/min/1.73m2 Final     Comment:     Effective December 21, 2021 eGFRcr in adults is calculated using the 2021 CKD-EPI creatinine equation which includes age and gender (Elder et al., NEJ, DOI: 10.1056/ZWKOyl7469728)   01/12/2005 78 >60 mL/min/1.7m2 Final     Calcium   Date Value Ref Range Status   01/26/2022 9.8 8.5 - 10.1 mg/dL Final   01/12/2005 10.2 8.5 - 10.4 mg/dL Final         ASSESSMENT/PLAN:     Contusion of scalp, subsequent encounter  Cognitive impairment  ESRD (end stage renal disease) (H)  Atrial fibrillation, unspecified type (H)  Generalized muscle weakness    Contusion on head resolving   Vitals stable room air   Mood stable  Hr stable, on Metoprolol. Continue on current dose of Metoprolol  On Eliquis, no signs and symptoms bleeding  Participating in therapies  She would like to go home to assistive living, however, considering long term care due to care needs    Per consult with therapis    Transfers: cga, 4ww, min asisst   Walking 200 feet with 4ww sba   Has 4ww, pendant, gb, rts, oxygen   Barriers: signifcant fatigue on dialysis days during therapy   OT   SLUMS: 19/30   SQ: 21/22   Groom: mod I   UB drsg: mod I   LB drsg: sba   Toileting: sba   Discharge plan and any ID barriers to discharge: Resident previously lived at [place] with much help from sister to coordinate appointments and transportation. Her sister states she is no longer able/willing to provide this help.      Electronically signed by:  JUSTO Ward CNP

## 2022-02-09 NOTE — PROGRESS NOTES
"Pulmonary Clinic Note    Chief Complaint   Patient presents with     New Patient       A/P:  76F with complex medical history including COPD being seen for COPD. Her PFTs show severe obstruction w/ moderate diffusion defect. She has stable pulmonary nodules since 2013, that are not worrisome and do not need further follow-up. She is well controlled on LAMA-LABA and prn albuterol. No hypoxemia. Given her difficulties w/ taking Stiolto will change to Bevespi, another LABA-LAMA, and work on prior auth. Continue as needed albuterol. No evidence that she needs supplemental O2, at this time, and can discontinue.     History:  76F ESRD (MWF HD), Afib (s/p AV luis ablation and PPM), HTN, HFrEF, pHTN, SKY (on CPAP), mod-severe TR being seen for COPD. Prescribed albuterol, tiotropium-olodaterol. Has previously trial vasodilators for pHTN, but coverage denied given slightly elevated LVEDP.     Had been off O2. No SOB at rest. Hasn't noticed help with Stiolto, but she is concerned her nursing facility doesn't help her do it properly. Does have SANCHEZ, walking ~60 yards. Does not notice hypoxemia off O2 w/ exertion. Albuterol helps a lot, uses 1-2x/day. No cough. No CP. Stable LLE edema. SpO2 at care facility %.     Former smoker, quit 6/2021, 165 pack years.      10 point review of systems negative, aside from that mentioned in HPI.    /57 (BP Location: Left arm, Patient Position: Sitting, Cuff Size: Adult Regular)   Pulse 84   Ht 1.626 m (5' 4\")   Wt 59.4 kg (131 lb)   SpO2 100%   BMI 22.49 kg/m    Gen: well-appearing  HEENT: anicteric  Card: RRR  Pulm: clear bilaterally  Abd: soft  MSK: 2+ LLE edema, stable, no RLE edema   Skin: no obvious rash  Psych: normal affect  Neuro: alert and oriented     Labs:  Personally reviewed    Imaging/Studies: Personally reviewed  PFTs (1/25/22) - severe obstruction, moderate diffusion defect   Lexiscan (1/12/22) - probably negative for inducible ischemia  CXR (1/4/22) - " hyperinflation   TTE (12/7/21) - EF 50-55%, mildly dilated RV, mildly reduced RV function, severely dilated LA, severely dilated RA, severe TR, mild AR, mild-mod MR, RVSP 60-65mmHg  CT Chest (4/2021, [facility]) - scattered strands of fibrotic appearing scarring w/ benign small b/l nodules stable back to 2013.     Past Medical History:   Diagnosis Date     Acute renal failure (ARF) (H) 06/22/2021     Anemia in chronic kidney disease, on chronic dialysis (H) 07/14/2021     Atrophy of left kidney 05/18/2017     Chronic atrial fibrillation (H) 04/01/2013     Chronic kidney disease, stage III (moderate) (H) 11/13/2019    Formatting of this note might be different from the original. Cr=1.2     COPD mixed type (H) 04/06/2013    Formatting of this note might be different from the original. On Qvar and combivent, hx of cor pulmonale     Cor pulmonale (chronic) (H) 09/20/2021     Depressive disorder, not elsewhere classified      Dermatitis of lower extremity 05/05/2017     Elevated blood pressure reading without diagnosis of hypertension 12/10/2004     HTN (hypertension) 09/07/2011     Hx of gout 08/13/2014     Hypercalcemia 07/16/2018    Formatting of this note might be different from the original. 7/16/18  Ca++=10.8   Alb=3.8   7/17/18:  PTH=95.6  Ca++=10.1 (after diuresis), some renal impairment, was on vit D and calcium outpt     Hypertensive heart and kidney disease 07/16/2018    Formatting of this note might be different from the original. Atrophic left kidney, rise in Cr with increase in bumex 2018     Hyponatremia 06/22/2021     Intermediate stage nonexudative age-related macular degeneration of both eyes 01/13/2021     Lung nodule 04/28/2021     Malignant melanoma (H)      Migraine variant 12/10/2004     Problem list name updated by automated process. Provider to review     Neoplasm of skin of neck 06/06/2014     NSVT (nonsustained ventricular tachycardia) (H) 04/09/2020     SKY (obstructive sleep apnea) 04/08/2020      Osteoporosis 11/10/2004     Problem list name updated by automated process. Provider to review     Osteoporosis, unspecified dx 8/02     Pacemaker 07/14/2021    Formatting of this note might be different from the original. 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 On sotolol     Tobacco use disorder      Variants of migraine, not elsewhere classified, without mention of intractable migraine without mention of status migrainosus     flashing lights     Past Surgical History:   Procedure Laterality Date     ZZC NONSPECIFIC PROCEDURE  1990's    right ankle fracture     ZZC NONSPECIFIC PROCEDURE      spontaneous AB     ZZC NONSPECIFIC PROCEDURE      tonsillectomy     ZZC NONSPECIFIC PROCEDURE  1980's    tubal ligation     Family History   Problem Relation Age of Onset     Hypertension Mother      Arthritis Mother      Circulatory Mother      Heart Disease Mother      Lipids Mother      Osteoporosis Mother      Alcohol/Drug Father         ?MI     Circulatory Father      Heart Disease Father      Hypertension Sister      Allergies Sister         thyroid dz     Arthritis Sister      Osteoporosis Sister      Obesity Brother      Alcohol/Drug Brother         dm, ptsd, cad     Bladder Cancer Brother      Diabetes Brother      Myocardial Infarction Brother      Prostate Cancer Brother      Cerebrovascular Disease Maternal Grandmother      Breast Cancer Maternal Grandmother         94     Cancer - colorectal Maternal Grandmother         97     Arthritis Maternal Grandmother      Gastrointestinal Disease Maternal Grandmother      Osteoporosis Maternal Grandmother      Thyroid Disease Maternal Grandmother      Osteoarthritis Maternal Grandfather      C.A.D. Maternal Grandfather      Heart Failure Paternal Grandmother      Hyperlipidemia Maternal  Uncle      Alzheimer Disease Maternal Uncle      Thyroid Disease Other         niece     Social History     Socioeconomic History     Marital status: Legally      Spouse name: Derrick Chris     Number of children: 0     Years of education: Not on file     Highest education level: Not on file   Occupational History     Employer: [employer]   Tobacco Use     Smoking status: Former Smoker     Packs/day: 1.00     Years: 43.00     Pack years: 43.00     Types: Cigarettes     Quit date: 2021     Years since quittin.8     Smokeless tobacco: Never Used   Substance and Sexual Activity     Alcohol use: Yes     Comment: occ. glass of wine     Drug use: No     Sexual activity: Not on file   Other Topics Concern     Not on file   Social History Narrative    She was  to Derrick Chris, and she  from him for over 20 years, and she has not had contact with him, and does not know where he is. She never officially  him.    She had a long term relationship with Ventura Lopez, who she considered herself to be  to, but because she was legally  to Derrick, she could not officially  him. Also Ventura was a quad, and legally marrying him would disrupt his benefits, as Francine was his PCA. Ventura  around .     Social Determinants of Health     Financial Resource Strain: Not on file   Food Insecurity: Not on file   Transportation Needs: Not on file   Physical Activity: Not on file   Stress: Not on file   Social Connections: Not on file   Intimate Partner Violence: Not on file   Housing Stability: Not on file       50 minutes spent reviewing chart, reviewing test results, talking with and examining patient, formulating plan, and documentation on the day of the encounter.    Derrick Linares MD  Pulmonary and Critical Care Medicine  [facility]

## 2022-02-09 NOTE — PATIENT INSTRUCTIONS
Thank you for coming to pulmonary clinic. Your pulmonary function tests severe obstruction consistent with COPD. Your chest imaging is stable since 2013 and no further follow-up is needed. I would like you to start using Bevespi instead of Stiolto since it is more easily used. I will see you back as needed.

## 2022-02-09 NOTE — NURSING NOTE
"Chief Complaint   Patient presents with     New Patient       Vitals:    02/09/22 1356   BP: 114/57   BP Location: Left arm   Patient Position: Sitting   Cuff Size: Adult Regular   Pulse: 84   SpO2: 100%   Weight: 59.4 kg (131 lb)   Height: 1.626 m (5' 4\")       Body mass index is 22.49 kg/m .      Allyn Brock MA    "

## 2022-02-09 NOTE — LETTER
"  2/9/2022     RE: Francine Chris  [facility]  [address]  [facility] [address]    Dear Colleague,    Thank you for referring your patient, Francine Chris, to the [facility] [place]. Please see a copy of my visit note below.    Pulmonary Clinic Note    Chief Complaint   Patient presents with     New Patient       A/P:  76F with complex medical history including COPD being seen for COPD. Her PFTs show severe obstruction w/ moderate diffusion defect. She has stable pulmonary nodules since 2013, that are not worrisome and do not need further follow-up. She is well controlled on LAMA-LABA and prn albuterol. No hypoxemia. Given her difficulties w/ taking Stiolto will change to Bevespi, another LABA-LAMA, and work on prior auth. Continue as needed albuterol. No evidence that she needs supplemental O2, at this time, and can discontinue.     History:  76F ESRD (MWF HD), Afib (s/p AV luis ablation and PPM), HTN, HFrEF, pHTN, SKY (on CPAP), mod-severe TR being seen for COPD. Prescribed albuterol, tiotropium-olodaterol. Has previously trial vasodilators for pHTN, but coverage denied given slightly elevated LVEDP.     Had been off O2. No SOB at rest. Hasn't noticed help with Stiolto, but she is concerned her nursing facility doesn't help her do it properly. Does have SANCHEZ, walking ~60 yards. Does not notice hypoxemia off O2 w/ exertion. Albuterol helps a lot, uses 1-2x/day. No cough. No CP. Stable LLE edema. SpO2 at care facility %.     Former smoker, quit 6/2021, 165 pack years.      10 point review of systems negative, aside from that mentioned in HPI.    /57 (BP Location: Left arm, Patient Position: Sitting, Cuff Size: Adult Regular)   Pulse 84   Ht 1.626 m (5' 4\")   Wt 59.4 kg (131 lb)   SpO2 100%   BMI 22.49 kg/m    Gen: well-appearing  HEENT: anicteric  Card: RRR  Pulm: clear bilaterally  Abd: soft  MSK: 2+ LLE edema, stable, no RLE edema   Skin: no obvious " rash  Psych: normal affect  Neuro: alert and oriented     Labs:  Personally reviewed    Imaging/Studies: Personally reviewed  PFTs (1/25/22) - severe obstruction, moderate diffusion defect   Lexiscan (1/12/22) - probably negative for inducible ischemia  CXR (1/4/22) - hyperinflation   TTE (12/7/21) - EF 50-55%, mildly dilated RV, mildly reduced RV function, severely dilated LA, severely dilated RA, severe TR, mild AR, mild-mod MR, RVSP 60-65mmHg  CT Chest (4/2021, [facility]) - scattered strands of fibrotic appearing scarring w/ benign small b/l nodules stable back to 2013.     Past Medical History:   Diagnosis Date     Acute renal failure (ARF) (H) 06/22/2021     Anemia in chronic kidney disease, on chronic dialysis (H) 07/14/2021     Atrophy of left kidney 05/18/2017     Chronic atrial fibrillation (H) 04/01/2013     Chronic kidney disease, stage III (moderate) (H) 11/13/2019    Formatting of this note might be different from the original. Cr=1.2     COPD mixed type (H) 04/06/2013    Formatting of this note might be different from the original. On Qvar and combivent, hx of cor pulmonale     Cor pulmonale (chronic) (H) 09/20/2021     Depressive disorder, not elsewhere classified      Dermatitis of lower extremity 05/05/2017     Elevated blood pressure reading without diagnosis of hypertension 12/10/2004     HTN (hypertension) 09/07/2011     Hx of gout 08/13/2014     Hypercalcemia 07/16/2018    Formatting of this note might be different from the original. 7/16/18  Ca++=10.8   Alb=3.8   7/17/18:  PTH=95.6  Ca++=10.1 (after diuresis), some renal impairment, was on vit D and calcium outpt     Hypertensive heart and kidney disease 07/16/2018    Formatting of this note might be different from the original. Atrophic left kidney, rise in Cr with increase in bumex 2018     Hyponatremia 06/22/2021     Intermediate stage nonexudative age-related macular degeneration of both eyes 01/13/2021     Lung nodule 04/28/2021     Malignant  melanoma (H)      Migraine variant 12/10/2004     Problem list name updated by automated process. Provider to review     Neoplasm of skin of neck 06/06/2014     NSVT (nonsustained ventricular tachycardia) (H) 04/09/2020     SKY (obstructive sleep apnea) 04/08/2020     Osteoporosis 11/10/2004     Problem list name updated by automated process. Provider to review     Osteoporosis, unspecified dx 8/02     Pacemaker 07/14/2021    Formatting of this note might be different from the original. 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 On sotolol     Tobacco use disorder      Variants of migraine, not elsewhere classified, without mention of intractable migraine without mention of status migrainosus     flashing lights     Past Surgical History:   Procedure Laterality Date     ZZC NONSPECIFIC PROCEDURE  1990's    right ankle fracture     ZZC NONSPECIFIC PROCEDURE      spontaneous AB     ZZC NONSPECIFIC PROCEDURE      tonsillectomy     ZZC NONSPECIFIC PROCEDURE  1980's    tubal ligation     Family History   Problem Relation Age of Onset     Hypertension Mother      Arthritis Mother      Circulatory Mother      Heart Disease Mother      Lipids Mother      Osteoporosis Mother      Alcohol/Drug Father         ?MI     Circulatory Father      Heart Disease Father      Hypertension Sister      Allergies Sister         thyroid dz     Arthritis Sister      Osteoporosis Sister      Obesity Brother      Alcohol/Drug Brother         dm, ptsd, cad     Bladder Cancer Brother      Diabetes Brother      Myocardial Infarction Brother      Prostate Cancer Brother      Cerebrovascular Disease Maternal Grandmother      Breast Cancer Maternal Grandmother         94     Cancer - colorectal Maternal Grandmother         97     Arthritis Maternal Grandmother       Gastrointestinal Disease Maternal Grandmother      Osteoporosis Maternal Grandmother      Thyroid Disease Maternal Grandmother      Osteoarthritis Maternal Grandfather      C.A.D. Maternal Grandfather      Heart Failure Paternal Grandmother      Hyperlipidemia Maternal Uncle      Alzheimer Disease Maternal Uncle      Thyroid Disease Other         niece     Social History     Socioeconomic History     Marital status: Legally      Spouse name: Derrick Chris     Number of children: 0     Years of education: Not on file     Highest education level: Not on file   Occupational History     Employer: [employer]   Tobacco Use     Smoking status: Former Smoker     Packs/day: 1.00     Years: 43.00     Pack years: 43.00     Types: Cigarettes     Quit date: 2021     Years since quittin.8     Smokeless tobacco: Never Used   Substance and Sexual Activity     Alcohol use: Yes     Comment: occ. glass of wine     Drug use: No     Sexual activity: Not on file   Other Topics Concern     Not on file   Social History Narrative    She was  to Derrick Chris, and she  from him for over 20 years, and she has not had contact with him, and does not know where he is. She never officially  him.    She had a long term relationship with Ventura Jessica, who she considered herself to be  to, but because she was legally  to Derrick, she could not officially  him. Also Ventura was a quad, and legally marrying him would disrupt his benefits, as Francine was his PCA. Ventura  around .     Social Determinants of Health     Financial Resource Strain: Not on file   Food Insecurity: Not on file   Transportation Needs: Not on file   Physical Activity: Not on file   Stress: Not on file   Social Connections: Not on file   Intimate Partner Violence: Not on file   Housing Stability: Not on file     50 minutes spent reviewing chart, reviewing test results, talking with and examining patient,  formulating plan, and documentation on the day of the encounter.    Derrick Linares MD  Pulmonary and Critical Care Medicine  [facility]

## 2022-02-11 NOTE — PROGRESS NOTES
[facility] Care Coordination Contact    Member has been in the TCU for 30 days, requiring elderly waiver to close. Closed elderly waiver in [facility]. Right Faxed [facility] the 7519 form with notification that member's Elderly Waiver was closed effective 1/13/22. Gave DTR to Violet to process. Emailed [facility] notification of elderly waiver closure and called member's sister and updated her on the elderly waiver closure and DTRs completed. Member's sister continued to talk about her recommendation of member remaining LTC at the NH. Reviewed that this Care Coordinator agrees.  Jesica Whitehead, GAYLE  [facility]  [phone]

## 2022-02-11 NOTE — PROGRESS NOTES
[facility] Care Coordination Contact    Received a request to submit a DTR for the termination of Assisted Living and EW. Documentation completed and faxed to the health plan. Care Coordinator aware.  GAYLE Landin  [facility]  [phone]

## 2022-02-14 NOTE — PROGRESS NOTES
[place] [facility]    [place] [facility] Record Number:  [id number]  Place of Service where encounter took place:  [facility] (TCU) [[id number]]  Chief Complaint   Patient presents with     Nursing Home Acute       HPI:    Francine Chris  is a 76 year old (1945), who is being seen today for an episodic care visit.  HPI information obtained from: facility chart records, facility staff, patient report and [facility] chart review.     PMH: Afib, s/p ablation and PPM, ESRD on dialysis, pulmonary hypertension, severe tricuspid regurgitation, CHF, PAD, anemia, COPD, SKY, GERD, anxiety, depression       Today's concern is: RN requesting patient visit due to concern for LLE cellulitis and concern for multiple areas of bruising.    During exam, patient seen resting in bed. Endorses acute LLE pain w/swelling and redness. States LLE mid-shin blister is getting larger and more painful over the weekend. Does admit to new bruising to face, denies recent falls or injuries.       Past Medical and Surgical History reviewed in Epic today.    MEDICATIONS:    Current Outpatient Medications   Medication Sig Dispense Refill     ACE/ARB/ARNI NOT PRESCRIBED (INTENTIONAL) Please choose reason not prescribed from choices below.       acetaminophen (TYLENOL) 325 MG tablet Take 2 tablets (650 mg) by mouth 2 times daily. May also take 2 tablets (650 mg) 2 times daily as needed for pain. 120 tablet 98     albuterol (PROAIR HFA/PROVENTIL HFA/VENTOLIN HFA) 108 (90 Base) MCG/ACT inhaler Inhale 2 puffs into the lungs every 4 hours as needed       allopurinol (ZYLOPRIM) 100 MG tablet TAKE ONE TABLET BY MOUTH THREE TIMES A WEEK ON MONDAY, WEDNESDAY AND FRIDAY AFTER HD SESSION 12 tablet 97     apixaban ANTICOAGULANT (ELIQUIS) 2.5 MG tablet Take 1 tablet (2.5 mg) by mouth 2 times daily 60 tablet 11     artificial saliva (BIOTENE MT) AERS spray Take 1 spray by mouth every 6 hours as needed for dry mouth MAY KEEP AT  "BEDSIDE 30 mL 89     atorvastatin (LIPITOR) 40 MG tablet Take 1 tablet (40 mg) by mouth daily 30 tablet 11     diphenhydrAMINE (BENADRYL) 2 % external cream Apply topically 3 times daily as needed for itching 30 g 98     folic acid-vit B6-vit B12 (FOLGARD) 0.8-10-0.115 MG TABS per tablet Take 1 tablet by mouth daily       Glycopyrrolate-Formoterol (BEVESPI AEROSPHERE) 9-4.8 MCG/ACT oral inhaler Inhale 2 puffs into the lungs 2 times daily 10.7 g 4     loratadine (CLARITIN) 10 MG tablet Take 1 tablet (10 mg) by mouth At Bedtime 30 tablet 98     Melatonin 10 MG TABS tablet Take 10 mg by mouth nightly as needed       metoprolol succinate ER (TOPROL-XL) 25 MG 24 hr tablet Take 0.5 tablets (12.5 mg) by mouth 2 times daily 60 tablet 4     multivitamin RENAL (RENAVITE RX/NEPHROVITE) 1 MG tablet Take 1 tablet by mouth daily       pantoprazole (PROTONIX) 40 MG EC tablet Take 1 tablet (40 mg) by mouth daily 30 tablet 11     sodium chloride (OCEAN) 0.65 % nasal spray Spray 1 spray into both nostrils every 2 hours as needed for congestion MAY KEEP AT BEDSIDE 50 mL 98     tiotropium-olodaterol (STIOLTO RESPIMAT) 2.5-2.5 MCG/ACT AERS Inhale 2 puffs into the lungs daily       torsemide (DEMADEX) 20 MG tablet Take 2 tablets (40 mg) by mouth daily Take at 8 AM and 1 PM. (Patient taking differently: Take 40 mg by mouth daily Take at 8 AM x4 on TuThSaSu (non-HD days) and 1 PM all other days.) 120 tablet 4     triamcinolone (KENALOG) 0.1 % external cream Apply to AA BID x 1-2 week then PRN only 454 g 2         REVIEW OF SYSTEMS:  4 point ROS including Respiratory, CV, GI and , other than that noted in the HPI,  is negative      Objective:  /58   Pulse 70   Temp 97.4  F (36.3  C)   Resp 16   Ht 1.626 m (5' 4\")   Wt 59.5 kg (131 lb 3.2 oz)   SpO2 97%   BMI 22.52 kg/m    Exam:  GENERAL APPEARANCE:  Alert, in no distress, oriented, cooperative  RESP:  respiratory effort and palpation of chest normal, lungs clear to " auscultation , no respiratory distress  CV:  Palpation and auscultation of heart done , regular rate and rhythm, no murmur, rub, or gallop. LLE non-pitting edema, RLE with mild +1 edema.  ABDOMEN:  normal bowel sounds, soft, nontender, no guarding or rebound  M/S:   Gait and station abnormal, unable to assess patient is resting in bed.   SKIN:  LLE mid-shin blood filled blister w/surroudning erythema, + tenderness to LLE. Small bruise to L outer eyelid. Small bruise to L cheek. Small bruise to R chin. Bruise to L hip.  NEURO:   Cranial nerves 2-12 are normal tested and grossly at patient's baseline, Examination of sensation by touch normal  PSYCH:  oriented X 3, affect and mood normal      Labs:   Labs done in SNF are in [facility]. Please refer to them using [facility] [facility]Care Everywhere., Recent labs in EPIC reviewed by me today.  and   Most Recent 3 CBC's:Recent Labs   Lab Test 01/26/22  0638 01/18/22  1225 01/12/22  0113   WBC 6.7 6.1 8.5   HGB 11.6* 10.7* 9.5*   * 107* 105*   * 132* 163     Most Recent 3 BMP's:Recent Labs   Lab Test 01/26/22  0638 01/18/22  1225 01/13/22  0632   * 125* 125*   POTASSIUM 3.2* 3.4 4.0   CHLORIDE 87* 89* 91*   CO2 25 21 26   BUN 50* 50* 37*   CR 4.57* 3.79* 3.03*   ANIONGAP 14 15* 8   SILVIA 9.8 9.6 9.3   * 154* 96         ASSESSMENT/PLAN:    (S80.822A,  L08.9) Infected blister of left lower extremity, initial encounter  (primary encounter diagnosis)  (D69.6) Thrombocytopenia (H)  (N18.6,  D63.1,  Z99.2) Anemia in chronic kidney disease, on chronic dialysis (H)  (I48.91) Atrial fibrillation, unspecified type (H)  Comment: Acute infection to LLE r/t blood filled blister. New thrombocytopenia, platelet count trending down. Last platelet count 120K on 1/26/22. Anemia r/t ESRD on dialysis MWF. Chronic A Fib, on eliquis.   Multiple areas of bruising r/t thrombocytopenia and chronic use of eliquis.  Plan:   - RN to outline area of erythema to LLE  - Check CBC & BMP 2/15    - Check LFT on 2/15  - Add doxycycline 100mg BID x 7 days dx LLE cellulitis  - House wound care MD to see patient tomorrow to assess for possible I&D. Reviewed plan of care with RN.  - Continue dialysis MWF  - Continue eliquis  - Patient to follow-up with Nephrologist as directed  - Patient verbalizes understanding and agrees with treatment plan.         Total time spent with patient visit at the [facility] was 35 mins including patient visit and review of past records. Greater than 50% of total time (20 minutes) spent with counseling patient regarding treatment plan, medication management, follow-up labs, and follow-up appointments. Patient verbalizes understanding and agrees with treatment plan. Coordinating care with RN regarding plan to outline area of erythema to LLE and plan of care for LLE infection w/blood filled blister. Requesting wound care MD assess tomorrow for possible I&D.     Electronically signed by:  JUSTO Sotelo CNP

## 2022-02-14 NOTE — LETTER
2/14/2022        RE: Francine Chris  [facility]  [address] [facility] [address]        [place] [facility]    [place] Medical Record Number:  [id number]  Place of Service where encounter took place:  [facility] ([facility]) [[id number]]  Chief Complaint   Patient presents with     Nursing Home Acute       HPI:    Francine Chris  is a 76 year old (1945), who is being seen today for an episodic care visit.  HPI information obtained from: facility chart records, facility staff, patient report and [facility] chart review.     PMH: Afib, s/p ablation and PPM, ESRD on dialysis, pulmonary hypertension, severe tricuspid regurgitation, CHF, PAD, anemia, COPD, SKY, GERD, anxiety, depression       Today's concern is: RN requesting patient visit due to concern for LLE cellulitis and concern for multiple areas of bruising.    During exam, patient seen resting in bed. Endorses acute LLE pain w/swelling and redness. States LLE mid-shin blister is getting larger and more painful over the weekend. Does admit to new bruising to face, denies recent falls or injuries.       Past Medical and Surgical History reviewed in Epic today.    MEDICATIONS:    Current Outpatient Medications   Medication Sig Dispense Refill     ACE/ARB/ARNI NOT PRESCRIBED (INTENTIONAL) Please choose reason not prescribed from choices below.       acetaminophen (TYLENOL) 325 MG tablet Take 2 tablets (650 mg) by mouth 2 times daily. May also take 2 tablets (650 mg) 2 times daily as needed for pain. 120 tablet 98     albuterol (PROAIR HFA/PROVENTIL HFA/VENTOLIN HFA) 108 (90 Base) MCG/ACT inhaler Inhale 2 puffs into the lungs every 4 hours as needed       allopurinol (ZYLOPRIM) 100 MG tablet TAKE ONE TABLET BY MOUTH THREE TIMES A WEEK ON MONDAY, WEDNESDAY AND FRIDAY AFTER HD SESSION 12 tablet 97     apixaban ANTICOAGULANT (ELIQUIS) 2.5 MG tablet Take 1 tablet (2.5 mg) by mouth 2 times daily 60 tablet 11  "    artificial saliva (BIOTENE MT) AERS spray Take 1 spray by mouth every 6 hours as needed for dry mouth MAY KEEP AT BEDSIDE 30 mL 89     atorvastatin (LIPITOR) 40 MG tablet Take 1 tablet (40 mg) by mouth daily 30 tablet 11     diphenhydrAMINE (BENADRYL) 2 % external cream Apply topically 3 times daily as needed for itching 30 g 98     folic acid-vit B6-vit B12 (FOLGARD) 0.8-10-0.115 MG TABS per tablet Take 1 tablet by mouth daily       Glycopyrrolate-Formoterol (BEVESPI AEROSPHERE) 9-4.8 MCG/ACT oral inhaler Inhale 2 puffs into the lungs 2 times daily 10.7 g 4     loratadine (CLARITIN) 10 MG tablet Take 1 tablet (10 mg) by mouth At Bedtime 30 tablet 98     Melatonin 10 MG TABS tablet Take 10 mg by mouth nightly as needed       metoprolol succinate ER (TOPROL-XL) 25 MG 24 hr tablet Take 0.5 tablets (12.5 mg) by mouth 2 times daily 60 tablet 4     multivitamin RENAL (RENAVITE RX/NEPHROVITE) 1 MG tablet Take 1 tablet by mouth daily       pantoprazole (PROTONIX) 40 MG EC tablet Take 1 tablet (40 mg) by mouth daily 30 tablet 11     sodium chloride (OCEAN) 0.65 % nasal spray Spray 1 spray into both nostrils every 2 hours as needed for congestion MAY KEEP AT BEDSIDE 50 mL 98     tiotropium-olodaterol (STIOLTO RESPIMAT) 2.5-2.5 MCG/ACT AERS Inhale 2 puffs into the lungs daily       torsemide (DEMADEX) 20 MG tablet Take 2 tablets (40 mg) by mouth daily Take at 8 AM and 1 PM. (Patient taking differently: Take 40 mg by mouth daily Take at 8 AM x4 on TuThSaSu (non-HD days) and 1 PM all other days.) 120 tablet 4     triamcinolone (KENALOG) 0.1 % external cream Apply to AA BID x 1-2 week then PRN only 454 g 2         REVIEW OF SYSTEMS:  4 point ROS including Respiratory, CV, GI and , other than that noted in the HPI,  is negative      Objective:  /58   Pulse 70   Temp 97.4  F (36.3  C)   Resp 16   Ht 1.626 m (5' 4\")   Wt 59.5 kg (131 lb 3.2 oz)   SpO2 97%   BMI 22.52 kg/m    Exam:  GENERAL APPEARANCE:  Alert, in " no distress, oriented, cooperative  RESP:  respiratory effort and palpation of chest normal, lungs clear to auscultation , no respiratory distress  CV:  Palpation and auscultation of heart done , regular rate and rhythm, no murmur, rub, or gallop. LLE non-pitting edema, RLE with mild +1 edema.  ABDOMEN:  normal bowel sounds, soft, nontender, no guarding or rebound  M/S:   Gait and station abnormal, unable to assess patient is resting in bed.   SKIN:  LLE mid-shin blood filled blister w/surroudning erythema, + tenderness to LLE. Small bruise to L outer eyelid. Small bruise to L cheek. Small bruise to R chin. Bruise to L hip.  NEURO:   Cranial nerves 2-12 are normal tested and grossly at patient's baseline, Examination of sensation by touch normal  PSYCH:  oriented X 3, affect and mood normal      Labs:   Labs done in SNF are in [facility]. Please refer to them using [employer]/Care Everywhere., Recent labs in EPIC reviewed by me today.  and   Most Recent 3 CBC's:Recent Labs   Lab Test 01/26/22  0638 01/18/22  1225 01/12/22  0113   WBC 6.7 6.1 8.5   HGB 11.6* 10.7* 9.5*   * 107* 105*   * 132* 163     Most Recent 3 BMP's:Recent Labs   Lab Test 01/26/22  0638 01/18/22  1225 01/13/22  0632   * 125* 125*   POTASSIUM 3.2* 3.4 4.0   CHLORIDE 87* 89* 91*   CO2 25 21 26   BUN 50* 50* 37*   CR 4.57* 3.79* 3.03*   ANIONGAP 14 15* 8   SILVIA 9.8 9.6 9.3   * 154* 96         ASSESSMENT/PLAN:    (S80.822A,  L08.9) Infected blister of left lower extremity, initial encounter  (primary encounter diagnosis)  (D69.6) Thrombocytopenia (H)  (N18.6,  D63.1,  Z99.2) Anemia in chronic kidney disease, on chronic dialysis (H)  (I48.91) Atrial fibrillation, unspecified type (H)  Comment: Acute infection to LLE r/t blood filled blister. New thrombocytopenia, platelet count trending down. Last platelet count 120K on 1/26/22. Anemia r/t ESRD on dialysis MWF. Chronic A Fib, on eliquis.   Multiple areas of bruising r/t  thrombocytopenia and chronic use of eliquis.  Plan:   - RN to outline area of erythema to LLE  - Check CBC & BMP 2/15   - Check LFT on 2/15  - Add doxycycline 100mg BID x 7 days dx LLE cellulitis  - House wound care MD to see patient tomorrow to assess for possible I&D. Reviewed plan of care with RN.  - Continue dialysis MWF  - Continue eliquis  - Patient to follow-up with Nephrologist as directed  - Patient verbalizes understanding and agrees with treatment plan.         Total time spent with patient visit at the [facility] nursing [facility] was 35 mins including patient visit and review of past records. Greater than 50% of total time (20 minutes) spent with counseling patient regarding treatment plan, medication management, follow-up labs, and follow-up appointments. Patient verbalizes understanding and agrees with treatment plan. Coordinating care with RN regarding plan to outline area of erythema to LLE and plan of care for LLE infection w/blood filled blister. Requesting wound care MD assess tomorrow for possible I&D.     Electronically signed by:  JUSTO Sotelo CNP                 Sincerely,        JUSTO Sotelo CNP

## 2022-02-14 NOTE — TELEPHONE ENCOUNTER
Fax from [facility] and [facility]. Asking for order clarification. Placed order in your basket as they wish to have signed and faxed back.     Phone [phone]  Fax [phone]    Thank you  Magalie Little

## 2022-02-16 NOTE — TELEPHONE ENCOUNTER
Called and spoke to Merlene @[facility].    Per Merlene clarification for triamcinolone order not needed at this time.    Patient no longer resides at facility and most likely will not be returning.    Merlene voiced understanding.    Rodriguez RN-BSN-PHN  [facility]  [phone]

## 2022-02-24 NOTE — PROGRESS NOTES
[facility] Care Coordination Contact    No longer active with [facility] community case management effective 2/28/22.  Reason for community disenrollment: Move to LTC at [facility].  Called member's sister, Tereza, and reviewed that this Care Coordinator will officially be closing member to community care. Sister had no questions at this time. County already notified of LTC placement. elderly waiver was already closed.   GAYLE Coleman  [facility]  [phone]

## 2022-03-02 PROBLEM — Z86.79 HISTORY OF VENTRICULAR TACHYCARDIA: Status: ACTIVE | Noted: 2022-01-01

## 2022-03-02 NOTE — PROGRESS NOTES
Clinic Care Coordination Contact  Care Team Conversations    RN Clinical Product Navigator reviewed chart and noted [facility] dis-enrolled patient as of 2/28/22.      RN Clinical Product Navigator spoke with [facility]  Penny who informed writer patient was transferred to Long Term Care on 2/16/22.      CPN will monitor for any newly identified care navigation needs in 1-2 months.    Melissa Behl BSN, RN, PHN, [facility]  RN Clinical Product Navigator  [phone]      Melissa Behl BSN, RN, PHN, [facility]  RN Clinical Product Navigator  [phone]

## 2022-03-02 NOTE — PROGRESS NOTES
[facility] GERIATRICS    Chief Complaint   Patient presents with     Nursing Home Acute     provider change     HPI:  Francine Chris is a 76 year old  (1945)  female with hx of ESRD on HD, afib s/p AV node ablation and pacemaker on DOAC (dx 2013), HTN, combined systolic and diastolic HF, mod-severe tricuspid regurgitation not candidate for surgical repair, mid ascending aortic enlargement, BL LE edema, PAD, QTc prolongation, COPD, pulmonary HTN, hx of smoking, macular degeneration, anxiety/depression, SKY on CPAP, PUD, who is being seen today for an episodic care visit at: [facility] () [[id number]]. Room 163-1.    Resident well known to provider from previous living arrangement at [facility] where she resided since Sept 2021.    Previous hx reviewed. Hospitalized at [facility] from 6/21/21 to 7/5/21 with nausea/weakness/dairrhea and acute renal failure along with supratheraputic INR, hyperkalemia, and digoxin toxicity. Per nephrology note unclear precipitating event for SILAS but likely severe ATN. Imaging negative for acute process. Previous baseline Cr 1.5-1.7 as of May 2021. Required emergent femoral line for HD on admission, was transitioned to right chest wall tunneled catheter on 6/29/21 as renal function did not return to baseline prior to discharge and HD still required. During this hospitalization coumadin was switched to apixaban.  Hospital stay significant for fluid overload on 7/3/21, restarted torsemide. Continued on supplemental oxygen. Permanent pacemaker placed 6/25/21 s/p ablation due to inability to use digoxin in setting of renal failure. Recovered at [facility]CU and discharged to [facility] on supplemental oxygen.     Per hospital discharge was supposed to follow-up [email or web address] for Pacemaker check and [facility], but lost to follow-up. After transition to [facility], established care with cardiology (Dr. Holt)  and pulmonology (Dr. Linares) in [facility] system. Also saw cardiovascular surgery Dr. Janet Sanderson due to severe tricuspid valve regurgitation, unfortunately resident not surgical candidate. Undergoes HD MWF under care of Dr. Ro. R UE brachiobasilic fistula placed with Dr. Arredondo at [facility] in staged procedure 10/21/21 and 12/01/21 without complication, starting to use fistula, but R tunneled cath has not yet been removed.    Last [facility] visit with resident 1/11/22 for ER follow-up after fall. Shortly after this visit resident hospitalized at [facility] from 1/12 to 1/13/22 with chest pain and SOB. Lexiscan stress test completed and was negative, monitored on tele and serial torponins negative, unlikely ACS. Nephrology consulted for chronic hyponatremia related to fluid volume overload, placed on fluid restriction. Discharged to [facility] TCU due to inability of SHAREE to meet care needs. To ER 1/26/22 s/p mechanical fall with head trauma and scalp laceration, which was closed with staples and discharged back to TCU. Unable to progress with therapy to safely return to [facility] and transitioned to LTC permanently.     Today's concern is:   Follow-up today to establish care with LTC team.    Resident is concerned about wound to left shin.  Reported to provider on 2/14/22.  Blister in setting of leg swelling.  Treated for secondary cellulitis with seven day course of doxycycline.  WBC WNL.   Redness and swelling better, but still painful.  Referral placed to onsite wound care physician, but no documentation of current plan of care.  No fever/chills.    Saw pulmonology 2/9/22.  Completed PFTs.   Cleared to stop oxygen.   - Discontinued Stiolto respimat due to difficulty using.  New medications:  - Bevespi Aerosphere    Would like to see dentist.    Itching continues, likely uremic pruritus.  Followed by dermatology.  Last visit 1/4/22.  Started TAC BID x 1-2 weeks then PRN  Using emollient - emollient Cerave PRN.    Pleased with  "decision to move to LTC, less stressful now that there is adequate support to meet care needs.    Allergies, and PMH/PSH reviewed in [facility] today.    REVIEW OF SYSTEMS:  4 point ROS including Respiratory, CV, GI and , other than that noted in the HPI,  is negative    Objective:   /76   Pulse 78   Temp 97.1  F (36.2  C)   Resp 18   Ht 1.626 m (5' 4\")   Wt 58.2 kg (128 lb 6.4 oz)   SpO2 95%   BMI 22.04 kg/m    GENERAL APPEARANCE:  NAD, chronically ill appearing  RESP:  Non-labored breathing, palpation of chest w/ L CW pacer, also R tunneled cath with dressing CDI, no chest wall tenderness, no respiratory distress, Lung sounds clears, no rales/wheeze/rhonchi  CV:  Palpation - no murmur/non-displaced PMI Auscultation - rate and rhythm irregular. HR 76.   VASCULAR: R UE fistula with dressing +thrill CDI, R UE with extensive ecchymosis, Trace edema bilateral lower extremities.  ABDOMEN:  Normal bowel sounds, soft, nontender, no grimacing or guarding with palpation.   M/S:  +khypohosis, gait and station furniture walks around room with unsteady gait, Scoliosis thoracic spine.  SKIN:  Inspection - Chest wall tunneled cath with CDI dressing no erythema or edema. Ecchymosis R UE. Dry skin. No excoriations. Wound to left lower leg - SEE PHOTO. Palpation- no increased warmth, skin is dry and non-tender, except for some tenderness and swelling around left lower extremity wound.  NEURO: Cranial nerves II-XII grossly intact except hearing, no facial asymmetry, follows simple commands, moves all extremities symmetrically, normal tone, no tremor noted today.  PSYCH: awake and alert, speech fluent,  insight and judgement fair, no acute confusion, calm and cooperative.    Left Anterior Tibial Area:      Recent labs in [facility] reviewed by me today.    CBC RESULTS: Recent Labs   Lab Test 02/15/22  0624 01/26/22  0638   WBC 6.0 6.7   RBC 3.10* 3.32*   HGB 10.9* 11.6*   HCT 33.0* 34.5*   * 104*   MCH 35.2* 34.9*   MCHC " 33.0 33.6   RDW 15.0 15.8*   * 120*     Last Basic Metabolic Panel:  Recent Labs   Lab Test 02/15/22  0624 01/26/22  0638   * 126*   POTASSIUM 3.1* 3.2*   CHLORIDE 88* 87*   SILVIA 10.0 9.8   CO2 29 25   BUN 47* 50*   CR 4.45* 4.57*   * 104*     Liver Function Studies -   Recent Labs   Lab Test 02/15/22  0624 01/26/22  0638   PROTTOTAL 7.6 7.6   ALBUMIN 3.0* 3.1*   BILITOTAL 1.0 1.4*   ALKPHOS 122 148   AST 37 43   ALT 32 46     Assessment/Plan:  (S80.203E) Blister of left lower leg - reported 2/14/22  (primary encounter diagnosis)  (I73.9) PAD (peripheral artery disease) (H)  Comment: Blister of left lower leg sustained two weeks ago, eschar intact, continued tenderness and swelling, hx of PAD on chart, no recent ABIs.  Plan:   - Referral placed to [facility] [place]  - Check CBC with diff and BMP on 3/3  - Clarify current wound care: apply small piece of adaptic, wrap leg wit kerlix vs cleanse left Shin open area with NS pat dry.2.Pack open area with NS soaked kerlix BID cover with mapilex.  - Onsite wound physician to continue to follow weekly until seen in wound clinic >> emailed ADON for update regarding visit this week and order clarification  - Continue atorvastatin for secondary prevention, no ASA due to bleeding risk on apixaban     (N18.6,  Z99.2) ESRD (end stage renal disease) on dialysis (H)   (E87.1) Hyponatremia   Comment: Newly diagnosed with SILAS on CKD stage III June 2021 etiology unclear but likely severe ATN with vomiting and afib with RVR/hypotension prior, imaging negative for acute process. Getting HD MWF at [facility] via R CW tunneled cath, but R brachiobasilic fistula placed 10/21/21. Nephrologist Dr. Derrick Ro MD.  Dry weight 53 kg or 116# in past, today 128#, admission to TCU weight 139#. Chronically low sodium last 128 on 2/15.  Plan:   - BMP 3/3  - Continue 1200 mL/day fluid restriction  - Continue renal vitamin  -  Continue Sevelamer  - HD MWF and follow with nephology as previously determined   - R CW tunneled cath care by HD nursing, goal to remove once fistula vs graft matured to reduce infection/bleeding risk  - Renally dose medications and avoid nephrotoxins    (I50.42) Chronic combined systolic and diastolic HF (heart failure) (H)  (I07.1) Tricuspid valve insufficiency, unspecified etiology  (I27.20) Pulmonary hypertension (H)  (H86.79) Hx of non-sustained VT  Comment: Chronic, established care with Dr. Holt in [facility], no clinical improvement with sildenafil and ambrisentan so discontinued.  HFpEF with severe tricuspid valve regurgitation (not surgical candidate), RV dysfunction, pulmonary hypertension. Last echo 11/01/21, EF 50-55%, mild reduced right ventricular systolic function, BL atria severely dilated, severe tricuspid regurgitation, mild aortic and mild-moderate mitral regurgitation.  Plan:   - Torsemide to 40 mg daily  - Continue metoprolol XL 12.5 mg 2 times daily for nonsustained VT, hx of syncopal episode, Dec 2021  - Follow-up with Dr. Holt Jan 2023, needs to establish care with CORE clinic  - Daily weights  - BMP 3/3    (F41.9,  F32.9) Anxiety and depression  Comment: Stable, multiple stressors over last six month, pleased with move to LTC, appreciative of increased supports, last PHQ-9 2/27. Celexa stopped ~ Nov 2021 due to hyponatremia, which limits pharmacotherapy options.  Plan:  - Continue melatonin PRN  - ACP following    Uric Acid   Date Value Ref Range Status   12/09/2021 3.5 2.6 - 6.0 mg/dL Final     (M1A.9XXX0) Chronic gout  Comment: Uric acid 3.5 on 12/09/21, at goal of less 6  Plan:   - Allopurinol to 100 mg x3/week s/p HD run     (D53.9) Macrocytic anemia  Comment: Hgb dropped from 11-12 range in Nov 2021 to ~ 9-10. . Hgb 10.9 2/15/22.   Hgb as low as 7.3 in July 2021 9/20/21: Iron 49 WNL, Ferritin 836 H  7/5/21: B12 305 WNL   PLT now low since Jan 2022 120-132  Likely  multifactorial in setting of ESRD.  Plan:   - Follow serial CBC (check 3/3) and monitor clinically   - Continue Folic acid, B6, B12 vitamin   - Continue Protonix 40 mg daily          (I48.91) Atrial fibrillation, unspecified type (H)  Comment: Chronic afib with digoxin toxicity in June 2021.   S/p ablation and pacemaker placement 6/25/21 anticoagulated in apixaban.    Plan:    - Continue apixaban 2.5 mg PO BID  - Follow-up with cardiology as previously determined  - Continue metoprolol XL for rate control     (J44.9) COPD (H)  (G47.33) SKY  Comment: Chronic. Stopped smoking (June 2021) after 60 year history of 1-2 PPD.  Saw pulmonology 2/9/22 Dr. Linares. PFTs showed severe obstruction w/ moderate diffusion defect. Difficulties w/ taking Stiolto will change to Bevespi, another LABA-LAMA. Stopped oxygen.   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

## 2022-03-03 NOTE — PATIENT INSTRUCTIONS
Francine Chris  1945  ORDERS:  - Change primary NP to Eileen Arredondo and MD to Dr. Santos, updated chart tag  - Discontinue benadryl cream  - Discontinue Vasline   - Please schedule patient with [facility] cardiology CORE clinic  - Please schedule for dermatology follow-up Uremic pruritus  - Please schedule for onsite dental follow-up  Electronically signed by:   JUSTO Jerry CNP  03/03/22 8:00 AM

## 2022-03-03 NOTE — TELEPHONE ENCOUNTER
Message was left overnight from the nurse Schwann from [facility] regarding Francine who has a leg wound to set up a new patient.

## 2022-03-10 NOTE — PATIENT INSTRUCTIONS
Francine Chris  1945  ORDERS:  - Please schedule ASAP with at [facility] [place] with Dr. Cast/ [phone], then update NP with date of appointment (note resident with [facility] leaves at 10:30 AM)  - doxycycline hyclate (VIBRA-TABS) 100 MG tablet; Take 1 tablet (100 mg) by mouth 2 times daily for 5 days dx  Cellulitis of left lower extremity  - amoxicillin (AMOXIL) 500 MG capsule; Take 1 capsule (500 mg) by mouth daily (with dinner) for 5 days dx  Cellulitis of left lower extremity  - Jamal red area on left leg and monitor q shift and notify provider if redness is extending outside marked area x 5 days  - Discontinue all current wound care orders   - Please cleanse L shin eschar with NS, pat dry, apply skin prep to periwound, cover with non-stick telfa, secure with kerlix wrap and change daily and PRN, measure weekly and document on wound appearance with each dressing change  - Hold PRN Triamcinolone cream x 14 days  - Triamcinolone cream to BL forearms, back, and any other itching areas BID x 14 days, then resume PRN dosing dx Uremic pruritus  - Discontinue biotene mouth spray  Electronically signed by:   JUSTO Jerry CNP  03/10/22 1:29 PM

## 2022-03-10 NOTE — TELEPHONE ENCOUNTER
[facility]    Who is the name of the provider?:  Serene      What is the location you see this provider at?: [place]    Reason for call:  Please call Nisa at [phone] to schedule new appt.  Elgin WARNER, W, F.     Can we leave a detailed message on this number?  YES

## 2022-03-10 NOTE — PROGRESS NOTES
"Wound [facility] GERIATRICS    Chief Complaint   Patient presents with     RECHECK     HPI:  Francine Chris is a 76 year old  (1945) female with hx of ESRD on HD, afib s/p AV node ablation and pacemaker on DOAC (dx 2013), HTN, combined systolic and diastolic HF, mod-severe tricuspid regurgitation not candidate for surgical repair, mid ascending aortic enlargement, BL LE edema, PAD, QTc prolongation, COPD, pulmonary HTN, hx of smoking, macular degeneration, anxiety/depression, SKY on CPAP, PUD, who is being seen today for an episodic care visit at: [facility] () [[id number]].     Today's concern is:   Follow-up today for wound to left shin, present as \"blister\" - suspect hematoma.     Left leg wound with more pain.  Does not think wound care physician saw her this week.   Spoke to facility wound care nurse who reports resident missed wound doctor on Tuesday this week, out with family.  No fever chills. WBC 5.3K on 3/3/22, WNL.  Refusing dressing changes per nursing notes.  No dressing in place at time of visit.   Hx of PAD on chart, unable to locate recent ABIs.     Followed by dermatology for uremic pruritis.   Itching is \"still bad.\"  Using emollient (CeraVe) which she keeps at bedside, but not helping completely.  Hasn't used PRN benadryl cream or triamcinolone.   Continues on oral antihistamine daily.    Allergies, and PMH/PSH reviewed in EPIC today.    REVIEW OF SYSTEMS:  4 point ROS including Respiratory, CV, GI and , other than that noted in the HPI,  is negative  SLUMS 19/30 in Feb 2022 after most recent hospitalization.    Objective:   /68   Pulse 82   Temp 98.2  F (36.8  C)   Resp 16   Ht 1.626 m (5' 4\")   Wt 61.2 kg (135 lb)   SpO2 95%   BMI 23.17 kg/m    GENERAL APPEARANCE:  NAD, well groomed and wearing make-up  RESP:  Non-labored breathing, palpation of chest w/ L CW pacer, also R tunneled cath with dressing CDI, no chest wall tenderness, no " respiratory distress, Lung sounds clear, no rales/wheeze/rhonchi  CV:  Palpation - no murmur/non-displaced PMI Auscultation - rate and rhythm irregular.   VASCULAR: R UE fistula with well heal surgical incision, +bruit/+thrill, R UE with resolving ecchymosis, Trace edema bilateral lower extremities, unable to palpate pedal pulses but feet are pink and even temp.  ABDOMEN:  Normal bowel sounds, soft, nontender, no grimacing or guarding with palpation.   M/S:  +khypohosis  SKIN:  Inspection - Chest wall tunneled cath with CDI dressing no erythema or edema. Ecchymosis R UE resolving. Wound to left lower leg with periwound with increasing pink discoloration and swelling - SEE PHOTO. Palpation- no increased warmth, skin is dry and non-tender, except for some tenderness and swelling around left lower extremity wound.  PSYCH: awake and alert, speech fluent,  insight and judgement fair, no acute confusion, calm and cooperative.     Left anterior tibial area:      Recent labs in [facility] reviewed by me today.    CBC RESULTS: Recent Labs   Lab Test 03/03/22  0654 02/15/22  0624   WBC 5.3 6.0   RBC 3.30* 3.10*   HGB 11.8 10.9*   HCT 35.4 33.0*   * 107*   MCH 35.8* 35.2*   MCHC 33.3 33.0   RDW 15.9* 15.0    126*     Last Basic Metabolic Panel:  Recent Labs   Lab Test 03/03/22  0654 02/15/22  0624   * 128*   POTASSIUM 3.4 3.1*   CHLORIDE 89* 88*   SILVIA 10.1 10.0   CO2 33* 29   BUN 35* 47*   CR 3.68* 4.45*   GLC 76 110*     Assessment/Plan:  (S81.002D,  S81.802D,  S91.002D) Open wound of left knee, leg, and ankle, subsequent encounter  (primary encounter diagnosis)  (L03.116) Cellulitis of left lower extremity  (I73.9) PAD (peripheral artery disease) (H)  Comment: Blister of left lower leg sustained two weeks ago ?? hematoma, eschar intact, worsening tenderness/swelling/mild erythema, hx of PAD on chart, no recent ABIs.   Not scheduled with [facility] despite order last week.  Plan:    - Please schedule ASAP with at [facility] [place] with Dr. Cast/ [phone], then update NP with date of appointment   - doxycycline hyclate (VIBRA-TABS) 100 MG tablet; Take 1 tablet (100 mg) by mouth 2 times daily for 5 days dx  Cellulitis of left lower extremity  - amoxicillin (AMOXIL) 500 MG capsule; Take 1 capsule (500 mg) by mouth daily (with dinner) for 5 days dx  Cellulitis of left lower extremity  - Jamal red area on left leg and monitor q shift and notify provider if redness is extending outside marked area x 5 days  - Discontinue all current wound care orders, and start following: cleanse L shin eschar with NS, pat dry, apply skin prep to periwound, cover with non-stick telfa, secure with kerlix wrap and change daily and PRN, measure weekly and document on wound appearance with each dressing change  - Onsite wound physician to continue to follow weekly until seen in wound clinic >> emailed ADON/nurse manager for follow through   - Continue atorvastatin for secondary prevention, no ASA due to bleeding risk on apixaban     (N18.6,  Z99.2) ESRD (end stage renal disease) on dialysis (H)   (E87.1) Hyponatremia   Comment: Newly diagnosed with SILAS on CKD stage III June 2021 etiology unclear but likely severe ATN with vomiting and afib with RVR/hypotension prior, imaging negative for acute process. Getting HD MWF at [facility] via R CW tunneled cath, but R brachiobasilic fistula placed 10/21/21. Nephrologist Dr. Derrick Ro MD.  Dry weight 53 kg or 116# in past, today 128#, admission to TCU weight 139#, weight 3/8 135#. Chronically low sodium last 130 on 3/3/22.  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    Electronically signed by: JUSTO Jerry CNP

## 2022-03-10 NOTE — TELEPHONE ENCOUNTER
Consult received via workqueue from provider Eileen Arredondo APRN CNP  for a venous leg/foot ulcer.     Per Standing order Patient qualifies for JETT to be scheduled prior to assessment with Dr. Cast, or Shalonda ROSALES PA-C at [facility] [place] at [facility].    Routing to  Jane Brennan.  Orders pending    Derrick Mena LPN   on 3/10/2022 at 2:46 PM

## 2022-03-11 NOTE — TELEPHONE ENCOUNTER
Please update JETT diagnosis before scheduling can be completed.               Jane Brennan    [facility]   [phone]

## 2022-03-11 NOTE — TELEPHONE ENCOUNTER
Scheduled for 4/26/22.       Jane Brennan    [facility]   [phone]

## 2022-03-11 NOTE — TELEPHONE ENCOUNTER
Scheduling will be completed through separate encounter.       Jane Brennan    [facility]   [phone]

## 2022-03-14 NOTE — PROGRESS NOTES
[place] [facility]  [facility] Medical Record Number:  [id number]  Place of Service where encounter took place:  [facility] [[id number]]  Chief Complaint   Patient presents with     [facility] Care Coordination - Health Plan or Product Change       The health plan new enrollment has happened. I have reviewed the  MDS, the preventative needs,  and facility care plan. The level of care is appropriate. I have reviewed the code status/advanced directives.       HPI:    Francine Chris  is a 76 year old (1945), who is being seen today for an episodic care visit.  HPI information obtained from: facility chart records, facility staff and patient report. Today's concern is:      Patient Francine Chris is a 76 yr old female admitted to [facility] for rehabilitation s/p hospitalization [facility] 1/12-1/13/22 for chest pain and thought to be musculoskeletal s/p fall. Unremarkable chest xray, EKG and serial troponins  Lexiscan stress test is negative, and clinical suspicion for ACS is low, no further workup planned at this point    PMHx Afib, s/p ablation and PPM, ESRD on dialysis, pulmonary hypertension, severe tricuspid regurgitation, CHF (RIGHT), PAD, anemia, COPD, SKY, GERD, anxiety, depression and gout       Open wound of left knee, leg, and ankle, subsequent encounter  Cellulitis of left lower extremity  ESRD (end stage renal disease) on dialysis (H)  PAD (peripheral artery disease) (H)     Patient on Amoxicillin and doxycycline for cellulitis,   Wound not appear infected. Improving  Plans to follow up with onsite wound specialist tomorrow.   Vascular appointment to be rescheduled due to scheduled on Friday 4/22/22 on patient dialysis day and time.  Health Unit Coordinator  updated regarding  Patient states she has some pruritis lower and upper extremity bilateral. States topical TMC cr and lubricant helpful    Has some muscle spasms at times.  Helps to get up and stand.  This is  difficult when have dialysis. She states when they lower dialysis speed she has less cramps.  Reports at times she gets a Benadryl at dialysis for this     Past Medical and Surgical History reviewed in Epic today.    MEDICATIONS:    Current Outpatient Medications   Medication Sig Dispense Refill     ACE/ARB/ARNI NOT PRESCRIBED (INTENTIONAL) Please choose reason not prescribed from choices below.       acetaminophen (TYLENOL) 325 MG tablet Take 2 tablets (650 mg) by mouth 2 times daily. May also take 2 tablets (650 mg) 2 times daily as needed for pain. 120 tablet 98     albuterol (PROAIR HFA/PROVENTIL HFA/VENTOLIN HFA) 108 (90 Base) MCG/ACT inhaler Inhale 2 puffs into the lungs every 4 hours as needed       allopurinol (ZYLOPRIM) 100 MG tablet TAKE ONE TABLET BY MOUTH THREE TIMES A WEEK ON MONDAY, WEDNESDAY AND FRIDAY AFTER HD SESSION 12 tablet 97     amoxicillin (AMOXIL) 500 MG capsule Take 1 capsule (500 mg) by mouth daily (with dinner) for 5 days 5 capsule 0     apixaban ANTICOAGULANT (ELIQUIS) 2.5 MG tablet Take 1 tablet (2.5 mg) by mouth 2 times daily 60 tablet 11     atorvastatin (LIPITOR) 40 MG tablet Take 1 tablet (40 mg) by mouth daily 30 tablet 11     calcium acetate (CALPHRON) 667 MG TABS tablet Take 1 tablet (667 mg) by mouth 3 times daily (with meals)       doxycycline hyclate (VIBRA-TABS) 100 MG tablet Take 1 tablet (100 mg) by mouth 2 times daily for 5 days 10 tablet 0     folic acid-vit B6-vit B12 (FOLGARD) 0.8-10-0.115 MG TABS per tablet Take 1 tablet by mouth daily       Glycopyrrolate-Formoterol (BEVESPI AEROSPHERE) 9-4.8 MCG/ACT oral inhaler Inhale 2 puffs into the lungs 2 times daily 10.7 g 4     loratadine (CLARITIN) 10 MG tablet Take 1 tablet (10 mg) by mouth At Bedtime 30 tablet 98     Melatonin 10 MG TABS tablet Take 10 mg by mouth nightly as needed       metoprolol succinate ER (TOPROL-XL) 25 MG 24 hr tablet Take 0.5 tablets (12.5 mg) by mouth 2 times daily 60 tablet 4     multivitamin RENAL  "(RENAVITE RX/NEPHROVITE) 1 MG tablet Take 1 tablet by mouth daily       pantoprazole (PROTONIX) 40 MG EC tablet Take 1 tablet (40 mg) by mouth daily 30 tablet 11     sodium chloride (OCEAN) 0.65 % nasal spray Spray 1 spray into both nostrils every 2 hours as needed for congestion MAY KEEP AT BEDSIDE 50 mL 98     torsemide (DEMADEX) 20 MG tablet Take 2 tablets (40 mg) by mouth daily Take at 8 AM and 1 PM. (Patient taking differently: Take 40 mg by mouth daily Take at 8 AM x4 on TuThSaSu (non-HD days) and 1 PM all other days.) 120 tablet 4     triamcinolone (KENALOG) 0.1 % external cream Apply to AA BID x 1-2 week then PRN only 454 g 2         REVIEW OF SYSTEMS:  4 point ROS including Respiratory, CV, GI and , other than that noted in the HPI,  is negative    Objective:  /71   Pulse 79   Temp 97.3  F (36.3  C)   Resp 18   Ht 1.626 m (5' 4\")   Wt 60.6 kg (133 lb 8 oz)   SpO2 97%   BMI 22.92 kg/m    Exam:  GENERAL APPEARANCE:  Alert, in no distress  RESP:  respiratory effort and palpation of chest normal, lungs clear to auscultation , no respiratory distress  CV:  Palpation and auscultation of heart done , regular rate and rhythm, no murmur, rub, or gallop  ABDOMEN:  normal bowel sounds, soft, nontender, no hepatosplenomegaly or other masses  M/S:   sitting in on bed  SKIN:  Inspection of skin and subcutaneous tissue, LEFT lower leg, bruit right arm for dialysis access site, access port right chest  PSYCH:  oriented X 3            Labs:   Labs done in SNF are in [place] EPIC. Please refer to them using EPIC/Care Everywhere.    ASSESSMENT/PLAN:     Open wound of left knee, leg, and ankle, subsequent encounter  Cellulitis of left lower extremity  ESRD (end stage renal disease) on dialysis (H)  PAD (peripheral artery disease) (H)    Patient on Amoxicillin and doxycycline for cellulitis, continue course. Tolerating with no complications noted at this time  Did state she had some soft stool yesterday, monitor "   Wound not appear infected. Improving, monitor     Plans to follow up with onsite wound specialist tomorrow. Reviewed with Saroj the nurse wound specialist  Vascular appointment to be rescheduled due to scheduled on Friday 4/22/22 on patient dialysis day and time.  Health Unit Coordinator  updated regarding    Patient states she has some pruritis lower and upper extremity bilateral. States topical TMC cr and lubricant Cerave helpful. Continue lubricants. Continue Claritin    Has some muscle spasms at times.  Helps to get up and stand.  This is difficult when have dialysis. She states when they lower dialysis speed she has less cramps.  Reports at times she gets a Benadryl at dialysis for this   Monitor for now. Can consider topical rub    Still using dialysis port access site;, patient states they have used fistula and it has infiltrated x 4      Electronically signed by:  JUSTO Ward CNP

## 2022-03-21 NOTE — PROGRESS NOTES
"[facility] GERIATRICS    Chief Complaint   Patient presents with     Nursing Home Acute     PVD, cellulitis     HPI:  Francine Chris is a 76 year old  (1945) female with hx of ESRD on HD, afib s/p AV node ablation and pacemaker on DOAC (dx 2013), HTN, combined systolic and diastolic HF, mod-severe tricuspid regurgitation not candidate for surgical repair, mid ascending aortic enlargement, BL LE edema, PAD, QTc prolongation, COPD, pulmonary HTN, hx of smoking, macular degeneration, anxiety/depression, SKY on CPAP, PUD, who is being seen today for an episodic care visit at: [facility] () [[id number]].     Today's concern is:   Follow-up today due to left lower leg ulceration with secondary cellulitis.  Suspected hematoma after trauma to lower leg, as presented with sudden onset, large fluid filled \"blister\" to lower leg on 2/14/22. Treated for cellulitis x2 over last month, initially with doxycyline in February and then with 5 day course of amoxicillin and doxycycline which resident completed last week.    Reports onsite wound care physician Dr. Man debrided wound last week.  Recalls recommendation change dressing twice daily and pack debrided area.  States, \"they are changing it once a day maybe.\"  Frustrated that nursing from facility has not been following on concerns.  Some pain. Erythema and swelling improved.  No fever or new chills.    Chronic SANCHEZ, noted after walking about 150ft in hallway prior to visit, using 4WW.  Weight stable at 132#.  Not getting weighed daily.  Refusing some torsemide doses.  No increased leg swelling.  No SOB at rest.    Allergies, and PMH/PSH reviewed in EPIC today.    REVIEW OF SYSTEMS:  4 point ROS including Respiratory, CV, GI and , other than that noted in the HPI,  is negative    Objective:   /72   Pulse 76   Temp 97.6  F (36.4  C)   Resp 16   Ht 1.626 m (5' 4\")   Wt 60.1 kg (132 lb 9.6 oz)   SpO2 98%   BMI 22.76 kg/m    GENERAL " APPEARANCE:  NAD, well groomed and wearing make-up  RESP:  Non-labored breathing, palpation of chest w/ L CW pacer, also R tunneled cath with dressing CDI, no chest wall tenderness, no respiratory distress, Lung sounds clear, no rales/wheeze/rhonchi  CV:  Palpation - no murmur/non-displaced PMI Auscultation - rate and rhythm irregular.   VASCULAR: R UE fistula with well heal surgical incision, +bruit/+thrill, R UE with resolving ecchymosis, Trace edema bilateral lower extremities.  ABDOMEN:  Normal bowel sounds, soft, nontender, no grimacing or guarding with palpation.   M/S:  +khypohosis  SKIN:  Inspection - Chest wall tunneled cath with CDI dressing no erythema or edema. Ecchymosis R UE resolving. Wound to left lower leg - SEE PHOTO. Palpation- no increased warmth, skin is dry and non-tender.  PSYCH: awake and alert, speech fluent,  insight and judgement fair, no acute confusion, calm and cooperative.     Left Lower Leg:       Labs done in SNF are in [place] EPIC. Please refer to them using EPIC/Care Everywhere.    Assessment/Plan:  (S81.002D,  S81.802D,  S91.002D) Open wound of left knee, leg, and ankle, subsequent encounter  (primary encounter diagnosis)  (L03.116) Cellulitis of left lower extremity  (I73.9) PAD (peripheral artery disease) (H)  Comment: Blister of left lower leg sustained around 2/14/22, possible hematoma, eschar intact, hx of PAD on chart, no recent ABIs. Followed by onsite wound care physician Dr. Man, debridement last week, but wound scabbed over due to inconsistent wound care.  Outpatient wound care follow-up with ABIs ordered for 4/26/22.   Plan:   - Discussed wound care orders with [facility], reports facility error with transcription, due to see Dr. Man tomorrow, will ensure orders updated properly this week.  - Continue current wound care for today: Cleanse L shin eschar with NS, pat dry, apply skin prep to periwound, cover with non-stick telfa, secure with kerlix wrap and change daily and  PRN, measure weekly and document on wound appearance with each dressing change  - Continue atorvastatin for secondary prevention, no ASA due to bleeding risk on apixaban     (I50.42) Chronic combined systolic and diastolic HF (heart failure) (H)  (I07.1) Tricuspid valve insufficiency, unspecified etiology  (I27.20) Pulmonary hypertension (H)  Comment: Chronic, established care with Dr. Holt in [place] system, no clinical improvement with sildenafil and ambrisentan so discontinued.  HFpEF with severe tricuspid valve regurgitation (not surgical candidate), RV dysfunction, pulmonary hypertension. Last echo 11/01/21, EF 50-55%, mild reduced right ventricular systolic function, BL atria severely dilated, severe tricuspid regurgitation, mild aortic and mild-moderate mitral regurgitation.  Appears euvolemic   Plan:   - Continue Torsemide to 40 mg daily >> encouraged not to skip doses  - Continue metoprolol XL 12.5 mg 2 times daily for nonsustained VT, hx of syncopal episode, Dec 2021  - Follow-up with Dr. Holt Jan 2023  - [facility] clinic follow-up 5/2/22  - Daily weights and routine labs >> stress importance of daily weights to monitor fluid volume status    Follow-up with Dr. Santos 3/23 for regulatory visit.    Electronically signed by: JUSTO Jerry CNP     Append 03/22/22 10:28 PM  Sent e-mail message to [facility] and nurse manager requesting updated on wound care visit and new orders from today.

## 2022-03-23 NOTE — LETTER
3/23/2022        RE: Francine Chris  [facility]  [address]  [facility] [address]        Francine Chris is a 76 year old female seen March 23, 2022 at [facility] where she has resided for 2 months (admit to TCU 1/2022) seen after transfer over to [facility].  Pt is seen in her room sitting edge of bed, getting ready to go to dialysis.     She has a LLE wound, started as a hematoma in February 2022.  Wound physician was to see her yesterday, but unable to make it to the facility.   Pt has received two courses of abx for secondary cellulitis.      By chart review, pt was living and working independently until this past spring.  She had CKD and chronic atrial fib anticoagulated with warfarin.  She had a [facility] stay in June 2021 for acute renal failure with hyperkalemia, digoxin toxicity and a supratherapeutic INR.  She was emergently dialyzed and also had AV ablation and pacemaker placement, switched from warfarin to apixaban for anticoagulation.  She discharged to [facility] TCU where she made functional gains with therapies, then transitioned to AL.    In October and December 2021 pt had placement of brachial basilic fistula RUE.   Pt was in AL for a few months, but on the decline.  Was skipping dialysis sessions, missing appointments and falling.  Seen in ED in January 2022 after a fall, then hospitalized overnight for CP and SOB.   Lexiscan stress test negative for ischemia.     Seen by CV surgery for severe TR, but deemed too high of a surgical risk for valve replacement     She discharged to TCU   Had another ED visit later in January after a fall with scalp laceration.   Now ambulatory short distances with FWW and assist, has not made enough functional gains in TCU to enable her to return to AL.        Past Medical History:   Diagnosis Date     Acute renal failure (ARF) (H) 06/22/2021     Anemia in chronic kidney disease, on chronic dialysis (H) 07/14/2021      Atrophy of left kidney 05/18/2017     Chronic atrial fibrillation (H) 04/01/2013     Chronic kidney disease, stage III (moderate) (H) 11/13/2019    Formatting of this note might be different from the original. Cr=1.2     COPD mixed type (H) 04/06/2013    Formatting of this note might be different from the original. On Qvar and combivent, hx of cor pulmonale     Cor pulmonale (chronic) (H) 09/20/2021     Depressive disorder, not elsewhere classified      Dermatitis of lower extremity 05/05/2017     Elevated blood pressure reading without diagnosis of hypertension 12/10/2004     HTN (hypertension) 09/07/2011     Hx of gout 08/13/2014     Hypercalcemia 07/16/2018    Formatting of this note might be different from the original. 7/16/18  Ca++=10.8   Alb=3.8   7/17/18:  PTH=95.6  Ca++=10.1 (after diuresis), some renal impairment, was on vit D and calcium outpt     Hypertensive heart and kidney disease 07/16/2018    Formatting of this note might be different from the original. Atrophic left kidney, rise in Cr with increase in bumex 2018     Hyponatremia 06/22/2021     Intermediate stage nonexudative age-related macular degeneration of both eyes 01/13/2021     Lung nodule 04/28/2021     Malignant melanoma (H)      Migraine variant 12/10/2004     Problem list name updated by automated process. Provider to review     Neoplasm of skin of neck 06/06/2014     NSVT (nonsustained ventricular tachycardia) (H) 04/09/2020     SKY (obstructive sleep apnea) 04/08/2020     Osteoporosis 11/10/2004     Problem list name updated by automated process. Provider to review     Osteoporosis, unspecified dx 8/02     Pacemaker 07/14/2021    Formatting of this note might be different from the original. 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 On sotolol      "Tobacco use disorder      Variants of migraine, not elsewhere classified, without mention of intractable migraine without mention of status migrainosus     flashing lights       Past Surgical History:   Procedure Laterality Date     Z NONSPECIFIC PROCEDURE  1990's    right ankle fracture     ZZC NONSPECIFIC PROCEDURE      spontaneous AB     ZZC NONSPECIFIC PROCEDURE      tonsillectomy     ZZC NONSPECIFIC PROCEDURE  1980's    tubal ligation     SH: Previously living alone, was at [facility] 9/2021-1/2022  Her sister Tereza (Jhoan) is first contact and helps with her care  >40 pack year smoking history; quit date 4/2021       Pt worked as a  for a group home.       ROS: Ambulatory with 4WW, uses WC for distance   Chronic itching secondary to uremic pruritus  Weight in May 2021 was 135 lbs     EXAM: NAD, frail, looks older than her stated age.   /66   Pulse 76   Temp 97.1  F (36.2  C)   Resp 18   Ht 1.626 m (5' 4\")   Wt 60.1 kg (132 lb 9.6 oz)   SpO2 96%   BMI 22.76 kg/m     Neck supple without adenopathy  Lungs clear bilaterally   Heart RRR s1s2, I/VI HSM  Abd soft, NT, no distention or guarding, +BS  Ext with 2+ edema to knees, with chronic skin changes.     RUE fistula with +thrill, dressing without any drainage (not removed)   Left shin with anterior wound, about 1cm deep, scabbed over and dry, edges clean  Neuro: normal speech, generalized weakness, no focal findings  Psych: affect okay     Last Comprehensive Metabolic Panel:  Sodium   Date Value Ref Range Status   03/03/2022 130 (L) 133 - 144 mmol/L Final     Potassium   Date Value Ref Range Status   03/03/2022 3.4 3.4 - 5.3 mmol/L Final     Carbon Dioxide (CO2)   Date Value Ref Range Status   03/03/2022 33 (H) 20 - 32 mmol/L Final     Glucose   Date Value Ref Range Status   03/03/2022 76 70 - 99 mg/dL Final     Urea Nitrogen   Date Value Ref Range Status   03/03/2022 35 (H) 7 - 30 mg/dL Final     Creatinine   Date Value Ref " Range Status   03/03/2022 3.68 (H) 0.52 - 1.04 mg/dL Final     GFR Estimate   Date Value Ref Range Status   03/03/2022 12 (L) >60 mL/min/1.73m2 Final     Calcium   Date Value Ref Range Status   03/03/2022 10.1 8.5 - 10.1 mg/dL Final     Lab Results   Component Value Date    AST 37 02/15/2022      ALBUMIN 3.0 02/15/2022      ALKPHOS 122 02/15/2022     Lab Results   Component Value Date    WBC 5.3 03/03/2022      HGB 11.8 03/03/2022       03/03/2022       03/03/2022       IMP/PLAN:   (S81.002D,  S81.802D,  S91.002D) Open wound of left knee, leg, and ankle, subsequent encounter    (I73.9) PAD (peripheral artery disease) (H)  Comment: now scabbed over, no granulation tissue seen but no evidence of cellulitis today   Plan: clean daily, non adherent dressing.   Elevation and diuretic to minimize surrounding edema.     Wound doctor to see.        (J44.9) COPD, severe (H)    Comment: longtime smoker  PFTs showed severe obstruction and moderate diffusion defect    No longer on supplemental oxygen  Plan: Bevespi Aerosphere (LABA-LAMA) and prn albuterol        (I27.20) Pulmonary HTN (H)  (I07.1) Tricuspid valve insufficiency, unspecified etiology  (I50.32) Chronic heart failure with preserved ejection fraction (HFpEF) (H)  Comment: stable volume status today  No clinical improvement with sildenafil or ambrisentan, so discontinued  Severe TR but not a surgical candidate    Plan: torsemide 40 mg /d   Also on atorvastatin 40 mg/day   Follow weights, exam and BMP and follow up appointment with Dr Holt Cardiology 5/2       (N18.6) ESRD (end stage renal disease) (H)  Comment: on MWF dialysis   Chronic hyponatremia related to volume  Plan: per Nephrology Dr Ro  Continue calcium acetate 667mg tid with meals and renal vitamin  1200 ccs/day fluid restriction    (G47.33) SKY (obstructive sleep apnea)  Comment: no longer has CPAP, likely secondary to noncompliance     Plan: will need another formal sleep study to address        (I48.20) Chronic atrial fibrillation (H)  Comment: HR 70-80s   S/p pacemaker placement 6/2021    Plan: apixaban 2.5 mg bid for stroke prophylaxis    Metoprolol 12.5 mg bid for VR control       (F41.9,  F32.A) Anxiety and depression  Comment: many stressors and losses   Plan: citalopram discontinued secondary to low Na   Melatonin 10 mg/HS prn for sleep   ACP following    (R41.89) Cognitive impairment  Comment: no cog scores available, but clearly needs assist with multiple medical problems, appointments, etc.   Plan: LTC support for med admin, meals, activity       (M1A.9XX0) Chronic gout without tophus, unspecified cause, unspecified site  Comment: by hx  Plan: allopurinol 100 mg/day      Grisel Santos MD         Sincerely,        Grisel Santos MD

## 2022-03-28 NOTE — PROGRESS NOTES
"[facility] GERIATRICS    Chief Complaint   Patient presents with     Nursing Home Acute     wound check     HPI:  Francine Chris is a 76 year old  (1945) female with hx of ESRD on HD, afib s/p AV node ablation and pacemaker on DOAC (dx 2013), HTN, combined systolic and diastolic HF, mod-severe tricuspid regurgitation not candidate for surgical repair, mid ascending aortic enlargement, BL LE edema, PAD, QTc prolongation, COPD, pulmonary HTN, hx of smoking, macular degeneration, anxiety/depression, SKY on CPAP, PUD, who is being seen today for an episodic care visit at: [facility] () [[id number]]     Today's concern is:   Follow-up today for wound assessment.  Reports some mild pain.  Staff covering with protective dressing daily.  No erythema or edema.    \"I've had diarrhea all damn night!\"  Started late last night and stopped until 6 am.  Drinking PO fluids and feels like eating now.  Thinking of canceling HD today.  Upset about not having boxed lunch yet, staff delivered during visit.  Concerns remain about food and options.  Dietician out until 3/30/22, but message sent asking for consult.  Does not take laxatives.    Sister also requested review of paperwork which she left with .    Allergies, and PMH/PSH reviewed in mLED today.    REVIEW OF SYSTEMS:  4 point ROS including Respiratory, CV, GI and , other than that noted in the HPI,  is negative    Objective:   /76   Pulse 68   Temp 97.3  F (36.3  C)   Resp 18   Ht 1.626 m (5' 4\")   Wt 60.1 kg (132 lb 9.6 oz)   SpO2 97%   BMI 22.76 kg/m    GENERAL APPEARANCE:  NAD, well groomed and wearing make-up, dressed to go to HD  RESP:  Non-labored breathing, palpation of chest w/ L CW pacer, also R tunneled cath with dressing CDI, no chest wall tenderness, no respiratory distress, Lung sounds clear, no rales/wheeze/rhonchi  CV:  Palpation - no murmur/non-displaced PMI Auscultation - rate and rhythm irregular. "   VASCULAR: R UE fistula with well heal surgical incision, +bruit/+thrill, Trace edema bilateral lower extremities.  ABDOMEN:  Normal bowel sounds, soft, nontender, no grimacing or guarding with palpation.   M/S:  +khypohosis  SKIN:  Inspection - Chest wall tunneled cath with CDI dressing no erythema or edema. Wound to left lower leg - SEE PHOTO. Palpation- no increased warmth, skin is dry and non-tender.  PSYCH: awake and alert, speech fluent,  insight and judgement fair, no acute confusion, calm and cooperative.     Left Lower Leg:      Labs done in SNF are in [place] EPIC. Please refer to them using EPIC/Care Everywhere.   GFR Estimate   Date Value Ref Range Status   03/03/2022 12 (L) >60 mL/min/1.73m2 Final     Comment:     Effective December 21, 2021 eGFRcr in adults is calculated using the 2021 CKD-EPI creatinine equation which includes age and gender (Elder brock al., [place], DOI: 10.1056/VOOGqo7681423)   01/12/2005 78 >60 mL/min/1.7m2 Final     GFR Estimate If Black   Date Value Ref Range Status   01/12/2005 >80 >60 mL/min/1.7m2 Final       Assessment/Plan:  (S81.002D,  S81.802D,  S91.002D) Open wound of left leg (primary encounter diagnosis)  (I73.9) PAD (peripheral artery disease) (H)  Comment: Hematoma of left lower leg sustained around 2/14/22, eschar intact, hx of PAD on chart, no recent ABIs. Followed by onsite wound care physician Dr. Man, debridement two weeks ago, but wound scabbed over due to inconsistent wound care. Outpatient wound care follow-up with ABIs ordered for 4/26/22 if not improving with onsite measures.   Plan:   - Appointment with Dr. Man tomorrow 3/29  - Continue current wound care for today: Cleanse L shin eschar with NS, pat dry, apply skin prep to periwound, cover with non-stick telfa, secure with kerlix wrap and change daily and PRN, measure weekly and document on wound appearance with each dressing change  - Continue atorvastatin for secondary prevention, no ASA due to bleeding risk  on apixaban     (R19.5) Loose Stools  Comment: Reports loose stool overnight, apparently self-limited  Plan:   - Given recent abx, if stools remain loose sent stool for C.diff, if negative consider imodium     (Z71.89) Care Coordination  Comment: Sister provided form to discharge resident student loans given disability, now residing in LTC facility and now HD.  Plan:   - Reviewed nature of form  Total and Permanent Disability (TPD) Discharge for student loan    - Will have Dr. Santos reviewed and sign form if appropriate when she is next onsite 4/8, message sent to  via e-mail with update    Electronically signed by: JUSTO Jerry CNP

## 2022-04-03 NOTE — PROGRESS NOTES
Francine Chris is a 76 year old female seen March 23, 2022 at [facility] where she has resided for 2 months (admit to TCU 1/2022) seen after transfer over to [facility].  Pt is seen in her room sitting edge of bed, getting ready to go to dialysis.     She has a LLE wound, started as a hematoma in February 2022.  Wound physician was to see her yesterday, but unable to make it to the facility.   Pt has received two courses of abx for secondary cellulitis.      By chart review, pt was living and working independently until this past spring.  She had CKD and chronic atrial fib anticoagulated with warfarin.  She had a [facility] stay in June 2021 for acute renal failure with hyperkalemia, digoxin toxicity and a supratherapeutic INR.  She was emergently dialyzed and also had AV ablation and pacemaker placement, switched from warfarin to apixaban for anticoagulation.  She discharged to [facility] TCU where she made functional gains with therapies, then transitioned to AL.    In October and December 2021 pt had placement of brachial basilic fistula RUE.   Pt was in AL for a few months, but on the decline.  Was skipping dialysis sessions, missing appointments and falling.  Seen in ED in January 2022 after a fall, then hospitalized overnight for CP and SOB.   Lexiscan stress test negative for ischemia.     Seen by CV surgery for severe TR, but deemed too high of a surgical risk for valve replacement     She discharged to TCU   Had another ED visit later in January after a fall with scalp laceration.   Now ambulatory short distances with FWW and assist, has not made enough functional gains in TCU to enable her to return to AL.        Past Medical History:   Diagnosis Date     Acute renal failure (ARF) (H) 06/22/2021     Anemia in chronic kidney disease, on chronic dialysis (H) 07/14/2021     Atrophy of left kidney 05/18/2017     Chronic atrial fibrillation (H) 04/01/2013     Chronic kidney disease, stage III  (moderate) (H) 11/13/2019    Formatting of this note might be different from the original. Cr=1.2     COPD mixed type (H) 04/06/2013    Formatting of this note might be different from the original. On Qvar and combivent, hx of cor pulmonale     Cor pulmonale (chronic) (H) 09/20/2021     Depressive disorder, not elsewhere classified      Dermatitis of lower extremity 05/05/2017     Elevated blood pressure reading without diagnosis of hypertension 12/10/2004     HTN (hypertension) 09/07/2011     Hx of gout 08/13/2014     Hypercalcemia 07/16/2018    Formatting of this note might be different from the original. 7/16/18  Ca++=10.8   Alb=3.8   7/17/18:  PTH=95.6  Ca++=10.1 (after diuresis), some renal impairment, was on vit D and calcium outpt     Hypertensive heart and kidney disease 07/16/2018    Formatting of this note might be different from the original. Atrophic left kidney, rise in Cr with increase in bumex 2018     Hyponatremia 06/22/2021     Intermediate stage nonexudative age-related macular degeneration of both eyes 01/13/2021     Lung nodule 04/28/2021     Malignant melanoma (H)      Migraine variant 12/10/2004     Problem list name updated by automated process. Provider to review     Neoplasm of skin of neck 06/06/2014     NSVT (nonsustained ventricular tachycardia) (H) 04/09/2020     SKY (obstructive sleep apnea) 04/08/2020     Osteoporosis 11/10/2004     Problem list name updated by automated process. Provider to review     Osteoporosis, unspecified dx 8/02     Pacemaker 07/14/2021    Formatting of this note might be different from the original. 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 On sotolol     Tobacco use disorder      Variants of migraine, not elsewhere classified, without mention of intractable migraine without  "mention of status migrainosus     flashing lights       Past Surgical History:   Procedure Laterality Date     ZC NONSPECIFIC PROCEDURE  1990's    right ankle fracture     ZZC NONSPECIFIC PROCEDURE      spontaneous AB     ZZC NONSPECIFIC PROCEDURE      tonsillectomy     ZZC NONSPECIFIC PROCEDURE  1980's    tubal ligation     SH: Previously living alone, was at [facility] 9/2021-1/2022  Her sister Tereza Guerrero) is first contact and helps with her care  >40 pack year smoking history; quit date 4/2021       Pt worked as a  for a group home.       ROS: Ambulatory with 4WW, uses WC for distance   Chronic itching secondary to uremic pruritus  Weight in May 2021 was 135 lbs     EXAM: NAD, frail, looks older than her stated age.   /66   Pulse 76   Temp 97.1  F (36.2  C)   Resp 18   Ht 1.626 m (5' 4\")   Wt 60.1 kg (132 lb 9.6 oz)   SpO2 96%   BMI 22.76 kg/m     Neck supple without adenopathy  Lungs clear bilaterally   Heart RRR s1s2, I/VI HSM  Abd soft, NT, no distention or guarding, +BS  Ext with 2+ edema to knees, with chronic skin changes.     RUE fistula with +thrill, dressing without any drainage (not removed)   Left shin with anterior wound, about 1cm deep, scabbed over and dry, edges clean  Neuro: normal speech, generalized weakness, no focal findings  Psych: affect okay     Last Comprehensive Metabolic Panel:  Sodium   Date Value Ref Range Status   03/03/2022 130 (L) 133 - 144 mmol/L Final     Potassium   Date Value Ref Range Status   03/03/2022 3.4 3.4 - 5.3 mmol/L Final     Carbon Dioxide (CO2)   Date Value Ref Range Status   03/03/2022 33 (H) 20 - 32 mmol/L Final     Glucose   Date Value Ref Range Status   03/03/2022 76 70 - 99 mg/dL Final     Urea Nitrogen   Date Value Ref Range Status   03/03/2022 35 (H) 7 - 30 mg/dL Final     Creatinine   Date Value Ref Range Status   03/03/2022 3.68 (H) 0.52 - 1.04 mg/dL Final     GFR Estimate   Date Value Ref Range Status   03/03/2022 12 " (L) >60 mL/min/1.73m2 Final     Calcium   Date Value Ref Range Status   03/03/2022 10.1 8.5 - 10.1 mg/dL Final     Lab Results   Component Value Date    AST 37 02/15/2022      ALBUMIN 3.0 02/15/2022      ALKPHOS 122 02/15/2022     Lab Results   Component Value Date    WBC 5.3 03/03/2022      HGB 11.8 03/03/2022       03/03/2022       03/03/2022       IMP/PLAN:   (S81.002D,  S81.802D,  S91.002D) Open wound of left knee, leg, and ankle, subsequent encounter    (I73.9) PAD (peripheral artery disease) (H)  Comment: now scabbed over, no granulation tissue seen but no evidence of cellulitis today   Plan: clean daily, non adherent dressing.   Elevation and diuretic to minimize surrounding edema.     Wound doctor to see.        (J44.9) COPD, severe (H)    Comment: longtime smoker  PFTs showed severe obstruction and moderate diffusion defect    No longer on supplemental oxygen  Plan: Bevespi Aerosphere (LABA-LAMA) and prn albuterol        (I27.20) Pulmonary HTN (H)  (I07.1) Tricuspid valve insufficiency, unspecified etiology  (I50.32) Chronic heart failure with preserved ejection fraction (HFpEF) (H)  Comment: stable volume status today  No clinical improvement with sildenafil or ambrisentan, so discontinued  Severe TR but not a surgical candidate    Plan: torsemide 40 mg /d   Also on atorvastatin 40 mg/day   Follow weights, exam and BMP and follow up appointment with Dr Holt Cardiology 5/2       (N18.6) ESRD (end stage renal disease) (H)  Comment: on MWF dialysis   Chronic hyponatremia related to volume  Plan: per Nephrology Dr Ro  Continue calcium acetate 667mg tid with meals and renal vitamin  1200 ccs/day fluid restriction    (G47.33) SKY (obstructive sleep apnea)  Comment: no longer has CPAP, likely secondary to noncompliance     Plan: will need another formal sleep study to address       (I48.20) Chronic atrial fibrillation (H)  Comment: HR 70-80s   S/p pacemaker placement 6/2021    Plan: apixaban 2.5  mg bid for stroke prophylaxis    Metoprolol 12.5 mg bid for VR control       (F41.9,  F32.A) Anxiety and depression  Comment: many stressors and losses   Plan: citalopram discontinued secondary to low Na   Melatonin 10 mg/HS prn for sleep   ACP following    (R41.89) Cognitive impairment  Comment: no cog scores available, but clearly needs assist with multiple medical problems, appointments, etc.   Plan: LTC support for med admin, meals, activity       (M1A.9XX0) Chronic gout without tophus, unspecified cause, unspecified site  Comment: by hx  Plan: allopurinol 100 mg/day      Grisel Santos MD

## 2022-04-08 NOTE — PROGRESS NOTES
"[facility] GERIATRICS    Chief Complaint   Patient presents with     Nursing Home Acute     wound check     HPI:  Francine Chris is a 76 year old  (1945)  female with hx of ESRD on HD, afib s/p AV node ablation and pacemaker on DOAC (dx 2013), HTN, combined systolic and diastolic HF, mod-severe tricuspid regurgitation not candidate for surgical repair, mid ascending aortic enlargement, BL LE edema, PAD, QTc prolongation, COPD, pulmonary HTN, hx of smoking, macular degeneration, anxiety/depression, SKY on CPAP, PUD, who is being seen today for an episodic care visit at: [facility] [[id number]].     Today's concern is:   Follow-up today due to left lower leg ulceration - Dr. Santos present for visit, rounding today for regulatory visits.  Suspected hematoma after trauma to lower leg, as presented with sudden onset, large fluid filled blister to lower leg on 2/14/22. Treated for cellulitis x2, initially with doxycyline in February and then with 5 day course of amoxicillin and doxycycline which resident completed mid-March.     Discussed wound status with nursing and patient. Report scabbed area removed by patient prior to visit with onsite wound care physician Dr. Man; thus no sharp debridement required. Wound dressing changed to following:  \"Cleanse L shin open area with NS, pat dry, apply skin prep to isabell-wound, Pack with NS moistened gauze, secure with Kerlix.\"    No fever/chills.  No left leg pain.  No increased redness, swelling, drainage from left leg wound.    Resident is heading out to HD shortly after visit.    REVIEW OF SYSTEMS: See HPI.     Objective:   /64   Pulse 76   Temp 98.6  F (37  C)   Resp 18   Ht 1.626 m (5' 4\")   Wt 59.5 kg (131 lb 3.2 oz)   SpO2 96%   BMI 22.52 kg/m    GENERAL APPEARANCE:  NAD, well groomed and wearing make-up, dressed to go to HD  RESP:  Non-labored breathing  M/S:  +khypohosis, independent mobility   SKIN:  Inspection - Chest " "wall tunneled cath with CDI dressing no erythema or edema. Wound to left lower leg - SEE PHOTO. Palpation- no increased warmth, skin is dry and non-tender.  PSYCH: awake and alert, speech fluent,  insight and judgement fair, no acute confusion, calm and cooperative.      Left Anterior Tibial Area:      Recent labs in EPIC reviewed by me today.      Assessment/Plan:  (S81.002D,  S81.802D,  S91.002D) Open wound of left leg (primary encounter diagnosis)  (I73.9) PAD (peripheral artery disease) (H)  Comment: Hematoma of left lower leg sustained around 2/14/22, eschar intact, hx of PAD on chart, no recent ABIs. Followed by onsite wound care physician Dr. Man, debridement completed, but wound scabbed over due to inconsistent wound care. Scabbed removed by patient. No apparent infection. Outpatient wound care follow-up with ABIs ordered for 4/26/22 if not improving. Smaller in size today and stable.  Plan:   - Appointment with Dr. Man 4/12  - Continue current wound care updated this week: \"Cleanse L shin open area with NS, pat dry, apply skin prep to isabell-wound, Pack with NS moistened gauze, secure with Kerlix.\"  - Continue atorvastatin for secondary prevention, no ASA due to bleeding risk on apixaban   - Will continue to follow wound weekly to ensure wound physician orders are being followed by staff    Electronically signed by: JUSTO Jerry CNP         "

## 2022-04-13 NOTE — PROGRESS NOTES
[place] [facility]  [place] [facility] Record Number:  [id number]  Place of Service where encounter took place:  [facility] ([facility]) [[id number]]  Chief Complaint   Patient presents with     RECHECK       HPI:    Francine Chris  is a 76 year old (1945)   female with hx of ESRD on HD, afib s/p AV node ablation and pacemaker on DOAC (dx 2013), HTN, combined systolic and diastolic HF, mod-severe tricuspid regurgitation not candidate for surgical repair, mid ascending aortic enlargement, BL LE edema, PAD, QTc prolongation, COPD, pulmonary HTN, hx of smoking, macular degeneration, anxiety/depression, SKY on CPAP, PUD, who is being seen today for an episodic care visit.      HPI information obtained from: facility chart records, facility staff, patient report, [facility] chart review and Care Everywhere [facility] chart review.     Today's concern is:  Follow-up today requested by patient, reported to nursing several issues not discussed at follow-up last week; also for wound check.     Visit completed out of pt room in ancillary room, feels roommate listens and injects into visits and health concerns when in room.     Roommate issues, with light, belongings. Does not want SW to be notified for new placement, education provided regarding process if changes mind.    R UE swelling, some pain, after HD team attempt to access R UE AV fistula on Monday 4/11.   No numbness or tingling in right hand. No R UE weakness.  R CW central access will be used by HD.  Resident reports being assessed by her vascular physician last week.  Per nursing note area of ecchymosis to R AC area:  15cm x 14.5 cm.    Saw Dr. Man yesterday for L LEwound check.  Wound to be left KITA.  Smaller in six per pt report, per nursing note 4/11: 1.8 x 1.1cm.    Low mood at times.  Trouble sleeping.  Forgetting to use PRN melatonin.   On Celexa in past but stopped due to low sodium.   March PHQ-9 1/27.     Reviewed events over last  "year.  Overall reports \"I feel good!\"    Past Medical and Surgical History reviewed in Epic today.    MEDICATIONS:  Current Outpatient Medications   Medication Sig Dispense Refill     ACE/ARB/ARNI NOT PRESCRIBED (INTENTIONAL) Please choose reason not prescribed from choices below.       acetaminophen (TYLENOL) 325 MG tablet Take 2 tablets (650 mg) by mouth 2 times daily. May also take 2 tablets (650 mg) 2 times daily as needed for pain. 120 tablet 98     albuterol (PROAIR HFA/PROVENTIL HFA/VENTOLIN HFA) 108 (90 Base) MCG/ACT inhaler Inhale 2 puffs into the lungs every 4 hours as needed       allopurinol (ZYLOPRIM) 100 MG tablet TAKE ONE TABLET BY MOUTH THREE TIMES A WEEK ON MONDAY, WEDNESDAY AND FRIDAY AFTER HD SESSION 12 tablet 97     apixaban ANTICOAGULANT (ELIQUIS) 2.5 MG tablet Take 1 tablet (2.5 mg) by mouth 2 times daily 60 tablet 11     atorvastatin (LIPITOR) 40 MG tablet Take 1 tablet (40 mg) by mouth daily 30 tablet 11     calcium acetate (CALPHRON) 667 MG TABS tablet Take 1 tablet (667 mg) by mouth 3 times daily (with meals)       folic acid-vit B6-vit B12 (FOLGARD) 0.8-10-0.115 MG TABS per tablet Take 1 tablet by mouth daily       Glycopyrrolate-Formoterol (BEVESPI AEROSPHERE) 9-4.8 MCG/ACT oral inhaler Inhale 2 puffs into the lungs 2 times daily 10.7 g 4     loratadine (CLARITIN) 10 MG tablet Take 1 tablet (10 mg) by mouth At Bedtime 30 tablet 98     Melatonin 10 MG TABS tablet Take 10 mg by mouth nightly as needed       metoprolol succinate ER (TOPROL-XL) 25 MG 24 hr tablet Take 0.5 tablets (12.5 mg) by mouth 2 times daily 60 tablet 4     pantoprazole (PROTONIX) 40 MG EC tablet Take 1 tablet (40 mg) by mouth daily 30 tablet 11     sodium chloride (OCEAN) 0.65 % nasal spray Spray 1 spray into both nostrils every 2 hours as needed for congestion MAY KEEP AT BEDSIDE 50 mL 98     torsemide (DEMADEX) 20 MG tablet Take 2 tablets (40 mg) by mouth daily Take at 8 AM and 1 PM. (Patient taking differently: Take " "40 mg by mouth daily Take at 8 AM x4 on TuThSaSu (non-HD days) and 1 PM all other days.) 120 tablet 4     triamcinolone (KENALOG) 0.1 % external cream Apply to AA BID x 1-2 week then PRN only 454 g 2     REVIEW OF SYSTEMS:  4 point ROS including Respiratory, CV, GI and , other than that noted in the HPI,  is negative    Objective:  /74   Pulse 68   Temp 97.5  F (36.4  C)   Resp 18   Ht 1.626 m (5' 4\")   Wt 59.9 kg (132 lb)   SpO2 97%   BMI 22.66 kg/m    Exam:  GENERAL APPEARANCE:  NAD, well groomed and wearing make-up, dressed to go to HD  RESP:  Non-labored breathing, palpation of chest w/ L CW pacer, also R tunneled cath with dressing CDI, no chest wall tenderness, no respiratory distress, Lung sounds clear, no rales/wheeze/rhonchi  CV:  Palpation - no murmur/non-displaced PMI Auscultation - rate and rhythm irregular.   VASCULAR: R UE fistula with bandages and tape, large area of soft swelling and ecchymosis, +bruit/+thrill - SEE PHOTO. 2+ radial pulse BL, hands are pink/warm/even temp with cap refill < 2 seconds. Trace edema bilateral lower extremities.  ABDOMEN:  Normal bowel sounds, soft, nontender, no grimacing or guarding with palpation.   M/S:  +khypohosis. Good even  BL. No pain with PROM BL UE.  SKIN:  Inspection - Chest wall tunneled cath with CDI dressing no erythema or edema. Wound to left lower leg, wound bed brown/yellow scab, no erythema - SEE PHOTO. Palpation- no increased warmth, skin is dry and non-tender.  PSYCH: awake and alert, speech fluent,  insight and judgement fair, no acute confusion, calm and cooperative.     Right UE AV Fistula:      Left LE wound:        Labs:   Recent labs in [facility] reviewed by me today.    CBC RESULTS: Recent Labs   Lab Test 03/03/22  0654 02/15/22  0624   WBC 5.3 6.0   RBC 3.30* 3.10*   HGB 11.8 10.9*   HCT 35.4 33.0*   * 107*   MCH 35.8* 35.2*   MCHC 33.3 33.0   RDW 15.9* 15.0    126*       Last Basic Metabolic Panel:  Recent Labs "   Lab Test 03/03/22  0654 02/15/22  0624   * 128*   POTASSIUM 3.4 3.1*   CHLORIDE 89* 88*   SILVIA 10.1 10.0   CO2 33* 29   BUN 35* 47*   CR 3.68* 4.45*   GLC 76 110*       Liver Function Studies -   Recent Labs   Lab Test 02/15/22  0624 01/26/22  0638   PROTTOTAL 7.6 7.6   ALBUMIN 3.0* 3.1*   BILITOTAL 1.0 1.4*   ALKPHOS 122 148   AST 37 43   ALT 32 46       TSH   Date Value Ref Range Status   01/12/2005 1.22 0.4 - 5.0 mU/L Final     ASSESSMENT/PLAN:  (T82.590A) Malfunction of arteriovenous dialysis fistula, initial encounter (H)  (primary encounter diagnosis)  Comment: Suspected hematoma to R UE AV fistula site after attempt to access at HD two days ago, continued swelling, but positive bruit and thrill, neurovascular exam WNL, f/b closely by nephrology and vascular.  Plan:   - Called [facility] ([phone]), spoke to nurse, HD team provided assurance they are aware of issue with AV fistula ecchymosis and swelling, will use central access for HD run today, nephrology team to assess again at today at HD, nephrology reports Vascular visit last week as outpatient - no notes available. Gave HD team NP direct cell number to further assist with care coordination.   - Reviewed with Dr. Santos via telephone  - Assess fistula bruit and thrill two times a day if abnormal exam contact NP or MD   - Consider holding Eliquis which pt is on for afib if increased swelling or neurovascular compromise  - Elevated R UE as able, Tylenol for pain    (S81.802D) Open wound of left lower leg, subsequent encounter  (I73.9) PAD (peripheral artery disease) (H)  Comment: Hematoma of left lower leg sustained around 2/14/22, eschar intact, hx of PAD on chart, no recent ABIs. Treated for cellulitis x2. Followed by onsite wound care physician Dr. Man, debridement two weeks ago, wound scabbed over. Outpatient wound care follow-up with ABIs ordered for 4/26/22, but wound is slowly improving and smaller in size.  Plan:   - Push ABIs  and wound clinic back one month due to slow progressive healing and pt wish to avoid outpatient follow-up if possible  - Appointment with Dr. Man 4/19 onsite  - Okay to leave wound KITA and monitor daily per wound care physician  - Continue atorvastatin for secondary prevention, no ASA due to bleeding risk on apixaban     (F33.9) Depression  (G47.00) Insomnia  Comment: Chronic, limited medication options due to hyponatremia. PHQ-9 1/27, trouble sleeping    Plan:  - Schedule Melatonin 10 mg PO q HS (9 pm)  - Reviewed sleep hygiene   - Plan to call [facility] regarding antidepressant selection, ? mirtazapine    Electronically signed by:  JUSTO Jerry CNP

## 2022-04-16 PROBLEM — I77.0 A-V FISTULA (H): Status: ACTIVE | Noted: 2022-01-01

## 2022-04-16 PROBLEM — Z99.2 END STAGE RENAL FAILURE ON DIALYSIS (H): Status: ACTIVE | Noted: 2022-01-01

## 2022-04-16 PROBLEM — T14.8XXA HEMATOMA OF SKIN: Status: ACTIVE | Noted: 2022-01-01

## 2022-04-16 PROBLEM — N18.6 END STAGE RENAL FAILURE ON DIALYSIS (H): Status: ACTIVE | Noted: 2022-01-01

## 2022-04-16 NOTE — ED PROVIDER NOTES
History     Chief Complaint:  AV Fistula Problem     HPI:  The history is provided by the patient and the EMS personnel.   History is supplemented by electronic medical records and nursing home records.     Francine Chris is a 76 year old female on Eliquis with a complex medical history including ESRD on dialysis M/W/F, hypertension, PAD, COPD, chronic cor pulmonale, chronic atrial fibrillation who presents by EMS with pain, swelling, and bruising to her right upper arm around the site of her dialysis AV fistula. She reports that she dialyzes on Monday/Wednesday/Friday and during her dialysis treatment on Wednesday, 3 days ago, she began experiencing pain and swelling around the site of her dialysis fistula. She reports that the swelling and bruising has been slowly progressing since that time. On Friday, she had a full dialysis treatment, but this was done through her dialysis catheter. Today, she states that the swelling and bruising appeared worse, prompting her to notify staff at her home, who called EMS. Francine denies any known fevers. She notes that her last PO intake was approximately 1.5 hours ago and included beef barley soup, a banana, and some ice cream. She is anticoagulated on Eliquis and was last given this at 0800 this morning. She reports that the fistula was placed a few months ago by Dr. Arredondo at a vascular clinic in [place]. She notes that she still makes urine.      Review of Systems   All other systems reviewed and are negative.    Allergies:  Sotalol  Bupropion    Medications:  Albuterol inhaler  Allopurinol  Eliquis  Lipitor  Bevespi Aerosphere inhaler  Claritin  Melatonin  Metoprolol  Protonix  Torsemide  Stiolto Respimar inhaler  Duoneb nebulization   Celexa  Hydroxyzine  Letairis  Sildenafil    Past Medical History:     Acute renal failure  Anemia in chronic kidney disease  Atrophy of left kidney  Chronic atrial fibrillation  COPD  Chronic cor pulmonale  Depression   Dermatitis of  lower extremity   Hypertension  Gout   Hypercalcemia   Hypertensive heart and kidney disease   Hyponatremia   Intermediate stage nonexudative age-related macular degeneration of both eyes   Lung nodule   Malignant melanoma   Migraine   Neoplasm of skin of neck  Nonsustained ventricular tachycardia   SKY   Osteoporosis   PAD   Polyclonal gammopathy   Prolonged QT interval   Tobacco use disorder   ESRD on dialysis  Chronic gout  Respiratory failure with hypoxia  Anxiety  Pulmonary hypertension  Pseudophakia, both eyes  Nummular eczema  Acrocyanosis  Traumatic hematoma, left hip  Lower extremity edema  COPD  Tremor    Digoxin toxicity  Hyperkalemia  Hypokalemia  Incarcerated right femoral hernia  SILAS  Severe tricuspid regurgitation  Abdominal wall hematoma  Delirium, acute   Obesity  Cataract  Normocytic anemia  Hypotension    Peptic ulcer disease    Past Surgical History:    Ankle fracture surgery, right  Tonsillectomy  Tubal ligation  Spontaneous AB  Cataract extraction, bilateral   AV luis ablation   Upper dental extractions  [place] teeth extraction  Hysterectomy  Femoral hernia repair, right  Small bowel resection  Colonoscopy    Family History:    Mother: hypertension, arthritis, circulatory problem, heart disease, hyperlipidemia, osteoporosis, MI, obesity   Father: substance abuse, circulatory problem, heart disease  Sister: hypertension, allergies, thyroid disease, arthritis, osteoporosis  Brothers: obesity, substance abuse, diabetes mellitus, PTSD, coronary artery disease, bladder cancer, MI, prostate cancer    Social History:  The patient presents to the ED alone.  The patient presents to the ED via EMS.  The patient resides at [facility].  Tereza Miller, sister; [phone]    Physical Exam     Patient Vitals for the past 24 hrs:   BP Temp Temp src Pulse Resp SpO2 Height Weight   04/16/22 2230 131/62 -- -- 76 -- 95 % -- --   04/16/22 2200 125/49 -- -- 84 -- 97 % -- --   04/16/22 2130  "118/60 -- -- 76 -- 97 % -- --   04/16/22 1945 119/58 -- -- -- -- -- -- --   04/16/22 1900 127/62 -- -- -- -- -- -- --   04/16/22 1855 127/62 97.5  F (36.4  C) Oral 76 14 97 % 1.626 m (5' 4\") 59.9 kg (132 lb)     Physical Exam  General: Chronically ill but nontoxic-appearing woman sitting upright in room 4  HENT: mucous membranes moist  CV: rate as above, regular rhythm, trace symmetric lower extremity edema, no JVD, large moderately tender nonpulsatile mass in the right anterior distal upper arm and antecubital fossa without palpable thrill, no evidence of cyanosis or pallor to distal right arm/hand; see pictures below  Dialysis catheter in place in right chest  Resp: normal effort, speaks in full phrases, no stridor, no cough observed  GI: abdomen soft and nontender, no guarding  MSK: no bony tenderness to chest  Skin: appropriately warm and dry, no erythema or vesicles to chest wall  Extensive bruising and single hemorrhagic bullae to right distal upper arm and antecubital fossa, see pictures below  Neuro: alert, clear speech, oriented  Psych: cooperative                Emergency Department Course     Imaging:  US Ext Arterial Venous Dialys Acs Graft   Final Result   IMPRESSION:    1.  8.4 cm hematoma in the distal upper arm and antecubital fossa just distal to the AV fistula.   2.  Widely patent AV fistula.      Report per radiology    Laboratory:  Labs Ordered and Resulted from Time of ED Arrival to Time of ED Departure   BASIC METABOLIC PANEL - Abnormal       Result Value    Sodium 121 (*)     Potassium 4.2      Chloride 84 (*)     Carbon Dioxide (CO2) 25      Anion Gap 12      Urea Nitrogen 49 (*)     Creatinine 4.71 (*)     Calcium 10.3 (*)     Glucose 127 (*)     GFR Estimate 9 (*)    CBC WITH PLATELETS AND DIFFERENTIAL - Abnormal    WBC Count 7.3      RBC Count 3.15 (*)     Hemoglobin 10.8 (*)     Hematocrit 31.3 (*)     MCV 99      MCH 34.3 (*)     MCHC 34.5      RDW 14.6      Platelet Count 188      % " Neutrophils 69      % Lymphocytes 14      % Monocytes 13      % Eosinophils 3      % Basophils 1      % Immature Granulocytes 0      NRBCs per 100 WBC 0      Absolute Neutrophils 5.0      Absolute Lymphocytes 1.0      Absolute Monocytes 1.0      Absolute Eosinophils 0.2      Absolute Basophils 0.0      Absolute Immature Granulocytes 0.0      Absolute NRBCs 0.0     COVID-19 VIRUS (CORONAVIRUS) BY PCR - Normal    SARS CoV2 PCR Negative     TYPE AND SCREEN, ADULT    ABO/RH(D) A POS      Antibody Screen Negative      SPECIMEN EXPIRATION DATE [id number]     ABO/RH TYPE AND SCREEN      Emergency Department Course:       Reviewed:  I reviewed nursing notes, vitals, past medical history, Care Everywhere    Assessments:  1844 I obtained history and examined the patient as noted above.   1930 The patient was rechecked and updated about the vascular surgery consult.    Consults:  1900 I spoke with Dr. Fuller from vascular surgery regarding the patient's presentation and plan of care.   2227 I spoke with Dr. Gibbons from the hospitalist service regarding the patient's presentation, findings here in the ED, and plan of care.     Disposition:  The patient was admitted to the hospital under the care of Dr. Gibbons.     Impression & Plan     Medical Decision Making:  She has evidence of a very large hematoma at the site of her dialysis AV fistula, though fortunately ultrasound does show adequate flow through the fistula itself.  No compelling signs of overlying infection or abscess so antibiotics were withheld.  Vital signs are satisfactory.  No signs of acute distal ischemia or acute neurologic compromise.  She has significant hyponatremia though this appears fairly longstanding and her mental status is good.  I spoke with the on-call vascular surgeon who recommended an ultrasound of her fistula which was performed, along with hospitalization and n.p.o. after midnight, holding anticoagulants, in case of need for surgical  intervention tomorrow.  All these concerns were relayed to the excepting hospitalist, who did not wish to have any specific treatment of her hyponatremia.  She is admitted to the hospital for further multidisciplinary care.    Diagnosis:    ICD-10-CM    1. Hematoma of skin  T14.8XXA    2. A-V fistula (H)  I77.0    3. End stage renal failure on dialysis (H)  N18.6     Z99.2    4. Hyponatremia  E87.1      Scribe Disclosure:  I, Inocencia Fisher, am serving as a scribe at 6:46 PM on 4/16/2022 to document services personally performed by Woodrow Gonzalez MD based on my observations and the provider's statements to me.     This note was completed in part using Dragon voice recognition software. Although reviewed after completion, some word and grammatical errors may occur.        Woodrow Gonzalez MD  04/17/22 0010

## 2022-04-16 NOTE — ED NOTES
Bed: ED04  Expected date:   Expected time:   Means of arrival:   Comments:  Cristo 515 76 F pain in right arm, new fistula, swelling, bruising all down arm, softball size hematoma since Wednesday ETA 1837

## 2022-04-17 PROBLEM — F33.9 EPISODE OF RECURRENT MAJOR DEPRESSIVE DISORDER, UNSPECIFIED DEPRESSION EPISODE SEVERITY (H): Status: ACTIVE | Noted: 2022-01-01

## 2022-04-17 NOTE — PROGRESS NOTES
Pt here with fistula hematoma.  A & O x 4. Very Pleasant. CMS intact. Bowel sounds present. Flatus present. 1 Bm overnight. NPO overnight. VSS on RA. Softball size hematoma. Firm with blood filled blister at top. Bruising to majority of arm. Marked upon arrival to unit. Ulceration on lower left leg dressed with dry gauze. Sergio skin, especially LE. On dialysis, but still voiding freely. PIV removed and new one placed overnight. PIV patent and infusing 50 ml/hr NS. Up with SBA and walker. C/O 9/10 pain,  with IV dilaudid. Tremors baseline. Jugular line dressing reinforced. Plan for sister to visit in morning.

## 2022-04-17 NOTE — PROGRESS NOTES
RECEIVING UNIT ED HANDOFF REVIEW    ED Nurse Handoff Report was reviewed by: Elaine Hernandez RN on April 16, 2022 at 11:52 PM

## 2022-04-17 NOTE — PROGRESS NOTES
[facility]    Medicine Progress Note - Hospitalist Service        Date of Admission:  4/16/2022  6:46 PM    Assessment & Plan:   Francine Chris is a 76 year old female with a past medical history of with a past medical history of COPD, hypertension, SKY, peripheral artery disease, depression, A. fib on Eliquis, end-stage renal disease on dialysis Mondays Wednesdays and Fridays presents to hospital with an AV fistula hematoma.     Right arm hematoma around AV fisula  -3 days of swelling, pain, bruising at her AV fistula site. Symptoms progressed so she presented to emergency room.  -Ultrasound confirmed a 8.4 cm hematoma just distal to the AV fistula.  Fistula was patent.    -Vascular surgery  Following; planning drainage of hematoma tomorrow morning.  -hold eliquis, last dose 4/16 am  -NPO after MN  -symptomatic treatment of pain.     Hyponatremia   Chronic, slightly worse than baseline; baseline is between 124-130.  Patient is on 1200 mL fluid restriction.  -BMP in AM  -Continue with fluid restriction     Macrocytic anemia  -Chronic, likely due to renal failure     ESRD on hd MWF  -Last HD on Friday.    -Nephrology consulted for HD     COPD  -not in exacerbation  -Albuterol as needed     Atrial fibrillation status post AV node ablation  NSVT  S/p ppm  HTN  Hyperlipidemia  Severe tricuspid regurgitation  Pulmonary hypertension  Chronic right heart failure  -Appears euvolemic on exam   -hold Eliquis  -Continue metoprolol, statin, torsemide     SKY  -Does not tolerate CPAP     Hx of gout  -Allopurinol after dialysis     GERD  -PPI          Diet: Fluid restriction 1200 ML FLUID  NPO for Medical/Clinical Reasons Except for: Meds, Ice Chips  NPO for Medical/Clinical Reasons Except for: Meds     DVT Prophylaxis: Pneumatic Compression Devices   Schuler Catheter: Not present  Code Status: Full Code     Disposition Plan    Expected Discharge: 04/19/2022     Anticipated discharge location:  Awaiting care  "coordination huddle  Delays:        Entered: Cristian Trivedi MD 04/17/2022, 9:19 AM        Clinically Significant Risk Factors Present on Admission         # Hyponatremia: Na = 122 mmol/L (Ref range: 133 - 144 mmol/L) on admission, will monitor as appropriate       # Coagulation Defect: home medication list includes an anticoagulant medication        The patient's care was discussed with the Bedside Nurse, Patient and Patient's Family.    Cristian Trivedi MD  Hospitalist Service  [facility]  Text Page 7AM-6PM  Securely message with the Vocera Web Console (learn more here)  Text page via [employer] Paging/Directory    ______________________________________________________________________    Interval History   Endorses pain upto 8/10 at left arm hematoma sit. No dyspnea. Afebrile    Data reviewed today: I reviewed all medications, new labs and imaging results over the last 24 hours. I personally reviewed no images or EKG's today.    Physical Exam   Vital signs:  Temp: 97.7  F (36.5  C) Temp src: Oral BP: 133/61 Pulse: 73   Resp: 16 SpO2: 97 % O2 Device: None (Room air)   Height: 162.6 cm (5' 4\") Weight: 59.9 kg (132 lb)  Estimated body mass index is 22.66 kg/m  as calculated from the following:    Height as of this encounter: 1.626 m (5' 4\").    Weight as of this encounter: 59.9 kg (132 lb).      Wt Readings from Last 2 Encounters:   04/16/22 59.9 kg (132 lb)   04/13/22 59.9 kg (132 lb)       Gen: AAOX3, NAD, comfortable  HEENT:  no pallor  Resp: CTA B/L, normal WOB, no wheezes  CVS: RRR, no murmur  Abd/GI: Soft, non-tender. BS- normoactive.    Skin: Warm, dry no rashes  MSK: right arm-tense hematoma around the AC fossa; with surrounding bruising, tender  Neuro- CN- intact. No focal deficits.      Data   Recent Labs   Lab 04/17/22  0737 04/16/22  1907   WBC 6.9 7.3   HGB 10.3* 10.8*   * 99    188   INR 1.15  --    * 121*   POTASSIUM 4.8 4.2   CHLORIDE 90* 84*   CO2 23 25 "   BUN 55* 49*   CR 5.04* 4.71*   ANIONGAP 9 12   SILVIA 9.8 10.3*   GLC 87 127*       Recent Results (from the past 24 hour(s))   US Ext Arterial Venous Dialys Acs Graft    Narrative    EXAM: US EXTREMITY ARTERIAL VENOUS DIALYSIS ACCESS GRAFT  LOCATION: [facility]  DATE/TIME: 4/16/2022 8:05 PM    INDICATION: Swelling pain to fistula x 3 days.  COMPARISON: None.  TECHNIQUE: Arterial Duplex ultrasound of the right arm. Color flow and spectral Doppler with waveform analysis performed.    FINDINGS:  Brachial artery to basilic vein fistula is widely patent. There is a hematoma just distal to the AV fistula at the distal upper arm and antecubital fossa measuring 8.4 x 6.5 x 4.7 cm. No flow is evident within this hematoma.      Impression    IMPRESSION:   1.  8.4 cm hematoma in the distal upper arm and antecubital fossa just distal to the AV fistula.  2.  Widely patent AV fistula.     Medications     sodium chloride 50 mL/hr at 04/17/22 0127       atorvastatin  40 mg Oral Daily     metoprolol succinate ER  12.5 mg Oral BID     pantoprazole  40 mg Oral Daily     sodium chloride (PF)  3 mL Intracatheter Q8H     torsemide  40 mg Oral Daily

## 2022-04-17 NOTE — H&P (VIEW-ONLY)
[facility] PATIENT CONSULTATION NOTE    Reason for consultation: Right upper extremity hematoma    HPI:  Francine Chris is a 76-year-old lady with a past medical history of atrial fibrillation (on Eliquis), hypertension, and end-stage renal disease on hemodialysis Monday Wednesday Friday via a right upper extremity brachiobasilic fistula (also has right IJ tunneled dialysis catheter), who presents to [facility] emergency department with complaints of a right upper extremity hematoma.  Per patient and emergency department physician, patient had arteriovenous fistula placed at an outside hospital in December of last year.  Patient has been using arteriovenous fistula for approximately 4 months.  On Wednesday, she reported that she went to her dialysis center and received dialysis and during that session she developed a hematoma of the arteriovenous fistula site.  Patient was transferred back to her facility with plans to just monitor the hematoma.  She continued to have progressive pain around the hematoma site and increased swelling.  She underwent dialysis through her dialysis catheter on Friday and statin.  Given her persistent pain around the fistula site, she presented to the emergency department for further management.  Of note, patient is currently on Eliquis for her atrial fibrillation.  She last took her Eliquis yesterday morning.    REFERRAL SOURCE: Hospitalist medicine    PAST MEDICAL HISTORY:  Past Medical History:   Diagnosis Date     Acute renal failure (ARF) (H) 06/22/2021     Anemia in chronic kidney disease, on chronic dialysis (H) 07/14/2021     Atrophy of left kidney 05/18/2017     Chronic atrial fibrillation (H) 04/01/2013     Chronic kidney disease, stage III (moderate) (H) 11/13/2019    Formatting of this note might be different from the original. Cr=1.2     COPD mixed type (H) 04/06/2013    Formatting of this note might be different from the original. On Qvar and  combivent, hx of cor pulmonale     Cor pulmonale (chronic) (H) 09/20/2021     Depressive disorder, not elsewhere classified      Dermatitis of lower extremity 05/05/2017     Elevated blood pressure reading without diagnosis of hypertension 12/10/2004     HTN (hypertension) 09/07/2011     Hx of gout 08/13/2014     Hypercalcemia 07/16/2018    Formatting of this note might be different from the original. 7/16/18  Ca++=10.8   Alb=3.8   7/17/18:  PTH=95.6  Ca++=10.1 (after diuresis), some renal impairment, was on vit D and calcium outpt     Hypertensive heart and kidney disease 07/16/2018    Formatting of this note might be different from the original. Atrophic left kidney, rise in Cr with increase in bumex 2018     Hyponatremia 06/22/2021     Intermediate stage nonexudative age-related macular degeneration of both eyes 01/13/2021     Lung nodule 04/28/2021     Malignant melanoma (H)      Migraine variant 12/10/2004     Problem list name updated by automated process. Provider to review     Neoplasm of skin of neck 06/06/2014     NSVT (nonsustained ventricular tachycardia) (H) 04/09/2020     SKY (obstructive sleep apnea) 04/08/2020     Osteoporosis 11/10/2004     Problem list name updated by automated process. Provider to review     Osteoporosis, unspecified dx 8/02     Pacemaker 07/14/2021    Formatting of this note might be different from the original. 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 On sotolol     Tobacco use disorder      Variants of migraine, not elsewhere classified, without mention of intractable migraine without mention of status migrainosus     flashing lights       PAST SURGICAL HISTORY:  Past Surgical History:   Procedure Laterality Date     ZZC NONSPECIFIC PROCEDURE  1990's    right ankle fracture     ZZC  NONSPECIFIC PROCEDURE      spontaneous AB     ZZC NONSPECIFIC PROCEDURE      tonsillectomy     ZZC NONSPECIFIC PROCEDURE      tubal ligation       FAMILY HISTORY:  Family History   Problem Relation Age of Onset     Hypertension Mother      Arthritis Mother      Circulatory Mother      Heart Disease Mother      Lipids Mother      Osteoporosis Mother      Alcohol/Drug Father         ?MI     Circulatory Father      Heart Disease Father      Hypertension Sister      Allergies Sister         thyroid dz     Arthritis Sister      Osteoporosis Sister      Obesity Brother      Alcohol/Drug Brother         dm, ptsd, cad     Bladder Cancer Brother      Diabetes Brother      Myocardial Infarction Brother      Prostate Cancer Brother      Cerebrovascular Disease Maternal Grandmother      Breast Cancer Maternal Grandmother         94     Cancer - colorectal Maternal Grandmother         97     Arthritis Maternal Grandmother      Gastrointestinal Disease Maternal Grandmother      Osteoporosis Maternal Grandmother      Thyroid Disease Maternal Grandmother      Osteoarthritis Maternal Grandfather      C.A.D. Maternal Grandfather      Heart Failure Paternal Grandmother      Hyperlipidemia Maternal Uncle      Alzheimer Disease Maternal Uncle      Thyroid Disease Other         niece       SOCIAL HISTORY:   Social History     Tobacco Use     Smoking status: Former Smoker     Packs/day: 1.00     Years: 43.00     Pack years: 43.00     Types: Cigarettes     Quit date: 2021     Years since quittin.0     Smokeless tobacco: Never Used   Substance Use Topics     Alcohol use: Yes     Comment: occ. glass of wine       TOBACCO USE: Former    HOME MEDICATIONS:  Prior to Admission medications    Medication Sig Start Date End Date Taking? Authorizing Provider   acetaminophen (TYLENOL) 325 MG tablet Take 2 tablets (650 mg) by mouth 2 times daily. May also take 2 tablets (650 mg) 2 times daily as needed for pain. 21  Yes Eileen Arredondo  JUSTO Larry CNP   albuterol (PROAIR HFA/PROVENTIL HFA/VENTOLIN HFA) 108 (90 Base) MCG/ACT inhaler Inhale 2 puffs into the lungs every 4 hours as needed 6/30/21  Yes Reported, Patient   allopurinol (ZYLOPRIM) 100 MG tablet TAKE ONE TABLET BY MOUTH THREE TIMES A WEEK ON MONDAY, WEDNESDAY AND FRIDAY AFTER HD SESSION 12/31/21  Yes Eileen Arredondo APRN CNP   apixaban ANTICOAGULANT (ELIQUIS) 2.5 MG tablet Take 1 tablet (2.5 mg) by mouth 2 times daily 9/30/21  Yes Eileen Arredondo APRN CNP   atorvastatin (LIPITOR) 40 MG tablet Take 1 tablet (40 mg) by mouth daily 9/30/21  Yes Eileen Arredondo APRN CNP   calcium acetate (CALPHRON) 667 MG TABS tablet Take 1 tablet (667 mg) by mouth 3 times daily (with meals) 3/10/22  Yes Eileen Arredondo APRN CNP   Glycopyrrolate-Formoterol (BEVESPI AEROSPHERE) 9-4.8 MCG/ACT oral inhaler Inhale 2 puffs into the lungs 2 times daily 2/9/22  Yes Derrick Linares MD   loratadine (CLARITIN) 10 MG tablet Take 1 tablet (10 mg) by mouth At Bedtime 12/2/21  Yes Eileen Arredondo APRN CNP   Melatonin 10 MG TABS tablet Take 10 mg by mouth At Bedtime 7/9/21  Yes Reported, Patient   metoprolol succinate ER (TOPROL-XL) 25 MG 24 hr tablet Take 0.5 tablets (12.5 mg) by mouth 2 times daily 1/3/22  Yes Alex Holt MD   multivitamin RENAL (MULTIVITAMIN RENAL) 1 MG capsule Take 1 capsule by mouth daily   Yes Unknown, Entered By History   pantoprazole (PROTONIX) 40 MG EC tablet Take 1 tablet (40 mg) by mouth daily 9/30/21  Yes Eileen Arredondo APRN CNP   sodium chloride (OCEAN) 0.65 % nasal spray Spray 1 spray into both nostrils every 2 hours as needed for congestion MAY KEEP AT BEDSIDE 12/2/21  Yes Eileen Arredondo APRN CNP   torsemide (DEMADEX) 20 MG tablet Take 2 tablets (40 mg) by mouth daily Take at 8 AM and 1 PM.  Patient taking differently: No sig reported 11/1/21  Yes Alex Holt MD   triamcinolone (KENALOG) 0.1 % external cream Apply to AA BID x 1-2 week  then PRN only 1/4/22  Yes Sophia Damico PA-C   ACE/ARB/ARNI NOT PRESCRIBED (INTENTIONAL) Please choose reason not prescribed from choices below. 12/2/21   Eileen Arredondo, JUSTO CNP       VITAL SIGNS:  Temp: 99.6  F (37.6  C) Temp src: Oral BP: 121/67 Pulse: 84   Resp: 14 SpO2: 96 % O2 Device: None (Room air)      132 lbs 0 oz    PHYSICAL EXAM:  Well-appearing, no acute distress  Unlabored respirations on room air  Irregularly irregular    Palpable thrill over the right brachiobasilic arteriovenous fistula; well-healed surgical incision over the medial aspect of the right arm; 2+ palpable right radial pulse; neurovascularly intact with NO numbness in the median nerve distribution of her right hand; significant tense hematoma just proximal to the antecubital fossa of the right upper extremity with bruising along the skin with some blistering.    LABWORK:   On lab work, patient is noted to be hyponatremic with a sodium of 121.  Patient's creatinine is elevated to 4.71.  Patient's hemoglobin is 10.8.    ULTRASOUND:  Ultrasound duplex of dialysis access graft from April 16, 2022 shows a 8.4 cm hematoma in the distal upper arm and antecubital fossa just distal to the arteriovenous fistula.  The arteriovenous fistula is widely patent.                ASSESSMENT:  Patient Active Problem List   Diagnosis     Depressive disorder, not elsewhere classified     Osteoporosis     Tobacco use disorder     Migraine variant     Intermediate stage nonexudative age-related macular degeneration of both eyes     Anemia in chronic kidney disease, on chronic dialysis (H)     Atrophy of left kidney     Chronic kidney disease, stage III (moderate) (H)     COPD mixed type (H)     Cor pulmonale (chronic) (H)     Dermatitis of lower extremity     Hx of gout     Hypercalcemia     Hyponatremia     Lung nodule     Neoplasm of skin of neck     SKY (obstructive sleep apnea)     Pacemaker     PAD (peripheral artery disease) (H)     Polyclonal  gammopathy     ESRD (end stage renal disease) on dialysis (H)     Chronic gout     Chest pain, unspecified type     History of ventricular tachycardia     A-V fistula (H)     End stage renal failure on dialysis (H)     Hematoma of skin   76-year-old lady with a past medical history of atrial fibrillation (on Eliquis), hypertension, and end-stage renal disease on hemodialysis Monday Wednesday Friday via a right upper extremity brachiobasilic fistula (also has right IJ tunneled dialysis catheter), who presents to [facility] emergency department with complaints of a right upper extremity hematoma.    On evaluation, patient has significant hematoma over the right upper extremity.  Patient otherwise is neurovascularly intact in the right hand with a palpable radial pulse with no evidence of any nerve compression.    Hyponatremic     PLAN:  - Will plan to take patient to the operating room tomorrow for evacuation of right upper extremity hematoma with Vascular Surgery; appreciate Hospitalist and Nephrology recommendations regarding electrolyte derangements   - NPO@MN; OK for diet today   -Avoid using right upper extremity brachiobasilic fistula in the setting of known hematoma; would recommend using right IJ tunneled dialysis catheter for future dialysis needs for now   -Nephrology consultation for hyponatremia and management of end-stage renal disease    Discussed with Dr. Eric, who is in agreement with the above plan.    Jordan Fuller MD  Vascular Surgery Fellow     STAFF: Patient is examined with findings above.  Significant antecubital hematoma at isabell-Anastomotic area of right upper arm brachial to brachial fistula.  Fistula still patent.  With the significant hematoma fistula is not usable at this time and will take a long time for this to resolve.    Due to this I did recommend operative drainage of the hematoma being for.  Repair the fistula if there is a tear because the large hematoma.  This could  be performed under local anesthetic with sedation or possibly an axillary block.    Patient has several medical issues that need to be address and is Eliquis that is being held.  Would plan for surgical procedure tomorrow.  Discussed with patient.      Sree Eric MD

## 2022-04-17 NOTE — ED NOTES
[facility]  ED Nurse Handoff Report    ED Chief complaint: Arm Pain      ED Diagnosis:   Final diagnoses:   None       Code Status: admitting physician to determine     Allergies:   Allergies   Allergen Reactions     Sotalol Other (See Comments)     Prolonged QT     Bupropion Other (See Comments)     Na 118 acutely. Comorbid diuresis for heart failure       Patient Story: [address] 76 F pain in right arm, new fistula, swelling, bruising all down arm, softball size hematoma since Wednesday ETA 1837  Focused Assessment:  A&Ox4, VSS    Treatments and/or interventions provided:   Labs Ordered and Resulted from Time of ED Arrival to Time of ED Departure   BASIC METABOLIC PANEL - Abnormal       Result Value    Sodium 121 (*)     Potassium 4.2      Chloride 84 (*)     Carbon Dioxide (CO2) 25      Anion Gap 12      Urea Nitrogen 49 (*)     Creatinine 4.71 (*)     Calcium 10.3 (*)     Glucose 127 (*)     GFR Estimate 9 (*)    CBC WITH PLATELETS AND DIFFERENTIAL - Abnormal    WBC Count 7.3      RBC Count 3.15 (*)     Hemoglobin 10.8 (*)     Hematocrit 31.3 (*)     MCV 99      MCH 34.3 (*)     MCHC 34.5      RDW 14.6      Platelet Count 188      % Neutrophils 69      % Lymphocytes 14      % Monocytes 13      % Eosinophils 3      % Basophils 1      % Immature Granulocytes 0      NRBCs per 100 WBC 0      Absolute Neutrophils 5.0      Absolute Lymphocytes 1.0      Absolute Monocytes 1.0      Absolute Eosinophils 0.2      Absolute Basophils 0.0      Absolute Immature Granulocytes 0.0      Absolute NRBCs 0.0     COVID-19 VIRUS (CORONAVIRUS) BY PCR - Normal    SARS CoV2 PCR Negative     TYPE AND SCREEN, ADULT    ABO/RH(D) A POS      Antibody Screen Negative      SPECIMEN EXPIRATION DATE [id number]     ABO/RH TYPE AND SCREEN     US Ext Arterial Venous Dialys Acs Graft    (Results Pending)     Patient's response to treatments and/or interventions: NA    To be done/followed up on inpatient unit: monitor    Does  "this patient have any cognitive concerns?: NA    Activity level - Baseline/Home:  Independent  Activity Level - Current:   Independent    Patient's Preferred language: English   Needed?: No    Isolation: None  Infection: Not Applicable  Patient tested for COVID 19 prior to admission: YES  Bariatric?: No    Vital Signs:   Vitals:    04/16/22 1855   BP: 127/62   Pulse: 76   Resp: 14   Temp: 97.5  F (36.4  C)   TempSrc: Oral   SpO2: 97%   Weight: 59.9 kg (132 lb)   Height: 1.626 m (5' 4\")       Cardiac Rhythm:     Was the PSS-3 completed:   Yes  What interventions are required if any?               Family Comments: given to patient  OBS brochure/video discussed/provided to patient/family: No              Name of person given brochure if not patient:               Relationship to patient:    For the majority of the shift this patient's behavior was Green.   Behavioral interventions performed were NA.    ED NURSE PHONE NUMBER: *[id number]         "

## 2022-04-17 NOTE — CONSULTS
RENAL CONSULTATION NOTE      REFERRING MD:  Alina    REASON FOR CONSULTATION:  ESRD management       A/P:       1.  ESRD    -MWF [facility]   -Dr. Ro   -apparent ATN without recovery    Outpatient Orders:    Dialyzer: 160NRe Optiflux   Na: 137 mEq/L   Bicarb: 38 mEq/L   Dialysate: 3.0 K, 2.25 Ca, 1.0 Mg, 100 Dextrose ()   Dialysate/Machine Temp (prescribed): 37 C   BFR (actual): 400   Prescribed time: 03:00   EDW: 59 kg   Access Type: Active (In Use):CVCatheter-Tunneled/Chest  Pre Dialysis Vitals (for 3/28/2022 12:43 PM )  Pre BP (sit): 148/76  Pre Wt: 62 kg    Treatment Medication Orders    Medication Sig Start Date End Date   Heparin   Mircera  Vitamin D (Calcitriol)     2.  Right arm hematoma around AV fistula   -surgical drainage planned 04/18/22    3.  Anemia    4.  Secondary hyperparathyroidism    5.  Hyponatremia      No additional dialysis necessary today     Plan dialysis 04/18/22  Na bath 130  Continue fluid restriction       HPI:     MWF schedule at [facility]  Present with AVF hematoma  Access was cannulated per patient    Has CVC in place    AVF has been troublesome per patient    Dialysis in 1 year range     No CP or SOB    BP controlled   No O2 requirement  K in range    Slightly above target weight       ROS:  A complete 10 point review of systems was performed and is negative except as noted above.    PMH:    Past Medical History:   Diagnosis Date     Acute renal failure (ARF) (H) 06/22/2021     Anemia in chronic kidney disease, on chronic dialysis (H) 07/14/2021     Atrophy of left kidney 05/18/2017     Chronic atrial fibrillation (H) 04/01/2013     Chronic kidney disease, stage III (moderate) (H) 11/13/2019    Formatting of this note might be different from the original. Cr=1.2     COPD mixed type (H) 04/06/2013    Formatting of this note might be different from the original. On Qvar and combivent, hx of cor pulmonale     Cor pulmonale (chronic) (H) 09/20/2021      Depressive disorder, not elsewhere classified      Dermatitis of lower extremity 05/05/2017     Elevated blood pressure reading without diagnosis of hypertension 12/10/2004     HTN (hypertension) 09/07/2011     Hx of gout 08/13/2014     Hypercalcemia 07/16/2018    Formatting of this note might be different from the original. 7/16/18  Ca++=10.8   Alb=3.8   7/17/18:  PTH=95.6  Ca++=10.1 (after diuresis), some renal impairment, was on vit D and calcium outpt     Hypertensive heart and kidney disease 07/16/2018    Formatting of this note might be different from the original. Atrophic left kidney, rise in Cr with increase in bumex 2018     Hyponatremia 06/22/2021     Intermediate stage nonexudative age-related macular degeneration of both eyes 01/13/2021     Lung nodule 04/28/2021     Malignant melanoma (H)      Migraine variant 12/10/2004     Problem list name updated by automated process. Provider to review     Neoplasm of skin of neck 06/06/2014     NSVT (nonsustained ventricular tachycardia) (H) 04/09/2020     SKY (obstructive sleep apnea) 04/08/2020     Osteoporosis 11/10/2004     Problem list name updated by automated process. Provider to review     Osteoporosis, unspecified dx 8/02     Pacemaker 07/14/2021    Formatting of this note might be different from the original. 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 On sotolol     Tobacco use disorder      Variants of migraine, not elsewhere classified, without mention of intractable migraine without mention of status migrainosus     flashing lights       PSH:    Past Surgical History:   Procedure Laterality Date     Z NONSPECIFIC PROCEDURE  1990's    right ankle fracture     ZZC NONSPECIFIC PROCEDURE      spontaneous AB     ZZC NONSPECIFIC PROCEDURE      tonsillectomy     ZC  NONSPECIFIC PROCEDURE      tubal ligation       MEDICATIONS:    No current outpatient medications on file.       ALLERGIES:    Allergies as of 2022 - Reviewed 2022   Allergen Reaction Noted     Sotalol Other (See Comments) 2018     Bupropion Other (See Comments) 2013       FH:    Family History   Problem Relation Age of Onset     Hypertension Mother      Arthritis Mother      Circulatory Mother      Heart Disease Mother      Lipids Mother      Osteoporosis Mother      Alcohol/Drug Father         ?MI     Circulatory Father      Heart Disease Father      Hypertension Sister      Allergies Sister         thyroid dz     Arthritis Sister      Osteoporosis Sister      Obesity Brother      Alcohol/Drug Brother         dm, ptsd, cad     Bladder Cancer Brother      Diabetes Brother      Myocardial Infarction Brother      Prostate Cancer Brother      Cerebrovascular Disease Maternal Grandmother      Breast Cancer Maternal Grandmother         94     Cancer - colorectal Maternal Grandmother         97     Arthritis Maternal Grandmother      Gastrointestinal Disease Maternal Grandmother      Osteoporosis Maternal Grandmother      Thyroid Disease Maternal Grandmother      Osteoarthritis Maternal Grandfather      C.A.D. Maternal Grandfather      Heart Failure Paternal Grandmother      Hyperlipidemia Maternal Uncle      Alzheimer Disease Maternal Uncle      Thyroid Disease Other         niece       SH:    Social History     Socioeconomic History     Marital status: Single     Spouse name: Derrick Chris     Number of children: 0     Years of education: Not on file     Highest education level: Not on file   Occupational History     Employer: [employer]   Tobacco Use     Smoking status: Former Smoker     Packs/day: 1.00     Years: 43.00     Pack years: 43.00     Types: Cigarettes     Quit date: 2021     Years since quittin.0     Smokeless tobacco: Never Used   Substance and Sexual Activity      Alcohol use: Yes     Comment: occ. glass of wine     Drug use: No     Sexual activity: Not on file   Other Topics Concern     Not on file   Social History Narrative    She was  to Derrick Chris, and she  from him for over 20 years, and she has not had contact with him, and does not know where he is. She never officially  him.    She had a long term relationship with Ventura Lopez, who she considered herself to be  to, but because she was legally  to Derrick, she could not officially  him. Also Ventura was a quad, and legally marrying him would disrupt his benefits, as Francine was his PCA. Ventura  around .     Social Determinants of Health     Financial Resource Strain: Not on file   Food Insecurity: Not on file   Transportation Needs: Not on file   Physical Activity: Not on file   Stress: Not on file   Social Connections: Not on file   Intimate Partner Violence: Not on file   Housing Stability: Not on file       PHYSICAL EXAM:      Vitals were reviewed  Patient Vitals for the past 8 hrs:   BP Temp Temp src Pulse Resp SpO2   22 0742 133/61 97.7  F (36.5  C) Oral 73 16 97 %     I/O last 3 completed shifts:  In: -   Out: 1 [Urine:1]      Vitals:    22 1855   Weight: 59.9 kg (132 lb)       GENERAL: awake, alert, follows  HEENT: NC/AT, PERRLA, EOMI, non icteric, pharynx moist without lesion  RESP:  clear anteriorly  CV: RRR, normal S1 S2  ABDOMEN: soft  MS: no clubbing, cyanosis   SKIN: clear without significant rashes or lesions  NEURO: speech normal and cranial nerves 2-12 intact  PSYCH: affect normal/bright  EXT: large right upper arm hematoma       LABS:        Recent Labs   Lab 22  0737   *   POTASSIUM 4.8   CHLORIDE 90*   CO2 23   ANIONGAP 9   GLC 87   BUN 55*   CR 5.04*   GFRESTIMATED 8*   SILVIA 9.8     No lab results found in last 7 days.  Recent Labs   Lab 22  0737 22  1907   HGB 10.3* 10.8*       DIAGNOSTICS:   Reviewed      KARAN Cheema    [facility]  [phone]

## 2022-04-17 NOTE — CONSULTS
[facility] PATIENT CONSULTATION NOTE    Reason for consultation: Right upper extremity hematoma    HPI:  Francine Chris is a 76-year-old lady with a past medical history of atrial fibrillation (on Eliquis), hypertension, and end-stage renal disease on hemodialysis Monday Wednesday Friday via a right upper extremity brachiobasilic fistula (also has right IJ tunneled dialysis catheter), who presents to [facility] emergency department with complaints of a right upper extremity hematoma.  Per patient and emergency department physician, patient had arteriovenous fistula placed at an outside hospital in December of last year.  Patient has been using arteriovenous fistula for approximately 4 months.  On Wednesday, she reported that she went to her dialysis center and received dialysis and during that session she developed a hematoma of the arteriovenous fistula site.  Patient was transferred back to her facility with plans to just monitor the hematoma.  She continued to have progressive pain around the hematoma site and increased swelling.  She underwent dialysis through her dialysis catheter on Friday and statin.  Given her persistent pain around the fistula site, she presented to the emergency department for further management.  Of note, patient is currently on Eliquis for her atrial fibrillation.  She last took her Eliquis yesterday morning.    REFERRAL SOURCE: Hospitalist medicine    PAST MEDICAL HISTORY:  Past Medical History:   Diagnosis Date     Acute renal failure (ARF) (H) 06/22/2021     Anemia in chronic kidney disease, on chronic dialysis (H) 07/14/2021     Atrophy of left kidney 05/18/2017     Chronic atrial fibrillation (H) 04/01/2013     Chronic kidney disease, stage III (moderate) (H) 11/13/2019    Formatting of this note might be different from the original. Cr=1.2     COPD mixed type (H) 04/06/2013    Formatting of this note might be different from the original. On Qvar and  combivent, hx of cor pulmonale     Cor pulmonale (chronic) (H) 09/20/2021     Depressive disorder, not elsewhere classified      Dermatitis of lower extremity 05/05/2017     Elevated blood pressure reading without diagnosis of hypertension 12/10/2004     HTN (hypertension) 09/07/2011     Hx of gout 08/13/2014     Hypercalcemia 07/16/2018    Formatting of this note might be different from the original. 7/16/18  Ca++=10.8   Alb=3.8   7/17/18:  PTH=95.6  Ca++=10.1 (after diuresis), some renal impairment, was on vit D and calcium outpt     Hypertensive heart and kidney disease 07/16/2018    Formatting of this note might be different from the original. Atrophic left kidney, rise in Cr with increase in bumex 2018     Hyponatremia 06/22/2021     Intermediate stage nonexudative age-related macular degeneration of both eyes 01/13/2021     Lung nodule 04/28/2021     Malignant melanoma (H)      Migraine variant 12/10/2004     Problem list name updated by automated process. Provider to review     Neoplasm of skin of neck 06/06/2014     NSVT (nonsustained ventricular tachycardia) (H) 04/09/2020     SKY (obstructive sleep apnea) 04/08/2020     Osteoporosis 11/10/2004     Problem list name updated by automated process. Provider to review     Osteoporosis, unspecified dx 8/02     Pacemaker 07/14/2021    Formatting of this note might be different from the original. 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 On sotolol     Tobacco use disorder      Variants of migraine, not elsewhere classified, without mention of intractable migraine without mention of status migrainosus     flashing lights       PAST SURGICAL HISTORY:  Past Surgical History:   Procedure Laterality Date     ZZC NONSPECIFIC PROCEDURE  1990's    right ankle fracture     ZZC  NONSPECIFIC PROCEDURE      spontaneous AB     ZZC NONSPECIFIC PROCEDURE      tonsillectomy     ZZC NONSPECIFIC PROCEDURE      tubal ligation       FAMILY HISTORY:  Family History   Problem Relation Age of Onset     Hypertension Mother      Arthritis Mother      Circulatory Mother      Heart Disease Mother      Lipids Mother      Osteoporosis Mother      Alcohol/Drug Father         ?MI     Circulatory Father      Heart Disease Father      Hypertension Sister      Allergies Sister         thyroid dz     Arthritis Sister      Osteoporosis Sister      Obesity Brother      Alcohol/Drug Brother         dm, ptsd, cad     Bladder Cancer Brother      Diabetes Brother      Myocardial Infarction Brother      Prostate Cancer Brother      Cerebrovascular Disease Maternal Grandmother      Breast Cancer Maternal Grandmother         94     Cancer - colorectal Maternal Grandmother         97     Arthritis Maternal Grandmother      Gastrointestinal Disease Maternal Grandmother      Osteoporosis Maternal Grandmother      Thyroid Disease Maternal Grandmother      Osteoarthritis Maternal Grandfather      C.A.D. Maternal Grandfather      Heart Failure Paternal Grandmother      Hyperlipidemia Maternal Uncle      Alzheimer Disease Maternal Uncle      Thyroid Disease Other         niece       SOCIAL HISTORY:   Social History     Tobacco Use     Smoking status: Former Smoker     Packs/day: 1.00     Years: 43.00     Pack years: 43.00     Types: Cigarettes     Quit date: 2021     Years since quittin.0     Smokeless tobacco: Never Used   Substance Use Topics     Alcohol use: Yes     Comment: occ. glass of wine       TOBACCO USE: Former    HOME MEDICATIONS:  Prior to Admission medications    Medication Sig Start Date End Date Taking? Authorizing Provider   acetaminophen (TYLENOL) 325 MG tablet Take 2 tablets (650 mg) by mouth 2 times daily. May also take 2 tablets (650 mg) 2 times daily as needed for pain. 21  Yes Eileen Arredondo  JUSTO Larry CNP   albuterol (PROAIR HFA/PROVENTIL HFA/VENTOLIN HFA) 108 (90 Base) MCG/ACT inhaler Inhale 2 puffs into the lungs every 4 hours as needed 6/30/21  Yes Reported, Patient   allopurinol (ZYLOPRIM) 100 MG tablet TAKE ONE TABLET BY MOUTH THREE TIMES A WEEK ON MONDAY, WEDNESDAY AND FRIDAY AFTER HD SESSION 12/31/21  Yes Eileen Arredondo APRN CNP   apixaban ANTICOAGULANT (ELIQUIS) 2.5 MG tablet Take 1 tablet (2.5 mg) by mouth 2 times daily 9/30/21  Yes Eileen Arredondo APRN CNP   atorvastatin (LIPITOR) 40 MG tablet Take 1 tablet (40 mg) by mouth daily 9/30/21  Yes Eileen Arredondo APRN CNP   calcium acetate (CALPHRON) 667 MG TABS tablet Take 1 tablet (667 mg) by mouth 3 times daily (with meals) 3/10/22  Yes Eileen Arredondo APRN CNP   Glycopyrrolate-Formoterol (BEVESPI AEROSPHERE) 9-4.8 MCG/ACT oral inhaler Inhale 2 puffs into the lungs 2 times daily 2/9/22  Yes Derrick Linares MD   loratadine (CLARITIN) 10 MG tablet Take 1 tablet (10 mg) by mouth At Bedtime 12/2/21  Yes Eileen Arredondo APRN CNP   Melatonin 10 MG TABS tablet Take 10 mg by mouth At Bedtime 7/9/21  Yes Reported, Patient   metoprolol succinate ER (TOPROL-XL) 25 MG 24 hr tablet Take 0.5 tablets (12.5 mg) by mouth 2 times daily 1/3/22  Yes Alex Holt MD   multivitamin RENAL (MULTIVITAMIN RENAL) 1 MG capsule Take 1 capsule by mouth daily   Yes Unknown, Entered By History   pantoprazole (PROTONIX) 40 MG EC tablet Take 1 tablet (40 mg) by mouth daily 9/30/21  Yes Eileen Arredondo APRN CNP   sodium chloride (OCEAN) 0.65 % nasal spray Spray 1 spray into both nostrils every 2 hours as needed for congestion MAY KEEP AT BEDSIDE 12/2/21  Yes Eileen Arredondo APRN CNP   torsemide (DEMADEX) 20 MG tablet Take 2 tablets (40 mg) by mouth daily Take at 8 AM and 1 PM.  Patient taking differently: No sig reported 11/1/21  Yes Alex Holt MD   triamcinolone (KENALOG) 0.1 % external cream Apply to AA BID x 1-2 week  then PRN only 1/4/22  Yes Sophia Damico PA-C   ACE/ARB/ARNI NOT PRESCRIBED (INTENTIONAL) Please choose reason not prescribed from choices below. 12/2/21   Eileen Arredondo, JUSTO CNP       VITAL SIGNS:  Temp: 99.6  F (37.6  C) Temp src: Oral BP: 121/67 Pulse: 84   Resp: 14 SpO2: 96 % O2 Device: None (Room air)      132 lbs 0 oz    PHYSICAL EXAM:  Well-appearing, no acute distress  Unlabored respirations on room air  Irregularly irregular    Palpable thrill over the right brachiobasilic arteriovenous fistula; well-healed surgical incision over the medial aspect of the right arm; 2+ palpable right radial pulse; neurovascularly intact with NO numbness in the median nerve distribution of her right hand; significant tense hematoma just proximal to the antecubital fossa of the right upper extremity with bruising along the skin with some blistering.    LABWORK:   On lab work, patient is noted to be hyponatremic with a sodium of 121.  Patient's creatinine is elevated to 4.71.  Patient's hemoglobin is 10.8.    ULTRASOUND:  Ultrasound duplex of dialysis access graft from April 16, 2022 shows a 8.4 cm hematoma in the distal upper arm and antecubital fossa just distal to the arteriovenous fistula.  The arteriovenous fistula is widely patent.                ASSESSMENT:  Patient Active Problem List   Diagnosis     Depressive disorder, not elsewhere classified     Osteoporosis     Tobacco use disorder     Migraine variant     Intermediate stage nonexudative age-related macular degeneration of both eyes     Anemia in chronic kidney disease, on chronic dialysis (H)     Atrophy of left kidney     Chronic kidney disease, stage III (moderate) (H)     COPD mixed type (H)     Cor pulmonale (chronic) (H)     Dermatitis of lower extremity     Hx of gout     Hypercalcemia     Hyponatremia     Lung nodule     Neoplasm of skin of neck     SKY (obstructive sleep apnea)     Pacemaker     PAD (peripheral artery disease) (H)     Polyclonal  gammopathy     ESRD (end stage renal disease) on dialysis (H)     Chronic gout     Chest pain, unspecified type     History of ventricular tachycardia     A-V fistula (H)     End stage renal failure on dialysis (H)     Hematoma of skin   76-year-old lady with a past medical history of atrial fibrillation (on Eliquis), hypertension, and end-stage renal disease on hemodialysis Monday Wednesday Friday via a right upper extremity brachiobasilic fistula (also has right IJ tunneled dialysis catheter), who presents to [facility] emergency department with complaints of a right upper extremity hematoma.    On evaluation, patient has significant hematoma over the right upper extremity.  Patient otherwise is neurovascularly intact in the right hand with a palpable radial pulse with no evidence of any nerve compression.    Hyponatremic     PLAN:  - Will plan to take patient to the operating room tomorrow for evacuation of right upper extremity hematoma with Vascular Surgery; appreciate Hospitalist and Nephrology recommendations regarding electrolyte derangements   - NPO@MN; OK for diet today   -Avoid using right upper extremity brachiobasilic fistula in the setting of known hematoma; would recommend using right IJ tunneled dialysis catheter for future dialysis needs for now   -Nephrology consultation for hyponatremia and management of end-stage renal disease    Discussed with Dr. Eric, who is in agreement with the above plan.    Jordan Fuller MD  Vascular Surgery Fellow     STAFF: Patient is examined with findings above.  Significant antecubital hematoma at isabell-Anastomotic area of right upper arm brachial to brachial fistula.  Fistula still patent.  With the significant hematoma fistula is not usable at this time and will take a long time for this to resolve.    Due to this I did recommend operative drainage of the hematoma being for.  Repair the fistula if there is a tear because the large hematoma.  This could  be performed under local anesthetic with sedation or possibly an axillary block.    Patient has several medical issues that need to be address and is Eliquis that is being held.  Would plan for surgical procedure tomorrow.  Discussed with patient.      Sree Eric MD

## 2022-04-17 NOTE — H&P
[facility]    History and Physical  Hospitalist     Date of Admission:  4/16/2022    Assessment & Plan   Francine Chris is a 76 year old female with a past medical history of with a past medical history of COPD, hypertension, SKY, peripheral artery disease, depression, A. fib on Eliquis, end-stage renal disease on dialysis Mondays Wednesdays and Fridays presents to hospital with an AV fistula hematoma.    Av fistula hematoma  3 days of swelling, pain, bruising at her AV fistula site.. Started on Wednesday during dialysis. Used dialysis catheter on Friday.  Symptoms progressed so she presented to emergency room.  Ultrasound confirmed a 8.4 cm hematoma just distal to the AV fistula.  Fistula was patent.  Vascular surgery was contacted in the emergency room and will see the patient in morning.  -f/u vascular surgery  -npo  -hold eliquis, last dose 4/16 am  -Gentle IV hydration while n.p.o. 50ml/hr    Hyponatremia   Chronic, slightly worse than baseline.  Patient is on 1200 mL fluid restriction.  -Follow-up repeat BMP in the morning  -Continue with fluid restriction    Macrocytic anemia  Chronic, likely due to renal failure    ESRD on hd MWF  Las hd Friday. Has fistula and dialysis catheter.   -Follow-up nephrology consult    COPD  Not in acute exacerbation  -Albuterol as needed    Atrial fibrillation status post AV node ablation  NSVT  S/p ppm  HTN  Hyperlipidemia  Severe tricuspid regurgitation  Pulmonary hypertension  Chronic right heart failure  Appears euvolemic on exam   -hold Eliquis  -Continue with metoprolol, statin, torsemide    SKY  -Does not tolerate CPAP    Hx of gout  -Allopurinol after dialysis    GERD  -PPI      Code Status   Full Code  DVT ppx: scd  Disposition Plan   Expected discharge in greater than 2 days to prior living arrangement once once AV fistulas repaired.     Entered: Won Gibbons MD 04/16/2022, 10:27 PM         Primary Care Physician   Eileen NOBLE  Arnulfo    Chief Complaint   AV fistula hematoma    History obtained from the patient    History of Present Illness   Francine Chris is a 76 year old female with a past medical history of with a past medical history of COPD, hypertension, SKY, peripheral artery disease, depression, A. fib on Eliquis, end-stage renal disease on dialysis Mondays Wednesdays and Fridays presents to hospital with an AV fistula hematoma.  The patient has been on dialysis for approximately a year and still has a dialysis catheter in place.  She has been using her AV fistula for approximately 4 months now.  On Wednesday she went for dialysis and complete her session but started developing pain and swelling at the AV fistula site after.  Progressively the symptoms worsened and she developed bruising.  On Friday she underwent dialysis through her dialysis catheter instead.  AV fistula pain continued she rates it as a 9 out of 10 she took Tylenol at her facility without improvement of symptoms.  Due to ongoing symptoms the patient presents to the emergency room.  The patient provides no other complaints.    Past Medical History    I have reviewed this patient's medical history and updated it with pertinent information if needed.   Past Medical History:   Diagnosis Date     Acute renal failure (ARF) (H) 06/22/2021     Anemia in chronic kidney disease, on chronic dialysis (H) 07/14/2021     Atrophy of left kidney 05/18/2017     Chronic atrial fibrillation (H) 04/01/2013     Chronic kidney disease, stage III (moderate) (H) 11/13/2019    Formatting of this note might be different from the original. Cr=1.2     COPD mixed type (H) 04/06/2013    Formatting of this note might be different from the original. On Qvar and combivent, hx of cor pulmonale     Cor pulmonale (chronic) (H) 09/20/2021     Depressive disorder, not elsewhere classified      Dermatitis of lower extremity 05/05/2017     Elevated blood pressure reading without diagnosis of  hypertension 12/10/2004     HTN (hypertension) 09/07/2011     Hx of gout 08/13/2014     Hypercalcemia 07/16/2018    Formatting of this note might be different from the original. 7/16/18  Ca++=10.8   Alb=3.8   7/17/18:  PTH=95.6  Ca++=10.1 (after diuresis), some renal impairment, was on vit D and calcium outpt     Hypertensive heart and kidney disease 07/16/2018    Formatting of this note might be different from the original. Atrophic left kidney, rise in Cr with increase in bumex 2018     Hyponatremia 06/22/2021     Intermediate stage nonexudative age-related macular degeneration of both eyes 01/13/2021     Lung nodule 04/28/2021     Malignant melanoma (H)      Migraine variant 12/10/2004     Problem list name updated by automated process. Provider to review     Neoplasm of skin of neck 06/06/2014     NSVT (nonsustained ventricular tachycardia) (H) 04/09/2020     SKY (obstructive sleep apnea) 04/08/2020     Osteoporosis 11/10/2004     Problem list name updated by automated process. Provider to review     Osteoporosis, unspecified dx 8/02     Pacemaker 07/14/2021    Formatting of this note might be different from the original. 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 On sotolol     Tobacco use disorder      Variants of migraine, not elsewhere classified, without mention of intractable migraine without mention of status migrainosus     flashing lights       Past Surgical History   I have reviewed this patient's surgical history and updated it with pertinent information if needed.  Past Surgical History:   Procedure Laterality Date     ZZC NONSPECIFIC PROCEDURE  1990's    right ankle fracture     ZZC NONSPECIFIC PROCEDURE      spontaneous AB     ZZC NONSPECIFIC PROCEDURE      tonsillectomy     ZZC NONSPECIFIC PROCEDURE  1980's     tubal ligation       Prior to Admission Medications   Prior to Admission Medications   Prescriptions Last Dose Informant Patient Reported? Taking?   ACE/ARB/ARNI NOT PRESCRIBED (INTENTIONAL)  Nursing Home No No   Sig: Please choose reason not prescribed from choices below.   Glycopyrrolate-Formoterol (BEVESPI AEROSPHERE) 9-4.8 MCG/ACT oral inhaler 4/16/2022 at am  No Yes   Sig: Inhale 2 puffs into the lungs 2 times daily   Melatonin 10 MG TABS tablet 4/15/2022 at 2100 Nursing Home Yes Yes   Sig: Take 10 mg by mouth At Bedtime   acetaminophen (TYLENOL) 325 MG tablet 4/16/2022 at am Nursing Home No Yes   Sig: Take 2 tablets (650 mg) by mouth 2 times daily. May also take 2 tablets (650 mg) 2 times daily as needed for pain.   albuterol (PROAIR HFA/PROVENTIL HFA/VENTOLIN HFA) 108 (90 Base) MCG/ACT inhaler  at prn Nursing Home Yes Yes   Sig: Inhale 2 puffs into the lungs every 4 hours as needed   allopurinol (ZYLOPRIM) 100 MG tablet 4/15/2022 at pm Nursing Home No Yes   Sig: TAKE ONE TABLET BY MOUTH THREE TIMES A WEEK ON MONDAY, WEDNESDAY AND FRIDAY AFTER HD SESSION   apixaban ANTICOAGULANT (ELIQUIS) 2.5 MG tablet 4/16/2022 at 0800 Nursing Home No Yes   Sig: Take 1 tablet (2.5 mg) by mouth 2 times daily   atorvastatin (LIPITOR) 40 MG tablet 4/15/2022 at pm Nursing Home No Yes   Sig: Take 1 tablet (40 mg) by mouth daily   calcium acetate (CALPHRON) 667 MG TABS tablet 4/16/2022 at 1600  Yes Yes   Sig: Take 1 tablet (667 mg) by mouth 3 times daily (with meals)   loratadine (CLARITIN) 10 MG tablet 4/15/2022 at pm Nursing Home No Yes   Sig: Take 1 tablet (10 mg) by mouth At Bedtime   metoprolol succinate ER (TOPROL-XL) 25 MG 24 hr tablet 4/16/2022 at am Nursing Home No Yes   Sig: Take 0.5 tablets (12.5 mg) by mouth 2 times daily   multivitamin RENAL (MULTIVITAMIN RENAL) 1 MG capsule 4/16/2022 at am  Yes Yes   Sig: Take 1 capsule by mouth daily   pantoprazole (PROTONIX) 40 MG EC tablet 4/16/2022 at pm Nursing Home No Yes   Sig:  Take 1 tablet (40 mg) by mouth daily   sodium chloride (OCEAN) 0.65 % nasal spray  Nursing Home No Yes   Sig: Spray 1 spray into both nostrils every 2 hours as needed for congestion MAY KEEP AT BEDSIDE   torsemide (DEMADEX) 20 MG tablet 4/16/2022 at 0800 Nursing Home No Yes   Sig: Take 2 tablets (40 mg) by mouth daily Take at 8 AM and 1 PM.   Patient taking differently: No sig reported   triamcinolone (KENALOG) 0.1 % external cream  at prn Nursing Home No Yes   Sig: Apply to AA BID x 1-2 week then PRN only      Facility-Administered Medications: None     Allergies   Allergies   Allergen Reactions     Sotalol Other (See Comments)     Prolonged QT     Bupropion Other (See Comments)     Na 118 acutely. Comorbid diuresis for heart failure       Social History   I have reviewed this patient's social history and updated it with pertinent information if needed. Francine Chris  reports that she quit smoking about 12 months ago. Her smoking use included cigarettes. She has a 43.00 pack-year smoking history. She has never used smokeless tobacco. She reports current alcohol use. She reports that she does not use drugs.    Family History   I have reviewed this patient's family history and updated it with pertinent information if needed.   Family History   Problem Relation Age of Onset     Hypertension Mother      Arthritis Mother      Circulatory Mother      Heart Disease Mother      Lipids Mother      Osteoporosis Mother      Alcohol/Drug Father         ?MI     Circulatory Father      Heart Disease Father      Hypertension Sister      Allergies Sister         thyroid dz     Arthritis Sister      Osteoporosis Sister      Obesity Brother      Alcohol/Drug Brother         dm, ptsd, cad     Bladder Cancer Brother      Diabetes Brother      Myocardial Infarction Brother      Prostate Cancer Brother      Cerebrovascular Disease Maternal Grandmother      Breast Cancer Maternal Grandmother         94     Cancer - colorectal Maternal  Grandmother         97     Arthritis Maternal Grandmother      Gastrointestinal Disease Maternal Grandmother      Osteoporosis Maternal Grandmother      Thyroid Disease Maternal Grandmother      Osteoarthritis Maternal Grandfather      C.A.D. Maternal Grandfather      Heart Failure Paternal Grandmother      Hyperlipidemia Maternal Uncle      Alzheimer Disease Maternal Uncle      Thyroid Disease Other         niece       Review of Systems   The 10 point Review of Systems is negative other than noted in the HPI or here.     Physical Exam   Temp: 97.5  F (36.4  C) Temp src: Oral BP: 125/49 Pulse: 84   Resp: 14 SpO2: 97 % O2 Device: None (Room air)    Vital Signs with Ranges  Temp:  [97.5  F (36.4  C)] 97.5  F (36.4  C)  Pulse:  [76-84] 84  Resp:  [14] 14  BP: (118-127)/(49-62) 125/49  SpO2:  [97 %] 97 %  132 lbs 0 oz  Physical Exam  Vitals reviewed.   Constitutional:       Appearance: Normal appearance.      Comments: Very pleasant elderly lady seen resting in bed comfortably no apparent distress.   HENT:      Head: Normocephalic and atraumatic.      Mouth/Throat:      Mouth: Mucous membranes are moist.      Pharynx: Oropharynx is clear.   Eyes:      Extraocular Movements: Extraocular movements intact.      Conjunctiva/sclera: Conjunctivae normal.      Pupils: Pupils are equal, round, and reactive to light.   Cardiovascular:      Rate and Rhythm: Normal rate and regular rhythm.      Pulses: Normal pulses.      Heart sounds: Normal heart sounds.      Comments: PPM present on left side of chest subcutaneously  Dialysis catheter on right side of chest anteriorly.  AV fistula swollen with bruising and tenderness.  Palpable thrill.  Pulmonary:      Effort: Pulmonary effort is normal.      Breath sounds: Normal breath sounds. No wheezing, rhonchi or rales.   Abdominal:      General: Abdomen is flat. Bowel sounds are normal.      Palpations: Abdomen is soft.   Musculoskeletal:         General: No swelling or tenderness.  Normal range of motion.      Cervical back: Normal range of motion.   Skin:     General: Skin is warm and dry.   Neurological:      General: No focal deficit present.      Mental Status: She is alert and oriented to person, place, and time. Mental status is at baseline.

## 2022-04-17 NOTE — PLAN OF CARE
AxOx4. VSS on Rm Air. CMS intact. Asstx1 w/ transfers. ESRD w/ minimal UO. Dialysis access to R jugular and AV fistula to RUE. Continues w/ large hematoma and intact blood blister to AV fistula to RUE. Ice packs to surrounding area w/ good effect. Switched to renal diet, NPO after MN.

## 2022-04-17 NOTE — PROGRESS NOTES
VSS. On RA. A/Ox4. Up w/ 1. Ice packs to fistula hematoma, blood blister intact. Tolerating renal diet, npo after midnight. Given tylenol x1. On dialysis but still voids. PIV SL. Tremors baseline. Continue w/ plan of care.

## 2022-04-17 NOTE — PHARMACY-ADMISSION MEDICATION HISTORY
Pharmacy Medication History  Admission medication history interview status for the 4/16/2022  admission is complete. See EPIC admission navigator for prior to admission medications     Location of Interview: [facility]  Medication history sources: [facility] (From [facility])    Significant changes made to the medication list:  Removed folic acid-B6-B12 and added Triphrocaps 1mg Capsule.    In the past week, patient estimated taking medication this percent of the time: greater than 90%    Additional medication history information:   Patient received a new order for acetaminophen today for 1000mg three times daily, received one time today, 4-16-22 at 1301.    Medication reconciliation completed by provider prior to medication history? No    Time spent in this activity: 20 minutes    Prior to Admission medications    Medication Sig Last Dose Taking? Auth Provider   acetaminophen (TYLENOL) 325 MG tablet Take 2 tablets (650 mg) by mouth 2 times daily. May also take 2 tablets (650 mg) 2 times daily as needed for pain. 4/16/2022 at am Yes Eileen Arredondo APRN CNP   albuterol (PROAIR HFA/PROVENTIL HFA/VENTOLIN HFA) 108 (90 Base) MCG/ACT inhaler Inhale 2 puffs into the lungs every 4 hours as needed  at prn Yes Reported, Patient   allopurinol (ZYLOPRIM) 100 MG tablet TAKE ONE TABLET BY MOUTH THREE TIMES A WEEK ON MONDAY, WEDNESDAY AND FRIDAY AFTER HD SESSION 4/15/2022 at pm Yes Eileen Arredondo APRN CNP   apixaban ANTICOAGULANT (ELIQUIS) 2.5 MG tablet Take 1 tablet (2.5 mg) by mouth 2 times daily 4/16/2022 at 0800 Yes Eileen Arredondo APRN CNP   atorvastatin (LIPITOR) 40 MG tablet Take 1 tablet (40 mg) by mouth daily 4/15/2022 at pm Yes Eileen Arredondo APRN CNP   calcium acetate (CALPHRON) 667 MG TABS tablet Take 1 tablet (667 mg) by mouth 3 times daily (with meals) 4/16/2022 at 1600 Yes Eileen Arredondo APRN CNP   Glycopyrrolate-Formoterol (BEVESPI AEROSPHERE) 9-4.8 MCG/ACT oral inhaler Inhale 2 puffs into  the lungs 2 times daily 4/16/2022 at am Yes Derrick Linares MD   loratadine (CLARITIN) 10 MG tablet Take 1 tablet (10 mg) by mouth At Bedtime 4/15/2022 at pm Yes Eileen Arredondo APRN CNP   Melatonin 10 MG TABS tablet Take 10 mg by mouth At Bedtime 4/15/2022 at 2100 Yes Reported, Patient   metoprolol succinate ER (TOPROL-XL) 25 MG 24 hr tablet Take 0.5 tablets (12.5 mg) by mouth 2 times daily 4/16/2022 at am Yes Alex Holt MD   multivitamin RENAL (MULTIVITAMIN RENAL) 1 MG capsule Take 1 capsule by mouth daily 4/16/2022 at am Yes Unknown, Entered By History   pantoprazole (PROTONIX) 40 MG EC tablet Take 1 tablet (40 mg) by mouth daily 4/16/2022 at pm Yes Eileen Arredondo APRN CNP   sodium chloride (OCEAN) 0.65 % nasal spray Spray 1 spray into both nostrils every 2 hours as needed for congestion MAY KEEP AT BEDSIDE  Yes Eileen Arredondo APRN CNP   torsemide (DEMADEX) 20 MG tablet Take 2 tablets (40 mg) by mouth daily Take at 8 AM and 1 PM.  Patient taking differently: No sig reported 4/16/2022 at 0800 Yes Alex Holt MD   triamcinolone (KENALOG) 0.1 % external cream Apply to AA BID x 1-2 week then PRN only  at prn Yes Sophia Damico PA-C   ACE/ARB/ARNI NOT PRESCRIBED (INTENTIONAL) Please choose reason not prescribed from choices below.   Eileen Arredondo APRN CNP       The information provided in this note is only as accurate as the sources available at the time of update(s)

## 2022-04-18 NOTE — PROGRESS NOTES
Potassium   Date Value Ref Range Status   04/18/2022 4.8 3.4 - 5.3 mmol/L Final   01/12/2005 4.5 3.4 - 5.3 mmol/L Final     Hemoglobin   Date Value Ref Range Status   04/18/2022 9.4 (L) 11.7 - 15.7 g/dL Final   01/12/2005 15.6 11.7 - 15.7 g/dL Final     Creatinine   Date Value Ref Range Status   04/18/2022 5.31 (H) 0.52 - 1.04 mg/dL Final   01/12/2005 0.80 0.60 - 1.30 mg/dL Final     Urea Nitrogen   Date Value Ref Range Status   04/18/2022 72 (H) 7 - 30 mg/dL Final   01/12/2005 16 7 - 30 mg/dL Final     Sodium   Date Value Ref Range Status   04/18/2022 119 (LL) 133 - 144 mmol/L Final   01/12/2005 141 133 - 144 mmol/L Final     INR   Date Value Ref Range Status   04/17/2022 1.15 0.85 - 1.15 Final       DIALYSIS PROCEDURE NOTE  Hepatitis status of previous patient on machine log was checked and verified ok to use with this patients hepatitis status.  Patient dialyzed for 3 hrs. on a K2 bath with a net fluid removal of  1.L.  A BFR of 400 ml/min was obtained via a RIJ  Total heparin received during the treatment: 0 units.  Heparin instilled in both ports post run    Meds  given: none   Complications Cramping hands reduced UF Dr Palmer aware     Person educated: Pt . Knowledge base minimal . Barriers to learning: None . Educated on access  via oral  mode. Patient verbalized understanding. .     ICEBOAT? Timeout performed pre-treatment  I: Patient was identified using 2 identifiers  C:  Consent Signed Yes  E: Equipment preventative maintenance is current and dialysis delivery system OK to use  B: Hepatitis B Surface Antigen: Neg ; Draw Date: 1/12/22   Hepatitis B Surface Antibody: Immune; Draw Date: 1/12/22  O: Dialysis orders present andcomplete prior to treatment  A: Vascular access verified and assessed prior to treatment  T: Treatment was performed at a clinically appropriate time  ?: Patient was allowed to ask questions and address concerns prior to treatment  See flowsheet in EPIC for further details and post  assessment.  Machine water alarm in place and functioning. Transducer pods intact and checked every 15min.   Pt returned via bed .  Chlorine/Chloramine water system checked every 4 hours.  Outpatient Dialysis at [facility]

## 2022-04-18 NOTE — PROVIDER NOTIFICATION
MD Notification    Notified Person: MD    Notified Person Name: Dr. Farooq Foster    Notification Date/Time: 4/18/2022, 1031    Notification Interaction:paging    Purpose of Notification: 2223- CJ, Pt's sodium level :119.     Orders Received:    Comments:

## 2022-04-18 NOTE — PROVIDER NOTIFICATION
MD Notification    Notified Person: MD    Notified Person Name: Dr. De    Notification Date/Time: 4/18/2022, 1435    Notification Interaction:paging    Purpose of Notification: 2223- CJ, pt reports itchiness, uses topical Benadrly at home. Can pt have Benadryl ordered? Thank you.    Orders Received:    Comments:

## 2022-04-18 NOTE — ANESTHESIA PREPROCEDURE EVALUATION
Anesthesia Pre-Procedure Evaluation    Patient: Francine Chris   MRN: [id number] : 1945        Procedure : Procedure(s):  REVISION, ARTERIOVENOUS FISTULA, UPPER EXTREMITY          Past Medical History:   Diagnosis Date     Acute renal failure (ARF) (H) 2021     Anemia in chronic kidney disease, on chronic dialysis (H) 2021     Atrophy of left kidney 2017     Chronic atrial fibrillation (H) 2013     Chronic kidney disease, stage III (moderate) (H) 2019    Formatting of this note might be different from the original. Cr=1.2     COPD mixed type (H) 2013    Formatting of this note might be different from the original. On Qvar and combivent, hx of cor pulmonale     Cor pulmonale (chronic) (H) 2021     Depressive disorder, not elsewhere classified      Dermatitis of lower extremity 2017     Elevated blood pressure reading without diagnosis of hypertension 12/10/2004     HTN (hypertension) 2011     Hx of gout 2014     Hypercalcemia 2018    Formatting of this note might be different from the original. 18  Ca++=10.8   Alb=3.8   18:  PTH=95.6  Ca++=10.1 (after diuresis), some renal impairment, was on vit D and calcium outpt     Hypertensive heart and kidney disease 2018    Formatting of this note might be different from the original. Atrophic left kidney, rise in Cr with increase in bumex      Hyponatremia 2021     Intermediate stage nonexudative age-related macular degeneration of both eyes 2021     Lung nodule 2021     Malignant melanoma (H)      Migraine variant 12/10/2004     Problem list name updated by automated process. Provider to review     Neoplasm of skin of neck 2014     NSVT (nonsustained ventricular tachycardia) (H) 2020     SKY (obstructive sleep apnea) 2020     Osteoporosis 11/10/2004     Problem list name updated by automated process. Provider to review     Osteoporosis,  unspecified dx      Pacemaker 2021    Formatting of this note might be different from the original. After AV luis ablation for AF with RVR     PAD (peripheral artery disease) (H) 2019     Polyclonal gammopathy 2018     Prolonged QT interval 2018    Formatting of this note might be different from the original. On sotolol     Tobacco use disorder      Variants of migraine, not elsewhere classified, without mention of intractable migraine without mention of status migrainosus     flashing lights      Past Surgical History:   Procedure Laterality Date     ZZ NONSPECIFIC PROCEDURE      right ankle fracture     ZZC NONSPECIFIC PROCEDURE      spontaneous AB     ZZC NONSPECIFIC PROCEDURE      tonsillectomy     ZZC NONSPECIFIC PROCEDURE      tubal ligation      Allergies   Allergen Reactions     Sotalol Other (See Comments)     Prolonged QT     Bupropion Other (See Comments)     Na 118 acutely. Comorbid diuresis for heart failure      Social History     Tobacco Use     Smoking status: Former Smoker     Packs/day: 1.00     Years: 43.00     Pack years: 43.00     Types: Cigarettes     Quit date: 2021     Years since quittin.0     Smokeless tobacco: Never Used   Substance Use Topics     Alcohol use: Yes     Comment: occ. glass of wine      Wt Readings from Last 1 Encounters:   22 59.9 kg (132 lb 0.9 oz)        Anesthesia Evaluation   Pt has had prior anesthetic. Type: General.    No history of anesthetic complications       ROS/MED HX  ENT/Pulmonary:     (+) sleep apnea, tobacco use (Quit in ), Past use, 43  Pack-Year Hx,  moderate,  COPD,     Neurologic:     (+) migraines,     Cardiovascular: Comment: Cor pulmonale    (+) Dyslipidemia hypertension-Peripheral Vascular Disease-- Other. ---Taking blood thinners Instructions Given to patient: Eliquis. CHF etiology: Cor pulmonale Last EF: 50-55 pacemaker, dysrhythmias (NSVT), a-fib and Other, valvular problems/murmurs type:  AI and MR Mild AI; Mild-mod MR; Sev TR. pulmonary hypertension (Severe (RVSP 60-65 mmHg)), Previous cardiac testing   Echo: Date: 12/7/21 Results:  Interpretation Summary  Left ventricular systolic function is low normal. The visual ejection fraction is 50-55%. The right ventricle is mildly dilated. The right ventricular systolic function is mildly reduced. The left atrium is severely dilated. The right atrium is severely dilated. There is severe (4+) tricuspid regurgitation. Mechanism seems to be poor coaptation the TV leaflets due to impingement by the pacemaker wire. The tip of pacemaker wire seems to be angled towards the base-mid interventricular septum instead of RV apex. There is mild (1+) aortic and mild-moderate mitral regurgitation. The right ventricular systolic pressure is approximated at 60-65 mmHg based on a RA pressure of 15 mmHg Elevated vright atrial pressure of >15 mmHg. No prior study for comparison.      Left Ventricle  The left ventricle is normal in size. There is normal left ventricular wall thickness. Left ventricular systolic function is low normal. The visual ejection fraction is 50-55%. Diastolic function not assessed due to atrial fibrillation. No regional wall motion abnormalities noted.    Right Ventricle  There is a catheter/pacemaker lead seen in the right ventricle. The right ventricle is mildly dilated. The tip of pacemaker wire seems to be angled towards the base-mid interventricular septum instead of RV apex. Mildly decreased right ventricular systolic function.    Atria  The left atrium is severely dilated. The right atrium is severely dilated.    Mitral Valve  The mitral valve leaflets appear normal. There is no evidence of stenosis, fluttering, or prolapse. There is mild mitral annular calcification. There is mild to moderate (1-2+) mitral regurgitation.    Tricuspid Valve  There is severe (4+) tricuspid regurgitation. Mechanism seems to be poor coaptation the TV leaflets due  to impingment by the pacemaker wire. The right ventricular systolic pressure is approximated at 51.1 mmHg plus the right atrial pressure. Right ventricular systolic pressure is elevated, consistent with severe pulmonary hypertension.    Aortic Valve  There is mild trileaflet aortic sclerosis. There is mild (1+) aortic regurgitation. No aortic stenosis is present.    Pulmonic Valve  Normal pulmonic valve. There is trace pulmonic valvular regurgitation.    Vessels  Borderline aortic root dilatation. Normal size ascending aorta. IVC diameter >2.1 cm collapsing <50% with sniff suggests a high RA pressure estimated at 15 mmHg or greater.    Pericardium  There is no pericardial effusion.    Rhythm  The rhythm was atrial fibrillation.  Stress Test: Date: Results:    ECG Reviewed: Date: 1/26/22 Results:  A-fib  Cath: Date: Results:      METS/Exercise Tolerance:     Hematologic:     (+) anemia,     Musculoskeletal:   (+) arthritis,     GI/Hepatic:       Renal/Genitourinary: Comment: Hyponatremia    (+) renal disease, type: ESRD and CRI, Pt requires dialysis, type: Hemodialysis,     Endo:       Psychiatric/Substance Use:     (+) psychiatric history depression     Infectious Disease:       Malignancy:       Other:            Physical Exam    Airway        Mallampati: II       Respiratory Devices and Support         Dental       (+) caps and chipped      Cardiovascular          Rhythm and rate: regular and normal     Pulmonary           breath sounds clear to auscultation           OUTSIDE LABS:  CBC:   Lab Results   Component Value Date    WBC 6.0 04/18/2022    WBC 6.9 04/17/2022    HGB 9.4 (L) 04/18/2022    HGB 10.3 (L) 04/17/2022    HCT 27.4 (L) 04/18/2022    HCT 31.3 (L) 04/17/2022     (L) 04/18/2022     04/17/2022     BMP:   Lab Results   Component Value Date     (L) 04/18/2022     (LL) 04/18/2022    POTASSIUM 4.8 04/18/2022    POTASSIUM 4.8 04/17/2022    CHLORIDE 85 (L) 04/18/2022    CHLORIDE 90  (L) 04/17/2022    CO2 22 04/18/2022    CO2 23 04/17/2022    BUN 72 (H) 04/18/2022    BUN 55 (H) 04/17/2022    CR 5.31 (H) 04/18/2022    CR 5.04 (H) 04/17/2022    GLC 87 04/18/2022    GLC 87 04/17/2022     COAGS:   Lab Results   Component Value Date    PTT 33 04/17/2022    INR 1.15 04/17/2022     POC: No results found for: BGM, HCG, HCGS  HEPATIC:   Lab Results   Component Value Date    ALBUMIN 3.0 (L) 02/15/2022    PROTTOTAL 7.6 02/15/2022    ALT 32 02/15/2022    AST 37 02/15/2022    ALKPHOS 122 02/15/2022    BILITOTAL 1.0 02/15/2022     OTHER:   Lab Results   Component Value Date    SILVIA 9.4 04/18/2022    LIPASE 317 01/12/2022    TSH 1.22 01/12/2005       Anesthesia Plan    ASA Status:  4   NPO Status:  NPO Appropriate    Anesthesia Type: General.     - Airway: LMA   Induction: Intravenous.   Maintenance: Balanced.        Consents    Anesthesia Plan(s) and associated risks, benefits, and realistic alternatives discussed. Questions answered and patient/representative(s) expressed understanding.    - Discussed:     - Discussed with:  Patient         Postoperative Care    Pain management: IV analgesics, Multi-modal analgesia.   PONV prophylaxis: Ondansetron (or other 5HT-3)     Comments:                Kanu Roman MD

## 2022-04-18 NOTE — PLAN OF CARE
Goal Outcome Evaluation:  4/18/2022, [id number]  Pt here with Hyponatremia. A&O. VSS. Tolerating NPO diet. Takes pills water. Up with SBA. C/o mild to moderate pain, decrease with Tylenol for R upper arm. Hematoma to R upper arm.Pt scoring green on the Aggression Stop Light Tool. Plan : Continue current care. Discharge pending.

## 2022-04-18 NOTE — PROGRESS NOTES
Addendum:  -Repeat sodium after dialysis is 129.  Discussed with Dr. Palmer from nephrology, not totally unexpected, she was dialyzed on the lower sodium bath at 130 but still shannan up as she was 119 to begin with.  -He did not think we need to lower the sodium given the abrupt rise as he expects that numbers will continue to drift today and tomorrow until her next dialysis session.  -Should be okay proceeding with hematoma drainage today.  -We will order a sodium level around 5-6 PM today.

## 2022-04-18 NOTE — ANESTHESIA PROCEDURE NOTES
Airway       Patient location during procedure: OR       Procedure Start/Stop Times: 4/18/2022 5:08 PM  Staff -        CRNA: Farheen Ashton APRN CRNA       Performed By: CRNA  Consent for Airway        Urgency: elective  Indications and Patient Condition       Indications for airway management: isabell-procedural       Induction type:intravenous       Mask difficulty assessment: 0 - not attempted    Final Airway Details       Final airway type: supraglottic airway    Supraglottic Airway Details        Type: LMA       Brand: I-Gel       LMA size: 4    Post intubation assessment        Placement verified by: capnometry, equal breath sounds and chest rise        Number of attempts at approach: 1       Secured with: pink tape       Ease of procedure: easy       Dentition: Intact and Unchanged    Medication(s) Administered   Medication Administration Time: 4/18/2022 5:08 PM

## 2022-04-18 NOTE — PROGRESS NOTES
Inpatient Dialysis Progress Note            Assessment and Plan:     1.  ESRD               -MWF [facility]              -Dr. Ro              -apparent ATN without recovery     Outpatient Orders:     Dialyzer: 160NRe Optiflux   Na: 137 mEq/L   Bicarb: 38 mEq/L   Dialysate: 3.0 K, 2.25 Ca, 1.0 Mg, 100 Dextrose ()   Dialysate/Machine Temp (prescribed): 37 C   BFR (actual): 400   Prescribed time: 03:00   EDW: 59 kg   Access Type: Active (In Use):CVCatheter-Tunneled/Chest  Pre Dialysis Vitals (for 3/28/2022 12:43 PM )  Pre BP (sit): 148/76  Pre Wt: 62 kg    Treatment Medication Orders    Medication Sig Start Date End Date   Heparin   Mircera  Vitamin D (Calcitriol)      2.  Right arm hematoma around AV fistula              -surgical drainage planned today.       3.  Anemia     4.  Secondary hyperparathyroidism     5.  Hyponatremia - no other reason than reduced water clearance in advanced kidney disease.         Plan dialysis today  Na bath 130  Continue fluid restriction                   Interval History:     Feeling OK.  Not SOB.  NPO for surgery later today.    Arm hurts but tolerable.  She has decided to stick with CVC and not use AVF going forward.          Dialysis Parameters:     Wt Readings from Last 4 Encounters:   04/18/22 59.9 kg (132 lb 0.9 oz)   04/13/22 59.9 kg (132 lb)   04/08/22 59.5 kg (131 lb 3.2 oz)   03/28/22 60.1 kg (132 lb 9.6 oz)     I/O last 3 completed shifts:  In: 360 [P.O.:360]  Out: -   BP Readings from Last 3 Encounters:   04/18/22 123/57   04/13/22 128/74   04/08/22 122/64       Routine, ONE TIME, Starting today For 1 Occurrences  Weight Loss (kg): 1.5  Dialysis Temp: 36.5  C  Access Device: CVC  Access Site: R IJ  Dialyzer: Revaclear  Dialysis Bath: K 2  Blood Flow Rate (mL/min): 400  Total Treatment Time (hrs): 3  Heparin: none         Medications and Allergies:   Reviewed in EPIC      sodium chloride 0.9%  250 mL Intravenous Once in dialysis/CRRT     sodium chloride  0.9%  300 mL Hemodialysis Machine Once     sodium chloride 0.9%  250 mL Intravenous Once in dialysis/CRRT     sodium chloride 0.9%  300 mL Hemodialysis Machine Once     atorvastatin  40 mg Oral Daily     sodium chloride (PF) 0.9%  1.3-2.6 mL Intracatheter Once in dialysis/CRRT    Followed by     heparin  3 mL Intracatheter Once in dialysis/CRRT     sodium chloride (PF) 0.9%  1.3-2.6 mL Intracatheter Once in dialysis/CRRT    Followed by     heparin  3 mL Intracatheter Once in dialysis/CRRT     metoprolol succinate ER  12.5 mg Oral BID     - MEDICATION INSTRUCTIONS -   Does not apply Once     - MEDICATION INSTRUCTIONS -   Does not apply Once     pantoprazole  40 mg Oral Daily     sodium chloride (PF)  3 mL Intracatheter Q8H     sodium chloride (PF)  9 mL Intracatheter During Dialysis/CRRT (from stock)     sodium chloride (PF)  9 mL Intracatheter During Dialysis/CRRT (from stock)     torsemide  40 mg Oral Daily     sodium chloride 0.9%, sodium chloride 0.9%, acetaminophen **OR** acetaminophen, albuterol, alteplase, alteplase, HYDROmorphone, lidocaine 4%, lidocaine (buffered or not buffered), melatonin, naloxone **OR** naloxone **OR** naloxone **OR** naloxone, prochlorperazine **OR** prochlorperazine **OR** prochlorperazine, sodium chloride (PF), sodium chloride (PF), sodium chloride (PF)     Allergies   Allergen Reactions     Sotalol Other (See Comments)     Prolonged QT     Bupropion Other (See Comments)     Na 118 acutely. Comorbid diuresis for heart failure              Labs:     BMP  Recent Labs   Lab 04/18/22  0840 04/17/22  0737 04/16/22  1907   * 122* 121*   POTASSIUM 4.8 4.8 4.2   CHLORIDE 85* 90* 84*   SILVIA 9.4 9.8 10.3*   CO2 22 23 25   BUN 72* 55* 49*   CR 5.31* 5.04* 4.71*   GLC 87 87 127*     CBC  Recent Labs   Lab 04/18/22  0840 04/17/22  0737 04/16/22  1907   WBC 6.0 6.9 7.3   HGB 9.4* 10.3* 10.8*   HCT 27.4* 31.3* 31.3*   MCV 99 102* 99   * 170 188     Lab Results   Component Value Date     AST 37 02/15/2022    ALT 32 02/15/2022    ALKPHOS 122 02/15/2022    BILITOTAL 1.0 02/15/2022            Physical Exam:   Vitals were reviewed in [facility]    Wt Readings from Last 3 Encounters:   04/18/22 59.9 kg (132 lb 0.9 oz)   04/13/22 59.9 kg (132 lb)   04/08/22 59.5 kg (131 lb 3.2 oz)       Intake/Output Summary (Last 24 hours) at 4/18/2022 0949  Last data filed at 4/17/2022 1754  Gross per 24 hour   Intake 360 ml   Output --   Net 360 ml       GENERAL APPEARANCE: pleasant, no distress, a & o  HEENT:  Eyes/ears/nose grossly normal, neck supple  RESP: lungs clear to auscultation with good efforts, no crackles, rhonchi or wheezes  CV: regular rate and rhythm, normal S1 S2, no murmur, click or rub   ABDOMEN: soft, nontender, bowel sounds normal  EXTREMITIES/SKIN: Tr BLE edema, Hematoma over AVF on RUE.         Pt seen on dialysis.  Stable run.  Good BFR.      Attestation:  I have reviewed today's vital signs, notes, medications, labs and imaging.     Maikel Palmer MD  [facility] - Nephrology  [phone]

## 2022-04-18 NOTE — ANESTHESIA POSTPROCEDURE EVALUATION
Patient: Francine Chris    Procedure: Procedure(s):  EVACUATE HEMATOMA UPPER RIGHT ARM. EXCISION FULL THICKNESS SKIN ULCER RIGHT UPPER ARM       Anesthesia Type:  General    Note:  Disposition: Inpatient   Postop Pain Control: Uneventful            Sign Out: Well controlled pain   PONV: No   Neuro/Psych: Uneventful            Sign Out: Acceptable/Baseline neuro status   Airway/Respiratory: Uneventful            Sign Out: Acceptable/Baseline resp. status   CV/Hemodynamics: Uneventful            Sign Out: Acceptable CV status   Other NRE: NONE   DID A NON-ROUTINE EVENT OCCUR? No           Last vitals:  Vitals Value Taken Time   /62 04/18/22 1830   Temp     Pulse 80 04/18/22 1840   Resp 16 04/18/22 1840   SpO2 97 % 04/18/22 1840   Vitals shown include unvalidated device data.    Electronically Signed By: Alex Sharif MD  April 18, 2022  6:41 PM

## 2022-04-18 NOTE — PROGRESS NOTES
VASCULAR SURGERY PROGRESS NOTE    Ms. Chris is a 75yo woman with a right upper extremity hematoma around her fistula.     NAEON. Completed dialysis this morning. Pain adequately controlled.    Vitals reviewed.    On exam, she is resting in bed with sister at bedside. Right arm hematoma without, nonpulsatile. 2+ right radial pulse. Sensation to hand intact and normal active ROM of hand.     Labs reviewed.    Ms. Chris is a 75yo woman with a right upper extremity hematoma around her fistula    - to the OR today for hematoma evacuation    Daylin Vieira MD  04/18/22  2:57 PM

## 2022-04-18 NOTE — CONSULTS
CLINICAL NUTRITION SERVICES - BRIEF NOTE    Chart reviewed. Received positive admission nutrition risk screen on 4/17/22 at 0105.  Have you recently lost weight without trying? Unsure  Have you been eating poorly because of a decreased appetite? No    Noted patient admitted 2/2 AV fistula hematoma that started last Wednesday at dialysis and got progressively worse.     Diet: NPO for evacuation of RUE hematoma this morning  Previously on Renal diet (non-dialysis) and tolerating well. Ate % of 3 nutritionally adequate meals yesterday.     Would recommend more liberalized Dialysis Diet once able to advance diet post op.     Weight hx reviewed. No recent weight loss noted. Has trended up over the past 6 months.   Wt Readings from Last 30 Encounters:   04/18/22 59.9 kg (132 lb 0.9 oz)   04/13/22 59.9 kg (132 lb)   04/08/22 59.5 kg (131 lb 3.2 oz)   03/28/22 60.1 kg (132 lb 9.6 oz)   03/23/22 60.1 kg (132 lb 9.6 oz)   03/21/22 60.1 kg (132 lb 9.6 oz)   03/14/22 60.6 kg (133 lb 8 oz)   03/10/22 61.2 kg (135 lb)   03/01/22 58.2 kg (128 lb 6.4 oz)   02/14/22 59.5 kg (131 lb 3.2 oz)   02/09/22 59.4 kg (131 lb)   02/09/22 59.5 kg (131 lb 3.2 oz)   02/02/22 59.4 kg (131 lb)   01/28/22 59.4 kg (131 lb)   01/26/22 59.4 kg (131 lb)   01/25/22 59.4 kg (131 lb)   01/17/22 59.5 kg (131 lb 3.2 oz)   01/14/22 63 kg (139 lb)   01/12/22 64 kg (141 lb)   01/05/22 61.7 kg (136 lb)   01/10/22 59.9 kg (132 lb)   01/03/22 59.9 kg (132 lb)   12/27/21 60.2 kg (132 lb 12.8 oz)   12/15/21 59 kg (130 lb)   12/07/21 59 kg (130 lb)   12/01/21 55.5 kg (122 lb 6.4 oz)   11/15/21 54.9 kg (121 lb)   11/04/21 55.2 kg (121 lb 12.8 oz)   11/01/21 54.4 kg (120 lb)   10/26/21 54.8 kg (120 lb 12.8 oz)     No nutrition risk factors identified at this time. Will chart review at LOS again to re-evaluate. Please page or consult with any nutrition needs/concerns that arise over admission.     Lin Montes De Oca RD, LD  Unit RD Pager: [phone]

## 2022-04-18 NOTE — BRIEF OP NOTE
[facility]    Brief Operative Note    Pre-operative diagnosis: A-V fistula (H) [I77.0]  Post-operative diagnosis Right upper arm extremity hematoma    Procedure: Procedure(s):  EVACUATE HEMATOMA UPPER RIGHT ARM. EXCISION FULL THICKNESS SKIN ULCER RIGHT UPPER ARM  Surgeon: Surgeon(s) and Role:     * Sree Eric - Primary     * Aundrea Arzola MD - Resident - Assisting  Anesthesia: Combined MAC with Local   Estimated Blood Loss: Less than 10 ml    Drains: Giancarlo-Serrato  Specimens: * No specimens in log *  Findings:   old clot evacuated, no evidence of active bleeding or involvement of fistula.  Complications: None.  Implants: * No implants in log *     Eliquis to be held for 1-2 days post op.

## 2022-04-18 NOTE — PROVIDER NOTIFICATION
MD Notification    Notified Person: MD    Notified Person Name: Dr. Farooq Foster    Notification Date/Time: 4/18/2022,  1050    Notification Interaction: paging    Purpose of Notification: 4293- IJ. Dr. Eric would like to know if it's okay for pt to go to surgery. Thank you.    Orders Received:    Comments:

## 2022-04-18 NOTE — PLAN OF CARE
Goal Outcome Evaluation:       Pt is A & O x 4. RA. NPO overnight (revision of AV fistula of R upper extremity). VSS. Stand by assist w/ walker. PRN tylenol given once during shift to aid with R upper extremity pain. Pt is in bed resting, call light and belonging within reach, will continue to monitor throughout shift.

## 2022-04-19 NOTE — PLAN OF CARE
POD 1: A&Ox4. VSS. Denies pain. Up w/ Ax1 WK, ambulated in room overnight. BS active, BM x1 overnight. Voided x1 in BR. R arm wrapped w/ Kerlix, SVETA present w/ bloody drainage. Hematoma present along R arm, marked. Vargas wound dressing CDI. Tolerating regular diet,1200 ml fluid restriction. Using IS. PIV SL.

## 2022-04-19 NOTE — OP NOTE
Procedure Date: 04/18/2022    PREOPERATIVE DIAGNOSIS:  Large right distal upper arm hematoma secondary to transposed brachial-to-brachial arteriovenous fistula with overlying skin necrosis.    POSTOPERATIVE DIAGNOSIS:  Large right distal upper arm hematoma secondary to transposed brachial-to-brachial arteriovenous fistula with overlying skin necrosis.    PROCEDURE:    1.  Excision of right distal upper arm skin ulceration.  2.  Evacuation of right distal upper arm arteriovenous fistula hematoma with irrigation and drainage.    SURGEON:  Sree Eric MD    :  Christian Arzola MD ([facility] Surgery resident).    ANESTHESIA:  General -- LMA.    PREOPERATIVE MEDICATIONS:  Ancef, 2 grams IV.    INDICATIONS FOR PROCEDURE:  A 76-year-old patient has a transposed right upper arm antecubital brachial-to-brachial arteriovenous fistula.  She is presently dialyzing via a right jugular tunneled catheter.  Three days ago, they tried to access the fistula for the second time and developed a large hematoma.  Fistula has remained patent clinically and by duplex ultrasound, but there is a very large hematoma with beginning of overlying skin full-thickness necrosis.  We felt that evacuation of the hematoma was indicated, and excision of the ulcer.  We would be prepared, in case there was ongoing disruption of the fistula, to repair the fistula if needed or consider ligation under informed consent of the patient.    DESCRIPTION OF PROCEDURE:  The patient was brought to the operating room, induced under general anesthesia.  LMA was placed.  Calf pneumatic compression boots were used and pillows placed under knees.  Right arm was prepped and draped in standard fashion.  A timeout was called, and the sites were identified.    Excision of skin ulcers:  Ulceration measured approximately 1 x 1 cm.  This was located lateral to the fistula.  A #15 blade scalpel was used to make an elliptical incision to excise this in a  full-thickness flap subcutaneous fashion to viable skin that was still very thin and friable.    Evacuation of hematoma:  Through the subcutaneous tissue, which was quite minimal, we entered the hematoma cavity.  This measured approximately 12 x 10 x 8 cm.  Old clotted blood was removed.  We could visualize the fistula, which was well-incorporated to surrounding tissue with no evidence of any active bleeding or disruption, and a strong pulse and thrill.  Wound was irrigated with saline after removing the hematoma.    A 15 round Giancarlo-Serrato drain was brought to the antecubital stab incision and placed into the hematoma pocket and hooked to bulb suction.    Wound closure:  With removal of the hematoma, we had plenty of tissue to do a primary closure.  However, with her initial sutures, the skin did tear due to the friable nature.  We therefore used Lloyd pledgets and mattress 4-0 Prolene sutures to approximate the skin edges with no tension.  On the proximal and distal, 2 interrupted 4-0 nylon sutures were placed.    Gel was then placed over the incision, followed by gauze, fluffs and Kerlix roll.    We did infiltrate the wound with 1% lidocaine, followed by 0.5% Marcaine for postoperative analgesia.    The patient tolerated the procedure well.    ESTIMATED BLOOD LOSS:  Less than 10 mL.    COUNTS:  Needle and sponge counts correct.    COMPLICATIONS:  None.    The patient was extubated, returned to the recovery room in satisfactory condition.    Sree Eric MD        D: 2022   T: 2022   MT: [facility]    Name:     TIFFANIE CHO  MRN:      [id number]        Account:        [id number]   :      1945           Procedure Date: 2022     Document: [id number]    cc:  Sree Eric MD

## 2022-04-19 NOTE — PROGRESS NOTES
POD 0 from evacuation of arteriovenous fistula hematoma w/ irrigation and drainage. Hx of COPD, CKD III, and hyponatremia - last value 128. A&Ox4, cooperative. VSS on 2 LPM, capno 34/8. Tolerating regular diet, denies N/V/D. 1200ml fluid restriction. SBA/ 1A for mobility, ROM WNL - moderate weakness to R arm. Pain managed with PO Tylenol x 1. PRN benadryl cream applied to upper back and shoulders x 1. LS CTA. Using IS. Pt's sister Tereza at beside most of shift. Discharge pending.
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
## 2022-04-19 NOTE — PROGRESS NOTES
Vascular Surgery Progress Note:  POD#1    S: Very comfortable this morning.   O:   Vitals:  BP  Min: 109/59  Max: 139/68  Temp  Av.1  F (36.7  C)  Min: 97.6  F (36.4  C)  Max: 98.6  F (37  C)  Pulse  Av.2  Min: 69  Max: 82  I/O last 3 completed shifts:  In: 300 [P.O.:150; I.V.:150]  Out: 1025 [Drains:25; Other:1000]    Physical Exam: Alert and appropriate.                RUE Dressing=Dry                Mild SVETA output.                +3 radial pulse and AVF thrill                 Normal CMS      Assessment/Plan: Doing well.  Discharge per Hospitalist and Nephrology.  We will pull SVETA just prior to discharge.  Sutures out in office > 2 weeks (very friable skin)        Wm. Hernesto MD

## 2022-04-19 NOTE — PROVIDER NOTIFICATION
MD Notification    Notified Person: MD    Notified Person Name:     Notification Date/Time: 4/18/22, 20:10    Notification Interaction: Telephone    Purpose of Notification: Requesting diet order    Orders Received: OK for regular diet

## 2022-04-19 NOTE — PROGRESS NOTES
[facility]  Medicine Progress Note - Hospitalist Service        Date of Admission:  4/16/2022  When I evaluated patient: 04/19/2022      Assessment & Plan:   Francine Chris is a 76 year old female with a past medical history of with a past medical history of COPD, hypertension, SKY, peripheral artery disease, depression, A. fib on Eliquis, end-stage renal disease on dialysis Mondays Wednesdays and Fridays presents to hospital with an AV fistula hematoma.     Right arm hematoma around AV fisula  3 days of swelling, pain, bruising at her AV fistula site. Symptoms progressed so she presented to ER. Ultrasound confirmed a 8.4 cm hematoma just distal to the AV fistula.  Fistula was patent.      -Vascular surgery consulted, s/p excision of the skin ulceration and dryness of the hematoma with drain placement 4/18.  -Anticoagulation remains on hold, resume when okay with vascular surgery   -symptomatic treatment of pain, much improved now     Hyponatremia   Chronic, slightly worse than baseline; at presentation 122.  Baseline is between 124-130.  Patient is on 1200 mL fluid restriction.   -Sodium was down to 119, 129 after HD and is 125 today.  -Continue with fluid restriction  -Appreciate nephrology managing fluid and electrolyte.     Macrocytic anemia  Thrombocytopenia  -Chronic, likely due to renal failure  -Platelet count also mildly low, likely related to consumption from bleeding  -Monitor     ESRD on hd MWF  -Nephrology following for dialysis  -S/p HD 4/18.  Plan is dialysis tomorrow.      COPD  -not in exacerbation  -Albuterol as needed     Atrial fibrillation status post AV node ablation  NSVT  S/p ppm  HTN  Hyperlipidemia  Severe tricuspid regurgitation  Pulmonary hypertension  Chronic right heart failure  -Appears euvolemic on exam   -PTA Eliquis remains on hold.  Resume when okay with vascular surgery  -Continue metoprolol, statin, torsemide     SKY  -Does not tolerate CPAP     Hx of  "gout  -Allopurinol after dialysis     GERD  -PPI       Diet: Fluid restriction 1200 ML FLUID  Regular Diet Adult     DVT Prophylaxis: Pneumatic Compression Devices   Schuler Catheter: Not present  Code Status: Full Code     Disposition Plan    Expected Discharge: 04/20/2022     Anticipated discharge location:  Awaiting care coordination huddle  Delays:        Entered: Estela Blanco MD 04/19/2022, 12:58 PM        Clinically Significant Risk Factors Present on Admission                  The patient's care was discussed with the Bedside Nurse, Patient and Patient's Family.    Estela Blanco MD  Hospitalist Service  [facility]       ______________________________________________________________________    Interval History   S/p evacuation of hematoma and SVETA drain placement yesterday.  -Reports right arm pain has significantly improved since surgery and is 2/10 today.  -Denies shortness of breath or chest pain.  Reports intermittent leg cramps which improves when she is up and ambulating.  Had some numbness in the right hand yesterday which is resolved completely.    Data reviewed today: I reviewed all medications, new labs and imaging results over the last 24 hours. I personally reviewed no images or EKG's today.    Physical Exam   Vital signs:  Temp: 97.7  F (36.5  C) Temp src: Oral BP: 122/59 Pulse: 77   Resp: 16 SpO2: 96 % O2 Device: None (Room air) Oxygen Delivery: 2 LPM Height: 162.6 cm (5' 4\") Weight: 59.9 kg (132 lb 0.9 oz)  Estimated body mass index is 22.67 kg/m  as calculated from the following:    Height as of this encounter: 1.626 m (5' 4\").    Weight as of this encounter: 59.9 kg (132 lb 0.9 oz).      Wt Readings from Last 2 Encounters:   04/18/22 59.9 kg (132 lb 0.9 oz)   04/13/22 59.9 kg (132 lb)       Gen: AAOX3, NAD, very pleasant, comfortable  HEENT:  no pallor  Resp: CTA B/L, normal WOB, no wheezes  CVS: RRR, no murmur  Abd/GI: Soft, non-tender. BS- normoactive.    Skin: " Warm, dry.  Ecchymosis over multiple areas.  MSK: right arm- now with Dexter drain and bandage. Stable bruise noted.   Neuro- CN- intact. No focal deficits.      Data   Recent Labs   Lab 04/19/22  0739 04/18/22  1855 04/18/22  1140 04/18/22  0840 04/17/22  0737   WBC 4.8  --   --  6.0 6.9   HGB 10.5*  --   --  9.4* 10.3*   MCV 99  --   --  99 102*   *  --   --  130* 170   INR  --   --   --   --  1.15   * 128* 129* 119* 122*   POTASSIUM 4.4  --   --  4.8 4.8   CHLORIDE 92*  --   --  85* 90*   CO2 24  --   --  22 23   BUN 45*  --   --  72* 55*   CR 3.91*  --   --  5.31* 5.04*   ANIONGAP 9  --   --  12 9   SILVIA 9.6  --   --  9.4 9.8   *  --   --  87 87       No results found for this or any previous visit (from the past 24 hour(s)).  Medications       acetaminophen  975 mg Oral Once     atorvastatin  40 mg Oral Daily     calcium acetate  667 mg Oral TID w/meals     metoprolol succinate ER  12.5 mg Oral BID     pantoprazole  40 mg Oral Daily     sodium chloride (PF)  3 mL Intracatheter Q8H     torsemide  40 mg Oral Daily

## 2022-04-19 NOTE — PROGRESS NOTES
"SPIRITUAL HEALTH SERVICES Progress Note  [facility]    Length-of-Stay visit.    Francine shared a wide-ranging personal story incorporating family, partnerships, and travel.    She explored grief and loss, told of close friendships, and named being part  as an element of her interests and spirituality. Francine particularly enjoys [place] and finds water a calming element.    She spoke of meditation and coping with anxiety, and she said she's \"overly emotional,\" sometimes growing tearful during the visit.     Upon inquiry, Francine shared that she feels safe at [facility] and likes being there.    I offered reflective listening and emotional support, guidance on meditation, affirmed experiences, normalized emotions, and sang a blessing.    I then shared about the visit with RICHARD.    SH remains available.    Rev Laura Christiansen  Associate   Spiritual Health Phone Line [phone]  Spiritual Health Pager [phone]  "

## 2022-04-19 NOTE — ANESTHESIA CARE TRANSFER NOTE
Patient: Francine Chris    Procedure: Procedure(s):  EVACUATE HEMATOMA UPPER RIGHT ARM. EXCISION FULL THICKNESS SKIN ULCER RIGHT UPPER ARM       Diagnosis: A-V fistula (H) [I77.0]  Diagnosis Additional Information: No value filed.    Anesthesia Type:   General     Note:    Oropharynx: oropharynx clear of all foreign objects and spontaneously breathing  Level of Consciousness: awake  Oxygen Supplementation: face mask  Level of Supplemental Oxygen (L/min / FiO2): 6  Independent Airway: airway patency satisfactory and stable  Dentition: dentition unchanged  Vital Signs Stable: post-procedure vital signs reviewed and stable  Report to RN Given: handoff report given  Patient transferred to: PACU    Handoff Report: Identifed the Patient, Identified the Reponsible Provider, Reviewed the pertinent medical history, Discussed the surgical course, Reviewed Intra-OP anesthesia mangement and issues during anesthesia, Set expectations for post-procedure period and Allowed opportunity for questions and acknowledgement of understanding      Vitals:  Vitals Value Taken Time   /65 04/18/22 1900   Temp     Pulse 76 04/18/22 1909   Resp 18 04/18/22 1909   SpO2 97 % 04/18/22 1910   Vitals shown include unvalidated device data.    Electronically Signed By: JUSTO Mccoy CRNA  April 18, 2022  10:23 PM

## 2022-04-20 NOTE — PLAN OF CARE
POD 2: A&Ox4. VSS. Mild headache controlled w/ tylenol. Up w/ Ax1 WK, ambulated in room overnight. BS active, BM overnight. R arm wrapped w/ Kerlix, SVETA present w/ bloody drainage, dressing changed yesterday. Hematoma present along R arm, marked. Vargas wound dressing CDI. Tolerating regular diet,1200 ml fluid restriction. Using IS. PIV SL.

## 2022-04-20 NOTE — PROGRESS NOTES
Care Management Discharge Note    Discharge Date: 04/20/2022       Discharge Disposition: Long Term Care    Discharge Services:      Discharge DME:      Discharge Transportation:      Private pay costs discussed: Not applicable    PAS Confirmation Code:    Patient/family educated on Medicare website which has current facility and service quality ratings:      Education Provided on the Discharge Plan:    Persons Notified of Discharge Plans: bedside nurse, [facility] admission, [facility]  Patient/Family in Agreement with the Plan: yes    Handoff Referral Completed: Yes    Additional Information:  Pt to discharge to [facility] LTC today at 2:15 via  w/c transport. Orders faxed and SW updated Sophia in [facility] admissions.         GAYLE Melgar

## 2022-04-20 NOTE — PROGRESS NOTES
Vascular Surgery Progress Note    S: Very comfortable this morning.  Anxious to go home.    O:   Vitals:  BP  Min: 109/59  Max: 127/62  Temp  Av.1  F (36.7  C)  Min: 97.7  F (36.5  C)  Max: 98.5  F (36.9  C)  Pulse  Av.4  Min: 70  Max: 81  I/O last 3 completed shifts:  In: 960 [P.O.:960]  Out: 25 [Drains:25]    Physical Exam: Right upper arm incision and dressings= dry    No output from hematoma SVETA since evening    Good thrill in aVF      Assessment/Plan: Hemodialysis this morning via right jugular tunneled catheter.  Plan discharge following this.  We will remove SVETA drain this morning.    Discussed with Dr. Blanco and patient's sister on the phone this morning (they live together).    She would like her fistula care transferred to my office and this will be arranged.  Plan suture removal due to very friable skin in approximately 3 weeks in the office.    Sree Eric MD

## 2022-04-20 NOTE — DISCHARGE SUMMARY
[facility]  Hospitalist Discharge Summary      Date of Admission:  4/16/2022  Date of Discharge:  4/20/2022  Discharging Provider: Estela Blanco MD  Discharge Service: Hospitalist Service    Discharge Diagnoses        Right arm hematoma around AV fistula    Hyponatremia    Macrocytic anemia    Thrombocytopenia    ESRD on HD    Atrial fibrillation status post AV node ablation    NSVT    S/p ppm    HTN    Hyperlipidemia    Severe tricuspid regurgitation    Pulmonary hypertension    Chronic right heart failure     SKY     Hx of gout     GERD      Follow-ups Needed After Discharge   Follow-up Appointments     Follow Up and recommended labs and tests      Follow up with long term physician.  The following labs/tests are   recommended: BMP at follow up.    Continue hemodialysis as per schedule.               Unresulted Labs Ordered in the Past 30 Days of this Admission     No orders found from 3/17/2022 to 4/17/2022.      These results will be followed up by none    Discharge Disposition   Discharged to LTC  Condition at discharge: Stable      Hospital Course    Francine Chris is a 76 year old female with a past medical history of with a past medical history of COPD, hypertension, SKY, peripheral artery disease, depression, A. fib on Eliquis, end-stage renal disease on dialysis Mondays Wednesdays and Fridays presents to hospital with an AV fistula hematoma.     Right arm hematoma around AV fisula  3 days of swelling, pain, bruising at her AV fistula site. Symptoms progressed so she presented to ER. Ultrasound confirmed a 8.4 cm hematoma just distal to the AV fistula.  Fistula was patent.       -Vascular surgery consulted, s/p excision of the skin ulceration and dryness of the hematoma with drain placement 4/18. Drain was removed on day of discharge  -Anticoagulation was held and after drain removal on day of discharge, recommended to resume after discharge.   -symptomatic treatment of  elbow/post surgical pain, much improved now     Hyponatremia   Chronic, slightly worse than baseline; at presentation 122.  Baseline is between 124-130.  Patient is on 1200 mL fluid restriction.   -Sodium was down to 119, 129 after HD and is 125 today.  -Continue with fluid restriction  -Appreciate nephrology managing fluid and electrolyte.     Macrocytic anemia  Thrombocytopenia  -Chronic, likely due to renal failure  -Platelet count also mildly low, likely related to consumption from bleeding      ESRD on hd MWF  -Nephrology following for dialysis  -S/p HD 4/18 and 4/20, patient will continue as per prior her schedule      COPD  -not in exacerbation  -Albuterol as needed     Atrial fibrillation status post AV node ablation  NSVT  S/p ppm  HTN  Hyperlipidemia  Severe tricuspid regurgitation  Pulmonary hypertension  Chronic right heart failure  -Appears euvolemic on exam   -PTA Eliquis remains on hold.  Resume when okay with vascular surgery  -Continue metoprolol, statin, torsemide     SKY  -Does not tolerate CPAP     Hx of gout  -Allopurinol after dialysis     GERD  -PPI    Consultations This Hospital Stay   VASCULAR SURGERY IP CONSULT  NEPHROLOGY IP CONSULT  CARE MANAGEMENT / SOCIAL WORK IP CONSULT  WOUND OSTOMY CONTINENCE NURSE  IP CONSULT    Code Status   Full Code    Time Spent on this Encounter   I, Estela Blanco MD, personally saw the patient today and spent greater than 30 minutes discharging this patient.       Estela Blanco MD  [address] [address]  Phone: [phone]  Fax: [phone]  ______________________________________________________________________    Physical Exam   Vital Signs: Temp: 97.5  F (36.4  C) Temp src: Oral BP: 124/50 Pulse: 74   Resp: 18 SpO2: 96 % O2 Device: None (Room air)    Weight: 132 lbs .89 oz    Gen: AAOX3, NAD, very pleasant, comfortable  HEENT:  no pallor  Resp: CTA B/L, normal WOB, no wheezes  CVS:  RRR, no murmur  Abd/GI: Soft, non-tender. BS- normoactive.    Skin: Warm, dry.  Ecchymosis over multiple areas.  MSK: right arm- with bandage.   Neuro- CN- intact. No focal deficits.        Primary Care Physician   Eileen Arredondo    Discharge Orders      General info for SNF    Length of Stay Estimate: Long Term Care  Condition at Discharge: Stable  Level of care:board and care  Rehabilitation Potential: Good  Admission H&P remains valid and up-to-date: Yes  Recent Chemotherapy: N/A  Use Nursing Home Standing Orders: Yes     Follow Up and recommended labs and tests    Follow up with group home physician.  The following labs/tests are recommended: BMP at follow up.    Continue hemodialysis as per schedule.     Reason for your hospital stay    Hematoma at the AVF site     Daily weights    Call Provider for weight gain of more than 2 pounds per day or 5 pounds per week.     Activity - Up ad mark     Activity - Up with assistive device     Full Code     Diet    Follow this diet upon discharge: Orders Placed This Encounter      Fluid restriction 1200 ML FLUID      Regular Diet Adult       Significant Results and Procedures   Most Recent 3 CBC's:Recent Labs   Lab Test 04/19/22  0739 04/18/22  0840 04/17/22  0737   WBC 4.8 6.0 6.9   HGB 10.5* 9.4* 10.3*   MCV 99 99 102*   * 130* 170     Most Recent 3 BMP's:Recent Labs   Lab Test 04/19/22  0739 04/18/22  1855 04/18/22  1140 04/18/22  0840 04/17/22  0737   * 128* 129* 119* 122*   POTASSIUM 4.4  --   --  4.8 4.8   CHLORIDE 92*  --   --  85* 90*   CO2 24  --   --  22 23   BUN 45*  --   --  72* 55*   CR 3.91*  --   --  5.31* 5.04*   ANIONGAP 9  --   --  12 9   SILVIA 9.6  --   --  9.4 9.8   *  --   --  87 87     Most Recent 2 LFT's:Recent Labs   Lab Test 02/15/22  0624 01/26/22  0638   AST 37 43   ALT 32 46   ALKPHOS 122 148   BILITOTAL 1.0 1.4*     Most Recent TSH and T4:No lab results found.  Most Recent Hemoglobin A1c:No lab results found.  Most Recent 6  glucoses:Recent Labs   Lab Test 04/19/22  0739 04/18/22  0840 04/17/22  0737 04/16/22  1907 03/03/22  0654 02/15/22  0624   * 87 87 127* 76 110*   ,   Results for orders placed or performed during the hospital encounter of 04/16/22   US Ext Arterial Venous Dialys Acs Graft    Narrative    EXAM: US EXTREMITY ARTERIAL VENOUS DIALYSIS ACCESS GRAFT  LOCATION: [facility]  DATE/TIME: 4/16/2022 8:05 PM    INDICATION: Swelling pain to fistula x 3 days.  COMPARISON: None.  TECHNIQUE: Arterial Duplex ultrasound of the right arm. Color flow and spectral Doppler with waveform analysis performed.    FINDINGS:  Brachial artery to basilic vein fistula is widely patent. There is a hematoma just distal to the AV fistula at the distal upper arm and antecubital fossa measuring 8.4 x 6.5 x 4.7 cm. No flow is evident within this hematoma.      Impression    IMPRESSION:   1.  8.4 cm hematoma in the distal upper arm and antecubital fossa just distal to the AV fistula.  2.  Widely patent AV fistula.       Discharge Medications   Current Discharge Medication List      CONTINUE these medications which have NOT CHANGED    Details   acetaminophen (TYLENOL) 325 MG tablet Take 2 tablets (650 mg) by mouth 2 times daily. May also take 2 tablets (650 mg) 2 times daily as needed for pain.  Qty: 120 tablet, Refills: 98    Associated Diagnoses: Pain      albuterol (PROAIR HFA/PROVENTIL HFA/VENTOLIN HFA) 108 (90 Base) MCG/ACT inhaler Inhale 2 puffs into the lungs every 4 hours as needed      allopurinol (ZYLOPRIM) 100 MG tablet TAKE ONE TABLET BY MOUTH THREE TIMES A WEEK ON MONDAY, WEDNESDAY AND FRIDAY AFTER HD SESSION  Qty: 12 tablet, Refills: 97    Associated Diagnoses: Chronic gout without tophus, unspecified cause, unspecified site      apixaban ANTICOAGULANT (ELIQUIS) 2.5 MG tablet Take 1 tablet (2.5 mg) by mouth 2 times daily  Qty: 60 tablet, Refills: 11    Associated Diagnoses: Chronic atrial fibrillation (H)       atorvastatin (LIPITOR) 40 MG tablet Take 1 tablet (40 mg) by mouth daily  Qty: 30 tablet, Refills: 11    Associated Diagnoses: Hyperlipidemia      calcium acetate (CALPHRON) 667 MG TABS tablet Take 1 tablet (667 mg) by mouth 3 times daily (with meals)      Glycopyrrolate-Formoterol (BEVESPI AEROSPHERE) 9-4.8 MCG/ACT oral inhaler Inhale 2 puffs into the lungs 2 times daily  Qty: 10.7 g, Refills: 4    Associated Diagnoses: Chronic obstructive pulmonary disease, unspecified COPD type (H)      loratadine (CLARITIN) 10 MG tablet Take 1 tablet (10 mg) by mouth At Bedtime  Qty: 30 tablet, Refills: 98    Associated Diagnoses: Itching      Melatonin 10 MG TABS tablet Take 10 mg by mouth At Bedtime      metoprolol succinate ER (TOPROL-XL) 25 MG 24 hr tablet Take 0.5 tablets (12.5 mg) by mouth 2 times daily  Qty: 60 tablet, Refills: 4    Associated Diagnoses: Chronic atrial fibrillation (H)      multivitamin RENAL (MULTIVITAMIN RENAL) 1 MG capsule Take 1 capsule by mouth daily      pantoprazole (PROTONIX) 40 MG EC tablet Take 1 tablet (40 mg) by mouth daily  Qty: 30 tablet, Refills: 11    Associated Diagnoses: GERD (gastroesophageal reflux disease)      sodium chloride (OCEAN) 0.65 % nasal spray Spray 1 spray into both nostrils every 2 hours as needed for congestion MAY KEEP AT BEDSIDE  Qty: 50 mL, Refills: 98    Associated Diagnoses: Nose dryness      torsemide (DEMADEX) 20 MG tablet Take 2 tablets (40 mg) by mouth daily Take at 8 AM and 1 PM.  Qty: 120 tablet, Refills: 4      triamcinolone (KENALOG) 0.1 % external cream Apply to AA BID x 1-2 week then PRN only  Qty: 454 g, Refills: 2    Associated Diagnoses: Itching      ACE/ARB/ARNI NOT PRESCRIBED (INTENTIONAL) Please choose reason not prescribed from choices below.    Associated Diagnoses: End stage renal disease (H)           Allergies   Allergies   Allergen Reactions     Sotalol Other (See Comments)     Prolonged QT     Bupropion Other (See Comments)     Na 118  acutely. Comorbid diuresis for heart failure

## 2022-04-20 NOTE — CONSULTS
[facility] Nurse Inpatient Assessment     Today's Assessment: Left anterior lower leg    Left leg wound is small and slowly improving per pt.  No acute s/s infection, but wound still has non-viable tissue in base and is fairly deep.  Will trial Plurogel to help with autolytic debridement.     Patient History (according to provider note(s):      Francine Chris is a 76 year old female with a past medical history of with a past medical history of COPD, hypertension, SKY, peripheral artery disease, depression, A. fib on Eliquis, end-stage renal disease on dialysis Mondays Wednesdays and Fridays presents to hospital with an AV fistula hematoma.    AREAS ASSESSED:      Focused: BLE    Wound Location: Left anterior lower leg    Last photo: 4-20-22 4-20-22 BLE overview      Wound due to: Trauma; started as blister/hematoma in mid Feb 2022  Wound history/plan of care:   Followed by wound team at facility, who is using saline moist gauze.  Pt likes to dangle legs over side of bed due to discomfort/restless legs.   Wound base: dark red and yellow moist tissue, non granular, semi-necrotic     Palpation of the wound bed: normal      Drainage: small     Description of drainage: serosanguinous     Measurements (length x width x depth, in cm) approx 1.5 x 0.8 x 0.3+cm      Tunneling N/A     Undermining none obvious  Periwound skin: intact      Color: normal and consistent with surrounding tissue      Temperature: normal   Odor: none  Pain: mild and moderate, tender with palpation  Pain interventions prior to dressing change: N/A  Treatment goal: Heal , Remove necrotic tissue and Soften necrotic tissue  STATUS: initial assessment  Supplies ordered: gathered and at bedside       TREATMENT PLAN:     Left lower leg wound(s): Every other day and prn:  1.  Cleanse thoroughly with wound cleanser  2.  Apply large pea-size amount Plurogel to a non-adherent dressing and press onto wound     Follow-up  with wound care team at facility.    RECOMMEND PRIMARY TEAM ORDER: None, at this time  Education provided: plan of care, wound progress and Infection prevention   Discussed plan of care with: Patient and Nurse  WOC Nurse follow-up plan:weekly  Notify WOC if wound(s) deteriorate.  Nursing to notify the Provider(s) and re-consult the WOC Nurse if new skin concern.    DATA:     Current support surface: Standard  Atmos Air mattress    BMI: Body mass index is 22.67 kg/m .     Active Diet Order: Orders Placed This Encounter      Regular Diet Adult      Diet     Output: I/O last 3 completed shifts:  In: 600 [P.O.:600]  Out: 1510 [Drains:10; Other:1500]     Labs: Recent Labs   Lab 04/19/22  0739 04/18/22  0840 04/17/22  0737   HGB 10.5*   < > 10.3*   INR  --   --  1.15   WBC 4.8   < > 6.9    < > = values in this interval not displayed.     Pressure Injury Risk Assessment:   Miguel Risk Assessment  Sensory Perception: 4-->no impairment  Moisture: 4-->rarely moist  Activity: 4-->walks frequently  Mobility: 4-->no limitation  Nutrition: 4-->excellent  Friction and Shear: 3-->no apparent problem  Miguel Score: 23    Janette Parrish RN

## 2022-04-20 NOTE — PROGRESS NOTES
VSS. A/O. Up-1. R arm AVF dressing changed. CMS intact. Bruised. Denies pain. Hemodialysis. Tolerating diet. 1200 fluid restriction.

## 2022-04-20 NOTE — PROGRESS NOTES
VASCULAR SURGERY PROGRESS NOTE    Ms. Chris is a 75yo woman with a right upper extremity hematoma s/p evacuation.    Drain pulled.    Okay to resume anticoagulation. Keep right upper extremity wound dressed with nonadherent gauze over the sutures and light wrap. Keep arm elevated when possible. Follow up with Dr. Eric in 3 weeks for suture removal.    Daylin Vieira MD  04/20/22  12:12 PM

## 2022-04-20 NOTE — PROGRESS NOTES
VSS. A/O. SBA. Denies pain. R arm SVETA removed, dressing changed. CMS intact. Bruises. Dialysis done. Tolerating diet. d'c

## 2022-04-20 NOTE — PROGRESS NOTES
Potassium   Date Value Ref Range Status   04/19/2022 4.4 3.4 - 5.3 mmol/L Final   01/12/2005 4.5 3.4 - 5.3 mmol/L Final     Hemoglobin   Date Value Ref Range Status   04/19/2022 10.5 (L) 11.7 - 15.7 g/dL Final   01/12/2005 15.6 11.7 - 15.7 g/dL Final     Creatinine   Date Value Ref Range Status   04/19/2022 3.91 (H) 0.52 - 1.04 mg/dL Final   01/12/2005 0.80 0.60 - 1.30 mg/dL Final     Urea Nitrogen   Date Value Ref Range Status   04/19/2022 45 (H) 7 - 30 mg/dL Final   01/12/2005 16 7 - 30 mg/dL Final     Sodium   Date Value Ref Range Status   04/19/2022 125 (L) 133 - 144 mmol/L Final   01/12/2005 141 133 - 144 mmol/L Final     INR   Date Value Ref Range Status   04/17/2022 1.15 0.85 - 1.15 Final       DIALYSIS PROCEDURE NOTE  Hepatitis status of previous patient on machine log was checked and verified ok to use with this patients hepatitis status.  Patient dialyzed for 3 hrs. on a K3 bath with a net fluid removal of  1.5L.  A BFR of 400 ml/min was obtained via a tunneled RIJ.      The treatment plan was discussed with Dr. Palmer during the treatment.    Total heparin received during the treatment: 0 units.    Line flushed, clamped and capped with heparin 1:1000 1.7 mL (1700 units) per lumen    Meds  given: none ordered   Complications: none      Person educated: patient. Knowledge base: minimal. Barriers to learning: none. Educated on access care via verbal mode. Patient verbalized understanding. Pt prefers verbal education style.     ICEBOAT? Timeout performed pre-treatment  I: Patient was identified using 2 identifiers  C:  Consent Signed Yes  E: Equipment preventative maintenance is current and dialysis delivery system OK to use  B: Hepatitis B Surface Antigen: Negative; Draw Date: 1/12/22      Hepatitis B Surface Antibody: Immune; Draw Date: 1/12/22  O: Dialysis orders present and complete prior to treatment  A: Vascular access verified and assessed prior to treatment  T: Treatment was performed at a clinically  appropriate time  ?: Patient was allowed to ask questions and address concerns prior to treatment  See flowsheet in EPIC for further details and post assessment.  Machine water alarm in place and functioning. Transducer pods intact and checked every 15min.   Pt returned via bed.  Chlorine/Chloramine water system checked every 4 hours.  Outpatient Dialysis at [facility] [place] qMWF    Please remove patient dressing on AVF and AVG needle sites 24 hours after dialysis. If leaking occurs please apply a Band-Aid.

## 2022-04-20 NOTE — PROGRESS NOTES
Inpatient Dialysis Progress Note            Assessment and Plan:     1.  ESRD               -MWF [facility]              -Dr. Ro              -apparent ATN without recovery     Outpatient Orders:     Dialyzer: 160NRe Optiflux   Na: 137 mEq/L   Bicarb: 38 mEq/L   Dialysate: 3.0 K, 2.25 Ca, 1.0 Mg, 100 Dextrose ()   Dialysate/Machine Temp (prescribed): 37 C   BFR (actual): 400   Prescribed time: 03:00   EDW: 59 kg   Access Type: Active (In Use):CVCatheter-Tunneled/Chest  Pre Dialysis Vitals (for 3/28/2022 12:43 PM )  Pre BP (sit): 148/76  Pre Wt: 62 kg    Treatment Medication Orders    Medication Sig Start Date End Date   Heparin   Mircera  Vitamin D (Calcitriol)      2.  Right arm hematoma around AV fistula              -surgical drainage planned today.       3.  Anemia     4.  Secondary hyperparathyroidism     5.  Hyponatremia - no other reason than reduced water clearance in advanced kidney disease.    Better today - 125 prerun.  Use Na bath 137 as this is her usual dialysate Na.          Plan:    HD per usual today  Discharge ?                     Interval History:     Feels OK.  Arm is much better after evacuation of hematoma.  Hoping to be released today.          Dialysis Parameters:     Wt Readings from Last 4 Encounters:   04/18/22 59.9 kg (132 lb 0.9 oz)   04/13/22 59.9 kg (132 lb)   04/08/22 59.5 kg (131 lb 3.2 oz)   03/28/22 60.1 kg (132 lb 9.6 oz)     I/O last 3 completed shifts:  In: 960 [P.O.:960]  Out: 25 [Drains:25]  BP Readings from Last 3 Encounters:   04/20/22 104/55   04/13/22 128/74   04/08/22 122/64       Routine, ONE TIME, Starting today For 1 Occurrences  Weight Loss (kg): 1.5  Dialysis Temp: 36.5  C  Access Device: CVC  Access Site: R IJ  Dialyzer: Revaclear  Dialysis Bath: K 3  Blood Flow Rate (mL/min): 400  Total Treatment Time (hrs): 3  Heparin: no         Medications and Allergies:   Reviewed in EPIC      acetaminophen  975 mg Oral Once     atorvastatin  40 mg Oral  Daily     calcium acetate  667 mg Oral TID w/meals     sodium chloride (PF) 0.9%  1.3-2.6 mL Intracatheter Once in dialysis/CRRT    Followed by     heparin  3 mL Intracatheter Once in dialysis/CRRT     sodium chloride (PF) 0.9%  1.3-2.6 mL Intracatheter Once in dialysis/CRRT    Followed by     heparin  3 mL Intracatheter Once in dialysis/CRRT     metoprolol succinate ER  12.5 mg Oral BID     - MEDICATION INSTRUCTIONS -   Does not apply Once     pantoprazole  40 mg Oral Daily     sodium chloride (PF)  3 mL Intracatheter Q8H     sodium chloride (PF)  9 mL Intracatheter During Dialysis/CRRT (from stock)     sodium chloride (PF)  9 mL Intracatheter During Dialysis/CRRT (from stock)     torsemide  40 mg Oral Daily     sodium chloride 0.9%, acetaminophen **OR** acetaminophen, albuterol, alteplase, alteplase, diphenhydrAMINE, diphenhydrAMINE-zinc acetate, HYDROmorphone, lidocaine 4%, lidocaine (buffered or not buffered), melatonin, naloxone **OR** naloxone **OR** naloxone **OR** naloxone, oxyCODONE IR, prochlorperazine **OR** prochlorperazine **OR** prochlorperazine, sodium chloride (PF), sodium chloride (PF), sodium chloride (PF)     Allergies   Allergen Reactions     Sotalol Other (See Comments)     Prolonged QT     Bupropion Other (See Comments)     Na 118 acutely. Comorbid diuresis for heart failure              Labs:     BMP  Recent Labs   Lab 04/19/22  0739 04/18/22  1855 04/18/22  1140 04/18/22  0840 04/17/22  0737 04/16/22  1907   * 128* 129* 119* 122* 121*   POTASSIUM 4.4  --   --  4.8 4.8 4.2   CHLORIDE 92*  --   --  85* 90* 84*   SILVIA 9.6  --   --  9.4 9.8 10.3*   CO2 24  --   --  22 23 25   BUN 45*  --   --  72* 55* 49*   CR 3.91*  --   --  5.31* 5.04* 4.71*   *  --   --  87 87 127*     CBC  Recent Labs   Lab 04/19/22  0739 04/18/22  0840 04/17/22  0737 04/16/22  1907   WBC 4.8 6.0 6.9 7.3   HGB 10.5* 9.4* 10.3* 10.8*   HCT 30.6* 27.4* 31.3* 31.3*   MCV 99 99 102* 99   * 130* 170 188     Lab  Results   Component Value Date    AST 37 02/15/2022    ALT 32 02/15/2022    ALKPHOS 122 02/15/2022    BILITOTAL 1.0 02/15/2022            Physical Exam:   Vitals were reviewed in [facility]    Wt Readings from Last 3 Encounters:   04/18/22 59.9 kg (132 lb 0.9 oz)   04/13/22 59.9 kg (132 lb)   04/08/22 59.5 kg (131 lb 3.2 oz)       Intake/Output Summary (Last 24 hours) at 4/20/2022 0857  Last data filed at 4/20/2022 0655  Gross per 24 hour   Intake 720 ml   Output 25 ml   Net 695 ml       GENERAL APPEARANCE: pleasant, no distress, a & o  HEENT:  Eyes/ears/nose grossly normal, neck supple  RESP: lungs clear to auscultation with good efforts, no crackles, rhonchi or wheezes  CV: regular rate and rhythm, normal S1 S2, no murmur, click or rub   ABDOMEN: soft, nontender, bowel sounds normal  EXTREMITIES/SKIN: tr ble edema, hematoma around RUE AVF improved following surgery.       Pt seen on dialysis.  Stable run.  Good BFR.      Attestation:  I have reviewed today's vital signs, notes, medications, labs and imaging.     Maikel Palmer MD  [facility] - Nephrology  [phone]

## 2022-04-21 NOTE — PROGRESS NOTES
S-(situation): CPN monitoring for any newly identified care navigation needs.    B-(background): Patient transferred to Long Term Care at [facility] on 2/16/22.    A-(assessment): Patient was inpatient at [facility] 4/16/22-4/20/22 due to hyponatremia, A-V fistula, ESRD on dialysis and hematoma of skin.  Patient was discharged back to Long Term Care at [facility].  No new network care navigation needs identified.    R-(recommendations/plan):  RN Clinical Product Navigator will perform no further monitoring or outreach at this time.    Melissa Behl BSN, RN, PHN, [facility]  RN Clinical Product Navigator  [phone]

## 2022-04-22 NOTE — LETTER
4/22/2022        RE: Francine Chris  [facility]  [address]  [facility] [address]        [facility]  PRIMARY CARE PROVIDER AND CLINIC:  JUSTO Jerry CNP, [address] [place] [address] / [facility] [id number]  Chief Complaint   Patient presents with     Hospital F/U     [facility] Record Number:  [id number]  Place of Service where encounter took place:  [facility] [[id number]]    Francine Chris  is a 76 year old  (1945), returned to the above facility from  [facility]. Hospital stay 4/16/22 - 4/20/22. .  Admitted to this facility for  rehab, medical management and nursing care.    HPI:    HPI information obtained from: facility chart records, facility staff and patient report.   Brief Summary of Hospital Course:   Updates on Status Since Skilled nursing Admission:     Patient Francine Chris is a 76 yr old female re-admitted to [facility] s/p hospitalization for AV fistula hematoma and s/p excision and drain. Drain removed prior to discharge. Anticoagulation restarted  Hyponatremia noted with . Nephrology guide fluid and electrolytes and improve to 125. Baseline 124-130. Put on 1200cc fluid restiction    PMHx  Afib (on Eliquis), s/p ablation and PPM, ESRD on dialysis Mon,Wed, Friday, pulmonary hypertension, severe tricuspid regurgitation, CHF (RIGHT), PAD, anemia, COPD, SKY, GERD, anxiety, depression and gout      TODAY  Patient ate breakfast. States appetites per usual  C/o muscle cramps in legs and hands. Standing, warm compress and Benadryl help.    Hyponatremia maybe contributing to cramping.    Has dialysis today. Has alt dialysis access site on chest  Reports pain managed at fistula/hematoma site    Walking with walker and going to therapies  Has some shortness of breath with walking length of oates to therapies.   Denies chest pain  Reports regular elimination    Consulted with staff  nurse, no concerns. Wound examined with nurse  Consulted with therapies, patient participating and walking down to therapies independently        CODE STATUS/ADVANCE DIRECTIVES DISCUSSION:   CPR/Full code   Patient's living condition: lives in a nursing home  ALLERGIES: Sotalol and Bupropion  PAST MEDICAL HISTORY:  has a past medical history of Acute renal failure (ARF) (H) (06/22/2021), Anemia in chronic kidney disease, on chronic dialysis (H) (07/14/2021), Atrophy of left kidney (05/18/2017), Chronic atrial fibrillation (H) (04/01/2013), Chronic kidney disease, stage III (moderate) (H) (11/13/2019), COPD mixed type (H) (04/06/2013), Cor pulmonale (chronic) (H) (09/20/2021), Depressive disorder, not elsewhere classified, Dermatitis of lower extremity (05/05/2017), Elevated blood pressure reading without diagnosis of hypertension (12/10/2004), HTN (hypertension) (09/07/2011), gout (08/13/2014), Hypercalcemia (07/16/2018), Hypertensive heart and kidney disease (07/16/2018), Hyponatremia (06/22/2021), Intermediate stage nonexudative age-related macular degeneration of both eyes (01/13/2021), Lung nodule (04/28/2021), Malignant melanoma (H), Migraine variant (12/10/2004), Neoplasm of skin of neck (06/06/2014), NSVT (nonsustained ventricular tachycardia) (H) (04/09/2020), SKY (obstructive sleep apnea) (04/08/2020), Osteoporosis (11/10/2004), Osteoporosis, unspecified (dx 8/02), Pacemaker (07/14/2021), PAD (peripheral artery disease) (H) (02/08/2019), 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  PAST SURGICAL HISTORY:   has a past surgical history that includes NONSPECIFIC PROCEDURE (1990's); NONSPECIFIC PROCEDURE; NONSPECIFIC PROCEDURE; NONSPECIFIC PROCEDURE (1980's); and Irrigation and debridement upper extremity, combined (Right,  4/18/2022).  FAMILY HISTORY: family history includes Alcohol/Drug in her brother and father; Allergies in her sister; Alzheimer Disease in her maternal uncle; Arthritis in her maternal grandmother, mother, and sister; Bladder Cancer in her brother; Breast Cancer in her maternal grandmother; C.A.D. in her maternal grandfather; Cancer - colorectal in her maternal grandmother; Cerebrovascular Disease in her maternal grandmother; Circulatory in her father and mother; Diabetes in her brother; Gastrointestinal Disease in her maternal grandmother; Heart Disease in her father and mother; Heart Failure in her paternal grandmother; Hyperlipidemia in her maternal uncle; Hypertension in her mother and sister; Lipids in her mother; Myocardial Infarction in her brother; Obesity in her brother; Osteoarthritis in her maternal grandfather; Osteoporosis in her maternal grandmother, mother, and sister; Prostate Cancer in her brother; Thyroid Disease in her maternal grandmother and another family member.  SOCIAL HISTORY:   reports that she quit smoking about 12 months ago. Her smoking use included cigarettes. She has a 43.00 pack-year smoking history. She has never used smokeless tobacco. She reports current alcohol use. She reports that she does not use drugs.    Post Discharge Medication Reconciliation Status: discharge medications reconciled and changed, per note/orders    Current Outpatient Medications   Medication Sig Dispense Refill     ACE/ARB/ARNI NOT PRESCRIBED (INTENTIONAL) Please choose reason not prescribed from choices below.       acetaminophen (TYLENOL) 325 MG tablet Take 2 tablets (650 mg) by mouth 2 times daily. May also take 2 tablets (650 mg) 2 times daily as needed for pain. 120 tablet 98     albuterol (PROAIR HFA/PROVENTIL HFA/VENTOLIN HFA) 108 (90 Base) MCG/ACT inhaler Inhale 2 puffs into the lungs every 4 hours as needed       allopurinol (ZYLOPRIM) 100 MG tablet TAKE ONE TABLET BY MOUTH THREE TIMES A WEEK ON  "MONDAY, WEDNESDAY AND FRIDAY AFTER HD SESSION 12 tablet 97     apixaban ANTICOAGULANT (ELIQUIS) 2.5 MG tablet Take 1 tablet (2.5 mg) by mouth 2 times daily 60 tablet 11     atorvastatin (LIPITOR) 40 MG tablet Take 1 tablet (40 mg) by mouth daily 30 tablet 11     calcium acetate (CALPHRON) 667 MG TABS tablet Take 1 tablet (667 mg) by mouth 3 times daily (with meals)       Glycopyrrolate-Formoterol (BEVESPI AEROSPHERE) 9-4.8 MCG/ACT oral inhaler Inhale 2 puffs into the lungs 2 times daily 10.7 g 4     loratadine (CLARITIN) 10 MG tablet Take 1 tablet (10 mg) by mouth At Bedtime 30 tablet 98     Melatonin 10 MG TABS tablet Take 10 mg by mouth At Bedtime       metoprolol succinate ER (TOPROL-XL) 25 MG 24 hr tablet Take 0.5 tablets (12.5 mg) by mouth 2 times daily 60 tablet 4     multivitamin RENAL (MULTIVITAMIN RENAL) 1 MG capsule Take 1 capsule by mouth daily       pantoprazole (PROTONIX) 40 MG EC tablet Take 1 tablet (40 mg) by mouth daily 30 tablet 11     sodium chloride (OCEAN) 0.65 % nasal spray Spray 1 spray into both nostrils every 2 hours as needed for congestion MAY KEEP AT BEDSIDE 50 mL 98     torsemide (DEMADEX) 20 MG tablet Take 2 tablets (40 mg) by mouth daily Take at 8 AM and 1 PM. 120 tablet 4     triamcinolone (KENALOG) 0.1 % external cream Apply to AA BID x 1-2 week then PRN only 454 g 2     ROS:  10 point ROS of systems including Constitutional, Eyes, Respiratory, Cardiovascular, Gastroenterology, Genitourinary, Integumentary, Musculoskeletal, Psychiatric were all negative except for pertinent positives noted in my HPI.    Vitals:  /60   Pulse 70   Temp 97.7  F (36.5  C)   Resp 18   Ht 1.626 m (5' 4\")   Wt 60 kg (132 lb 4.8 oz)   SpO2 95%   BMI 22.71 kg/m    Exam:  GENERAL APPEARANCE:  Alert, in no distress  ENT:  Mouth and posterior oropharynx normal, moist mucous membranes  EYES:  EOM, conjunctivae, lids, pupils and irises normal  NECK:  No adenopathy,masses or thyromegaly  RESP:  " respiratory effort and palpation of chest normal, lungs clear to auscultation, no respiratory distress  Mild shortness of breath with ambulation  CV:  Palpation and auscultation of heart done , regular rate and rhythm, no murmur, rub, or gallop  ABDOMEN:  normal bowel sounds, soft, nontender, no hepatosplenomegaly or other masses  M/S:   Gait and station normal with walker, appears to have scoliosis & kyphosis   SKIN:  Inspection of skin and subcutaneous tissue  Left leg wound healing        Right arm hematoma site clean and dry. Sutures intact          NEURO:   Cranial nerves 2-12 are normal tested and grossly at patient's baseline  PSYCH:  oriented X 3    Lab/Diagnostic data:  Labs done in SNF are in [place] EPIC. Please refer to them using [facility] Everywhere.    ASSESSMENT/PLAN:    Right arm hematoma around AV fisula  8.4 cm hematoma just distal to the AV fistula.  Fistula patent.    s/p excision of the skin ulceration and dryness of the hematoma with drain placement 4/18. Drain was removed on day of discharge  Anticoagulation held and after drain removal resumed  Pain resolving.   Continue daily dressing as ordered     Macrocytic anemia  Thrombocytopenia  Chronic, likely due to renal failure  Platelet count also mildly low, likely related to consumption from bleeding   Hemoglobin   Date Value Ref Range Status   04/19/2022 10.5 (L) 11.7 - 15.7 g/dL Final   01/12/2005 15.6 11.7 - 15.7 g/dL Final   hgb baseline ~9.5, stable  Platelet Count   Date Value Ref Range Status   04/19/2022 140 (L) 150 - 450 10e3/uL Final   01/12/2005 326 150 - 450 10e9/L Final   monitor   requested CMP,CBC with dialysis day next week     ESRD on hd MWF  Per hospital note : During hospitalization with severe hypotension/ afib with RVR in 6/2021 developed SILSA necessitating initiation of hemodialysis.   Dialyzes MWF   Follow up nephrologist Dr. Ro  Continue Calcium acetate  Hyponatremia   noted with . Nephrology guide fluid and  electrolytes and improve to 125. Baseline 124-130. Put on 1200cc fluid restriction. Avoid excessive weight gains between dialysis   Unclear if Torsemide contributing    Muscle cramps  Likely multi-factorial with complications chronic kidney disease, PAD   lower extremity and hands at times  Continue warm compress   Consider topical rub or muscle relaxant   Avoid Benadryl if able.   Has antihistamine Claritin takes at bedtime  Continue Tylenol for pain    COPD  History smoking  Denies shortness of breath   Continue tiotropium-olodaterol and as needed albuterol  Follow up pulmonologist as ordered    SKY  Monitor for hypoxia  No longer wears CPAP     Severe tricuspid regurgitation   Pulmonary hypertension  Chronic R heart failure, compensated  HTN  HLD  Echo with EF 50-55%, nl LV fn, severe TR, pulmonary pressures 60-65 mmHg. Seen by CV surgery, deemed to be too high of a surgical risk for valve replacement.   Wt Readings from Last 2 Encounters:   04/22/22 60 kg (132 lb 4.8 oz)   04/18/22 59.9 kg (132 lb 0.9 oz)   dry weight ~131-132lb, chronic managed   Dialysis to guide fluid management on dialysis runs, on 1200cc fluid restriction  Hr and blood pressure managed  Continue Metoprolol, Torsemide and Lipitor   Follow up lipid panel  Follow up cardiologist as advised    Permanent atrial fibrillation  S/p AVN ablation with ppm 6/2021  Continue Eliquis, no further signs and symptoms bleeding   Hr regular, continue Metoprolol   Pacemaker check per protocol   Monitor     PAD  Continue blood pressure managed  Not on aspirin due to bleeding risk on anticoagulation   Continue Lipitor  Remains on Eliquis    Left leg wound  Healing, no signs and symptoms infection  Monitor       Gout  Denies pain  Continue allopurinol 100 mg MWF after dialysis       GERD  Denies gastrointestinal upset  Continue pantoprazole 40 mg daily, consider trial dose reduction    Anxiety  Depression   Mood stable   Not on medications to treat   Consider  house psychologist referral     Insomnia  Reports Melatonin helps  Continue Melatonin  Monitor     Deconditioned  Comment: d/t recent hospitalization & comorbidity, expect delay rehabilitation d/t age & comorbidity  Plan: PT/OT eval & treat, monitor    Goals of care   Full code            Appointments in Next Year    May 02, 2022  1:10 PM  RETURN CORE with Christina Fuentes PA-C  [facility] [place] ([facility] ) [phone]   May 24, 2022  9:00 AM  US JETT DOPPLER NO EXERCISE 1-2 LVLS BILAT with SHVUS2  [facility] ([facility] ) [phone]   May 24, 2022 10:10 AM  New Visit with Mirna Tamayo PA-C  [facility] [place] ([facility] ) [phone]   Michael 10, 2022  2:00 PM  (Arrive by 1:45 PM)  Return Visit with Sophia Damico PA-C  [facility] ([facility] ) [phone]   Jul 22, 2022 12:00 AM  CARDIAC DEVICE CHECK - REMOTE with ROYAL TECH1  [facility] ([facility] ) [phone]            Total time spent with patient visit at the skilled nursing facility was 35 min including patient visit and review of past records. Greater than 50% of total time spent with counseling and coordinating care due to discussion on pain management, wound management and consult with therapies and nursing as noted above .     Electronically signed by:  JUSTO Ward CNP                           Sincerely,        JUSTO Ward CNP

## 2022-04-22 NOTE — PROGRESS NOTES
[facility]  PRIMARY CARE PROVIDER AND CLINIC:  Eileen Arredondo, APRN CNP, [address] / [place] [address]  Chief Complaint   Patient presents with     Hospital F/U     [place] Medical Record Number:  [id number]  Place of Service where encounter took place:  [facility] [[id number]]    Francine Chris  is a 76 year old  (1945), returned to the above facility from  [facility]. Hospital stay 4/16/22 - 4/20/22. .  Admitted to this facility for  rehab, medical management and nursing care.    HPI:    HPI information obtained from: facility chart records, facility staff and patient report.   Brief Summary of Hospital Course:   Updates on Status Since Skilled nursing Admission:     Patient Francine Chris is a 76 yr old female re-admitted to [facility] s/p hospitalization for AV fistula hematoma and s/p excision and drain. Drain removed prior to discharge. Anticoagulation restarted  Hyponatremia noted with . Nephrology guide fluid and electrolytes and improve to 125. Baseline 124-130. Put on 1200cc fluid restiction    PMHx  Afib (on Eliquis), s/p ablation and PPM, ESRD on dialysis Mon,Wed, Friday, pulmonary hypertension, severe tricuspid regurgitation, CHF (RIGHT), PAD, anemia, COPD, SKY, GERD, anxiety, depression and gout      TODAY  Patient ate breakfast. States appetites per usual  C/o muscle cramps in legs and hands. Standing, warm compress and Benadryl help.    Hyponatremia maybe contributing to cramping.    Has dialysis today. Has alt dialysis access site on chest  Reports pain managed at fistula/hematoma site    Walking with walker and going to therapies  Has some shortness of breath with walking length of oaets to therapies.   Denies chest pain  Reports regular elimination    Consulted with staff nurse, no concerns. Wound examined with nurse  Consulted with therapies, patient participating and walking down to therapies  independently        CODE STATUS/ADVANCE DIRECTIVES DISCUSSION:   CPR/Full code   Patient's living condition: lives in a nursing home  ALLERGIES: Sotalol and Bupropion  PAST MEDICAL HISTORY:  has a past medical history of Acute renal failure (ARF) (H) (06/22/2021), Anemia in chronic kidney disease, on chronic dialysis (H) (07/14/2021), Atrophy of left kidney (05/18/2017), Chronic atrial fibrillation (H) (04/01/2013), Chronic kidney disease, stage III (moderate) (H) (11/13/2019), COPD mixed type (H) (04/06/2013), Cor pulmonale (chronic) (H) (09/20/2021), Depressive disorder, not elsewhere classified, Dermatitis of lower extremity (05/05/2017), Elevated blood pressure reading without diagnosis of hypertension (12/10/2004), HTN (hypertension) (09/07/2011), gout (08/13/2014), Hypercalcemia (07/16/2018), Hypertensive heart and kidney disease (07/16/2018), Hyponatremia (06/22/2021), Intermediate stage nonexudative age-related macular degeneration of both eyes (01/13/2021), Lung nodule (04/28/2021), Malignant melanoma (H), Migraine variant (12/10/2004), Neoplasm of skin of neck (06/06/2014), NSVT (nonsustained ventricular tachycardia) (H) (04/09/2020), SKY (obstructive sleep apnea) (04/08/2020), Osteoporosis (11/10/2004), Osteoporosis, unspecified (dx 8/02), Pacemaker (07/14/2021), PAD (peripheral artery disease) (H) (02/08/2019), 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  PAST SURGICAL HISTORY:   has a past surgical history that includes NONSPECIFIC PROCEDURE (1990's); NONSPECIFIC PROCEDURE; NONSPECIFIC PROCEDURE; NONSPECIFIC PROCEDURE (1980's); and Irrigation and debridement upper extremity, combined (Right, 4/18/2022).  FAMILY HISTORY: family history includes Alcohol/Drug in her brother and father; Allergies in her sister; Alzheimer Disease  in her maternal uncle; Arthritis in her maternal grandmother, mother, and sister; Bladder Cancer in her brother; Breast Cancer in her maternal grandmother; C.A.D. in her maternal grandfather; Cancer - colorectal in her maternal grandmother; Cerebrovascular Disease in her maternal grandmother; Circulatory in her father and mother; Diabetes in her brother; Gastrointestinal Disease in her maternal grandmother; Heart Disease in her father and mother; Heart Failure in her paternal grandmother; Hyperlipidemia in her maternal uncle; Hypertension in her mother and sister; Lipids in her mother; Myocardial Infarction in her brother; Obesity in her brother; Osteoarthritis in her maternal grandfather; Osteoporosis in her maternal grandmother, mother, and sister; Prostate Cancer in her brother; Thyroid Disease in her maternal grandmother and another family member.  SOCIAL HISTORY:   reports that she quit smoking about 12 months ago. Her smoking use included cigarettes. She has a 43.00 pack-year smoking history. She has never used smokeless tobacco. She reports current alcohol use. She reports that she does not use drugs.    Post Discharge Medication Reconciliation Status: discharge medications reconciled and changed, per note/orders    Current Outpatient Medications   Medication Sig Dispense Refill     ACE/ARB/ARNI NOT PRESCRIBED (INTENTIONAL) Please choose reason not prescribed from choices below.       acetaminophen (TYLENOL) 325 MG tablet Take 2 tablets (650 mg) by mouth 2 times daily. May also take 2 tablets (650 mg) 2 times daily as needed for pain. 120 tablet 98     albuterol (PROAIR HFA/PROVENTIL HFA/VENTOLIN HFA) 108 (90 Base) MCG/ACT inhaler Inhale 2 puffs into the lungs every 4 hours as needed       allopurinol (ZYLOPRIM) 100 MG tablet TAKE ONE TABLET BY MOUTH THREE TIMES A WEEK ON MONDAY, WEDNESDAY AND FRIDAY AFTER HD SESSION 12 tablet 97     apixaban ANTICOAGULANT (ELIQUIS) 2.5 MG tablet Take 1 tablet (2.5 mg) by  "mouth 2 times daily 60 tablet 11     atorvastatin (LIPITOR) 40 MG tablet Take 1 tablet (40 mg) by mouth daily 30 tablet 11     calcium acetate (CALPHRON) 667 MG TABS tablet Take 1 tablet (667 mg) by mouth 3 times daily (with meals)       Glycopyrrolate-Formoterol (BEVESPI AEROSPHERE) 9-4.8 MCG/ACT oral inhaler Inhale 2 puffs into the lungs 2 times daily 10.7 g 4     loratadine (CLARITIN) 10 MG tablet Take 1 tablet (10 mg) by mouth At Bedtime 30 tablet 98     Melatonin 10 MG TABS tablet Take 10 mg by mouth At Bedtime       metoprolol succinate ER (TOPROL-XL) 25 MG 24 hr tablet Take 0.5 tablets (12.5 mg) by mouth 2 times daily 60 tablet 4     multivitamin RENAL (MULTIVITAMIN RENAL) 1 MG capsule Take 1 capsule by mouth daily       pantoprazole (PROTONIX) 40 MG EC tablet Take 1 tablet (40 mg) by mouth daily 30 tablet 11     sodium chloride (OCEAN) 0.65 % nasal spray Spray 1 spray into both nostrils every 2 hours as needed for congestion MAY KEEP AT BEDSIDE 50 mL 98     torsemide (DEMADEX) 20 MG tablet Take 2 tablets (40 mg) by mouth daily Take at 8 AM and 1 PM. 120 tablet 4     triamcinolone (KENALOG) 0.1 % external cream Apply to AA BID x 1-2 week then PRN only 454 g 2     ROS:  10 point ROS of systems including Constitutional, Eyes, Respiratory, Cardiovascular, Gastroenterology, Genitourinary, Integumentary, Musculoskeletal, Psychiatric were all negative except for pertinent positives noted in my HPI.    Vitals:  /60   Pulse 70   Temp 97.7  F (36.5  C)   Resp 18   Ht 1.626 m (5' 4\")   Wt 60 kg (132 lb 4.8 oz)   SpO2 95%   BMI 22.71 kg/m    Exam:  GENERAL APPEARANCE:  Alert, in no distress  ENT:  Mouth and posterior oropharynx normal, moist mucous membranes  EYES:  EOM, conjunctivae, lids, pupils and irises normal  NECK:  No adenopathy,masses or thyromegaly  RESP:  respiratory effort and palpation of chest normal, lungs clear to auscultation, no respiratory distress  Mild shortness of breath with " ambulation  CV:  Palpation and auscultation of heart done , regular rate and rhythm, no murmur, rub, or gallop  ABDOMEN:  normal bowel sounds, soft, nontender, no hepatosplenomegaly or other masses  M/S:   Gait and station normal with walker, appears to have scoliosis & kyphosis   SKIN:  Inspection of skin and subcutaneous tissue  Left leg wound healing        Right arm hematoma site clean and dry. Sutures intact          NEURO:   Cranial nerves 2-12 are normal tested and grossly at patient's baseline  PSYCH:  oriented X 3    Lab/Diagnostic data:  Labs done in SNF are in [place] [facility]. Please refer to them using [facility]/Care Everywhere.    ASSESSMENT/PLAN:    Right arm hematoma around AV fisula  8.4 cm hematoma just distal to the AV fistula.  Fistula patent.    s/p excision of the skin ulceration and dryness of the hematoma with drain placement 4/18. Drain was removed on day of discharge  Anticoagulation held and after drain removal resumed  Pain resolving.   Continue daily dressing as ordered     Macrocytic anemia  Thrombocytopenia  Chronic, likely due to renal failure  Platelet count also mildly low, likely related to consumption from bleeding   Hemoglobin   Date Value Ref Range Status   04/19/2022 10.5 (L) 11.7 - 15.7 g/dL Final   01/12/2005 15.6 11.7 - 15.7 g/dL Final   hgb baseline ~9.5, stable  Platelet Count   Date Value Ref Range Status   04/19/2022 140 (L) 150 - 450 10e3/uL Final   01/12/2005 326 150 - 450 10e9/L Final   monitor   requested CMP,CBC with dialysis day next week     ESRD on hd MWF  Per hospital note : During hospitalization with severe hypotension/ afib with RVR in 6/2021 developed SILAS necessitating initiation of hemodialysis.   Dialyzes MWF   Follow up nephrologist Dr. Ro  Continue Calcium acetate  Hyponatremia   noted with . Nephrology guide fluid and electrolytes and improve to 125. Baseline 124-130. Put on 1200cc fluid restriction. Avoid excessive weight gains between dialysis    Unclear if Torsemide contributing    Muscle cramps  Likely multi-factorial with complications chronic kidney disease, PAD   lower extremity and hands at times  Continue warm compress   Consider topical rub or muscle relaxant   Avoid Benadryl if able.   Has antihistamine Claritin takes at bedtime  Continue Tylenol for pain    COPD  History smoking  Denies shortness of breath   Continue tiotropium-olodaterol and as needed albuterol  Follow up pulmonologist as ordered    SKY  Monitor for hypoxia  No longer wears CPAP     Severe tricuspid regurgitation   Pulmonary hypertension  Chronic R heart failure, compensated  HTN  HLD  Echo with EF 50-55%, nl LV fn, severe TR, pulmonary pressures 60-65 mmHg. Seen by CV surgery, deemed to be too high of a surgical risk for valve replacement.   Wt Readings from Last 2 Encounters:   04/22/22 60 kg (132 lb 4.8 oz)   04/18/22 59.9 kg (132 lb 0.9 oz)   dry weight ~131-132lb, chronic managed   Dialysis to guide fluid management on dialysis runs, on 1200cc fluid restriction  Hr and blood pressure managed  Continue Metoprolol, Torsemide and Lipitor   Follow up lipid panel  Follow up cardiologist as advised    Permanent atrial fibrillation  S/p AVN ablation with ppm 6/2021  Continue Eliquis, no further signs and symptoms bleeding   Hr regular, continue Metoprolol   Pacemaker check per protocol   Monitor     PAD  Continue blood pressure managed  Not on aspirin due to bleeding risk on anticoagulation   Continue Lipitor  Remains on Eliquis    Left leg wound  Healing, no signs and symptoms infection  Monitor       Gout  Denies pain  Continue allopurinol 100 mg MWF after dialysis       GERD  Denies gastrointestinal upset  Continue pantoprazole 40 mg daily, consider trial dose reduction    Anxiety  Depression   Mood stable   Not on medications to treat   Consider house psychologist referral     Insomnia  Reports Melatonin helps  Continue Melatonin  Monitor     Deconditioned  Comment: d/t recent  hospitalization & comorbidity, expect delay rehabilitation d/t age & comorbidity  Plan: PT/OT eval & treat, monitor    Goals of care   Full code            Appointments in Next Year    May 02, 2022  1:10 PM  RETURN CORE with Christina Fuentes PA-C  [facility] [place] ([facility] ) [phone]   May 24, 2022  9:00 AM  US JETT DOPPLER NO EXERCISE 1-2 LVLS BILAT with SHVUS2  [facility] ([facility] ) [phone]   May 24, 2022 10:10 AM  New Visit with Mirna Tamayo PA-C  [facility] [place] ([facility] ) [phone]   Michael 10, 2022  2:00 PM  (Arrive by 1:45 PM)  Return Visit with Sophia Damico PA-C  [facility] ([facility] ) [phone]   Jul 22, 2022 12:00 AM  CARDIAC DEVICE CHECK - REMOTE with ROYAL TECH1  [facility] ([facility] ) [phone]            Total time spent with patient visit at the skilled nursing facility was 35 min including patient visit and review of past records. Greater than 50% of total time spent with counseling and coordinating care due to discussion on pain management, wound management and consult with therapies and nursing as noted above .     Electronically signed by:  JUSTO Ward CNP

## 2022-04-25 NOTE — PROGRESS NOTES
[facility]    Chief Complaint   Patient presents with     Nursing Home Acute     R UE hematoma     HPI:  Francine Chris is a 76 year old  (1945) female with hx of ESRD on HD, afib s/p AV node ablation and pacemaker on DOAC (dx 2013), HTN, combined systolic and diastolic HF, mod-severe tricuspid regurgitation not candidate for surgical repair, mid ascending aortic enlargement, BL LE edema, PAD, QTc prolongation, COPD, pulmonary HTN, hx of smoking, macular degeneration, anxiety/depression, SKY on CPAP, PUD, who is being seen today for an episodic care visit at: [facility] () [[id number]]     Hospitalized at [facility] from 4/16 to 4/20/22 with right arm hematoma around AV fistula site. Vascular surgery consulted underwent excision of right distal upper arm skin ulceration and evacuation of right distal upper arm arteriovenous fistula hematoma with irrigation and drainage on 4/18 with placement of SVETA drain. Anticoagulation held post-op until drain discontinued and resumed on hospital discharge. Lab work significant for hyponatremia, which has been chronic issue. Also noted with thrombocytopenia, felt to be related to bleeding. Other medications continued for chronic conditions. No follow-up scheduled with vascular on discharge from hospital.     Today's concern is:   Follow-up today to assess clinical status after hospital discharge.    Resident getting ready to leave for HD and in a hurry.     Reports nausea, vomiting, and diarrhea overnight, unclear to what extent.  No call from nursing to report this.   Able to eat piece of toast this morning.  No further vomiting or episodes of loose stool since getting up.  No fever/chills.  No abd pain.   No melena or hematochezia reported.   Reports low blood pressure yesterday.  Nurse manager is not aware of any issues over weekend.    Concerned about R UE.  Feeling much better than prior to surgery, but still having pain.   No  "redness or warming, \"I'm not worried about infection.\"  Unsure of follow-up care for wound.    Per PT allowed evaluation, but refused OT this morning.    Walked 15ft 2WW.    Supervision with transfers.     Recent blood pressures:      Weight weights:      Allergies, and PMH/PSH reviewed in [facility] today.    REVIEW OF SYSTEMS:  Limited secondary to cognitive impairment but today pt reports history as per HPI. UMS 19/30 Feb 2022.    Objective:   /70   Pulse 73   Temp 97.4  F (36.3  C)   Resp 18   Ht 1.626 m (5' 4\")   Wt 63.8 kg (140 lb 9.6 oz)   SpO2 97%   BMI 24.13 kg/m    GENERAL APPEARANCE:  NAD, well groomed and wearing make-up, dressed to go to HD, wearing jacket already.   RESP:  Non-labored breathing, palpation of chest w/ L CW pacer, also R tunneled cath with dressing CDI, no chest wall tenderness, no respiratory distress, Lung sounds clear, no rales/wheeze/rhonchi  CV:  Palpation - no murmur/non-displaced PMI Auscultation - rate and rhythm irregular.   VASCULAR: R UE fistula KITA with sutures, small amount of soft swelling and ecchymosis, +bruit/+thrill. 2+ radial pulse BL, hands are pink/warm/even temp with cap refill < 2 seconds. Trace edema bilateral lower extremities.  ABDOMEN:  Normal bowel sounds, soft, nontender, no grimacing or guarding with palpation.   M/S:  +khypohosis. Good even  BL. No pain with PROM BL UE.  SKIN:  Inspection - Chest wall tunneled cath with CDI dressing no erythema or edema. Wound to left lower leg, wound bed brown/yellow scab. Palpation- no increased warmth, skin is dry and non-tender.  PSYCH: awake and alert, speech fluent,  insight and judgement fair, no acute confusion, calm and cooperative.     Recent labs in [facility] reviewed by me today.      **Labs from HD reviewed in Care Everywhere from 4/22**    CBC RESULTS: Recent Labs   Lab Test 04/19/22  0739 04/18/22  0840   WBC 4.8 6.0   RBC 3.08* 2.77*   HGB 10.5* 9.4*   HCT 30.6* 27.4*   MCV 99 99   MCH 34.1* 33.9* "   MCHC 34.3 34.3   RDW 14.7 14.4   * 130*     Last Basic Metabolic Panel:  Recent Labs   Lab Test 04/19/22  0739 04/18/22  1855 04/18/22  1140 04/18/22  0840   * 128*   < > 119*   POTASSIUM 4.4  --   --  4.8   CHLORIDE 92*  --   --  85*   SILVIA 9.6  --   --  9.4   CO2 24  --   --  22   BUN 45*  --   --  72*   CR 3.91*  --   --  5.31*   *  --   --  87    < > = values in this interval not displayed.       Liver Function Studies -   Recent Labs   Lab Test 02/15/22  0624 01/26/22  0638   PROTTOTAL 7.6 7.6   ALBUMIN 3.0* 3.1*   BILITOTAL 1.0 1.4*   ALKPHOS 122 148   AST 37 43   ALT 32 46       TSH   Date Value Ref Range Status   01/12/2005 1.22 0.4 - 5.0 mU/L Final     Assessment/Plan:  (T82.590A) Malfunction of arteriovenous dialysis fistula, initial encounter (H)  (primary encounter diagnosis)  (T14.8XXA) Hematoma  Comment: 4/18/22 underwent excision of right distal upper arm skin ulceration and evacuation of right distal upper arm arteriovenous fistula hematoma with irrigation and drainage with placement of SVETA drain with Omile. Removed drain prior to discharge. Some pain, site stable t/o apparent bleeding or infection.   Plan:   - HUC to schedule vascular f/u for suture removal  - Increase tylenol dose  - Staff to continue to monitor bruit/thrill BID  - Continue to use central line for HD    (D53.9) Macrocytic anemia  (D69.6) Thrombocytopenia   Comment: Chronic in setting of ESRD, low PLT s/p bleed  Hgb dropped from 11-12 range in Nov 2021 to ~ 9-10 over last two months, stable after sugery.   . Hgb 9.9,  at HD on 4/22 4/22: Iron 75 WNL, Ferritin 315 H   Plan:   - Monitor serial labs, coordinate with nephrology           (N18.6) ESRD (end stage renal disease) (H)  (E87.1) Hyponatremia  Comment: Newly diagnosed ESRD with SILAS on CKD stage III June 2021 etiology unclear but likely severe ATN with vomiting and afib with RVR/hypotension prior.   Dry weight was 53 kg or 116#, now ~ 130-135#,  trouble following diet and fluid restriction.   Chronically low sodium 125-130, selective serotonin reuptake inhibitor stopped in Dec 2021.  Plan:   - HD MWF  - 1200 mL fluid restriction  - Followed closed by nephrology Dr. Ro   - PAVAN CW tunneled cath care by HD nursing, goal to remove once fistula vs graft matured to reduce infection/bleeding risk  - Renally dose medications and avoid nephrotoxins  - Continue renal vitamin and phosphate binder   - Continue loratidine and topical triamcinolone for uremic pruritis, followed derm     (S81.802D) Open wound of left lower leg, subsequent encounter  (I73.9) PAD (peripheral artery disease) (H)  Comment: Hematoma of left lower leg sustained around 2/14/22, eschar intact, hx of PAD on chart, no recent ABIs. Treated for cellulitis x2. Followed by onsite wound care physician Dr. Man, debridement two weeks ago, wound scabbed over. Postponed wound care follow-up with ABIs as wound is slowly improving.  Plan:   - Continue to follow with Dr. Man   - Okay to leave wound KITA and monitor daily per wound care physician  - Continue atorvastatin for secondary prevention, no ASA due to bleeding risk on apixaban     (R26.9) Abnormal gait    Comment: Physical deconditioning and unsteady gait with poor safety awareness   Plan:   - Recommend post hospital PT/OT to optimize gait, strength, balance  - Fall precautions: reduce  apartment, use walker when up, change position slowly    (I50.812) Chronic right-sided heart failure (H)  (I07.1) Tricuspid valve insufficiency  (I27.20) Pulmonary hypertension (H)  Comment: Chronic, established care with Dr. Holt in [facility], she discontinued sildenafil and ambrisentan due to lack of efficacy.  HFpEF with tricuspid valve regurgitation, RV dysfunction, pulmonary hypertension.  Not candidate for tricuspid valve repair due to frailty - saw Dr. Harding 12/27/21.  No SOB, orthopnea, PND, leg swelling. Chronic SANCHEZ.  Plan:   - Torsemide 40 mg  daily at 1 pm MWF and at 8 am on Tu, Th, Sa, Sun  - Follow-up with CORE clinic this month  - Routine weights labs VS     (I48.91) Atrial fibrillation (H)  Comment: Chronic afib with digoxin toxicity in June 2021.   S/p ablation and pacemaker placement 6/25/21 anticoagulated on apixaban.    Plan:    - Continue apixaban  - Continue metoprolol for rate control   - Remote PPM checks  - Follow-up CORE clinic 5/2    (I10) HTN  Comment: Blood pressures a recent baseline per chart reveiw  Plan:   - Continue metoprolol XL 12.5 mg BID  - Monitor routine labs and VS    (E78.5) HL  Comment: Chronic, last lipid panel 4/29/21 in [facility] system, LDL 49  Plan:   - Continue atorvastatin  - Check lipid panel with next routine labs    (J44.9) COPD (H)  (G47.33) SKY  Comment: Stable. No cough, sputum production, purulence.  Saw pulmonology Dr. Linares 2/9/22, pulmonary function tests shwoed severe obstruction consistent with COPD.   Plan:   - Continue Bevespi started by pulmonology   - Continue PRN albuterol  - Does not want CPAP, would benefit from nocturnal oxygen study, defer to pulmonology to setup      (F33.9) Depression  (G47.00) Insomnia  Comment: Chronic, limited medication options due to hyponatremia. PHQ-9 1/27, trouble sleeping    Plan:  - Schedule Melatonin 10 mg PO q HS (9 pm)  - Reviewed sleep hygiene   -Consider call to MTM regarding antidepressant selection, limited by hyponatremia, ? Mirtazapine  - Continue ACP supports     (Z87.39) Hx of gout   Comment: Chronic  Uric Acid   Date Value Ref Range Status   12/09/2021 3.5 2.6 - 6.0 mg/dL Final   Plan:  - Continue allopurinol 100 mg x3/week MWF after HD    (K21.9) GERD  Comment: Chronic  Plan:   - Continue Protonix 40 mg daily, look to GDR as able    Orders:  - Increased Tylenol to 1,000 mg PO TID  - Needs vascular follow-up clarified and wound care clarified, HUC/Nurse manager to follow-up.    Electronically signed by: JUSTO Jerry CNP

## 2022-04-26 NOTE — TELEPHONE ENCOUNTER
[facility] called 4/25 asking for Post-Op wound care instructions for patient to be faxed over to them     Fax- [phone]  Phone- [phone]

## 2022-04-26 NOTE — TELEPHONE ENCOUNTER
Patient has not yet been seen at [facility]. Dmitriy Navarro called to inform them of this and to call the hospital for instructions.

## 2022-05-02 NOTE — ED PROVIDER NOTES
History   Chief Complaint:  Fall     The history is provided by the patient, the EMS personnel and medical records.      Francine Chris is a 76 year old female who is anticoagulated on Eliquis with history of ESRD on dialysis, A-V fistula, atrial fibrillation, congestive heart failure, COPD, among others who presents via EMS after an unwitnessed fall. The patient was reaching for a remote on the nightstand when she fell off the bed and struck the left temporal area of her head on the corner of the nightstand. She denies loss of consciousness. She endorses left shoulder pain and dizziness. She denies any other extremity injury. She denies neck pain, back pain, or clavicle pain. She denies chest pain, emesis, or diarrhea. She denies double vision or other changes in vision. She denies dental injury. She is anticoagulated on Eliquis for atrial fibrillation. Last known tetanus in 2011.    Review of Systems   Eyes: Negative for visual disturbance.   Cardiovascular: Negative for chest pain.   Gastrointestinal: Negative for diarrhea and vomiting.   Musculoskeletal: Positive for arthralgias (left shoulder). Negative for back pain and neck pain.   Neurological: Positive for dizziness. Negative for syncope.   All other systems reviewed and are negative.    Allergies:  Sotalol  Bupropion    Medications:  Albuterol inhaler  Zyloprim  Eliquis  Lipitor  Calphron   Claritin   Toprol  Protonix   Demadex     Past Medical History:     Acute renal failure  Anemia in chronic kidney disease, on chronic dialysis   Atrophy of left kidney   Atrial fibrillation  Chronic kidney disease, stage 3  COPD  Cor pulmonale   Depression  Hypertension  Gout  Lung nodule  Nonsustained ventricular tachycardia  Obstructive sleep apnea  Osteoporosis   Peripheral artery disease  Pacemaker in place  Polyclonal gammopathy   ESRD on dialysis   A-V fistula     Congestive heart failure     Past Surgical History:    ORIF ankle, right   Tonsillectomy  Tubal  "ligation    Hysterectomy  Cataract removal, bilateral  Small bowel resection     Family History:    Mother - Hypertension, Arthritis, Osteoporosis   Father - Myocardial Infarction  Sister - Arthritis, Osteoporosis   Brother - Bladder cancer, Myocardial Infarction, Diabetes Mellitus     Social History:  The patient arrived to the emergency department via EMS.    Physical Exam     Patient Vitals for the past 24 hrs:   BP Temp Temp src Pulse Resp SpO2 Height Weight   05/02/22 0712 (!) 147/71 97.6  F (36.4  C) Oral 74 20 99 % 1.626 m (5' 4\") 64 kg (141 lb)     Physical Exam  General: Alert, appears frail and elderly.  Cooperative.     In mild distress  HEENT:  Head:  Skin tear/laceration to left eyebrow  Ears:  External ears are normal  Mouth/Throat:  Oropharynx is without erythema or exudate and mucous membranes are moist.   Eyes:   Conjunctivae normal and EOM are normal. No scleral icterus.    Pupils are equal, round, and reactive to light.   Neck:   Normal range of motion. Neck supple.  CV:  Normal rate, regular rhythm, normal heart sounds and radial pulses are 2+ and symmetric.  No murmur.  Resp:  Breath sounds are clear bilaterally    Non-labored, no retractions or accessory muscle use  GI:  Abdomen is soft, no distension, no tenderness. No rebound or guarding.  No CVA tenderness bilaterally  MS:  Normal range of motion. No edema.    Normal strength in all 4 extremities.     Back atraumatic.    No midline cervical, thoracic, or lumbar tenderness  Skin:  Left eyebrow laceration.  Warm and dry.    Neuro: Alert. Normal strength.  GCS: 15  Psych:  Normal mood and affect.    Emergency Department Course   Imaging:  CT Facial Bones without Contrast   Final Result   IMPRESSION:  No evidence of acute facial fracture.      RENATO GREEN MD            SYSTEM ID:  [id number]      CT Head w/o Contrast   Final Result   IMPRESSION: No acute intracranial abnormality.      RENATO GREEN MD            SYSTEM ID:  [id number]      CT " Cervical Spine w/o Contrast   Final Result   IMPRESSION: No evidence of acute fracture or subluxation in the   cervical spine.      RENATO GREEN MD            SYSTEM ID:  [id number]      XR Shoulder Left G/E 3 Views   Preliminary Result   IMPRESSION: No acute displaced left shoulder fracture. Mild left   acromioclavicular and glenohumeral joint osteoarthritis. Slight   superior migration of the humeral head. No dislocation. Left   subclavian single lead cardiac pacemaker. Cardiomegaly. Degenerative   change and scoliosis visualized spine. Arterial calcification.       Report per radiology     Procedures    Laceration Repair      Procedure: Laceration Repair    Indication: Laceration    Consent: Verbal    Location: Left Eyebrow    Length: 2.5 cm    Preparation: Irrigation with Sterile Saline & ShurCleanse    Anesthesia: Topical -LET and Bupivacaine with Epinephrine - 0.5% 2 cc's  Anxiolysis: None  Sedation: No sedation.      Treatment/Exploration: Wound explored, no foreign bodies found     Closure: The wound was closed in one layer. Skin was closed with 5 x 5.0 ethylon using Interrupted sutures.     Patient Status: The patient tolerated the procedure well: Yes.     Emergency Department Course:     Reviewed:  I reviewed nursing notes, vitals, past medical history and Care Everywhere    Assessments:  0706 I obtained history and examined the patient as noted above.   0819 I performed a laceration repair, procedure noted above.     Interventions:  0727 Tylenol 650 mg PO  0733 LET 3 mL topical  0742 Tdap 0.5 mL IM    Disposition:  The patient was discharged to home.     Impression & Plan   Medical Decision Making:  The patient presents for evaluation of a head injury after a mechanical fall.  Due to the patient's anticoagulation, they were at risk of intracranial hemorrhage.  CT of the head was obtained and is negative for acute ICH or skull fracture.  The CT head, C spine and facial bones results were discussed with  the patient. The possibility of delayed onset bleeding was discussed with the patient and they be accompanied by a responsible person for the next 24 hours to observe for any neurologic changes.  Head injury precautions and reasons to return to the emergency department were discussed.      The patient's eyebrow laceration was repaired as noted above.  Pt was given wound care instructions and will follow up with PCP for suture removal.    Based on a full exam, x-ray of the left shoulder was ordered and returned unremarkable.  The pt describes a mechanical accident and therefore no additional evaluation for metabolic or cardiac etiology was warranted at this time.  The patient was treated with Tylenol in the ED with symptomatic relief.  The patient will follow up with PCP within 5 days for wound re-evaluation and suture removal.     Diagnosis:    ICD-10-CM    1. Fall, initial encounter  W19.XXXA    2. Closed head injury, initial encounter  S09.90XA    3. Eyebrow laceration, left, initial encounter  S01.112A      Scribe Disclosure:  IJoanne, am serving as a scribe at 7:06 AM on 5/2/2022 to document services personally performed by Bennie Casillas MD based on my observations and the provider's statements to me.     Bennie Casillas MD  05/02/22 0904

## 2022-05-02 NOTE — ED TRIAGE NOTES
Patient presents via EMS with complaints of fall from bed, on thinners.  Patient was in bed, reaching for her remote on the nightstand, when she fell out of bed, hitting her head on the nightstand. Did not lose consciousness. Patient on Eliquis for Afib. Patient with laceration to upper L eyebrow.

## 2022-05-02 NOTE — ED NOTES
Bed: ED25  Expected date:   Expected time:   Means of arrival:   Comments:  511 76f fell from bed, hit head on thinners

## 2022-05-02 NOTE — ED NOTES
Talked to RN at [facility]. Informed of imagining results, medications given, and wound care. Plan for pt to return back to facility via wheelchair transport.

## 2022-05-04 NOTE — PROGRESS NOTES
[facility]    Francine Chris returns for follow-up of her evacuation left arm hematoma on 4/18/2022.  She has a left upper arm transposed brachial to brachial fistula that developed a hematoma while attempting dialysis access.  Hematoma was removed with no obvious injury or bleeding from the fistula which is well-developed.  Dialyzing via tunneled catheter presently.      Seen in ED due to fall with trauma to left temple had with no LOS.  CT scan cervical spine and x-ray shoulder negative.  Laceration repaired.    No at the time of her hematoma evacuation that her skin was extremely thin and the sutures were pulling through.  We did place pledgets to to help prevent this.  Not surprisingly some of these have pulled through and there is an open ulcer with a small amount of drainage.  They are using a Mepilex foam to the site.  No discomfort.  No ischemic symptoms to arm.  Using her right jugular tunneled catheter.    She is done okay since her temple laceration.  Fair amount of ecchymosis but healing.    Exam: Alert and appropriate.  Here with her daughter.   Blood pressure 108/73 left arm.  Pulse 66   Ecchymosis to the left periorbital and scalp area   Healing laceration that is sutured   Good pulse and thrill within right upper arm fistula which is medial to the incision which is partially broken down though the sutures are still intact.  Mild induration.  No cellulitis      Procedure: Timeout was called the sites were identified.  Prepped with alcohol.  All sutures and pledgets were removed.  Ulceration measures approximately 2 x 2 x 0.4 cm.  This is lateral to the fistula which is not exposed.  Using a #15 blade scalpel full-thickness/subcutaneous excisional debridement was performed down to bleeding tissue circumferentially.  Moistened Aquacel AG applied at the site followed by a 4 x 4 and Spandagrip to hold this in place.      IMPRESSION: Wound ulceration due to inability to approximate  edges even with removal of the hematoma and excision of nonviable skin.  This will need to heal by secondary intent.  Moistened Aquacel Ag to site daily or every other day.  Return to clinic in 2 weeks for redebridement if needed.  Discussed with patient and daughter.    aVF is still patent of good clinical size.  Unable to use until skin ulcer is completely resolved and thus continue with tunnel catheter which is patient's desire anyways.    Sree Eric MD  This note was created using Dragon voice recognition software which may result in transcription errors.

## 2022-05-05 NOTE — PROGRESS NOTES
"[facility] GERIATRICS    Chief Complaint   Patient presents with     Nursing Home Acute     ER follow up     HPI:  Francine Chris is a 76 year old  (1945)  female with hx of ESRD on HD, afib s/p AV node ablation and pacemaker on DOAC (dx 2013), HTN, combined systolic and diastolic HF, mod-severe tricuspid regurgitation not candidate for surgical repair, mid ascending aortic enlargement, BL LE edema, PAD, QTc prolongation, COPD, pulmonary HTN, hx of smoking, macular degeneration, anxiety/depression, SKY on CPAP, PUD, who is being seen today for an episodic care visit at: [facility] () [[id number]]       Resident to ER on 5/2/22 due to mechanical fall with head strike. Per notes, was reaching for remote on nightstand and fell from bed and struck her left temporal area on her night stand; resident on apixaban for Afib. Complained of left shoulder pain and dizziness in ER. CT of facial bones, head, neck negative for acute process. L shoulder x-ray was also negative for acute process. Sustained 2.5 cm laceration to left eyebrow, irrigated and closed with 5 interrupted sutures. Given mechanical fall and able to ambulate at baseline in ER discharged back to LTC without further cardiac or metabolic work-up.       Of note, resident ospitalized at [facility] from 4/16 to 4/20/22 with right arm hematoma around AV fistula site. Vascular surgery consulted underwent excision of right distal upper arm skin ulceration and evacuation of right distal upper arm arteriovenous fistula hematoma with irrigation and drainage on 4/18.    Today's concern is:   ER follow-up.   Sleeping prior to visit and does not recall circumstance of fall.  \"The next thing I knew I was on the floor.\"  No headache. No vision changes.   No pain.     Went to HD on Monday for full run, Wednesday took off machine early due to technical difficulties. Weight is up a big. Trouble following fluid restriction.     Reports wound " "to R arm \"improving\" - see vascular today.  L leg sore \"healed.\"     Some SANCHEZ.  No SOB at rest.  No orthopnea.  No PND.  No increased leg swelling.    Missed cardiology visit on 5/2.     Moving bowels okay.  No dysuria - not making as much urine.     Of spirits states \"that's up for debate.\"  Not sleeping well at night.   \"I need something to help me sleep.\"   Still having pruritis generalized, worse on arms.  \"My whole body itches.\"     Allergies, and PMH/PSH reviewed in [facility] today.    REVIEW OF SYSTEMS:  Limited secondary to cognitive impairment but today pt reports history as per HPI.   SLUMS 19/30.     Objective:   /76   Pulse 65   Temp 97.3  F (36.3  C)   Resp 18   Ht 1.626 m (5' 4\")   Wt 64 kg (141 lb)   SpO2 97%   BMI 24.20 kg/m     GENERAL APPEARANCE:  NAD, well groomed and wearing make-up, dressed  HEAD: Ecchymosis superior to left orbit and to left temporal area, non-tender  EYES:  EOM intact, lids normal, PERRL BL 2+, sclera clear and conjunctiva normal, no discharge.   ENT:  Mouth normal, moist mucous membranes  NECK:  Nontender, supple, symmetrical; no cervical adenopathy, masses or thyromegaly, trachea midline  RESP:  Non-labored breathing, palpation of chest w/ L CW pacer, also R tunneled cath with dressing CDI, no chest wall tenderness, no respiratory distress, Lung sounds clear, no rales/wheeze/rhonchi  CV:  Palpation - no murmur/non-displaced PMI Auscultation - rate and rhythm irregular.   VASCULAR: R UE fistula +bruit/+thrill. 2+ radial pulse BL, hands are pink/warm/even temp with cap refill < 2 seconds. Trace edema bilateral lower extremities.  ABDOMEN:  Normal bowel sounds, soft, nontender, no grimacing or guarding with palpation.   M/S:  +khypohosis. Good even  BL. No pain with PROM BL UE.  SKIN:  Inspection - Chest wall tunneled cath with CDI dressing no erythema or edema. Wound to left lower leg, wound bed brown/yellow scab - see photo below. R UE fistula KITA with sutures, " central open area, small amount of soft swelling and ecchymosis improved from last week. Palpation- no increased warmth, skin is dry and non-tender.  PSYCH: awake and alert, speech fluent,  insight and judgement fair, no acute confusion, calm and cooperative.      Right Upper Arm:        Left Anterior Tibial Area:      Left Temporal Area:      Labs done in SNF are in [place] EPIC. Please refer to them using EPIC/Care Everywhere.     CT FACIAL BONES WITHOUT CONTRAST  5/2/2022 8:07 AM      HISTORY: Trauma, facial injury.     COMPARISON: None.     FINDINGS: Left frontal scalp contusion/abrasion. No evidence of acute  facial fracture. Left maxillary sinus mucosal thickening with  secretions and soft tissue protruding into the left nasal cavity. No  evidence of globe rupture or intraorbital hemorrhage.                                                                     IMPRESSION:  No evidence of acute facial fracture.     RENATO GREEN MD     CT SCAN OF THE HEAD WITHOUT CONTRAST   5/2/2022 8:06 AM      HISTORY: Head trauma, minor (Age >= 65y)     COMPARISON: Head CT 1/26/2022     FINDINGS:  Mild volume loss is present. White matter hypoattenuation  likely represents mild chronic small vessel ischemic change.  Parenchyma is otherwise unremarkable. No evidence of acute ischemia,  hemorrhage, mass, mass effect or hydrocephalus. The visualized  calvarium, tympanic cavities, mastoid cavities, and extracranial soft  tissues are unremarkable. Left maxillary sinus mucosal thickening with  presumed secretions and nonspecific soft tissue extension into the  left nasal cavity, incompletely characterized. Bilateral lens  replacements. Left frontal scalp abrasion.                                                                      IMPRESSION: No acute intracranial abnormality.     RENATO GREEN MD      CT CERVICAL SPINE WITHOUT CONTRAST   5/2/2022 8:06 AM      HISTORY: Neck trauma (Age >= 65y)     TECHNIQUE: Axial images of  the cervical spine were obtained without  intravenous contrast. Multiplanar reformations were performed.   Radiation dose for this scan was reduced using automated exposure  control, adjustment of the mA and/or kV according to patient size, or  iterative reconstruction technique.     COMPARISON: Cervical spine MRI 1/26/2022     FINDINGS:  No evidence of acute fracture or posttraumatic subluxation.  Multilevel mild to moderate degenerative disc disease and facet  arthropathy. No high-grade spinal canal or foraminal stenosis. Apical  pulmonary emphysema and presumed atelectasis. Cardiac device. Carotid  vascular calcifications. Bilateral parotid gland calcifications.                                                                    IMPRESSION: No evidence of acute fracture or subluxation in the  cervical spine.     RENATO GREEN MD     Assessment/Plan:  (Z91.81) History of recent fall  (primary encounter diagnosis)  (S01.112D) Laceration of left periocular area without foreign body, subsequent encounter  Comment: Fall from bed with head strike, no apparent LOC, imaging negative for acute process, has not developed neurological deficit in interim. Cognitive impairment, physical deconditioning and unsteady gait with poor safety awareness make resident ongoing fall risk.  Plan:   - Removed sutures after 5 days and cover with steri-strips  - PT/OT  - Fall precautions: reduce  apartment, use walker when up, change position slowly    (T82.590A) Malfunction of arteriovenous dialysis fistula, initial encounter (H)  (primary encounter diagnosis)  (T14.8XXA) Hematoma  Comment: 4/18/22 underwent excision of right distal upper arm skin ulceration and evacuation of right distal upper arm arteriovenous fistula hematoma with irrigation and drainage with placement of SVETA drain with Omile. Removed drain prior to discharge. Site with small ulceration  Plan:   - Vascular surgery follow-up today - discuss with patient, ensure  come back with wound care orders  - Continue Tylenol for pain  - Staff to continue to monitor bruit/thrill BID  - Continue to use central line for HD    (S81.802D) Open wound of left lower leg, subsequent encounter  (I73.9) PAD (peripheral artery disease) (H)  Comment: Stable  Hematoma of left lower leg sustained around 2/14/22, eschar intact, hx of PAD on chart, no recent ABIs. Treated for cellulitis x2. Followed by onsite wound care physician Dr. Man, debridement two weeks ago, wound scabbed over. Postponed wound care follow-up with ABIs as wound is slowly improving.  Plan:   - Continue to follow with Dr. aMn   - Okay to leave wound KITA and monitor daily per wound care physician  - Continue atorvastatin for secondary prevention, no ASA due to bleeding risk on apixaban     (I50.812) Chronic right-sided heart failure (H)  (I10) HTN  Comment: Chronic, established care with Dr. Holt in [facility], she discontinued sildenafil and ambrisentan due to lack of efficacy.  HFpEF with tricuspid valve regurgitation, RV dysfunction, pulmonary hypertension.  Not candidate for tricuspid valve repair due to frailty - saw Dr. Harding 12/27/21.  No SOB, orthopnea, PND, leg swelling. Chronic SANCHEZ.  BP Readings from Last 3 Encounters:   05/05/22 108/73   05/05/22 128/76   05/02/22 138/77   Plan:   - Torsemide 40 mg daily at 1 pm MWF and at 8 am on Tu, Th, Sa, Sun  - Follow-up with [facility] this month - missed appointment, [facility]UC to reschedule   - Continue metoprolol XL 12.5 mg BID  - Monitor routine labs and VS    (I48.91) Atrial fibrillation (H)  Comment: Chronic afib with digoxin toxicity in June 2021.   S/p ablation and pacemaker placement 6/25/21 anticoagulated on apixaban.    Plan:    - Continue apixaban  - Continue metoprolol for rate control   - Remote PPM checks  - Follow-up [facility] 5/2    (F33.9) Depression  (G47.00) Insomnia  Comment: Chronic, limited medication options due to hyponatremia. PHQ-9 1/27, trouble  sleeping    Plan:  -Message sent to MTM regarding antidepressant selection, limited by hyponatremia, ? Mirtazapine ? gabapentin  - Continue Melatonin 10 mg PO q HS (9 pm)  - Reviewed sleep hygiene   - Continue ACP supports     Electronically signed by: JUSTO Jerry CNP

## 2022-05-05 NOTE — PROGRESS NOTES
Patient is here to discuss post op.    /73 (BP Location: Left arm, Patient Position: Chair, Cuff Size: Adult Regular)   Pulse 66   SpO2 99%     Questions patient would like addressed today are: N/A.    Refills are needed: No    Has homecare services and agency name:  Yes: [facility].     Abhishek Ramirez

## 2022-05-06 NOTE — TELEPHONE ENCOUNTER
Call returned to [facility] and again discussed that patient has not been seen at [facility] so no orders can be provided. No further questions or concerns.

## 2022-05-06 NOTE — TELEPHONE ENCOUNTER
[facility]    Who is the provider?:  Hernesto      Preferred provider location: [place]    Person calling: Chiqui  Call back phone: [phone]       Nurse call back needed: YES          Can we leave a message?  YES        Reason for call:    Chiqui with [facility] was asking about some recent wound care orders not having been received. Might possibly be tied to 5/5 OV with Hernesto @ [facility] or to 4/26 request to Wound. Please contact Chiqui to clarify.    Fax- [phone]  Phone- [phone]    Outside imaging: n/a    Name / Loc of pharmacy: n/a    [facility]   [address]  [place] [id number] [phone]

## 2022-05-06 NOTE — TELEPHONE ENCOUNTER
Chiqui with [facility] called back about wound care orders not having been received. Please review and call and/or fax orders to [facility].     Fax- [phone]  Phone- [phone]    [facility]   [address]  [place] [address]  [phone]
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
## 2022-05-08 PROBLEM — S72.002A CLOSED FRACTURE OF LEFT HIP, INITIAL ENCOUNTER (H): Status: ACTIVE | Noted: 2022-01-01

## 2022-05-08 NOTE — PHARMACY-ADMISSION MEDICATION HISTORY
Pharmacy Medication History  Admission medication history interview status for the 5/8/2022  admission is complete. See EPIC admission navigator for prior to admission medications     Location of Interview: Outside patient room but on unit  Medication history sources: MAR ([facility])    Significant changes made to the medication list:  Patient takes Bevepsi 1 puff once daily instead of 2 puffs BID    In the past week, patient estimated taking medication this percent of the time: greater than 90%    Additional medication history information:   None    Medication reconciliation completed by provider prior to medication history? No    Time spent in this activity: 30 minutes    Prior to Admission medications    Medication Sig Last Dose Taking? Auth Provider   ACE/ARB/ARNI NOT PRESCRIBED (INTENTIONAL) Please choose reason not prescribed from choices below.  at - Yes Eileen Arredondo APRN CNP   acetaminophen (TYLENOL) 325 MG tablet Take 650 mg by mouth 2 times daily as needed for mild pain  at PRN Yes Unknown, Entered By History   acetaminophen (TYLENOL) 325 MG tablet Take 2 tablets (650 mg) by mouth 2 times daily. May also take 2 tablets (650 mg) 2 times daily as needed for pain.  Patient taking differently: Take 2 tablets (650 mg) by mouth 2 times daily. 5/8/2022 at AM Yes Eileen Arredondo APRN CNP   albuterol (PROAIR HFA/PROVENTIL HFA/VENTOLIN HFA) 108 (90 Base) MCG/ACT inhaler Inhale 2 puffs into the lungs every 4 hours as needed 5/6/2022 at PRN Yes Reported, Patient   allopurinol (ZYLOPRIM) 100 MG tablet TAKE ONE TABLET BY MOUTH THREE TIMES A WEEK ON MONDAY, WEDNESDAY AND FRIDAY AFTER HD SESSION 5/4/2022 at PM Yes Eileen Arredondo APRN CNP   apixaban ANTICOAGULANT (ELIQUIS) 2.5 MG tablet Take 1 tablet (2.5 mg) by mouth 2 times daily 5/8/2022 at 0800 Yes Eileen Arredondo APRN CNP   atorvastatin (LIPITOR) 40 MG tablet Take 1 tablet (40 mg) by mouth daily 5/8/2022 at AM Yes Eileen Arredondo  JUSTO Larry CNP   calcium acetate (CALPHRON) 667 MG TABS tablet Take 1 tablet (667 mg) by mouth 3 times daily (with meals) 5/8/2022 at 1200 Yes Eileen Arredondo APRN CNP   Glycopyrrolate-Formoterol (BEVESPI AEROSPHERE) 9-4.8 MCG/ACT oral inhaler Inhale 2 puffs into the lungs 2 times daily  Patient taking differently: Inhale 1 puff into the lungs daily 5/8/2022 at AM Yes Derrick Linares MD   loratadine (CLARITIN) 10 MG tablet Take 1 tablet (10 mg) by mouth At Bedtime 5/7/2022 at HS Yes Eileen Arredondo APRN CNP   Melatonin 10 MG TABS tablet Take 10 mg by mouth At Bedtime 5/7/2022 at HS Yes Reported, Patient   metoprolol succinate ER (TOPROL-XL) 25 MG 24 hr tablet Take 0.5 tablets (12.5 mg) by mouth 2 times daily 5/8/2022 at AM Yes Alex Holt MD   multivitamin RENAL (MULTIVITAMIN RENAL) 1 MG capsule Take 1 capsule by mouth daily 5/8/2022 at AM Yes Unknown, Entered By History   pantoprazole (PROTONIX) 40 MG EC tablet Take 1 tablet (40 mg) by mouth daily 5/7/2022 at PM Yes Eileen Arredondo APRN CNP   sodium chloride (OCEAN) 0.65 % nasal spray Spray 1 spray into both nostrils every 2 hours as needed for congestion MAY KEEP AT BEDSIDE  at PRN Yes Eileen Arredondo APRN CNP   torsemide (DEMADEX) 20 MG tablet Take 2 tablets (40 mg) by mouth daily Take at 8 AM and 1 PM. 5/8/2022 at 0800 Yes Alex Holt MD   triamcinolone (KENALOG) 0.1 % external cream Apply to AA BID x 1-2 week then PRN only  at PRN Yes Sophia Damico, LENIN       The information provided in this note is only as accurate as the sources available at the time of update(s)

## 2022-05-08 NOTE — TELEPHONE ENCOUNTER
Francine Chris is a 76 year old  (1945), Nurse called today to report: mechanical fall, being unable to move L LE, pain    ASSESSMENT/PLAN    Unable to obtain XR, patient requesting to be sent in for pain control. Patient will be sent to hospital for evaluation/tx    Electronically signed by:   Derrick Aguilar NP

## 2022-05-08 NOTE — ED NOTES
Bed: ED24  Expected date:   Expected time:   Means of arrival:   Comments:  Cristo [phone] fall hip pain eta [id number]

## 2022-05-08 NOTE — ED NOTES
"[facility]  ED Nurse Handoff Report    ED Chief complaint: Fall      ED Diagnosis:   Final diagnoses:   None       Code Status: to be address at admission    Allergies:   Allergies   Allergen Reactions     Sotalol Other (See Comments)     Prolonged QT     Bupropion Other (See Comments)     Na 118 acutely. Comorbid diuresis for heart failure       Patient Story: patient fell out of bed today. Came with left leg pain which was shorten and rotated out. She lives in a NH. She stated she ambulates with a walker. Patient was seen 5/10 due to another fall. She has several bruising throughout her body. She has an old wound noted to right upper arm. She currently is on Eliquis, she is a dialysis patient ,last run was on Friday. She dialysis Monday ,Wednesday and Friday.. hxo f afib,chf and copd among other things  Focused Assessment:  Left hip pain    Treatments and/or interventions provided: labs,xray and dilaudid for pain  Patient's response to treatments and/or interventions: ongoing    To be done/followed up on inpatient unit:  .    Does this patient have any cognitive concerns?: Activity level - Baseline/Home:  Stand with Assist and Walker  Activity Level - Current:   Total Care    Patient's Preferred language: English   Needed?: No    Isolation: None  Infection: Not Applicable  Patient tested for COVID 19 prior to admission: YES  Bariatric?: No    Vital Signs:   Vitals:    05/08/22 1741 05/08/22 1751   BP:  (!) 145/81   Pulse: 73    Resp: 20    Temp: 97.6  F (36.4  C)    TempSrc: Oral    SpO2: 96%    Weight: 64 kg (141 lb)    Height: 1.651 m (5' 5\")        Cardiac Rhythm:     Was the PSS-3 completed:   Yes  What interventions are required if any?               Family Comments: none  OBS brochure/video discussed/provided to patient/family: No              Name of person given brochure if not patient: .              Relationship to patient: .    For the majority of the shift this patient's " behavior was Green.   Behavioral interventions performed were none.    ED NURSE PHONE NUMBER: [phone]

## 2022-05-09 NOTE — PROGRESS NOTES
Full note to follow.  In brief, 76 year old female with a left femoral neck fracture after a fall.  Would benefit from a hip arthroplasty for pain control and mobility.  On anticoagulation and dialysis so timing of procedure depends on medical optimization.

## 2022-05-09 NOTE — PROGRESS NOTES
Pt A&O x2, bedrest, VSS. RA during the day. Needs O2 at night. L hip pain managed with IV Dilaudid, Oxy, and Tyl. Big bruise to L upper arm. Wound to R arm, dressing changed by WOC. Port on R chest, intact. Has pace maker on L chest. Pt went to dialysis in the afternoon. Saline lock.  NPO after midnight. Possible surgery tomorrow.

## 2022-05-09 NOTE — H&P
[facility]    History and Physical - Hospitalist Service       Date of Admission:  5/8/2022    Assessment & Plan      Francine Chris is a 76 year old female admitted on 5/8/2022 after a mechanical fall. Found to have left femoral neck fracture.    Mechanical fall  Left femoral neck fracture  Pain 9/10 prior to pain med in ED.   - admit to inpatient  - consult to orthopedics called from ED  - NPO after midnight in case of procedure  - pain control  - symptomatic management  - hold apixaban. Resume per ortho.  - bedrest for now    ESRD on HD MWF  Hyponatremia  On HD since 2021. Dialyzes MWF under care of Dr. Ro. Na 120 in ED (recent baseline runs low).  - nephrology consult  - per Dr. Palmer, hyponatremia is due to large intradialytic weight gains.  5 kg is a typical goal in HD for fluid removal.  - fluid restrict  - IVF ordered in ED. I put stop time on for a few hours. Stop sooner for any dyspnea.     Permanent atrial fibrillation  S/p AVN ablation with ppm 6/2021  - PTA on DOAC. Hold apixaban for trauma and possible surgery.     Severe tricuspid regurgitation   Pulmonary hypertension  Chronic R heart failure, compensated  HLD  [needs rec- atorvastatin 40 mg daily, metoprolol 12.5 mg BID, torsemide 40 mg daily]  Echo with EF 50-55%, nl LV fn, severe TR, pulmonary pressures 60-65 mmHg. Seen by CV surgery, deemed to be too high of a surgical risk for valve replacement.   - resume meds      Anemia  Hgb at 10.5 on presentation, stable from recent  - defer to nephrology for management     COPD  [needs rec- prn albuterol, tiotropium-olodaterol 2 puffs daily]  - prn albuterol available  - resume meds      SKY  No longer wears CPAP     Anxiety  Depression   Doesn't appear to currently be on meds for this     Gout  - resume home allopurinol 100 mg MWF after dialysis      PUD  - resume home pantoprazole 40 mg daily      PAD  noted     COVID-19 negative       Diet:   Dialysis diet. NPO after  midnight.  DVT Prophylaxis: DOAC  Schuler Catheter: Not present  Central Lines: PRESENT     Cardiac Monitoring: None  Code Status:   Full Code    Clinically Significant Risk Factors Present on Admission         # Hyponatremia: Na = 120 mmol/L (Ref range: 133 - 144 mmol/L) on admission, will monitor as appropriate  # Hypercalcemia: Ca = 10.5 mg/dL (Ref range: 8.5 - 10.1 mg/dL) and/or iCa = N/A on admission, will monitor as appropriate   # Anion Gap Metabolic Acidosis: AG = 16 mmol/L (Ref range: 3 - 14 mmol/L) on admission, will monitor and treat as appropriate    # Coagulation Defect: home medication list includes an anticoagulant medication  # Thrombocytopenia: Plts = 129 10e3/uL (Ref range: 150 - 450 10e3/uL) on admission, will monitor for bleeding       Disposition Plan   Expected Discharge:  2+ midnights   Anticipated discharge location:  Awaiting care coordination huddle  Delays:           The patient's care was discussed with the Bedside Nurse, Patient and ED physician Dr Cline.    Jie Medina MD  Hospitalist Service  [facility]  Securely message with the Vocera Web Console (learn more here)  Text page via [facility] Paging/Directory         ______________________________________________________________________    Chief Complaint   Left hip pain    History is obtained from the patient    History of Present Illness   Francine Chris is a 76 year old female who slipped on a wet area on the floor of her bedroom while she was trying to put away some clothes. Landed on left side and was unable to get up. Patient is quite uncomfortable in ED. Says pain is 9/10. RN was just entering room with pain med. Denies recent illness. She was last dialyzed Friday. Doesn't miss sessions.    Endorses occasional pain in RUE at site of previous fistula, where there is now a chronic wound.    Denies gi symptoms, fevers, chills. She was feeling in her normal state of health prior to fall. She has had other  "recent ED visits and admits for injuries related to falls.    She confirms that her code status is Full Code or \"do everything.\"    Review of Systems    The 10 point Review of Systems is negative other than noted in the HPI or here.     Past Medical History    I have reviewed this patient's medical history and updated it with pertinent information if needed.   Past Medical History:   Diagnosis Date     Acute renal failure (ARF) (H) 06/22/2021     Anemia in chronic kidney disease, on chronic dialysis (H) 07/14/2021     Atrophy of left kidney 05/18/2017     Chronic atrial fibrillation (H) 04/01/2013     Chronic kidney disease, stage III (moderate) (H) 11/13/2019    Formatting of this note might be different from the original. Cr=1.2     COPD mixed type (H) 04/06/2013    Formatting of this note might be different from the original. On Qvar and combivent, hx of cor pulmonale     Cor pulmonale (chronic) (H) 09/20/2021     Depressive disorder, not elsewhere classified      Dermatitis of lower extremity 05/05/2017     Elevated blood pressure reading without diagnosis of hypertension 12/10/2004     HTN (hypertension) 09/07/2011     Hx of gout 08/13/2014     Hypercalcemia 07/16/2018    Formatting of this note might be different from the original. 7/16/18  Ca++=10.8   Alb=3.8   7/17/18:  PTH=95.6  Ca++=10.1 (after diuresis), some renal impairment, was on vit D and calcium outpt     Hypertensive heart and kidney disease 07/16/2018    Formatting of this note might be different from the original. Atrophic left kidney, rise in Cr with increase in bumex 2018     Hyponatremia 06/22/2021     Intermediate stage nonexudative age-related macular degeneration of both eyes 01/13/2021     Lung nodule 04/28/2021     Malignant melanoma (H)      Migraine variant 12/10/2004     Problem list name updated by automated process. Provider to review     Neoplasm of skin of neck 06/06/2014     NSVT (nonsustained ventricular tachycardia) (H) " 2020     SKY (obstructive sleep apnea) 2020     Osteoporosis 11/10/2004     Problem list name updated by automated process. Provider to review     Osteoporosis, unspecified dx      Pacemaker 2021    Formatting of this note might be different from the original. After AV luis ablation for AF with RVR     PAD (peripheral artery disease) (H) 2019     Polyclonal gammopathy 2018     Prolonged QT interval 2018    Formatting of this note might be different from the original. On sotolol     Tobacco use disorder      Variants of migraine, not elsewhere classified, without mention of intractable migraine without mention of status migrainosus     flashing lights       Past Surgical History   I have reviewed this patient's surgical history and updated it with pertinent information if needed.  Past Surgical History:   Procedure Laterality Date     IRRIGATION AND DEBRIDEMENT UPPER EXTREMITY, COMBINED Right 2022    Procedure: EVACUATE HEMATOMA UPPER RIGHT ARM. EXCISION FULL THICKNESS SKIN ULCER RIGHT UPPER ARM;  Surgeon: Sree Eric;  Location: [facility] OR     ZZC NONSPECIFIC PROCEDURE      right ankle fracture     ZZC NONSPECIFIC PROCEDURE      spontaneous AB     ZZC NONSPECIFIC PROCEDURE      tonsillectomy     ZZC NONSPECIFIC PROCEDURE      tubal ligation       Social History   I have reviewed this patient's social history and updated it with pertinent information if needed.  Social History     Tobacco Use     Smoking status: Former Smoker     Packs/day: 1.00     Years: 43.00     Pack years: 43.00     Types: Cigarettes     Quit date: 2021     Years since quittin.1     Smokeless tobacco: Never Used   Substance Use Topics     Alcohol use: Yes     Comment: occ. glass of wine     Drug use: No       Family History   I have reviewed this patient's family history and updated it with pertinent information if needed.  Family History   Problem Relation Age of Onset      Hypertension Mother      Arthritis Mother      Circulatory Mother      Heart Disease Mother      Lipids Mother      Osteoporosis Mother      Alcohol/Drug Father         ?MI     Circulatory Father      Heart Disease Father      Hypertension Sister      Allergies Sister         thyroid dz     Arthritis Sister      Osteoporosis Sister      Obesity Brother      Alcohol/Drug Brother         dm, ptsd, cad     Bladder Cancer Brother      Diabetes Brother      Myocardial Infarction Brother      Prostate Cancer Brother      Cerebrovascular Disease Maternal Grandmother      Breast Cancer Maternal Grandmother         94     Cancer - colorectal Maternal Grandmother         97     Arthritis Maternal Grandmother      Gastrointestinal Disease Maternal Grandmother      Osteoporosis Maternal Grandmother      Thyroid Disease Maternal Grandmother      Osteoarthritis Maternal Grandfather      C.A.D. Maternal Grandfather      Heart Failure Paternal Grandmother      Hyperlipidemia Maternal Uncle      Alzheimer Disease Maternal Uncle      Thyroid Disease Other         niece       Prior to Admission Medications   Prior to Admission Medications   Prescriptions Last Dose Informant Patient Reported? Taking?   ACE/ARB/ARNI NOT PRESCRIBED (INTENTIONAL)  at - Nursing Home No Yes   Sig: Please choose reason not prescribed from choices below.   Glycopyrrolate-Formoterol (BEVESPI AEROSPHERE) 9-4.8 MCG/ACT oral inhaler 5/8/2022 at AM Nursing Home No Yes   Sig: Inhale 2 puffs into the lungs 2 times daily   Patient taking differently: Inhale 1 puff into the lungs daily   Melatonin 10 MG TABS tablet 5/7/2022 at  Nursing Home Yes Yes   Sig: Take 10 mg by mouth At Bedtime   acetaminophen (TYLENOL) 325 MG tablet 5/8/2022 at AM Nursing Home No Yes   Sig: Take 2 tablets (650 mg) by mouth 2 times daily. May also take 2 tablets (650 mg) 2 times daily as needed for pain.   Patient taking differently: Take 2 tablets (650 mg) by mouth 2 times daily.    acetaminophen (TYLENOL) 325 MG tablet  at PRN Nursing Home Yes Yes   Sig: Take 650 mg by mouth 2 times daily as needed for mild pain   albuterol (PROAIR HFA/PROVENTIL HFA/VENTOLIN HFA) 108 (90 Base) MCG/ACT inhaler 5/6/2022 at PRN Nursing Home Yes Yes   Sig: Inhale 2 puffs into the lungs every 4 hours as needed   allopurinol (ZYLOPRIM) 100 MG tablet 5/4/2022 at PM Nursing Home No Yes   Sig: TAKE ONE TABLET BY MOUTH THREE TIMES A WEEK ON MONDAY, WEDNESDAY AND FRIDAY AFTER HD SESSION   apixaban ANTICOAGULANT (ELIQUIS) 2.5 MG tablet 5/8/2022 at 0800 Nursing Home No Yes   Sig: Take 1 tablet (2.5 mg) by mouth 2 times daily   atorvastatin (LIPITOR) 40 MG tablet 5/8/2022 at AM Nursing Home No Yes   Sig: Take 1 tablet (40 mg) by mouth daily   calcium acetate (CALPHRON) 667 MG TABS tablet 5/8/2022 at 1200 Nursing Home Yes Yes   Sig: Take 1 tablet (667 mg) by mouth 3 times daily (with meals)   loratadine (CLARITIN) 10 MG tablet 5/7/2022 at  Nursing Home No Yes   Sig: Take 1 tablet (10 mg) by mouth At Bedtime   metoprolol succinate ER (TOPROL-XL) 25 MG 24 hr tablet 5/8/2022 at AM Nursing Home No Yes   Sig: Take 0.5 tablets (12.5 mg) by mouth 2 times daily   multivitamin RENAL (MULTIVITAMIN RENAL) 1 MG capsule 5/8/2022 at AM Nursing Home Yes Yes   Sig: Take 1 capsule by mouth daily   pantoprazole (PROTONIX) 40 MG EC tablet 5/7/2022 at PM Nursing Home No Yes   Sig: Take 1 tablet (40 mg) by mouth daily   sodium chloride (OCEAN) 0.65 % nasal spray  at PRN Nursing Home No Yes   Sig: Spray 1 spray into both nostrils every 2 hours as needed for congestion MAY KEEP AT BEDSIDE   torsemide (DEMADEX) 20 MG tablet 5/8/2022 at 0800 Nursing Home No Yes   Sig: Take 2 tablets (40 mg) by mouth daily Take at 8 AM and 1 PM.   triamcinolone (KENALOG) 0.1 % external cream  at PRN Nursing Home No Yes   Sig: Apply to AA BID x 1-2 week then PRN only      Facility-Administered Medications: None     Allergies   Allergies   Allergen Reactions      Sotalol Other (See Comments)     Prolonged QT     Bupropion Other (See Comments)     Na 118 acutely. Comorbid diuresis for heart failure       Physical Exam   Vital Signs: Temp: 97.6  F (36.4  C) Temp src: Oral BP: (!) 140/101 Pulse: 69   Resp: 20 SpO2: 100 % O2 Device: None (Room air)    Weight: 141 lbs 0 oz    Constitutional: Awake, alert, appears uncomfortable  HEENT: neck supple, no LAD noted, moist mucous membranes  Respiratory: Clear to auscultation bilaterally, no crackles or wheezing  Cardiovascular: irreguler. Right chest dialysis cath.  GI: Normal bowel sounds, soft, non-distended, non-tender  Skin/Integumen: Chronic venous stasis changes in LE. RUE fistula site with dressing and underneath is a necrotic wound with surrounding erythema. Does not appear acutely infected, but does have a slight odor.  Ext: no edema, moving all extremities  Neuro: CN 2-12 intact, non focal exam      Data   Data reviewed today: I reviewed all medications, new labs and imaging results over the last 24 hours.     Recent Labs   Lab 05/08/22  1752   WBC 6.3   HGB 10.5*   *   *   INR 1.62*   *   POTASSIUM 4.5   CHLORIDE 80*   CO2 24   BUN 52*   CR 5.21*   ANIONGAP 16*   SILVIA 10.5*   *     Recent Results (from the past 24 hour(s))   XR Femur Left 2 Views    Narrative    EXAM: XR FEMUR LEFT 2 VIEW  LOCATION: [facility]  DATE/TIME: 5/8/2022 6:19 PM    INDICATION: Fall, pain.  COMPARISON: None.      Impression    IMPRESSION: Fracture through the left femoral neck without evidence for intertrochanteric extension. Left femur otherwise negative.   XR Pelvis 1/2 Views    Narrative    EXAM: XR PELVIS 1/2 VW  LOCATION: [facility]  DATE/TIME: 5/8/2022 6:19 PM    INDICATION: Fall, left hip pain.  COMPARISON: None.      Impression    IMPRESSION: Fracture through the left femoral neck without evidence for intertrochanteric extension. No dislocation. Right hip negative  for fracture. Pelvis negative for fracture. Diffuse demineralization.

## 2022-05-09 NOTE — PROGRESS NOTES
RECEIVING UNIT ED HANDOFF REVIEW    ED Nurse Handoff Report was reviewed by: Nellie Thompson RN on May 8, 2022 at 9:11 PM

## 2022-05-09 NOTE — CONSULTS
Consult Date: 05/09/2022    ORTHOPEDIC CONSULTATION    DIAGNOSIS:  Left femoral neck fracture.      REQUESTING PHYSICIAN:  Dr. Jie Medina MD    HISTORY OF PRESENT ILLNESS:  Ms. Chris is a very pleasant 76-year-old female who lives in a long-term care facility, who had an unwitnessed fall.  She has a history of end-stage renal disease and is on dialysis.  She was unable to ambulate after the fall, taken to [facility] where she was found to have a left femoral neck fracture and admitted for definitive care.    REVIEW OF SYSTEMS:   A 10-point review of systems is positive for a right upper extremity wound where she has had previous fistula.  Otherwise, just left hip pain.    PAST MEDICAL HISTORY:  Significant for her acute renal failure on top of chronic kidney disease, on dialysis; chronic atrial fibrillation, COPD, cor pulmonale, depression, dermatitis of the lower extremity, hypertension, gout, hypercalcemia, hyponatremia, malignant melanoma, osteoporosis, nonsustained ventricular tachycardia, obstructive sleep apnea, peripheral arterial disease.    PAST SURGICAL HISTORY:  She has had a previous hematoma of right arm evacuated, left ankle fracture, tonsillectomy and tubal ligation.    SOCIAL HISTORY:  She lives in an assisted living facility.  Walks with a walker or cane as needed.    FAMILY HISTORY:  Noncontributory.    MEDICATIONS ON ADMISSION MEDICATIONS:  She is on glycopyrrolate inhaler, melatonin, Tylenol, albuterol, zolpidem, Lipitor, Calferon, Claritin, metoprolol, multivitamin, Protonix, Kenalog cream.    ALLERGIES:  SOTALOL AND BUPROPRION.    PHYSICAL EXAMINATION:    GENERAL:  She is lying in her hospital bed.  She has an ecchymosis around her left forehead down into her left cheek, but is alert and oriented and conversive.    VITAL SIGNS:  She is afebrile.  Vital signs are stable.  MUSCULOSKELETAL:  Right and left upper extremities:  On the right upper extremity, she has a  chronic wound.  She otherwise cachectic appearing.  No tenderness to palpation or crepitus.  She has full shoulder range of motion and function of the shoulders, elbows, wrists and hands.  Radial pulses intact bilaterally.  Radial, ulnar, median nerve sensation intact bilaterally.  Right and left lower extremity:  Left lower extremity is shortened and externally rotated, tender to palpation around the left hip. Skin is clean, dry, and intact in both lower extremities.  Palpable dorsalis pedis pulses.  EHL, FHL, tibialis anterior, gastroc soleus fire bilaterally.  She has full sensation to superficial and deep peroneus saphenous, tibial, and sural nerves bilaterally.  She has no tenderness to palpation or crepitus in the right lower extremity.  Right lower extremity is within normal limits.      IMAGING:  X-rays of AP pelvis and left lateral hip show a displaced left femoral neck fracture.    LABORATORY DATA:  On admission, sodium was 120, potassium 4.5.  White blood cell count 6.3, hemoglobin 10.5.  INR is 1.62.    IMPRESSION AND PLAN:  I had a long discussion with Ms. Chris and her daughter regarding left femoral neck fracture.  I think she would benefit from bipolar hemiarthroplasty to improve her pain and improve her function.  It sounds as if she is falling frequently and that her overall health condition is somewhat deteriorating.  They understand the risks of the procedure, particularly risk of somebody in her circumstance and also particularly risk of somebody who has end-stage renal disease and is on dialysis.  Her sodium is quite low this morning and she is due for dialysis today.  We will proceed with dialysis today and then when her electrolytes are better stabilized, we will proceed with surgical intervention as able.  They understand and are happy with this plan of care.    Eran Funez MD        D: 05/09/2022   T: 05/09/2022   MT: [id number]    Name:     MARQUIS TIFFANIE NORMA  MRN:       [id number]        Account:      [id number]   :      1945           Consult Date: 2022     Document: [id number]

## 2022-05-09 NOTE — PROGRESS NOTES
Pt up to floor this evening. ED admit for fall and L hip fracture. Pt will be NPO at midnight. A&Ox4, but pt is drowsy/sleepy, responds to voice. Was unable to assess buttocks/coccyx region due to pain, will pass to incoming nurse. Text sent to pharmacy to refill phoslo.

## 2022-05-09 NOTE — PROGRESS NOTES
Potassium   Date Value Ref Range Status   05/08/2022 4.5 3.4 - 5.3 mmol/L Final   01/12/2005 4.5 3.4 - 5.3 mmol/L Final     Hemoglobin   Date Value Ref Range Status   05/08/2022 10.5 (L) 11.7 - 15.7 g/dL Final   01/12/2005 15.6 11.7 - 15.7 g/dL Final     Creatinine   Date Value Ref Range Status   05/08/2022 5.21 (H) 0.52 - 1.04 mg/dL Final   01/12/2005 0.80 0.60 - 1.30 mg/dL Final     Urea Nitrogen   Date Value Ref Range Status   05/08/2022 52 (H) 7 - 30 mg/dL Final   01/12/2005 16 7 - 30 mg/dL Final     Sodium   Date Value Ref Range Status   05/08/2022 120 (L) 133 - 144 mmol/L Final   01/12/2005 141 133 - 144 mmol/L Final     INR   Date Value Ref Range Status   05/08/2022 1.62 (H) 0.85 - 1.15 Final       DIALYSIS PROCEDURE NOTE  Hepatitis status of previous patient on machine log was checked and verified ok to use with this patients hepatitis status.  Patient dialyzed for 3 hrs. on a K3 bath with a net fluid removal of  4.8L.  A BFR of 400 ml/min was obtained via a RIJ       The treatment plan was discussed with Dr. Chen  during the treatment.    Total heparin received during the treatment: 0 units.      Line flushed, clamped and capped with heparin 1:1000 2.1 mL (2200 units) per lumen    Meds  given: None    Complications: None       Person educated: Pt very lethargic and medicated for pain, Procedure explained      ICEBOAT? Timeout performed pre-treatment  I: Patient was identified using 2 identifiers  C:  Consent Signed Yes  E: Equipment preventative maintenance is current and dialysis delivery system OK to use  B: Hepatitis B Surface Antigen: Neg ; Draw Date: 1.12.22      Hepatitis B Surface Antibody: Immune ; Draw Date: 1.12.22  O: Dialysis orders present and complete prior to treatment  A: Vascular access verified and assessed prior to treatment  T: Treatment was performed at a clinically appropriate time  ?: Patient was allowed to ask questions and address concerns prior to treatment  See flowsheet in EPIC  for further details and post assessment.  Machine water alarm in place and functioning. Transducer pods intact and checked every 15min.   Pt returned via Bed .  Chlorine/Chloramine water system checked every 4 hours.  Outpatient Dialysis at [facility]

## 2022-05-09 NOTE — ED PROVIDER NOTES
History     Chief Complaint:  Fall       HPI   Francine Chris is a 76 year old female who presents for evaluation of left hip pain after a fall.  She arrives via EMS.  She states that she fell getting out of bed tonight.  She recalls the fall and did not hit her head or lose consciousness.  She has bruising and Steri-Strips on her left forehead from a fall last week.  She also has bruising on her arm on the left from last week.  She has pain in left hip but no other new injuries.  She denies any syncope.    ROS:  Review of Systems   Constitutional: Negative for chills and fever.   Respiratory: Negative for cough and shortness of breath.    Cardiovascular: Negative for chest pain.   Gastrointestinal: Negative for abdominal pain.   Musculoskeletal: Negative for back pain and neck pain.   Skin: Positive for wound.   Neurological: Negative for headaches.   Psychiatric/Behavioral: Negative for confusion.   All other systems reviewed and are negative.        Allergies:  Sotalol  Bupropion     Medications:    No current outpatient medications on file.      Past Medical History:    Past Medical History:   Diagnosis Date     Acute renal failure (ARF) (H) 06/22/2021     Anemia in chronic kidney disease, on chronic dialysis (H) 07/14/2021     Atrophy of left kidney 05/18/2017     Chronic atrial fibrillation (H) 04/01/2013     Chronic kidney disease, stage III (moderate) (H) 11/13/2019     COPD mixed type (H) 04/06/2013     Cor pulmonale (chronic) (H) 09/20/2021     Depressive disorder, not elsewhere classified      Dermatitis of lower extremity 05/05/2017     Elevated blood pressure reading without diagnosis of hypertension 12/10/2004     HTN (hypertension) 09/07/2011     Hx of gout 08/13/2014     Hypercalcemia 07/16/2018     Hypertensive heart and kidney disease 07/16/2018     Hyponatremia 06/22/2021     Intermediate stage nonexudative age-related macular degeneration of both eyes 01/13/2021     Lung nodule 04/28/2021      Malignant melanoma (H)      Migraine variant 12/10/2004     Neoplasm of skin of neck 06/06/2014     NSVT (nonsustained ventricular tachycardia) (H) 04/09/2020     SKY (obstructive sleep apnea) 04/08/2020     Osteoporosis 11/10/2004     Osteoporosis, unspecified dx 8/02     Pacemaker 07/14/2021     PAD (peripheral artery disease) (H) 02/08/2019     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     Prolonged QT interval 07/16/2018     Tobacco use disorder      Variants of migraine, not elsewhere classified, without mention of intractable migraine without mention of status migrainosus      Patient Active Problem List   Diagnosis     Anxiety and depression     Osteoporosis     Tobacco use disorder     Intermediate stage nonexudative age-related macular degeneration of both eyes     Anemia in chronic kidney disease, on chronic dialysis (H)     Atrophy of left kidney     Chronic kidney disease, stage III (moderate) (H)     COPD mixed type (H)     Cor pulmonale (chronic) (H)     Dermatitis of lower extremity     Hx of gout     Hypercalcemia     Hyponatremia     Lung nodule     Neoplasm of skin of neck     SKY (obstructive sleep apnea)     Pacemaker     PAD (peripheral artery disease) (H)     Polyclonal gammopathy     ESRD (end stage renal disease) on dialysis (H)     Chronic gout     History of ventricular tachycardia     A-V fistula (H)     End stage renal failure on dialysis (H)     Hematoma of skin     Episode of recurrent major depressive disorder, unspecified depression episode severity (H)     Closed fracture of left hip, initial encounter (H)     Fall, initial encounter        Past Surgical History:    Past Surgical History:   Procedure Laterality Date     IRRIGATION AND DEBRIDEMENT UPPER EXTREMITY, COMBINED Right 4/18/2022    Procedure: EVACUATE HEMATOMA UPPER RIGHT ARM. EXCISION FULL THICKNESS SKIN ULCER RIGHT UPPER ARM;  Surgeon: Sree Eric;  Location:  OR     [facility] NONSPECIFIC PROCEDURE  1990's    right ankle  fracture     ZZC NONSPECIFIC PROCEDURE      spontaneous AB     ZZC NONSPECIFIC PROCEDURE      tonsillectomy     ZZC NONSPECIFIC PROCEDURE  1980's    tubal ligation        Family History:    family history includes Alcohol/Drug in her brother and father; Allergies in her sister; Alzheimer Disease in her maternal uncle; Arthritis in her maternal grandmother, mother, and sister; Bladder Cancer in her brother; Breast Cancer in her maternal grandmother; C.A.D. in her maternal grandfather; Cancer - colorectal in her maternal grandmother; Cerebrovascular Disease in her maternal grandmother; Circulatory in her father and mother; Diabetes in her brother; Gastrointestinal Disease in her maternal grandmother; Heart Disease in her father and mother; Heart Failure in her paternal grandmother; Hyperlipidemia in her maternal uncle; Hypertension in her mother and sister; Lipids in her mother; Myocardial Infarction in her brother; Obesity in her brother; Osteoarthritis in her maternal grandfather; Osteoporosis in her maternal grandmother, mother, and sister; Prostate Cancer in her brother; Thyroid Disease in her maternal grandmother and another family member.    Social History:   reports that she quit smoking about 13 months ago. Her smoking use included cigarettes. She has a 43.00 pack-year smoking history. She has never used smokeless tobacco. She reports current alcohol use. She reports that she does not use drugs.  PCP: Eileen Arredondo     Physical Exam     Patient Vitals for the past 24 hrs:   BP Temp Temp src Pulse Resp SpO2 Height Weight   05/08/22 2132 (!) 151/77 97.3  F (36.3  C) Oral 75 18 97 % -- --   05/08/22 2030 (!) 145/113 -- -- 76 -- 98 % -- --   05/08/22 2000 (!) 158/86 -- -- 78 -- 96 % -- --   05/08/22 1930 (!) 152/84 -- -- 74 -- 98 % -- --   05/08/22 1900 (!) 154/88 -- -- 73 -- 100 % -- --   05/08/22 1845 -- -- -- -- -- 100 % -- --   05/08/22 1800 (!) 140/101 -- -- 69 -- 96 % -- --   05/08/22 1751 (!) 145/81 --  "-- -- -- -- -- --   05/08/22 1741 -- 97.6  F (36.4  C) Oral 73 20 96 % 1.651 m (5' 5\") 64 kg (141 lb)        Physical Exam  Constitutional: Well appearing.  HEENT: Old appearing ecchymosis of the left forehead and face with Steri-Strips present on the left forehead.  PERRL.  EOMI.  Moist mucous membranes.  Neck: Soft.  Supple.  No tenderness to palpation.  Cardiac: Regular rate and rhythm.  No murmur or rub.  Respiratory: Clear to auscultation bilaterally.  No respiratory distress.  No wheezing, rhonchi, or rales.  Abdomen: Soft and nontender.  No guarding.  Nondistended.  Musculoskeletal: Significant tenderness of the left hip and pain with any attempted passive range of motion.  Left leg is shortened and internally rotated.  No edema.  Normal range of motion of the remainder joints.  Old ecchymosis of the left arm with no tenderness.  Neurologic: Alert and oriented x3.  Normal tone and bulk.  No facial drooping.  Normal speech.  5/5 strength in bilateral upper and lower extremities, with left leg limited at the hip and knee secondary to hip injury.  Sensation to light touch intact throughout..  Skin: No rashes.  No edema.  Psych: Normal affect.  Normal behavior.      Emergency Department Course   ECG:  ECG taken at 1803, ECG read at 1803  Ventricular-paced rhythm.  Abnormal ECG.  No significant change as compared to prior EKG dated 1/26/22   Rate 70 bpm. LA interval * ms. QRS duration 134 ms. QT/QTc 474/511 ms. P-R-T axis * -56 117.       Imaging:  XR Chest Port 1 View   Final Result   IMPRESSION: Pulmonary vascular congestion with increased interstitial markings, correlate for interstitial pulmonary edema, recommend follow-up to resolution. No pneumothorax. Moderately enlarged heart. Left subclavian approach pacer. Right-sided central    venous catheter with tip over atrial caval junction.      XR Pelvis 1/2 Views   Final Result   IMPRESSION: Fracture through the left femoral neck without evidence for " intertrochanteric extension. No dislocation. Right hip negative for fracture. Pelvis negative for fracture. Diffuse demineralization.      XR Femur Left 2 Views   Final Result   IMPRESSION: Fracture through the left femoral neck without evidence for intertrochanteric extension. Left femur otherwise negative.         Report per radiology    Laboratory:  Labs Ordered and Resulted from Time of ED Arrival to Time of ED Departure   BASIC METABOLIC PANEL - Abnormal       Result Value    Sodium 120 (*)     Potassium 4.5      Chloride 80 (*)     Carbon Dioxide (CO2) 24      Anion Gap 16 (*)     Urea Nitrogen 52 (*)     Creatinine 5.21 (*)     Calcium 10.5 (*)     Glucose 129 (*)     GFR Estimate 8 (*)    INR - Abnormal    INR 1.62 (*)    CBC WITH PLATELETS AND DIFFERENTIAL - Abnormal    WBC Count 6.3      RBC Count 2.94 (*)     Hemoglobin 10.5 (*)     Hematocrit 31.6 (*)      (*)     MCH 35.7 (*)     MCHC 33.2      RDW 18.4 (*)     Platelet Count 129 (*)     % Neutrophils 73      % Lymphocytes 14      % Monocytes 11      % Eosinophils 1      % Basophils 0      % Immature Granulocytes 1      NRBCs per 100 WBC 0      Absolute Neutrophils 4.7      Absolute Lymphocytes 0.9      Absolute Monocytes 0.7      Absolute Eosinophils 0.0      Absolute Basophils 0.0      Absolute Immature Granulocytes 0.1      Absolute NRBCs 0.0     COVID-19 VIRUS (CORONAVIRUS) BY PCR - Normal    SARS CoV2 PCR Negative          Procedures     Emergency Department Course:             Reviewed:  I reviewed nursing notes, vitals and past medical history    Interventions:  IV Dilaudid to 0.5 mg x 2    Disposition:  The patient was admitted to the hospital under the care of Dr. Medina.     Impression & Plan    Covid-19  Francine Chris was evaluated during a global COVID-19 pandemic, which necessitated consideration that the patient might be at risk for infection with the SARS-CoV-2 virus that causes COVID-19.   Applicable protocols for evaluation  were followed during the patient's care.   COVID-19 was considered as part of the patient's evaluation. The plan for testing is:  a test was obtained during this visit.    Medical Decision Making:  Francine Chris is a 76-year-old woman who is afebrile and hemodynamically stable.  Very tender in the left hip with any attempted range of motion.  Leg is shortened and internally rotated.  X-ray demonstrates a left femoral neck fracture.  Discussed results with the patient and the need for admission and she is in agreement.  I spoke with orthopedics who will consult on the patient.  I spoke with hospitalist who accepts the patient for admission and she was in stable condition at time of admission.    Diagnosis:    ICD-10-CM    1. Closed fracture of left hip, initial encounter (H)  S72.002A    2. Hyponatremia  E87.1    3. Fall, initial encounter  W19.XXXA         Discharge Medications:  Current Discharge Medication List           5/8/2022   MD Son Patel Nicholas J, MD  05/12/22 1014

## 2022-05-09 NOTE — CONSULTS
Nephrology Initial Consult  May 9, 2022      Francine Chris MRN:[id number] YOB: 1945  Date of Admission:5/8/2022  Primary care provider: Eileen Arredondo  Requesting physician: Jie Medina MD    ASSESSMENT AND RECOMMENDATIONS:   1 ESRD -   Outpt HD orders -  SUSAN ContrerasF, Derrick Legris  Dialyzer: 160NRe Optiflux   Na: 137 mEq/L   Bicarb: 38 mEq/L   Dialysate: 2.0 K, 2.5 Ca, 1.0 Mg, 100 Dextrose ()   BFR (prescribed): 400   BFR (actual): 400   Prescribed time: 03:00   EDW: 59 kg     2 Hyponatremia - chronic 2/2 ESRD and large fluid intake. 5 kg above TW    3 Mechanical fall/ Femur fracture - planned for ORIF once vol status and metabolic parameters/ hyponatremia is better.     4 Anemia in ESRD -   Hgb at goal     Plan -   D/w Ortho team  HD today with 4-5 L UF   ( usually gets 5 L or more UF)  135 sodium bath   BMP early AM tomm and decide if she needs another HD session but hopefully can proceed to hip surgery tomorrow     Thank you for the consult. Will continue to follow along with you .      Kina Chen MD  [facility] - Nephrology   [phone]        REASON FOR CONSULT: ESRD    HISTORY OF PRESENT ILLNESS:  Francine Chris is a 76 year old with ESRD , A.fib ( s/p ablation and PPM) , pulm HTN, PAD - now presenting with lt femoral neck fracture after a mechanical fall. She thinks she may have tripped while moving around in her room in LTC.     Consulted for HD while in-house  She struggles with large IDWG ( 5-6 L UF with dialysis) and hyponatremia ( related to larget fluid intake)   Sodium - 120.   Wt 64 kg      Outpt HD orders -  LUIS Contreras, Derrick Legguido  Dialyzer: 160NRe Optiflux   Na: 137 mEq/L   Bicarb: 38 mEq/L   Dialysate: 2.0 K, 2.5 Ca, 1.0 Mg, 100 Dextrose ()   BFR (prescribed): 400   BFR (actual): 400   Prescribed time: 03:00   EDW: 59 kg     On eval,   Awake , alert. Laying flat. C/o pain with fracture. No shortness of  breath       PAST MEDICAL HISTORY:  Reviewed with patient on 05/09/2022  and is as listed in HPI.       MEDICATIONS:  PTA Meds  Prior to Admission medications    Medication Sig Last Dose Taking? Auth Provider   ACE/ARB/ARNI NOT PRESCRIBED (INTENTIONAL) Please choose reason not prescribed from choices below.  at - Yes Eileen Arredondo APRN CNP   acetaminophen (TYLENOL) 325 MG tablet Take 650 mg by mouth 2 times daily as needed for mild pain  at PRN Yes Unknown, Entered By History   acetaminophen (TYLENOL) 325 MG tablet Take 2 tablets (650 mg) by mouth 2 times daily. May also take 2 tablets (650 mg) 2 times daily as needed for pain.  Patient taking differently: Take 2 tablets (650 mg) by mouth 2 times daily. 5/8/2022 at AM Yes Eileen Arredondo APRN CNP   albuterol (PROAIR HFA/PROVENTIL HFA/VENTOLIN HFA) 108 (90 Base) MCG/ACT inhaler Inhale 2 puffs into the lungs every 4 hours as needed 5/6/2022 at PRN Yes Reported, Patient   allopurinol (ZYLOPRIM) 100 MG tablet TAKE ONE TABLET BY MOUTH THREE TIMES A WEEK ON MONDAY, WEDNESDAY AND FRIDAY AFTER HD SESSION 5/4/2022 at PM Yes Eileen Arredondo APRN CNP   apixaban ANTICOAGULANT (ELIQUIS) 2.5 MG tablet Take 1 tablet (2.5 mg) by mouth 2 times daily 5/8/2022 at 0800 Yes Eileen Arredondo APRN CNP   atorvastatin (LIPITOR) 40 MG tablet Take 1 tablet (40 mg) by mouth daily 5/8/2022 at AM Yes Eileen Arredondo APRN CNP   calcium acetate (CALPHRON) 667 MG TABS tablet Take 1 tablet (667 mg) by mouth 3 times daily (with meals) 5/8/2022 at 1200 Yes Eileen Arredondo APRN CNP   Glycopyrrolate-Formoterol (BEVESPI AEROSPHERE) 9-4.8 MCG/ACT oral inhaler Inhale 2 puffs into the lungs 2 times daily  Patient taking differently: Inhale 1 puff into the lungs daily 5/8/2022 at AM Yes Derrick Linares MD   loratadine (CLARITIN) 10 MG tablet Take 1 tablet (10 mg) by mouth At Bedtime 5/7/2022 at HS Yes Arnulfo, JUSTO Damico CNP   Melatonin 10 MG TABS tablet Take 10 mg by  mouth At Bedtime 5/7/2022 at HS Yes Reported, Patient   metoprolol succinate ER (TOPROL-XL) 25 MG 24 hr tablet Take 0.5 tablets (12.5 mg) by mouth 2 times daily 5/8/2022 at AM Yes Alex Hotl MD   multivitamin RENAL (MULTIVITAMIN RENAL) 1 MG capsule Take 1 capsule by mouth daily 5/8/2022 at AM Yes Unknown, Entered By History   pantoprazole (PROTONIX) 40 MG EC tablet Take 1 tablet (40 mg) by mouth daily 5/7/2022 at PM Yes Eileen Arredondo APRN CNP   sodium chloride (OCEAN) 0.65 % nasal spray Spray 1 spray into both nostrils every 2 hours as needed for congestion MAY KEEP AT BEDSIDE  at PRN Yes Eileen Arredondo APRN CNP   torsemide (DEMADEX) 20 MG tablet Take 2 tablets (40 mg) by mouth daily Take at 8 AM and 1 PM. 5/8/2022 at 0800 Yes Alex Holt MD   triamcinolone (KENALOG) 0.1 % external cream Apply to AA BID x 1-2 week then PRN only  at PRN Yes Sophia Damico, PAJORGE      Current Meds    - MEDICATION INSTRUCTIONS for Dialysis Patients -   Does not apply See Admin Instructions     calcium acetate  667 mg Oral TID w/meals     loratadine  10 mg Oral At Bedtime     metoprolol succinate ER  12.5 mg Oral BID     multivitamin RENAL  1 capsule Oral Daily     pantoprazole  40 mg Oral At Bedtime     sodium chloride (PF)  3 mL Intracatheter Q8H     umeclidinium-vilanterol  1 puff Inhalation Daily     Infusion Meds    - MEDICATION INSTRUCTIONS -         ALLERGIES:    Allergies   Allergen Reactions     Sotalol Other (See Comments)     Prolonged QT     Bupropion Other (See Comments)     Na 118 acutely. Comorbid diuresis for heart failure       REVIEW OF SYSTEMS:  A comprehensive of systems was negative except as noted above.    SOCIAL HISTORY:   Reviewed with patient on 05/09/2022     FAMILY MEDICAL HISTORY:   Family History   Problem Relation Age of Onset     Hypertension Mother      Arthritis Mother      Circulatory Mother      Heart Disease Mother      Lipids Mother      Osteoporosis Mother       "Alcohol/Drug Father         ?MI     Circulatory Father      Heart Disease Father      Hypertension Sister      Allergies Sister         thyroid dz     Arthritis Sister      Osteoporosis Sister      Obesity Brother      Alcohol/Drug Brother         dm, ptsd, cad     Bladder Cancer Brother      Diabetes Brother      Myocardial Infarction Brother      Prostate Cancer Brother      Cerebrovascular Disease Maternal Grandmother      Breast Cancer Maternal Grandmother         94     Cancer - colorectal Maternal Grandmother         97     Arthritis Maternal Grandmother      Gastrointestinal Disease Maternal Grandmother      Osteoporosis Maternal Grandmother      Thyroid Disease Maternal Grandmother      Osteoarthritis Maternal Grandfather      C.A.D. Maternal Grandfather      Heart Failure Paternal Grandmother      Hyperlipidemia Maternal Uncle      Alzheimer Disease Maternal Uncle      Thyroid Disease Other         niece     Reviewed with patient on 2022     PHYSICAL EXAM:   Temp  Av.1  F (36.7  C)  Min: 97.3  F (36.3  C)  Max: 99  F (37.2  C)      Pulse  Av.2  Min: 69  Max: 85 Resp  Av.3  Min: 17  Max: 20  SpO2  Av.8 %  Min: 92 %  Max: 100 %       /55 (BP Location: Left arm)   Pulse 74   Temp 99  F (37.2  C) (Axillary)   Resp 17   Ht 1.651 m (5' 5\")   Wt 64 kg (141 lb)   SpO2 92%   BMI 23.46 kg/m        Admit Weight: 64 kg (141 lb)     GENERAL APPEARANCE: no distress,  awake  EYES: no scleral icterus, pupils equal  HENT: NC/AT,  mouth  without ulcers or lesions  Lymphatics: no cervical or supraclavicular LAD  Endo: no moon facies, no goiter  Pulmonary: lungs clear to auscultation   CV: regular rhythm, normal rate, no rub   - JVP -  GI: soft, nontender,   MS: no evidence of inflammation in joints  : no lott  SKIN: no rash, warm, dry, no cyanosis  NEURO: face symmetric, no asterixis   Rt internal jugular TDC     LABS:   CMP  Recent Labs   Lab 22  1752   *   POTASSIUM 4.5 "   CHLORIDE 80*   CO2 24   ANIONGAP 16*   *   BUN 52*   CR 5.21*   GFRESTIMATED 8*   SILVIA 10.5*     CBC  Recent Labs   Lab 05/08/22  1752   HGB 10.5*   WBC 6.3   RBC 2.94*   HCT 31.6*   *   MCH 35.7*   MCHC 33.2   RDW 18.4*   *     INR  Recent Labs   Lab 05/08/22  1752   INR 1.62*     ABGNo lab results found in last 7 days.   URINE STUDIES  Recent Labs   Lab Test 01/10/22  1857   COLOR Orange*   APPEARANCE Cloudy*   URINEGLC Negative   URINEBILI Negative   URINEKETONE Negative   SG 1.017   UBLD Large*   URINEPH 5.5   PROTEIN 200 *   NITRITE Negative   LEUKEST Negative   RBCU >182*   WBCU 43*     IMAGING:  Personally reviewed the images and findings are as listed in HPI     Kina Chen MD

## 2022-05-09 NOTE — CONSULTS
"[facility]  WO Nurse Inpatient Assessment     Today's Assessment: RUE    Patient History (according to provider note(s):    \"76 year old female admitted on 5/8/2022 after a mechanical fall. Found to have left femoral neck fracture.\"    AREAS ASSESSED:    Areas visualized during today's visit: Focused: and RUE       Wound Location: RUE near AC         Last photo: 5/9  Wound due to: Surgical Wound  Wound history/plan of care:   Per sister at bedside, was previously using a silver alginate   Wound base:  fibrin and slough,     Palpation of the wound bed: firm      Drainage: moderate     Description of drainage: serosanguinous     Measurements (length x width x depth, in cm) 2  x 2  x  0.6 cm      Tunneling N/A     Undermining N/A  Periwound skin: Dry/scaly      Color: pink and purple      Temperature: normal   Odor: moderate  Pain: complaint of pain \"all over\", assessed by primary RN and medication provided by primary RN  Pain interventions prior to dressing change: patient tolerated well  Treatment goal: Heal , Infection control/prevention and Remove necrotic tissue  STATUS: initial assessment  Supplies ordered: supplies stored on unit      TREATMENT PLAN:     RUE wound(s): Daily     Wound care  Start:  05/10/22 0600,   DAILY,   Routine        Comments: Location: RUE AC   Care: provided daily by primary RN   1. Cleanse with commercial wound cleanser and 4x4\" gauze, pat dry   2. Apply Iodosorb gel ( #[id number]) nickel thickness into wound base   3. Cover with adaptic gauze, then with cover sponges or ABD pad   4. Secure with flexinet          Orders: Written    RECOMMEND PRIMARY TEAM ORDER: None, at this time  Education provided: plan of care and Infection prevention   Discussed plan of care with: Patient, Family and Nurse  WOC Nurse follow-up plan:weekly  Notify WOC if wound(s) deteriorate.  Nursing to notify the Provider(s) and re-consult the WOC Nurse if new skin concern.    DATA: "     Current support surface: Standard  Atmos Air mattress  Containment of urine/stool: Anuric and Continent of bowel  BMI: Body mass index is 23.46 kg/m .   Active Diet Order: Orders Placed This Encounter      NPO per Anesthesia Guidelines for Procedure/Surgery Except for: Meds     Output: No intake/output data recorded.   Labs: Recent Labs   Lab 05/08/22  1752   HGB 10.5*   INR 1.62*   WBC 6.3     Pressure Injury Risk Assessment:   Miguel Risk Assessment  Sensory Perception: 4-->no impairment  Moisture: 4-->rarely moist  Activity: 1-->bedfast  Mobility: 2-->very limited  Nutrition: 2-->probably inadequate  Friction and Shear: 2-->potential problem  Miguel Score: 15    Lima PURCELLNAFISA   Dept. Pager: [phone]  Dept. Office Number: [phone]

## 2022-05-09 NOTE — PROGRESS NOTES
Patient very drowsy at the start of NOC, awake at 02:30 and rated left hip pain at 5/10. Was given 650 mg Tylenol and ice applied. Interventions were effective and patient asleep again.Has been NPO all night, only awake for a few minutes at a time to answer assessment questions. Has been totally dependent on staff for bed mobility, staff repositioned. Dialysis site in right chest, right upper arm wrapped. Has multiple bruises especially on left upper arm and above left eye. On 3L of supplemental oxygen all night, saturation within therapeutic range.

## 2022-05-09 NOTE — DISCHARGE INSTRUCTIONS
[facility]  [phone] (Center Phone)  [phone] (Center Fax)    [address]  [address] [address]    Dialysis is Monday, Wednesday and  Friday  [employer]  ride.

## 2022-05-10 NOTE — OP NOTE
Procedure Date: 05/10/2022    PREOPERATIVE DIAGNOSIS:  Left femoral neck fracture.    POSTOPERATIVE DIAGNOSIS:  Left femoral neck fracture.    PROCEDURE:  Left bipolar hemiarthroplasty using a Smith and Nephew Synergy cemented femoral stem.    SURGEON:  Eran Funez MD    FIRST ASSISTANT:  Octavia Florence PA-C.    INDICATIONS FOR PROCEDURE:  Ms. Chris is a very pleasant 76-year-old female who lives in an assisted living facility, who has had chronic end-stage renal disease on dialysis, who fell, suffering a left displaced femoral neck fracture.  We had a long discussion of the risks, benefits, and alternatives of bipolar hemiarthroplasty.  She and her sister understand the risks, benefits and alternatives and wished to proceed with surgery.  Her surgery was delayed somewhat due to her electrolyte imbalance and need for dialysis.  After dialysis and improvement of her metabolic situation, she was taken to the operating room.    NARRATIVE EVENTS:  After proper identification of the patient and extremity to be operated on, Ms. Chris was taken to the operating room, where she underwent a fascial block as well as a general anesthetic, placed in the left lateral decubitus position, left hip up.  Left hip was prepped and draped in the usual sterile manner.  After the appropriate surgical pause confirming the patient to be operated on, 10 minutes after the patient had received 2 grams of Ancef, her left hip was approached through a lateral incision centered over the greater trochanter, skin and soft tissue sharply dissected down to the tensor fascia.  Fascia was then split in line with fiber in line with femur, taken down to the trochanteric bursa.  The bursa tissue was removed.  The anterior one-third of gluteus medius removed off the greater trochanter in a modified Hardinge fashion.  This took us down to the joint capsule, which was T'd, and the femoral neck was identified.  We made our femoral neck osteotomy 12 mm  proximal to the lesser trochanter, according to preoperative templating.  We then removed the femoral head from the acetabulum and exposed the acetabulum, removed all hematogenous fibrinous debris from the acetabulum, sized the acetabulum, and it sized to fit a size 46 bipolar component.  Once this was done, we then paid our attention to the femur.  Here, we removed the bone from the piriformis fossa anterolaterally, sequentially broached up to fit a size 15 Smith and Nephew Synergy femoral stem with a standard neck offset and a -3 neck length on a 28 mm femoral head with a 46 bipolar component.  We had appropriate leg length and stability of the hip joint.  So at this point, we prepared to cement in our final implants using 2 batches of Simplex cement, 1 with antibiotic and 1 without.  Using second generation cement techniques, we cemented in a size 15 Smith and Nephew Synergy femoral stem with a 13 distal centralizer.  Once this was solidly into position, we placed the -3 neck length, 28 mm femoral head with a 46 bipolar component on the trunnion, impacted and reduced the hip.  At this point, we then thoroughly irrigated the hip with a dilute Betadine solution followed by saline and closed the joint capsule with interrupted 0 Vicryl sutures, closed the abductor with #5 Ethibond sutures through bone tunnels, closed the tensor fascia with interrupted 0 Vicryl sutures and a running #1 Stratafix suture, and we closed the skin and soft tissues with absorbable sutures and staples in the skin.  We did place 1 gram of powdered vancomycin deep to the tensor fascia prior to closure.  The patient was placed in a well-padded postop dressing, taken to recovery room in stable condition.  She tolerated the procedure without difficulty.    Eran Funez MD        D: 05/10/2022   T: 05/10/2022   MT: [facility]    Name:     TIFFANIE CHO  MRN:      [id number]        Account:        [id number]   :      1945            Procedure Date: 05/10/2022     Document: [id number]

## 2022-05-10 NOTE — PROGRESS NOTES
"Nephrology Progress Note  05/10/2022         ASSESSMENT AND RECOMMENDATIONS:   1 ESRD -   Outpt HD orders -  LUIS Contreras Michael Legris  Dialyzer: 160NRe Optiflux   Na: 137 mEq/L   Bicarb: 38 mEq/L   Dialysate: 2.0 K, 2.5 Ca, 1.0 Mg, 100 Dextrose ()   BFR (prescribed): 400   BFR (actual): 400   Prescribed time: 03:00   EDW: 59 kg      2 Hyponatremia - chronic 2/2 ESRD and large fluid intake.   124 with HD yesterday. Should improve further with HD and UF today      3 Mechanical fall/ Femur fracture - planned for ORIF today     4 Anemia in ESRD -   Hgb at goal      Plan -   D/w Ortho team  HD done . ORIF today   Will likely skip HD tomorrow and run off schedule on Thursday   Check BMP in AM.   Limit fluid intake to < 1800 ml/ day     Kina Chen MD  [facility] - Nephrology   [phone]      Interval History :   Seen / examined on dialysis  Dialysed yesterday with 4.8 L UF. Sodium improved to 124  Dialysed again this morning for 2 hrs to prepare for ORIF later today . 3 L removed.   Tolerated HD okay . Laying flat. Awake , alert. BP okay       Physical Exam:   I/O last 3 completed shifts:  In: 200 [P.O.:200]  Out: 4800 [Other:4800]    /64   Pulse 80   Temp 97.9  F (36.6  C) (Oral)   Resp 16   Ht 1.651 m (5' 5\")   Wt 66.1 kg (145 lb 11.6 oz)   SpO2 95%   BMI 24.25 kg/m      GENERAL APPEARANCE: alert and no distress  Pulmonary: lungs clear to auscultation   CV: regular rhythm, normal rate, no rub   - JVP -  GI: soft, nontender,   MS: no evidence of inflammation in joints  : no lott  SKIN: no rash, warm, dry, no cyanosis  NEURO: face symmetric, no asterixis   Rt internal jugular TDC   Labs:   All labs reviewed by me  Electrolytes/Renal - Recent Labs   Lab Test 05/10/22  0729 05/09/22  1145 05/08/22  1752 04/19/22  0739   *  --  120* 125*   POTASSIUM 4.3  --  4.5 4.4   CHLORIDE 88*  --  80* 92*   CO2 22  --  24 24   BUN 48*  --  52* 45*   CR 4.45*  --  5.21* 3.91* "   GLC 86  --  129* 156*   SILVIA 10.2*  --  10.5* 9.6   PHOS 5.5* 5.8*  --   --        CBC -   Recent Labs   Lab Test 05/10/22  0729 05/08/22  1752 04/19/22  0739 04/18/22  0840   WBC  --  6.3 4.8 6.0   HGB 10.8* 10.5* 10.5* 9.4*   PLT  --  129* 140* 130*       LFTs -   Recent Labs   Lab Test 05/10/22  0729 02/15/22  0624 01/26/22  0638 01/12/22  0113   ALKPHOS  --  122 148 148   BILITOTAL  --  1.0 1.4* 0.9   ALT  --  32 46 49   AST  --  37 43 76*   PROTTOTAL  --  7.6 7.6 7.6   ALBUMIN 3.3* 3.0* 3.1* 3.1*         Current Medications:    [Auto Hold] - MEDICATION INSTRUCTIONS for Dialysis Patients -   Does not apply See Admin Instructions     [Auto Hold] calcium acetate  667 mg Oral TID w/meals     ceFAZolin  2 g Intravenous Pre-Op/Pre-procedure x 1 dose     ceFAZolin  2 g Intravenous See Admin Instructions     [Auto Hold] loratadine  10 mg Oral At Bedtime     [Auto Hold] metoprolol succinate ER  12.5 mg Oral BID     [Auto Hold] multivitamin RENAL  1 capsule Oral Daily     [Auto Hold] pantoprazole  40 mg Oral At Bedtime     [Auto Hold] sodium chloride (PF)  3 mL Intracatheter Q8H     tranexamic acid  1 g Intravenous Once     tranexamic acid  1 g Intravenous Once     [Auto Hold] umeclidinium-vilanterol  1 puff Inhalation Daily       - MEDICATION INSTRUCTIONS -       sodium chloride       Kina Chen MD

## 2022-05-10 NOTE — PROGRESS NOTES
Pt A&O x3, bedrest, VSS, RA. NPO. pain managed with Oxy. Pt went to dialysis in the morning, will go to surgery after dialysis.

## 2022-05-10 NOTE — PROGRESS NOTES
Pt. Alert to self and time. VSS in RA. On bedrest. Rated her pain 8, managed by oxy, tolerated well. NPO from midnight, awaiting procedure

## 2022-05-10 NOTE — BRIEF OP NOTE
[facility]    Brief Operative Note    Pre-operative diagnosis: Left displaced femoral neck fracture (H) [S72.002A]  Post-operative diagnosis Same as pre-operative diagnosis    Procedure: Procedure(s):  left bipolar hip hemiarthroplasty  Surgeon: Surgeon(s) and Role:     * Eran Funez MD - Primary     * Octavia Florence PA-C - Assisting  Anesthesia: Combined Spinal with Femoral Nerve Block   Estimated Blood Loss: Less than 100 ml    Drains: None  Specimens: * No specimens in log *  Findings:   None.  Complications: None.  Implants:   Implant Name Type Inv. Item Serial No.  Lot No. LRB No. Used Action   BONE CEMENT SIMPLEX W/TOBRAMYCIN 6197-9-001 - [id number] Cement, Bone BONE CEMENT SIMPLEX W/TOBRAMYCIN 6197-9-001  [employer] [id number] Left 1 Implanted   BONE CEMENT SIMPLEX FULL DOSE 6191-1-001 - [id number] Cement, Bone BONE CEMENT SIMPLEX FULL DOSE 6191-1-001  [employer] [id number] Left 1 Implanted   IMP BIOPREP BONE PREPARATION KIT    [employer] [id number] Left 1 Implanted   IMP CENTRALIZER DISTAL BI POLAR SNN INVIS 12MM [id number] - [id number] Total Joint Component/Insert IMP CENTRALIZER DISTAL BI POLAR SNN INVIS 12MM [id number]  [employer] [id number] Left 1 Implanted   IMP CUP SNN BIPOLAR TANDEM 46MM - [id number] Total Joint Component/Insert IMP CUP SNN BIPOLAR TANDEM 46MM  [employer] [id number] Left 1 Implanted   IMP STEM FEM HIP SNN SYNERGY CEMENTED SZ 15 [id number] - [id number] Total Joint Component/Insert IMP STEM FEM HIP SNN SYNERGY CEMENTED SZ 15 [id number]  [employer] [id number] Left 1 Implanted   IMP HEAD FEMORAL SNR [place] 28MM -3 [id number] - [id number] Total Joint Component/Insert IMP HEAD FEMORAL SNR [place] 28MM -3 [id number]  [employer] [id number] Left 1 Implanted

## 2022-05-10 NOTE — ANESTHESIA PREPROCEDURE EVALUATION
Anesthesia Pre-Procedure Evaluation    Patient: Francine Chris   MRN: [id number] : 1945        Procedure : Procedure(s):  left bipolar hip hemiarthroplasty          Past Medical History:   Diagnosis Date     Acute renal failure (ARF) (H) 2021     Anemia in chronic kidney disease, on chronic dialysis (H) 2021     Atrophy of left kidney 2017     Chronic atrial fibrillation (H) 2013     Chronic kidney disease, stage III (moderate) (H) 2019    Formatting of this note might be different from the original. Cr=1.2     COPD mixed type (H) 2013    Formatting of this note might be different from the original. On Qvar and combivent, hx of cor pulmonale     Cor pulmonale (chronic) (H) 2021     Depressive disorder, not elsewhere classified      Dermatitis of lower extremity 2017     Elevated blood pressure reading without diagnosis of hypertension 12/10/2004     HTN (hypertension) 2011     Hx of gout 2014     Hypercalcemia 2018    Formatting of this note might be different from the original. 18  Ca++=10.8   Alb=3.8   18:  PTH=95.6  Ca++=10.1 (after diuresis), some renal impairment, was on vit D and calcium outpt     Hypertensive heart and kidney disease 2018    Formatting of this note might be different from the original. Atrophic left kidney, rise in Cr with increase in bumex      Hyponatremia 2021     Intermediate stage nonexudative age-related macular degeneration of both eyes 2021     Lung nodule 2021     Malignant melanoma (H)      Migraine variant 12/10/2004     Problem list name updated by automated process. Provider to review     Neoplasm of skin of neck 2014     NSVT (nonsustained ventricular tachycardia) (H) 2020     SKY (obstructive sleep apnea) 2020     Osteoporosis 11/10/2004     Problem list name updated by automated process. Provider to review     Osteoporosis, unspecified dx       Pacemaker 2021    Formatting of this note might be different from the original. After AV luis ablation for AF with RVR     PAD (peripheral artery disease) (H) 2019     Polyclonal gammopathy 2018     Prolonged QT interval 2018    Formatting of this note might be different from the original. On sotolol     Tobacco use disorder      Variants of migraine, not elsewhere classified, without mention of intractable migraine without mention of status migrainosus     flashing lights      Past Surgical History:   Procedure Laterality Date     IRRIGATION AND DEBRIDEMENT UPPER EXTREMITY, COMBINED Right 2022    Procedure: EVACUATE HEMATOMA UPPER RIGHT ARM. EXCISION FULL THICKNESS SKIN ULCER RIGHT UPPER ARM;  Surgeon: Sree Eric;  Location: [facility] OR     ZZC NONSPECIFIC PROCEDURE      right ankle fracture     ZZC NONSPECIFIC PROCEDURE      spontaneous AB     ZZC NONSPECIFIC PROCEDURE      tonsillectomy     ZZC NONSPECIFIC PROCEDURE      tubal ligation      Allergies   Allergen Reactions     Sotalol Other (See Comments)     Prolonged QT     Bupropion Other (See Comments)     Na 118 acutely. Comorbid diuresis for heart failure      Social History     Tobacco Use     Smoking status: Former Smoker     Packs/day: 1.00     Years: 43.00     Pack years: 43.00     Types: Cigarettes     Quit date: 2021     Years since quittin.1     Smokeless tobacco: Never Used   Substance Use Topics     Alcohol use: Yes     Comment: occ. glass of wine      Wt Readings from Last 1 Encounters:   05/10/22 66.1 kg (145 lb 11.6 oz)        Anesthesia Evaluation   Pt has had prior anesthetic. Type: General.    No history of anesthetic complications       ROS/MED HX  ENT/Pulmonary:     (+) sleep apnea, tobacco use (Quit in ), Past use, 43  Pack-Year Hx,  moderate,  COPD,     Neurologic:     (+) migraines,     Cardiovascular: Comment: Cor pulmonale    (+) Dyslipidemia hypertension-Peripheral Vascular  Disease-- Other. ---Taking blood thinners CHF etiology: Cor pulmonale Last EF: 50-55 pacemaker, dysrhythmias (NSVT), a-fib and Other, valvular problems/murmurs type: AI and MR Mild AI; Mild-mod MR; Sev TR. pulmonary hypertension (Severe (RVSP 60-65 mmHg)), Previous cardiac testing   Echo: Date: 12/7/21 Results:  Interpretation Summary  Left ventricular systolic function is low normal. The visual ejection fraction is 50-55%. The right ventricle is mildly dilated. The right ventricular systolic function is mildly reduced. The left atrium is severely dilated. The right atrium is severely dilated. There is severe (4+) tricuspid regurgitation. Mechanism seems to be poor coaptation the TV leaflets due to impingement by the pacemaker wire. The tip of pacemaker wire seems to be angled towards the base-mid interventricular septum instead of RV apex. There is mild (1+) aortic and mild-moderate mitral regurgitation. The right ventricular systolic pressure is approximated at 60-65 mmHg based on a RA pressure of 15 mmHg Elevated vright atrial pressure of >15 mmHg. No prior study for comparison.      Left Ventricle  The left ventricle is normal in size. There is normal left ventricular wall thickness. Left ventricular systolic function is low normal. The visual ejection fraction is 50-55%. Diastolic function not assessed due to atrial fibrillation. No regional wall motion abnormalities noted.    Right Ventricle  There is a catheter/pacemaker lead seen in the right ventricle. The right ventricle is mildly dilated. The tip of pacemaker wire seems to be angled towards the base-mid interventricular septum instead of RV apex. Mildly decreased right ventricular systolic function.    Atria  The left atrium is severely dilated. The right atrium is severely dilated.    Mitral Valve  The mitral valve leaflets appear normal. There is no evidence of stenosis, fluttering, or prolapse. There is mild mitral annular calcification. There is mild  to moderate (1-2+) mitral regurgitation.    Tricuspid Valve  There is severe (4+) tricuspid regurgitation. Mechanism seems to be poor coaptation the TV leaflets due to impingment by the pacemaker wire. The right ventricular systolic pressure is approximated at 51.1 mmHg plus the right atrial pressure. Right ventricular systolic pressure is elevated, consistent with severe pulmonary hypertension.    Aortic Valve  There is mild trileaflet aortic sclerosis. There is mild (1+) aortic regurgitation. No aortic stenosis is present.    Pulmonic Valve  Normal pulmonic valve. There is trace pulmonic valvular regurgitation.    Vessels  Borderline aortic root dilatation. Normal size ascending aorta. IVC diameter >2.1 cm collapsing <50% with sniff suggests a high RA pressure estimated at 15 mmHg or greater.    Pericardium  There is no pericardial effusion.    Rhythm  The rhythm was atrial fibrillation.  Stress Test: Date: Results:    ECG Reviewed: Date: 1/26/22 Results:  A-fib  Cath: Date: Results:      METS/Exercise Tolerance:     Hematologic:     (+) anemia,     Musculoskeletal:   (+) arthritis,     GI/Hepatic:       Renal/Genitourinary: Comment: Hyponatremia (chronic), dialyzed today    (+) renal disease, type: ESRD and CRI, Pt requires dialysis, type: Hemodialysis,     Endo:       Psychiatric/Substance Use:     (+) psychiatric history depression     Infectious Disease:       Malignancy:       Other:            Physical Exam    Airway        Mallampati: II   TM distance: > 3 FB    Mouth opening: > 3 cm    Respiratory Devices and Support         Dental       (+) caps, chipped and upper dentures      Cardiovascular          Rhythm and rate: regular and normal     Pulmonary           breath sounds clear to auscultation           OUTSIDE LABS:  CBC:   Lab Results   Component Value Date    WBC 6.3 05/08/2022    WBC 4.8 04/19/2022    HGB 10.8 (L) 05/10/2022    HGB 10.5 (L) 05/08/2022    HCT 31.6 (L) 05/08/2022    HCT 30.6 (L)  04/19/2022     (L) 05/08/2022     (L) 04/19/2022     BMP:   Lab Results   Component Value Date     (L) 05/10/2022     (L) 05/10/2022    POTASSIUM 4.1 05/10/2022    POTASSIUM 4.3 05/10/2022    CHLORIDE 88 (L) 05/10/2022    CHLORIDE 80 (L) 05/08/2022    CO2 22 05/10/2022    CO2 24 05/08/2022    BUN 48 (H) 05/10/2022    BUN 52 (H) 05/08/2022    CR 4.45 (H) 05/10/2022    CR 5.21 (H) 05/08/2022    GLC 86 05/10/2022     (H) 05/08/2022     COAGS:   Lab Results   Component Value Date    PTT 33 04/17/2022    INR 1.42 (H) 05/10/2022     POC: No results found for: BGM, HCG, HCGS  HEPATIC:   Lab Results   Component Value Date    ALBUMIN 3.3 (L) 05/10/2022    PROTTOTAL 7.6 02/15/2022    ALT 32 02/15/2022    AST 37 02/15/2022    ALKPHOS 122 02/15/2022    BILITOTAL 1.0 02/15/2022     OTHER:   Lab Results   Component Value Date    SILVIA 10.2 (H) 05/10/2022    PHOS 5.5 (H) 05/10/2022    LIPASE 317 01/12/2022    TSH 1.22 01/12/2005       Anesthesia Plan    ASA Status:  4   NPO Status:  NPO Appropriate    Anesthesia Type: General.     - Airway: ETT   Induction: Intravenous, Propofol.   Maintenance: Balanced.        Consents    Anesthesia Plan(s) and associated risks, benefits, and realistic alternatives discussed. Questions answered and patient/representative(s) expressed understanding.    - Discussed:     - Discussed with:  Patient         Postoperative Care    Pain management: IV analgesics, Multi-modal analgesia.   PONV prophylaxis: Ondansetron (or other 5HT-3)     Comments:                Raffi Vogt MD

## 2022-05-10 NOTE — PROGRESS NOTES
Potassium   Date Value Ref Range Status   05/10/2022 4.3 3.4 - 5.3 mmol/L Final   01/12/2005 4.5 3.4 - 5.3 mmol/L Final     Hemoglobin   Date Value Ref Range Status   05/10/2022 10.8 (L) 11.7 - 15.7 g/dL Final   01/12/2005 15.6 11.7 - 15.7 g/dL Final     Creatinine   Date Value Ref Range Status   05/10/2022 4.45 (H) 0.52 - 1.04 mg/dL Final   01/12/2005 0.80 0.60 - 1.30 mg/dL Final     Urea Nitrogen   Date Value Ref Range Status   05/10/2022 48 (H) 7 - 30 mg/dL Final   01/12/2005 16 7 - 30 mg/dL Final     Sodium   Date Value Ref Range Status   05/10/2022 124 (L) 133 - 144 mmol/L Final   01/12/2005 141 133 - 144 mmol/L Final     INR   Date Value Ref Range Status   05/10/2022 1.42 (H) 0.85 - 1.15 Final      Latest Reference Range & Units 01/12/22 11:38   Hep B Surface Agn Nonreactive  Nonreactive   Hepatitis B Surface Antibody <8.00 m[IU]/mL >1,000.00 (H) [1]   (H): Data is abnormally high  [1] Reactive, Patient is considered to be immune to infection with hepatitis B when the value is greater than or equal to 12.0 mIU/mL.    DIALYSIS PROCEDURE NOTE  Hepatitis status of previous patient on machine log was checked and verified ok to use with this patients hepatitis status.  Patient dialyzed for 2 hrs. on a K3 bath with a net fluid removal of  3L.  A BFR of 400 ml/min was obtained via a RIJ.      The treatment plan was discussed with Dr. Chen during the treatment.    Total heparin received during the treatment: 0 units.   Line flushed, clamped and capped with heparin 1:1000 1.6 mL (1600 units) per lumen    Meds  given: none   Complications: none      Person educated: patient. Knowledge base substantial. Barriers to learning: none. Educated on procedure via verbal mode. Patient verbalized understanding. Pt prefers verbal education style.     ICEBOAT? Timeout performed pre-treatment  I: Patient was identified using 2 identifiers  C:  Consent Signed Yes  E: Equipment preventative maintenance is current and dialysis  delivery system OK to use  B: Hepatitis B Surface Antigen: NEGATIVE; Draw Date: 1/12/2022      Hepatitis B Surface Antibody: IMMUNE; Draw Date: 1/12/2022  O: Dialysis orders present and complete prior to treatment  A: Vascular access verified and assessed prior to treatment  T: Treatment was performed at a clinically appropriate time  ?: Patient was allowed to ask questions and address concerns prior to treatment  See flowsheet in EPIC for further details and post assessment.  Machine water alarm in place and functioning. Transducer pods intact and checked every 15min.   Pt returned via bed.  Chlorine/Chloramine water system checked every 4 hours.  Outpatient Dialysis at [facility] MWF

## 2022-05-10 NOTE — ANESTHESIA CARE TRANSFER NOTE
Patient: Francine Chris    Procedure: Procedure(s):  left bipolar hip hemiarthroplasty       Diagnosis: Left displaced femoral neck fracture (H) [S72.002A]  Diagnosis Additional Information: No value filed.    Anesthesia Type:   General     Note:    Oropharynx: oropharynx clear of all foreign objects and spontaneously breathing  Level of Consciousness: awake  Oxygen Supplementation: face mask  Level of Supplemental Oxygen (L/min / FiO2): 6  Independent Airway: airway patency satisfactory and stable  Dentition: dentition unchanged  Vital Signs Stable: post-procedure vital signs reviewed and stable  Report to RN Given: handoff report given  Patient transferred to: PACU  Comments: Neuromuscular blockade reversed after TOF 4/4, spontaneous respirations, adequate tidal volumes, followed commands to voice, oropharynx suctioned with soft flexible catheter, extubated atraumatically, extubated with suction, airway patent after extubation.  Oxygen via facemask at 6 liters per minute to PACU. Oxygen tubing connected to wall O2 in PACU, SpO2, NiBP, and EKG monitors and alarms on and functioning, report on patient's clinical status given to PACU RN, RN questions answered.     Handoff Report: Identifed the Patient, Identified the Reponsible Provider, Reviewed the pertinent medical history, Discussed the surgical course, Reviewed Intra-OP anesthesia mangement and issues during anesthesia, Set expectations for post-procedure period and Allowed opportunity for questions and acknowledgement of understanding      Vitals:  Vitals Value Taken Time   /80 05/10/22 1810   Temp     Pulse 87 05/10/22 1812   Resp 17 05/10/22 1812   SpO2 100 % 05/10/22 1812   Vitals shown include unvalidated device data.    Electronically Signed By: JUSTO Garcia CRNA  May 10, 2022  6:12 PM

## 2022-05-10 NOTE — ANESTHESIA PROCEDURE NOTES
"Fascia iliaca Procedure Note    Pre-Procedure   Staff -        Anesthesiologist:  Raffi Vogt MD       Performed By: Anesthesiologist       Location: pre-op       Pre-Anesthestic Checklist: patient identified, IV checked, site marked, risks and benefits discussed, informed consent, monitors and equipment checked, pre-op evaluation, at physician/surgeon's request and post-op pain management  Timeout:       Correct Patient: Yes        Correct Procedure: Yes        Correct Site: Yes        Correct Position: Yes        Correct Laterality: Yes        Site Marked: Yes  Procedure Documentation  Procedure: Fascia iliaca       Laterality: left       Patient Position: supine       Patient Prep/Sterile Barriers: sterile gloves, mask, \"No-touch\" technique       Skin prep: Chloraprep       Local skin infiltrated with 3 mL of 1% lidocaine.        Insertion site: upper, medial thigh.       Needle Type: insulated       Needle Gauge: 22.        Needle Length (millimeters): 90        Ultrasound guided (permanent image entered into patient's record)       1. Ultrasound was used to identify targeted nerve, plexus, vascular marker, or fascial plane and place a needle adjacent to it in real-time.       2. Ultrasound was used to visualize the spread of anesthetic in close proximity to the above referenced structure.       3. A permanent image is entered into the patient's record.       4. The visualized anatomic structures appeared normal.       5. There were no apparent abnormal pathologic findings.    Assessment/Narrative         The placement was negative for: blood aspirated, painful injection and site bleeding       Paresthesias: No.       Test dose of mL at.         Test dose negative, 3 minutes after injection, for signs of intravascular, subdural, or intrathecal injection.       Bolus given via needle..        Secured via.        Insertion/Infusion Method: Single Shot       Complications: none       Injection made " incrementally with aspirations every 5 mL.    Medication(s) Administered   Bupivacaine 0.25% w/ 1:400K Epi (Injection) - Injection   30 mL - 5/10/2022 4:05:00 PM   Comments:  30cc of 0.5% Bupivacaine with epinephrine (1:400K) injected in the fascia iliaca plane.    Pt tolerated well.    No complications.      Ultrasound Interpretation, Peripheral Nerve Block    1. Under ultrasound guidance, the needle was inserted and placed in close proximity to the target nerve(s).  2. Ultrasound was also used to visualize the spread of the anesthetic in close proximity to the nerve(s) being blocked.  Local anesthetic was administered in incremental doses, with intermittent negative aspiration.    3. The nerve(s) appeared anatomically normal.  4. There were no apparent abnormal pathological findings.  5. A permanent ultrasound image was saved in the patient's record.    The surgeon has given a verbal order transferring care of this patient to me for the performance of a regional analgesia block for post-op pain control. It is requested of me because I am uniquely trained and qualified to perform this block and the surgeon is neither trained nor qualified to perform this procedure.    Raffi Vogt MD   5:26 PM

## 2022-05-10 NOTE — PROGRESS NOTES
[facility]  Hospitalist Progress Note  Jonathan Dey MD  05/10/2022    Assessment & Plan   Francine Chris is a 76 year old female admitted on 5/8/2022 after a mechanical fall. Found to have left femoral neck fracture.     Mechanical fall  Left femoral neck fracture to OR today  Pain 9/10 prior to pain med in ED.   - orthopedics following  - to OR today arthroplasty  - pain control  - symptomatic management  - hold apixaban. Resume per ortho.     ESRD on HD MWF  Hyponatremia  On HD since 2021. Dialyzes MWF under care of Dr. Ro. Na 120 in ED (recent baseline runs low).  - nephrology consult  - per Dr. Palmer, hyponatremia is due to large intradialytic weight gains.  5 kg is a typical goal in HD for fluid removal.  - had HD on 5/9, next HD on 5/11     Permanent atrial fibrillation  S/p AVN ablation with ppm 6/2021  - PTA on DOAC. Hold apixaban for trauma and possible surgery.     Severe tricuspid regurgitation   Pulmonary hypertension  Chronic R heart failure, compensated  HLD  [needs rec- atorvastatin 40 mg daily, metoprolol 12.5 mg BID, torsemide 40 mg daily]  Echo with EF 50-55%, nl LV fn, severe TR, pulmonary pressures 60-65 mmHg. Seen by CV surgery, deemed to be too high of a surgical risk for valve replacement.   - resume meds      Anemia  Hgb at 10.5 on presentation, stable from recent  - defer to nephrology for management     COPD  [needs rec- prn albuterol, tiotropium-olodaterol 2 puffs daily]  - prn albuterol available  - resume meds      SKY  No longer wears CPAP     Anxiety  Depression   Doesn't appear to currently be on meds for this     Gout  - resume home allopurinol 100 mg MWF after dialysis      PUD  - resume home pantoprazole 40 mg daily      PAD  noted     COVID-19 negative     Diet:   Dialysis diet.    DVT Prophylaxis: DOAC  Schuler Catheter: Not present  Central Lines: PRESENT     Cardiac Monitoring: None    Code Status:   Full Code      # Hyponatremia: Na = 120  "mmol/L >> 124 >> 130 (Ref range: 133 - 144 mmol/L) on admission, will monitor as appropriat  # Hypercalcemia: Ca = 10.5 mg/dL >> 10.2 (Ref range: 8.5 - 10.1 mg/dL) and/or iCa = N/A on admission, will monitor as appropriate   # Anion Gap Metabolic Acidosis: AG = 16 mmol/L (Ref range: 3 - 14 mmol/L) on admission, will monitor and treat as appropriate    # Coagulation Defect: home medication list includes an anticoagulant medication  # Thrombocytopenia: Plts = 129 10e3/uL (Ref range: 150 - 450 10e3/uL) on admission, will monitor for bleeding         Disposition Plan     Expected Discharge:  2+ midnights       Interval History   -- to OR today  -- had HD yesterday    -Data reviewed today: I reviewed all new labs and imaging over the last 24 hours. I personally reviewed no images or EKG's today.    Physical Exam    , Blood pressure 134/64, pulse 80, temperature 97.9  F (36.6  C), temperature source Oral, resp. rate 16, height 1.651 m (5' 5\"), weight 66.1 kg (145 lb 11.6 oz), SpO2 95 %.  Vitals:    05/08/22 1741 05/09/22 1330 05/10/22 0500   Weight: 64 kg (141 lb) 64 kg (141 lb 1.5 oz) 66.1 kg (145 lb 11.6 oz)     Vital Signs with Ranges  Temp:  [97.3  F (36.3  C)-98.6  F (37  C)] 97.9  F (36.6  C)  Pulse:  [69-85] 80  Resp:  [16-18] 16  BP: ()/(57-76) 134/64  SpO2:  [92 %-95 %] 95 %  I/O's Last 24 hours  I/O last 3 completed shifts:  In: 200 [P.O.:200]  Out: 7800 [Other:7800]    Medications   All medications were reviewed.    - MEDICATION INSTRUCTIONS -       sodium chloride         [Auto Hold] - MEDICATION INSTRUCTIONS for Dialysis Patients -   Does not apply See Admin Instructions     [Auto Hold] calcium acetate  667 mg Oral TID w/meals     ceFAZolin  2 g Intravenous Pre-Op/Pre-procedure x 1 dose     ceFAZolin  2 g Intravenous See Admin Instructions     [Auto Hold] loratadine  10 mg Oral At Bedtime     [Auto Hold] metoprolol succinate ER  12.5 mg Oral BID     [Auto Hold] multivitamin RENAL  1 capsule Oral Daily "     [Auto Hold] pantoprazole  40 mg Oral At Bedtime     [Auto Hold] sodium chloride (PF)  3 mL Intracatheter Q8H     tranexamic acid  1 g Intravenous Once     tranexamic acid  1 g Intravenous Once     [Auto Hold] umeclidinium-vilanterol  1 puff Inhalation Daily        Data   Recent Labs   Lab 05/10/22  1449 05/10/22  0729 05/08/22  1752   WBC  --   --  6.3   HGB  --  10.8* 10.5*   MCV  --   --  108*   PLT  --   --  129*   INR  --  1.42* 1.62*   * 124* 120*   POTASSIUM 4.1 4.3 4.5   CHLORIDE  --  88* 80*   CO2  --  22 24   BUN  --  48* 52*   CR  --  4.45* 5.21*   ANIONGAP  --  14 16*   SILVIA  --  10.2* 10.5*   GLC  --  86 129*   ALBUMIN  --  3.3*  --        No results found for this or any previous visit (from the past 24 hour(s)).    Jonathan Dey MD  Text Page  (7am to 6pm)

## 2022-05-10 NOTE — ANESTHESIA POSTPROCEDURE EVALUATION
Patient: Francine Chris    Procedure: Procedure(s):  left bipolar hip hemiarthroplasty       Anesthesia Type:  General    Note:  Disposition: Inpatient   Postop Pain Control: Uneventful            Sign Out: Well controlled pain   PONV: No   Neuro/Psych: Uneventful            Sign Out: Acceptable/Baseline neuro status   Airway/Respiratory: Uneventful            Sign Out: Acceptable/Baseline resp. status   CV/Hemodynamics: Uneventful            Sign Out: Acceptable CV status; No obvious hypovolemia; No obvious fluid overload   Other NRE: NONE   DID A NON-ROUTINE EVENT OCCUR? No           Last vitals:  Vitals Value Taken Time   /74 05/10/22 1830   Temp 36.7  C (98.1  F) 05/10/22 1820   Pulse 78 05/10/22 1837   Resp 32 05/10/22 1837   SpO2 95 % 05/10/22 1835   Vitals shown include unvalidated device data.    Electronically Signed By: Moncho Fermin MD  May 10, 2022  6:38 PM

## 2022-05-11 NOTE — OR NURSING
1925hr - [facility] updated on Pts stable status; Pt sleeping on/off; VS & Surgical site remain stable.  T.O: Okay for Pt to transfer out of PACU per BRIANA Fermin.

## 2022-05-11 NOTE — PLAN OF CARE
Goal Outcome Evaluation:      Patient vital signs are at baseline: Yes  Patient able to ambulate as they were prior to admission or with assist devices provided by therapies during their stay:  Yes  Patient MUST void prior to discharge:  Yes  Patient able to tolerate oral intake:  Yes  Pain has adequate pain control using Oral analgesics:  Yes  Does patient have an identified :  Yes  Has goal D/C date and time been discussed with patient:  Yes        Alert and oriented with forgetful limitations/confused. VSS, CMS intact, L hip CDI ice  applied. Offered bed pan but didn't void, bladder scanned 94ml. Pain controlled with scheduled tylenol. Discharge pending

## 2022-05-11 NOTE — PROGRESS NOTES
05/11/22 1002   Quick Adds   Type of Visit Initial PT Evaluation       Present no   Living Environment   People in Home facility resident   Current Living Arrangements assisted living  (Long term care)   Home Accessibility no concerns   Transportation Anticipated health plan transportation   Living Environment Comments Patient lives in long term care, previously resided in assisted living facility but was having too many falls per family.  Patient is in a shared room, family states with room is quite cluttered and difficult for patient to negotiate her walker through.  Patient does have a bathroom in her room.   Self-Care   Usual Activity Tolerance moderate   Current Activity Tolerance fair   Regular Exercise No   Equipment Currently Used at Home walker, rolling   Fall history within last six months yes   Number of times patient has fallen within last six months 6   Activity/Exercise/Self-Care Comment Patient resides in long term care.  Per family, patient was getting dressing and to the toilet independently.  Patient was receiving staff assistance for bathroom.  Facility staff also assist with medication management and meal preparation.  Patient has dialysis three days per week (M, W, F) and rides are arranged by long term care staff via health care transportation.  Patient ambulates with a 4WW.   General Information   Onset of Illness/Injury or Date of Surgery 05/08/22   Referring Physician Eran Funez MD   Patient/Family Therapy Goals Statement (PT) Patient does not state goals.   Pertinent History of Current Problem (include personal factors and/or comorbidities that impact the POC) 76-year-old female with chronic end-stage renal disease on dialysis, who fell, suffering a left displaced femoral neck fracture.  Now s/p left bipolar hip hemiarthroplasty.   Existing Precautions/Restrictions fall;weight bearing   Weight-Bearing Status - LLE weight-bearing as tolerated    Cognition   Affect/Mental Status (Cognition) confused;low arousal/lethargic   Orientation Status (Cognition) oriented to;person;situation;verbal cues/prompts needed for orientation   Follows Commands (Cognition) WFL;increased processing time needed;physical/tactile prompts required;repetition of directions required;verbal cues/prompting required   Pain Assessment   Patient Currently in Pain Yes, see Vital Sign flowsheet  (Increased left hip pain with mobility)   Integumentary/Edema   Integumentary/Edema Comments Many bruises noted along face and extremities, incision at left hip   Posture    Posture Forward head position;Protracted shoulders;Kyphosis   Range of Motion (ROM)   Range of Motion ROM deficits secondary to pain   ROM Comment Patient with no formal hip precautions but limited by pain.   Strength (Manual Muscle Testing)   Strength (Manual Muscle Testing) Able to perform R SLR;Able to perform L SLR;Deficits observed during functional mobility   Bed Mobility   Comment, (Bed Mobility) Patient cued for supine > sit transfer with HOB elevated ~30 degrees and upper extremity support on right bed rail.  Patient requires minAx1 to assist lower extremities to EOB, mod Ax1 at trunk for supine > sit.  Patient is able to maintain static sitting balance at EOB with SBA at start of session but needing up to mod Ax1 with onset of fatigue after x2 sit <> stands.   Transfers   Comment, (Transfers) Patient completes x2 sit <> stands from elevated bed height with FWW, requires mod Ax1 for first sit <> stand and maxAx1 for second sit <> stand due to increased fatigue.  Patient cued for safe hand placement pushing from bed rather than pulling on walker.  Patient requires increased time to achieve full standing position with bilateral upper extremity support on FWW and minAx1.  Patient reports increased left hip pain with FWW, encouraged to offload with upper extremities on walker.   Gait/Stairs (Locomotion)   Comment,  (Gait/Stairs) Patient able to take small steps forward with left lower extremities but unable to take steps with right lower extremity due to increased pain when weightbearing on left hip despite offloading with bilateral upper extremities on walker.  Patient increased left hip and knee flexion with weight bearing, difficulty maintaining extension despite cues for upright posture and forward gaze.  Patient unable to safety complete stand-pivot to recliner chair, returns to bed at end of session with maxAx2.  Currenrtly recommend Ax2 and Christina Stedy vs overhead lift.   Balance   Balance Comments Impaired dynamic balance, baseline use of AD during ambulation and history of many falls   Sensory Examination   Sensory Perception patient reports no sensory changes   Clinical Impression   Criteria for Skilled Therapeutic Intervention Yes, treatment indicated   PT Diagnosis (PT) Impaired functional mobility   Influenced by the following impairments Left hip incision, left lower extremity pain, decreased activity tolerance, impaired balance, left lower extremity weakness   Functional limitations due to impairments Impaired independence with bed mobility, transfers, and gait   Clinical Presentation (PT Evaluation Complexity) Evolving/Changing   Clinical Presentation Rationale Clinical judgement, PMH, social support   Clinical Decision Making (Complexity) moderate complexity   Planned Therapy Interventions (PT) balance training;bed mobility training;gait training;home exercise program;patient/family education;postural re-education;strengthening;transfer training;progressive activity/exercise   Anticipated Equipment Needs at Discharge (PT) walker, rolling  (Patient owns a 4WW but will require a FWW.)   Risk & Benefits of therapy have been explained evaluation/treatment results reviewed;care plan/treatment goals reviewed;risks/benefits reviewed;participants voiced agreement with care plan;participants included;patient   PT  Discharge Planning   PT Discharge Recommendation (DC Rec) Transitional Care Facility   PT Rationale for DC Rec Patient presents significantly below baseline mobility, unable to progress to ambulating due to increased fatigue and left hip pain.   Patient currently requiring Ax1-2 for all mobility and unable to ambulate; therefore, would benefit from TCU at discharge to progress safety and independence with functional mobility tasks prior to returning to long term care.  Patient owns a 4WW but recommend at FWW at discharge to permit upper extremities to assist with offloading left lower extremity.  Expect with improved pain control and less fatigue, patient may progress to Ax1 for mobility.   Plan of Care Review   Plan of Care Reviewed With patient   Total Evaluation Time   Total Evaluation Time (Minutes) 10   Physical Therapy Goals   PT Frequency Daily   PT Predicted Duration/Target Date for Goal Attainment 05/18/22   PT Goals Bed Mobility;Transfers;Gait   PT: Bed Mobility Supervision/stand-by assist;Supine to/from sit;Rolling   PT: Transfers Supervision/stand-by assist;Sit to/from stand;Bed to/from chair;Assistive device   PT: Gait Supervision/stand-by assist;100 feet;Rolling walker

## 2022-05-11 NOTE — PLAN OF CARE
Goal Outcome Evaluation:    Patient is A&OX3, Pt confused time and date, pt has bruised on all over the body, Abrasion on her left forehead, lung sound clear, Extremities looks dusky, right foot cool to touch, pulses per doppler, her dressing is clean, dry and intact, reposition in bed. No urine out put dialysis pt, bladder scanner for 128 mL, Dialysis run tomorrow. Pain managed with Tylenol. Up with two assist and sera steady or ceiling lift. Will continue to monitor.

## 2022-05-11 NOTE — PROGRESS NOTES
Chart reviewed. Labs and vitals reviewed.   Plan for HD tomorrow.     Recent Labs   Lab Test 05/11/22  0845 05/11/22  0610 05/10/22  2136 05/10/22  1449 05/10/22  0729   *  --   --  130* 124*   POTASSIUM 5.1  --   --  4.1 4.3   CHLORIDE 89*  --   --   --  88*   CO2 20  --   --   --  22   ANIONGAP 14  --   --   --  14   * 138*   < >  --  86   BUN 48*  --   --   --  48*   CR 3.82*  --   --   --  4.45*   SILVIA 9.8  --   --   --  10.2*    < > = values in this interval not displayed.       Kina Chen MD  [facility] - Nephrology   [phone]

## 2022-05-11 NOTE — PLAN OF CARE
OT orders received, chart reviewed and per consult with PT it was determined that skilled OT intervention can be deferred to next level of care when pt may be better able to tolerate multiple therapies. PT will continue to follow during stay to advance mobility and OT will sign off and complete order.

## 2022-05-11 NOTE — PROGRESS NOTES
Orthopedic Surgery  Francine Chris  2022  Admit Date:  2022  POD: 1 Day Post-Op   Procedure(s):  left bipolar hip hemiarthroplasty    Alert and oriented      Patient resting comfortably in bed.    Pain controlled.  Tolerating oral intake.    Denies nausea or vomiting  Denies chest pain or shortness of breath    Vital Sign Ranges  Temperature Temp  Av.8  F (36.6  C)  Min: 97.5  F (36.4  C)  Max: 98.1  F (36.7  C)   Blood pressure Systolic (24hrs), Av , Min:115 , Max:143        Diastolic (24hrs), Av, Min:51, Max:94      Pulse Pulse  Av.8  Min: 72  Max: 95   Respirations Resp  Av.3  Min: 14  Max: 20   Pulse oximetry SpO2  Av.1 %  Min: 94 %  Max: 100 %       Dressing is clean, dry, and intact.   Minimal erythema of the surrounding skin.   Bilateral calves are soft, non-tender.  Left lower extremity is NVI.  Sensation intact bilateral lower extremities  Patient able to resist dorsi and plantar flexion bilaterally  +Dp pulse    Labs:  Recent Labs   Lab Test 22  0845 22  1752 22  0739   WBC 8.4 6.3 4.8     Recent Labs   Lab Test 22  0845 05/10/22  0729 22   HGB 11.2* 10.8* 10.5*     Recent Labs   Lab Test 05/10/22  0729 22  1752 22  0737   INR 1.42* 1.62* 1.15     Recent Labs   Lab Test 22  0845 22  1752 22  0739    129* 140*       1. PLAN:   Resume Eliquis for DVT prophylaxis.     Mobilize with PT/OT    WBAT Left LE with walker.     Continue current pain regiment.   Dressings: Keep intact.  Change if >60% saturated or peeling off.     2. Disposition   Anticipate d/c to TCU when medically cleared and progressing in PT.    Joanne Kam PA-C

## 2022-05-11 NOTE — PROGRESS NOTES
[facility]  Hospitalist Progress Note  Jonathan Dey MD  05/11/2022    Assessment & Plan   Francine Chris is a 76 year old female admitted on 5/8/2022 after a mechanical fall. Found to have left femoral neck fracture.     Mechanical fall  POD # 1 Left femoral neck fracture s/p bipolar hemiarthroplasty  Pain 9/10 prior to pain med in ED.   - orthopedics following  - pain control, anticoagulation and activity per orthopedics    - symptomatic management  - hold apixaban. Resume as per ortho.     ESRD on HD MWF  Hyponatremia  On HD since 2021. Dialyzes MWF under care of Dr. Ro. Na 120 in ED (recent baseline runs low).  - nephrology consult  - per Dr. Palmer, hyponatremia is due to large intradialytic weight gains.  5 kg is a typical goal in HD for fluid removal.  - had HD on 5/9, next HD today on 5/12 (off schedule)     Permanent atrial fibrillation  S/p AVN ablation with ppm 6/2021  - PTA on DOAC. Hold apixaban now post op with resumption as per surgery.     Severe tricuspid regurgitation   Pulmonary hypertension  Chronic R heart failure, compensated  HLD  [needs rec- atorvastatin 40 mg daily, metoprolol 12.5 mg BID, torsemide 40 mg daily]  Echo with EF 50-55%, nl LV fn, severe TR, pulmonary pressures 60-65 mmHg. Seen by CV surgery, deemed to be too high of a surgical risk for valve replacement.   - resume lipitor, metoprolol      Anemia  Hgb at 10.5 on presentation, stable from recent  - defer to nephrology for management  - post op hgb stable at 11.2     COPD  [needs rec- prn albuterol, tiotropium-olodaterol 2 puffs daily]  - prn albuterol available  - continue MDI      SKY  No longer wears CPAP     Anxiety  Depression   Doesn't appear to currently be on meds for this     Gout  - continue allopurinol 100 mg MWF after dialysis      PUD  - continue pantoprazole 40 mg daily      PAD  noted     COVID-19 negative     Diet:   Dialysis diet.    DVT Prophylaxis: DOAC  Schuler Catheter: Not  "present  Central Lines: PRESENT     Cardiac Monitoring: None    Code Status:   Full Code      # Hyponatremia: Na = 120 mmol/L >> 124 >> 130 (Ref range: 133 - 144 mmol/L) on admission, will monitor as appropriat  # Hypercalcemia: Ca = 10.5 mg/dL >> 10.2 (Ref range: 8.5 - 10.1 mg/dL) and/or iCa = N/A on admission, will monitor as appropriate   # Anion Gap Metabolic Acidosis: AG = 16 mmol/L (Ref range: 3 - 14 mmol/L) on admission, will monitor and treat as appropriate    # Coagulation Defect: home medication list includes an anticoagulant medication  # Thrombocytopenia: Plts = 129 10e3/uL (Ref range: 150 - 450 10e3/uL) on admission, will monitor for bleeding         Disposition Plan     Expected Discharge:  2+ midnights       Interval History   -- post op doing well, stable hgb and vitals  -- plan for HD tomorrow  -- pain controlled    -Data reviewed today: I reviewed all new labs and imaging over the last 24 hours. I personally reviewed no images or EKG's today.    Physical Exam    , Blood pressure 129/59, pulse 72, temperature 97.5  F (36.4  C), temperature source Oral, resp. rate 16, height 1.651 m (5' 5\"), weight 66.1 kg (145 lb 11.6 oz), SpO2 98 %.  Vitals:    05/08/22 1741 05/09/22 1330 05/10/22 0500   Weight: 64 kg (141 lb) 64 kg (141 lb 1.5 oz) 66.1 kg (145 lb 11.6 oz)     Vital Signs with Ranges  Temp:  [97.5  F (36.4  C)-98.1  F (36.7  C)] 97.5  F (36.4  C)  Pulse:  [72-95] 72  Resp:  [14-20] 16  BP: (115-143)/(51-94) 129/59  SpO2:  [94 %-100 %] 98 %  I/O's Last 24 hours  I/O last 3 completed shifts:  In: 810 [I.V.:810]  Out: 3000 [Other:3000]    GEN: NAD, SLEEPING< AROUSES  HEENT NC,  LEFT FACIAL BRUISING  PULM CTA  CV RRR  GI SOFT +BS  MS NO EDEMA  NEURO MOVES ALL 4 EXT  DERM WARM AND DRY      Medications   All medications were reviewed.    lactated ringers         - MEDICATION INSTRUCTIONS for Dialysis Patients -   Does not apply See Admin Instructions     acetaminophen  975 mg Oral Q8H     [Held by " provider] atorvastatin  40 mg Oral Daily     ceFAZolin  1 g Intravenous Q24H     famotidine  10 mg Intravenous QPM    Or     famotidine  10 mg Oral QPM     polyethylene glycol  17 g Oral Daily     senna-docusate  1 tablet Oral BID     sodium chloride (PF)  3 mL Intracatheter Q8H        Data   Recent Labs   Lab 05/11/22  0845 05/11/22  0610 05/10/22  2136 05/10/22  1449 05/10/22  0729 05/08/22  1752   WBC 8.4  --   --   --   --  6.3   HGB 11.2*  --   --   --  10.8* 10.5*   *  --   --   --   --  108*     --   --   --   --  129*   INR  --   --   --   --  1.42* 1.62*   *  --   --  130* 124* 120*   POTASSIUM 5.1  --   --  4.1 4.3 4.5   CHLORIDE 89*  --   --   --  88* 80*   CO2 20  --   --   --  22 24   BUN 48*  --   --   --  48* 52*   CR 3.82*  --   --   --  4.45* 5.21*   ANIONGAP 14  --   --   --  14 16*   SILVIA 9.8  --   --   --  10.2* 10.5*   * 138* 74  --  86 129*   ALBUMIN 3.2*  --   --   --  3.3*  --        Recent Results (from the past 24 hour(s))   XR Pelvis w Hip Port Left  1 View    Narrative    EXAM: XR PELVIS AND HIP PORTABLE LEFT 1 VIEW  LOCATION: [facility]  DATE/TIME: 5/10/2022 6:14 PM    INDICATION: Left hip postoperative follow-up.  COMPARISON: 5/8/2022.      Impression    IMPRESSION:  1.  Interval left hip hemiarthroplasty with cemented proximal femur endoprosthesis. The component is well seated without evidence of complication. Postoperative soft tissue gas about the hip. Lateral skin staples.  2.  No fracture or joint malalignment.  3.  Bone demineralization.       Jonathan Dey MD  Text Page  (7am to 6pm)

## 2022-05-11 NOTE — PROGRESS NOTES
SPIRITUAL HEALTH SERVICES Progress Note  [facility]     Admissions Request.    I attempted to visit Francine several times over the course of the day; she was either receiving cares or asleep and did not rouse when I spoke.     remains available.    Rev Laura Christiansen  Associate   Spiritual Health Phone Line [phone]  Spiritual Health Pager [phone]

## 2022-05-11 NOTE — PROGRESS NOTES
Date/Time: 05/10/2022 2641-3820  Summary: Fall. POD0 left hip fx  Precautions: fall risk  Cognitive Concerns/Orientation: A&Ox3-4 disoriented to place at times. Forgetful. Pt doesn't like capno and will take off often  Behavior and Aggression Color: green  ABNL VS/O2: VSS on 3L NC. Ex IPI will drop to 5 at times. Encourage IS usage.   CMS: intact  Edema: +2 bilat LE  Mobility: bedrest. Waiting for PT to see pt.   Pain Management: denies pain.  Diet: regular. Only had ginger ale, water, and ice chips for me  Bowel/Bladder: pt on dialysis. Per pt, last BM was 05/06/2022  ABNL Labs/BG: Na: 130  Drain/Devices: Right CVC hep locked. Left PIV SL. Not infusing LR as pt is on dialysis and hx of hyponatremia.   Telemetry Rhythm: NA  Skin: bruise and laceration to left side of face. Bruise on right elbow. Abrasion to right arm. Bruising on bilat forearms. Blochy/venus bilat LE. UTV left hip incision.  Tests/Procedures: NA  Discharge Date: pending  Other Info:

## 2022-05-12 NOTE — PROGRESS NOTES
[facility]    Medicine Progress Note - Hospitalist Service       Date of Admission:  5/8/2022  Assessment & Plan   Francine Chris is a 76 year old female with history of atrial fibrillation on anticoagulation, severe tricuspid regurgitation, pulmonary hypertension, chronic right-sided heart failure, COPD, SKY, anxiety, depression, gout, peptic ulcer disease, peripheral arterial disease admitted on 5/8/2022 after a mechanical fall, found to have left femoral neck fracture.    Left femoral neck fracture s/p bipolar hemiarthroplasty  Mechanical fall  *Presents with fall slipping on wet floor  *XR with above fracture  - Orthopedic surgery following, routine postoperative cares per orthopedic surgery  - Holding apixaban as below     Subarachnoid hemorrhage, posterior right Sylvian fissure  Encephalopathy, multifactorial from SAH, opioids, acute illness  *Noted to be more confused 5/12, sister reports is not at baseline  *Head CT negative with initial fall 5/2, not checked this admission  *Apixaban resumed 5/11/22   - Head CT STAT ordered, demonstrated hemorrhage as above  - Discussed with neuro critical care and consult placed  - Transfer to ICU  - Levetiracetam ordered  - Repeat head CT in 6 hours ordered  - Neurology to decide on Kcentra and order if appropriate  - Hold apixaban  - Q1H neuro checks pending stable head CT    ESRD on HD MWF  Hyponatremia  On HD since 2021. Dialyzes MWF under care of Dr. Ro. Na 120 in ED (recent baseline runs low). Per Dr. Palmer, hyponatremia is due to large intradialytic weight gains.  5 kg is a typical goal in HD for fluid removal.  - Nephrology consulted  - HD 5/12, next planned 5/14 per nephrology      Permanent atrial fibrillation  S/p AVN ablation with ppm 6/2021  - Off apixaban as above     Severe tricuspid regurgitation   Pulmonary hypertension  Chronic R heart failure, compensated  HLD  [needs rec- atorvastatin 40 mg daily, metoprolol 12.5 mg BID,  torsemide 40 mg daily]  Echo with EF 50-55%, nl LV fn, severe TR, pulmonary pressures 60-65 mmHg. Seen by CV surgery, deemed to be too high of a surgical risk for valve replacement.   - resume lipitor, metoprolol      Anemia  Hgb at 10.5 on presentation, stable from recent  - Hgb stable 5/12     COPD  [needs rec- prn albuterol, tiotropium-olodaterol 2 puffs daily]  - Continue prior to admission inhaler regimen      SKY  No longer wears CPAP     Anxiety  Depression   Doesn't appear to currently be on meds for this     Gout  - Continue allopurinol 100 mg MWF after dialysis      PUD  - Continue pantoprazole 40 mg daily      PAD  noted      Clinically Significant Risk Factors Present on Admission                    Diet: Advance Diet as Tolerated: Regular Diet Adult  Diet    DVT Prophylaxis: Pneumatic Compression Devices  Schuler Catheter: Not present  Code Status: Full Code      Disposition Plan   Expected Discharge unclear, eventually back to LTC    Entered: Elmo Feliz MD 05/12/2022, 2:48 PM       The patient's care was discussed with the patient, neurology service, patient's sister    Time Spent on this Encounter   Francine was seen and evaluated by me on 05/12/22.  She was in critical condition as the result of SAH.    Her condition is now Guarded.      The acute issues managed by me today include new subarachnoid hemorrhage, encephalopathy  Supportive interventions provided and/or ordered by me include head CT, levetiracetam, coordination of care with neurology    Total Critical Care time spent by me, excluding procedures, was 45 minutes.    MD Elmo Hand MD  Hospitalist Service  [facility]    ______________________________________________________________________    Interval History   Patient initially seen on dialysis.  She denies any active complaints.  Discussed with sister later, who reports on her discussion patient is more confused than baseline.   Patient fell prompting admission, not clear if she hit her head or not.  Head CT obtained which demonstrates new subarachnoid hemorrhage.  Reassessed patient following CT results.  She has been working with PT.  She denies any headache or any focal numbness/tingling/weakness, vision changes.    Data reviewed today: I reviewed all medications, new labs and imaging results over the last 24 hours. I personally reviewed no images or EKG's today.    Physical Exam   Vital Signs: Temp: 96.8  F (36  C) Temp src: Axillary BP: 123/61 Pulse: 91   Resp: 16 SpO2: 94 % O2 Device: None (Room air)    Weight: 145 lbs 11.58 oz    Constitutional: Chronically ill appearing  Respiratory: Clear to auscultation bilaterally, good air movement bilaterally  Cardiovascular: RRR   GI: Soft, non-tender, non-distended.    Skin/Integumen: Warm, dry  Neuro: Falls asleep easily during exam, encephalopathic, not able to state date.  Strength grossly intact and symmetric bilaterally, limited some in left leg due to pain from surgery.  Extraocular movements intact.    Data   Recent Labs   Lab 05/12/22  1158 05/12/22  0913 05/12/22  0633 05/11/22  0845 05/10/22  2136 05/10/22  1449 05/10/22  0729 05/08/22  1752 05/08/22  1752   WBC  --  9.3  --  8.4  --   --   --   --  6.3   HGB  --  11.4*  --  11.2*  --   --  10.8*  --  10.5*   MCV  --  104*  --  109*  --   --   --   --  108*   PLT  --  146*  --  151  --   --   --   --  129*   INR  --   --   --   --   --   --  1.42*  --  1.62*   NA  --  127*  --  123*  --  130* 124*  --  120*   POTASSIUM  --  5.1  --  5.1  --  4.1 4.3  --  4.5   CHLORIDE  --  94  --  89*  --   --  88*  --  80*   CO2  --  22  --  20  --   --  22  --  24   BUN  --  41*  --  48*  --   --  48*  --  52*   CR  --  3.03*  --  3.82*  --   --  4.45*  --  5.21*   ANIONGAP  --  11  --  14  --   --  14  --  16*   SILVIA  --  7.6*  --  9.8  --   --  10.2*  --  10.5*   GLC 89 95 104* 160*   < >  --  86  --  129*   ALBUMIN  --  3.4  --  3.2*  --   --   3.3*   < >  --     < > = values in this interval not displayed.       No results found for this or any previous visit (from the past 24 hour(s)).  Medications       - MEDICATION INSTRUCTIONS for Dialysis Patients -   Does not apply See Admin Instructions     acetaminophen  975 mg Oral Q8H     apixaban ANTICOAGULANT  2.5 mg Oral BID     atorvastatin  40 mg Oral Daily     ceFAZolin  1 g Intravenous Q24H     famotidine  10 mg Intravenous QPM    Or     famotidine  10 mg Oral QPM     polyethylene glycol  17 g Oral Daily     senna-docusate  1 tablet Oral BID     sodium chloride (PF)  3 mL Intracatheter Q8H

## 2022-05-12 NOTE — PROGRESS NOTES
Potassium   Date Value Ref Range Status   05/11/2022 5.1 3.4 - 5.3 mmol/L Final   01/12/2005 4.5 3.4 - 5.3 mmol/L Final     Hemoglobin   Date Value Ref Range Status   05/11/2022 11.2 (L) 11.7 - 15.7 g/dL Final   01/12/2005 15.6 11.7 - 15.7 g/dL Final     Creatinine   Date Value Ref Range Status   05/11/2022 3.82 (H) 0.52 - 1.04 mg/dL Final   01/12/2005 0.80 0.60 - 1.30 mg/dL Final     Urea Nitrogen   Date Value Ref Range Status   05/11/2022 48 (H) 7 - 30 mg/dL Final   01/12/2005 16 7 - 30 mg/dL Final     Sodium   Date Value Ref Range Status   05/11/2022 123 (L) 133 - 144 mmol/L Final   01/12/2005 141 133 - 144 mmol/L Final     INR   Date Value Ref Range Status   05/10/2022 1.42 (H) 0.85 - 1.15 Final      Latest Reference Range & Units 01/12/22 11:38   Hep B Surface Agn Nonreactive  Nonreactive   Hepatitis B Surface Antibody <8.00 m[IU]/mL >1,000.00 (H) [1]   (H): Data is abnormally high  [1] Reactive, Patient is considered to be immune to infection with hepatitis B when the value is greater than or equal to 12.0 mIU/mL.    DIALYSIS PROCEDURE NOTE  Hepatitis status of previous patient on machine log was checked and verified ok to use with this patients hepatitis status.  Patient dialyzed for 3 hrs. on a K2 bath with a net fluid removal of  4L.  A BFR of 400 ml/min was obtained via a Right Tunelled CVC.      The treatment plan was discussed with Dr. Chen during the treatment.    Total heparin received during the treatment: 0 units.     Line flushed, clamped and capped with heparin 1:1000 1.6 mL (1600 units) per lumen    Meds  given: none   Complications: none      Person educated: person. Knowledge base minimal. Barriers to learning: confused. Educated on procedurevia verbal mode. Patient family verbalized understanding. Pt prefers oral  education style.     ICEBOAT? Timeout performed pre-treatment  I: Patient was identified using 2 identifiers  C:  Consent Signed Yes  E: Equipment preventative maintenance is current  and dialysis delivery system OK to use  B: Hepatitis B Surface Antigen: NEGATIVE; Draw Date: 1/12/2022      Hepatitis B Surface Antibody: IMMUNE; Draw Date: 1/12/2022  O: Dialysis orders present and complete prior to treatment  A: Vascular access verified and assessed prior to treatment  T: Treatment was performed at a clinically appropriate time  ?: Patient was allowed to ask questions and address concerns prior to treatment  See flowsheet in EPIC for further details and post assessment.  Machine water alarm in place and functioning. Transducer pods intact and checked every 15min.   Pt returned via bed .  Chlorine/Chloramine water system checked every 4 hours.  Outpatient Dialysis at [facility].

## 2022-05-12 NOTE — PROGRESS NOTES
Patient is  A&O x 3, Denied pain during the night and report pain this morning. Schdule Tylenol given as order. Left hip dressing intact with drainage noted on dressings. Patient is schedule for dialysis this morning.

## 2022-05-12 NOTE — PROGRESS NOTES
Care Management Follow Up    Length of Stay (days): 4    Expected Discharge Date: 05/13/2022     Concerns to be Addressed:       Patient plan of care discussed at interdisciplinary rounds: Yes    Anticipated Discharge Disposition:       Anticipated Discharge Services:    Anticipated Discharge DME:      Patient/family educated on Medicare website which has current facility and service quality ratings:    Education Provided on the Discharge Plan:    Patient/Family in Agreement with the Plan:      Referrals Placed by CM/SW:    Private pay costs discussed: Not applicable    Additional Information:  Call to [facility] regarding patient returning.     Writer received a call back that patient can return to [facility] tomorrow when she's ready. Writer [facility] about [facility] and patient uses [facility] to get to and from dialysis.       GAYLE Becerril

## 2022-05-12 NOTE — PLAN OF CARE
Goal Outcome Evaluation:    Plan of Care Reviewed With: patient, sibling      A&O X 3, forgetful.  VSS, RA.  CMS intact. Dressing on left hip with small drainage. Dialysis this AM, 4L removed, does not make urine. Dialysis port on left chest, shunt on right arm. Pacemaker. Pain managed with oxy and tylenol. Up with 2 and lift. TCU pending.

## 2022-05-12 NOTE — PROGRESS NOTES
"Nephrology Progress Note  05/12/2022         ASSESSMENT AND RECOMMENDATIONS:   1 ESRD -   Outpt HD orders -  Racquel Ding , LUIS, Derrick Ro  Dialyzer: 160NRe Optiflux   Na: 137 mEq/L   Bicarb: 38 mEq/L   Dialysate: 2.0 K, 2.5 Ca, 1.0 Mg, 100 Dextrose ()   BFR (prescribed): 400   BFR (actual): 400   Prescribed time: 03:00   EDW: 59 kg      2 Hyponatremia - chronic 2/2 ESRD and large fluid intake.   127 with HD yesterday. Should improve further with HD and UF today      3 Mechanical fall/ Femur fracture - planned for ORIF today     4 Anemia in ESRD -   Hgb at goal      Plan -   HD today . 4 L UF. Next on Sat   Limit fluid intake to < 1800 ml/ day     Okay to discharge from renal stand point when other issues resolved.     Kina Chen MD  [facility] - Nephrology   [phone]      Interval History :   Seen / examined on dialysis  4 L UF planned.   Hd running okay   Sodium 127    Laying flat. Awake , alert. BP okay       Physical Exam:   I/O last 3 completed shifts:  In: 120 [P.O.:120]  Out: -     /68   Pulse 83   Temp (!) 96.5  F (35.8  C) (Axillary)   Resp 18   Ht 1.651 m (5' 5\")   Wt 66.1 kg (145 lb 11.6 oz)   SpO2 98%   BMI 24.25 kg/m      GENERAL APPEARANCE: alert and no distress  Pulmonary: lungs clear to auscultation   CV: regular rhythm, normal rate, no rub   - JVP -  GI: soft, nontender,   MS: no evidence of inflammation in joints  : no lott  SKIN: no rash, warm, dry, no cyanosis  NEURO: face symmetric, no asterixis   Rt internal jugular TDC   Labs:   All labs reviewed by me  Electrolytes/Renal -   Recent Labs   Lab Test 05/12/22  0913 05/12/22  0633 05/11/22  0845 05/10/22  2136 05/10/22  1449 05/10/22  0729   *  --  123*  --  130* 124*   POTASSIUM 5.1  --  5.1  --  4.1 4.3   CHLORIDE 94  --  89*  --   --  88*   CO2 22  --  20  --   --  22   BUN 41*  --  48*  --   --  48*   CR 3.03*  --  3.82*  --   --  4.45*   GLC 95 104* 160*   < >  --  86   SILVIA 7.6*  --  " 9.8  --   --  10.2*   PHOS 3.2  --  6.8*  --   --  5.5*    < > = values in this interval not displayed.       CBC -   Recent Labs   Lab Test 05/12/22  0913 05/11/22  0845 05/10/22  0729 05/08/22  1752   WBC 9.3 8.4  --  6.3   HGB 11.4* 11.2* 10.8* 10.5*   * 151  --  129*       LFTs -   Recent Labs   Lab Test 05/12/22  0913 05/11/22  0845 05/10/22  0729 02/15/22  0624 01/26/22  0638 01/12/22  0113   ALKPHOS  --   --   --  122 148 148   BILITOTAL  --   --   --  1.0 1.4* 0.9   ALT  --   --   --  32 46 49   AST  --   --   --  37 43 76*   PROTTOTAL  --   --   --  7.6 7.6 7.6   ALBUMIN 3.4 3.2* 3.3* 3.0* 3.1* 3.1*         Current Medications:    - MEDICATION INSTRUCTIONS for Dialysis Patients -   Does not apply See Admin Instructions     acetaminophen  975 mg Oral Q8H     apixaban ANTICOAGULANT  2.5 mg Oral BID     atorvastatin  40 mg Oral Daily     ceFAZolin  1 g Intravenous Q24H     famotidine  10 mg Intravenous QPM    Or     famotidine  10 mg Oral QPM     - MEDICATION INSTRUCTIONS -   Does not apply Once     polyethylene glycol  17 g Oral Daily     senna-docusate  1 tablet Oral BID     sodium chloride (PF)  3 mL Intracatheter Q8H       lactated ringers       Kina Chen MD

## 2022-05-12 NOTE — CONSULTS
"    [facility]    Stroke Telephone Note    I was called by on call Hospitalist on 05/12/22 regarding patient Francine Chris. 76-year-old female initially admitted on 5/8/2022 after a mechanical fall found to have left femoral fracture neck.  History of A. fib on Coumadin, ESRD on scheduled dialysis.    She initially fell on 5/2/2022, received a CT head back then which was unremarkable, she fell again on 5/8/2022 that resulted in left femoral fracture.  The second time she fell there was no CT scan that was performed.  She subsequently underwent left hip arthroplasty, postop stop she was recovering but had a lot of confusion altered mental status, she was eventually started on apixaban 2 days ago.  Due to altered mental status CT head was obtained today that showed a new subarachnoid hemorrhage in the posterior aspect of the right sylvian fissure.    -Reversing apixaban with Kcentra (Ordered)  -Obtaining a CT scan in 6 hours (Ordered)  -Systolic Blood pressure goal less than 150  -Starting Keppra for seizure prophylaxis - 500 mg BID  -Patient is moving to ICU with every 1 hour neurochecks  -We can consider starting heparin in 2 to 3 days if CT and examination looks good.    My recommendations are based on the information provided over the phone by Francine Chris's in-person providers. They are not intended to replace the clinical judgment of her in-person providers. I was not requested to personally see or examine the patient at this time.    The Stroke Staff is Dr. Walker.    Donal Russell MD  Vascular Neurology Fellow  To page me or covering stroke neurology team member, click here: AMCOM   Choose \"On Call\" tab at top, then search dropdown box for \"Neurology Adult\", select location, press Enter, then look for stroke/neuro ICU/telestroke.           "

## 2022-05-12 NOTE — PLAN OF CARE
Goal Outcome Evaluation:    Patient disoriented to time, alert when awake, lethargic. Denies chest pain or SOB or pain in general. Small dried drainage on dressing. Wound consult in place for wound on RUE. Handoff report given to ICU RN. No meds given this shift, meds not available. Patient transferred to ICU with belonging. Updated patient sister, Tereza.

## 2022-05-12 NOTE — PROGRESS NOTES
Orthopedic Surgery  Francine Chris  2022  Admit Date:  2022  POD 2 Days Post-Op  S/P Procedure(s):  left bipolar hip hemiarthroplasty    Patient in dialysis   Chart review without any concerning issues    Vital Sign Ranges  Temperature Temp  Av.5  F (36.4  C)  Min: 96.5  F (35.8  C)  Max: 98.2  F (36.8  C)   Blood pressure Systolic (24hrs), Av , Min:109 , Max:158        Diastolic (24hrs), Av, Min:59, Max:98      Pulse Pulse  Av.7  Min: 72  Max: 92   Respirations Resp  Av.4  Min: 16  Max: 18   Pulse oximetry SpO2  Av %  Min: 96 %  Max: 98 %         Labs:  Recent Labs   Lab Test 22  0845 05/10/22  1449 05/10/22  0729   POTASSIUM 5.1 4.1 4.3     Recent Labs   Lab Test 22  0845 05/10/22  0729 22  1752   HGB 11.2* 10.8* 10.5*     Recent Labs   Lab Test 05/10/22  0729 22  1752 22  0737   INR 1.42* 1.62* 1.15     Recent Labs   Lab Test 22  0845 22  1752 22  0739    129* 140*       A/P  1. Left hip hemiarthroplasty   Continue PTA Eliquis for DVT prophylaxis.     Mobilize with PT/OT    WBAT with walker   Leave dressing intact.     Continue current pain regiment.    2. Disposition   Anticipate d/c to TCU based on medial clearance and placement.    Sol Saucedo PA-C

## 2022-05-13 NOTE — PROGRESS NOTES
"      [facility]    Neurocritical Care Progress Note    Interval EventsNo significant changes over night. She continues to have some sleepiness and mild confusion. Moves all limbs (LLE limited as she is s/p arthoplasty for fracture).     HPI Summary  Francine is a 76 year old female with prior history of atrial fibrillation ofn Coumadin and ESRD on dialysis. She presented to the [facility] ED 5/8/2022 for evaluation after a fall.  She initially fell on 5/2/2022, received a CT head back then which was unremarkable, she fell again on 5/8/2022 that resulted in left femoral fracture.  The second time she fell there was no CT scan that was performed.  She subsequently underwent left hip arthroplasty, postop she was recovering but had a lot of confusion altered mental status, she was eventually started on apixaban for anticoagulation. Due to altered mental status CT head was obtained 5/12 that showed a new subarachnoid hemorrhage in the posterior aspect of the right sylvian fissure. Apixaban was reversed with Kcentra and she was transferred to the ICU.     Imaging:  CT head 5/13 @ 0805: stable hemorrhage  CT head 5/12 @ 2117: stable hemorrhage  CT head 5/12 @ 1539: new small subarachnoid hemorrhage in posterior aspect of right Sylvian fissure     Impression   Acute hemorrhage in right Sylvian fissure  S/p Kcentra for anticoagulation reversal     Plan  -neuro checks q4 hours  -from stroke standpoint, okay for patient to be transferred out of the ICU and onto the floor  -MRI +MRA head/neck without contrast (ordered)  -change keppra to 500mg daily after dialysis   -continue to hold anticoagulation    We will continue to follow.     Angela KEMP, CNP  Vascular Neurology  To page me or covering stroke neurology team member, click here: AMCOM   Choose \"On Call\" tab at top, then search dropdown box for \"Neurology Adult\", select location, press Enter, then look for stroke/neuro " ICU/telestroke.    ______________________________________________________    Clinically Significant Risk Factors Present on Admission                  Medications   Scheduled Meds    - MEDICATION INSTRUCTIONS for Dialysis Patients -   Does not apply See Admin Instructions     acetaminophen  975 mg Oral Q8H     atorvastatin  40 mg Oral Daily     famotidine  10 mg Intravenous QPM    Or     famotidine  10 mg Oral QPM     levETIRAcetam  500 mg Intravenous Q12H    Or     levETIRAcetam  500 mg Oral Q12H    Or     levETIRAcetam  500 mg Per NG tube Q12H     polyethylene glycol  17 g Oral Daily     senna-docusate  1 tablet Oral BID     sodium chloride (PF)  3 mL Intracatheter Q8H       Infusion Meds    niCARdipine         PRN Meds  acetaminophen, sore throat lozenge, bisacodyl, hydrALAZINE, HYDROmorphone, hydrOXYzine, labetalol, lidocaine 4%, lidocaine (buffered or not buffered), magnesium hydroxide, naloxone **OR** naloxone **OR** naloxone **OR** naloxone, oxyCODONE **OR** [DISCONTINUED] oxyCODONE, prochlorperazine **OR** prochlorperazine, sodium chloride, sodium chloride (PF)       PHYSICAL EXAMINATION  Temp:  [96.5  F (35.8  C)-98  F (36.7  C)] 98  F (36.7  C)  Pulse:  [69-95] 70  Resp:  [11-25] 17  BP: ()/(61-98) 117/62  SpO2:  [89 %-98 %] 97 %      General Exam  General:  patient lying in bed without any acute distress    Pulmonary:  no respiratory distress      Neuro Exam  Mental Status:  Somnolent but arouses to voice or tactile stimulation; oriented to person and place, partially to situation (recalls she has a fracture but not a brain bleed), able to follow simple commands  Cranial Nerves:  visual fields intact (exma limited due to patient's somnolence), EOMI with normal smooth pursuit, facial sensation intact and symmetric, facial movements symmetric, hearing not formally tested but intact to conversation, palate elevation symmetric and uvula midline, no dysarthria, tongue protrusion midline  Motor:  normal  muscle tone and bulk, no abnormal movements, able to move all limbs spontaneously, no pronator drift, no right leg drift, examination of left leg movement limited d/to fracture  Sensory:  light touch sensation intact and symmetric throughout upper and lower extremities, no extinction on double simultaneous stimulation   Coordination: deferred  Station/Gait:  deferred    Imaging  I personally reviewed all imaging; relevant findings per HPI.     Lab Results Data   CBC  Recent Labs   Lab 05/12/22 0913 05/11/22  0845 05/10/22  0729 05/08/22  1752   WBC 9.3 8.4  --  6.3   RBC 3.16* 3.17*  --  2.94*   HGB 11.4* 11.2* 10.8* 10.5*   HCT 32.7* 34.5*  --  31.6*   * 151  --  129*     Basic Metabolic Panel    Recent Labs   Lab 05/13/22 0520 05/12/22  1855 05/12/22  1158 05/12/22 0913 05/12/22  0633 05/11/22  0845   *  --   --  127*  --  123*   POTASSIUM 4.3  --   --  5.1  --  5.1   CHLORIDE 95  --   --  94  --  89*   CO2 22  --   --  22  --  20   BUN 43*  --   --  41*  --  48*   CR 3.83*  --   --  3.03*  --  3.82*   * 181* 89 95   < > 160*   SILVIA 8.7  --   --  7.6*  --  9.8    < > = values in this interval not displayed.     Liver Panel  Recent Labs   Lab 05/13/22 0520 05/12/22 0913 05/11/22  0845   ALBUMIN 2.7* 3.4 3.2*     INR    Recent Labs   Lab Test 05/10/22  0729 05/08/22  1752 04/17/22  0737   INR 1.42* 1.62* 1.15      Lipid Profile  No lab results found.  A1C  No lab results found.  Troponin I  No results for input(s): TROPONINIS, TROPONINI, GHTROP in the last 168 hours.

## 2022-05-13 NOTE — PROGRESS NOTES
[facility]  Hospitalist Progress Note  Jonathan Dey MD  05/13/2022    Assessment & Plan      Francine Chris is a 76 year old female with history of atrial fibrillation on anticoagulation, severe tricuspid regurgitation, pulmonary hypertension, chronic right-sided heart failure, COPD, SKY, anxiety, depression, gout, peptic ulcer disease, peripheral arterial disease admitted on 5/8/2022 after a mechanical fall, found to have left femoral neck fracture.     Left femoral neck fracture s/p bipolar hemiarthroplasty  Mechanical fall  *Presents with fall slipping on wet floor  *XR with above fracture  - Orthopedic surgery following, routine postoperative cares per orthopedic surgery  - Holding apixaban as below     Subarachnoid hemorrhage, posterior right Sylvian fissure  Encephalopathy, multifactorial from SAH, opioids, acute illness  *Noted to be more confused 5/12, sister reports is not at baseline  *Head CT negative with initial fall 5/2, not checked this admission  *Apixaban resumed 5/11/22   - Head CT STAT ordered, demonstrated hemorrhage as above  - Discussed with neuro critical care and consult placed  - Levetiracetam ordered post HD  - Received k centra  - Hold apixaban  - Q4H neuro checks with stable head CT  - transfer to stroke floor  - MRI/MRA ordered     ESRD on HD MWF  Hyponatremia  On HD since 2021. Dialyzes MWF under care of Dr. Ro. Na 120 in ED (recent baseline runs low). Per Dr. Palmer, hyponatremia is due to large intradialytic weight gains.  5 kg is a typical goal in HD for fluid removal.  - Nephrology consulted  - HD 5/12, next planned 5/14 per nephrology      Permanent atrial fibrillation  S/p AVN ablation with ppm 6/2021  - Off apixaban as above     Severe tricuspid regurgitation   Pulmonary hypertension  Chronic R heart failure, compensated  HLD  [needs rec- atorvastatin 40 mg daily, metoprolol 12.5 mg BID, torsemide 40 mg daily]  Echo with EF 50-55%, nl LV fn,  "severe TR, pulmonary pressures 60-65 mmHg. Seen by CV surgery, deemed to be too high of a surgical risk for valve replacement.   - continue lipitor, metoprolol      Anemia  Hgb at 10.5 on presentation, stable from recent  - Hgb stable 5/12     COPD  [needs rec- prn albuterol, tiotropium-olodaterol 2 puffs daily]  - Continue prior to admission inhaler regimen      SKY  No longer wears CPAP     Anxiety  Depression   Doesn't appear to currently be on meds for this     Gout  - Continue allopurinol 100 mg MWF after dialysis      PUD  - Continue pantoprazole 40 mg daily      PAD  noted    Clinically Significant Risk Factors Present on Admission     Diet: Advance Diet as Tolerated: Regular Diet Adult  Diet    DVT Prophylaxis: Pneumatic Compression Devices  Schuler Catheter: Not present  Code Status: Full Code          Disposition Plan        Transfer out of ICU to neurology floor.  Expected Discharge unclear, eventually back to LTC      Interval History   -- chart reviewed  -- discussed with RN  -- pending MRI    -Data reviewed today: I reviewed all new labs and imaging over the last 24 hours. I personally reviewed no images or EKG's today.    Physical Exam    , Blood pressure 118/64, pulse 71, temperature 98.2  F (36.8  C), temperature source Oral, resp. rate 14, height 1.651 m (5' 5\"), weight 66.1 kg (145 lb 11.6 oz), SpO2 93 %.  Vitals:    05/08/22 1741 05/09/22 1330 05/10/22 0500   Weight: 64 kg (141 lb) 64 kg (141 lb 1.5 oz) 66.1 kg (145 lb 11.6 oz)     Vital Signs with Ranges  Temp:  [97.7  F (36.5  C)-98.2  F (36.8  C)] 98.2  F (36.8  C)  Pulse:  [69-91] 71  Resp:  [9-25] 14  BP: ()/(61-91) 118/64  SpO2:  [89 %-99 %] 93 %  I/O's Last 24 hours  I/O last 3 completed shifts:  In: 1300 [P.O.:1300]  Out: -     Constitutional: Left periorbital ecchymosis, oriented to self  Respiratory: Clear to auscultation bilaterally, no crackles or wheezing  Cardiovascular: Regular rate and rhythm, normal S1 and S2, intermittently " paced  GI: Normal bowel sounds, soft, non-distended, non-tender  Skin/Integumen: No rashes, no cyanosis, no edema  Other:      Medications   All medications were reviewed.    niCARdipine         - MEDICATION INSTRUCTIONS for Dialysis Patients -   Does not apply See Admin Instructions     atorvastatin  40 mg Oral Daily     famotidine  10 mg Intravenous QPM    Or     famotidine  10 mg Oral QPM     levETIRAcetam  500 mg Intravenous Daily    Or     levETIRAcetam  500 mg Oral Daily    Or     levETIRAcetam  500 mg Per NG tube Daily     polyethylene glycol  17 g Oral Daily     senna-docusate  1 tablet Oral BID     sodium chloride (PF)  3 mL Intracatheter Q8H        Data   Recent Labs   Lab 05/13/22  0835 05/13/22  0520 05/12/22  1855 05/12/22  1158 05/12/22  0913 05/12/22  0633 05/11/22  0845 05/10/22  1449 05/10/22  0729 05/08/22  1752 05/08/22  1752   WBC  --   --   --   --  9.3  --  8.4  --   --   --  6.3   HGB  --   --   --   --  11.4*  --  11.2*  --  10.8*  --  10.5*   MCV  --   --   --   --  104*  --  109*  --   --   --  108*   PLT  --   --   --   --  146*  --  151  --   --   --  129*   INR  --   --   --   --   --   --   --   --  1.42*  --  1.62*   NA  --  128*  --   --  127*  --  123*   < > 124*  --  120*   POTASSIUM  --  4.3  --   --  5.1  --  5.1   < > 4.3  --  4.5   CHLORIDE  --  95  --   --  94  --  89*  --  88*  --  80*   CO2  --  22  --   --  22  --  20  --  22  --  24   BUN  --  43*  --   --  41*  --  48*  --  48*  --  52*   CR  --  3.83*  --   --  3.03*  --  3.82*  --  4.45*  --  5.21*   ANIONGAP  --  11  --   --  11  --  14  --  14  --  16*   SILVIA  --  8.7  --   --  7.6*  --  9.8  --  10.2*  --  10.5*   * 104* 181*   < > 95   < > 160*   < > 86  --  129*   ALBUMIN  --  2.7*  --   --  3.4  --  3.2*  --  3.3*   < >  --     < > = values in this interval not displayed.       Recent Results (from the past 24 hour(s))   CT Head w/o Contrast   Result Value    Radiologist flags New intracranial hemorrhage (AA)     Narrative    CT OF THE HEAD WITHOUT CONTRAST 5/12/2022 3:39 PM     COMPARISON: Head CT 5/2/2022.    HISTORY: Head trauma, moderate-severe.    TECHNIQUE: 5 mm thick axial CT images of the head were acquired  without IV contrast material.    FINDINGS: There is new small subarachnoid hemorrhage in the posterior  aspect of the right Sylvian fissure. There is moderate diffuse  cerebral volume loss. There are subtle patchy areas of decreased  density in the cerebral white matter bilaterally that are consistent  with sequela of chronic small vessel ischemic disease.    The ventricles and basal cisterns are within normal limits in  configuration given the degree of cerebral volume loss.  There is no  midline shift.    No intracranial mass or recent infarct.    The visualized paranasal sinuses are well-aerated. There is no  mastoiditis. There are no fractures of the visualized bones.      Impression    IMPRESSION:   1. New small volume subarachnoid hemorrhage in the posterior aspect of  the right Sylvian fissure.  2. Diffuse cerebral volume loss and cerebral white matter changes  consistent with chronic small vessel ischemic disease.    [Critical Result: New intracranial hemorrhage]    Finding was identified on 5/12/2022 3:42 PM.     SHAKA JUAN was contacted by Dr. Foley on 5/12/2022 3:55 PM  and verbalized understanding of the critical result.     Radiation dose for this scan was reduced using automated exposure  control, adjustment of the mA and/or kV according to patient size, or  iterative reconstruction technique.     KEVYN FOLEY MD         SYSTEM ID:  [id number]   CT Head w/o Contrast    Narrative    EXAM: CT HEAD W/O CONTRAST  LOCATION: [facility]  DATE/TIME: 5/12/2022 9:11 PM    INDICATION: Follow-up intracranial hemorrhage  COMPARISON: 05/12/2022  TECHNIQUE: Routine CT Head without IV contrast. Multiplanar reformats. Dose reduction techniques were  used.    FINDINGS:  INTRACRANIAL CONTENTS: Stable subarachnoid hemorrhage in the right sylvian fissure. No new or progressive intracranial hemorrhage. No CT evidence of acute infarct. Mild presumed chronic small vessel ischemic changes. Moderate generalized volume loss. No   hydrocephalus.     VISUALIZED ORBITS/SINUSES/MASTOIDS: Prior bilateral cataract surgery. Visualized portions of the orbits are otherwise unremarkable. Mild mucosal thickening of the right maxillary sinus with chronic mucoperiosteal reaction. No middle ear or mastoid   effusion.    BONES/SOFT TISSUES: No acute abnormality.      Impression    IMPRESSION:  1.  No significant interval change. Stable subarachnoid hemorrhage in the right sylvian fissure.   CT Head w/o Contrast    Narrative    CT SCAN OF THE HEAD WITHOUT CONTRAST   5/13/2022 8:05 AM     HISTORY: Stroke, follow up.    TECHNIQUE:  Axial images of the head and coronal reformations without  IV contrast material. Radiation dose for this scan was reduced using  automated exposure control, adjustment of the mA and/or kV according  to patient size, or iterative reconstruction technique.    COMPARISON: Head CT 5/12/2022.    FINDINGS:  Acute subarachnoid hemorrhage within the right sylvian fissure,  unchanged. No new areas of hemorrhage. Volume loss and white matter  hypoattenuation likely represent chronic small ischemic change.    The visualized calvarium, tympanic cavities, mastoid cavities, and  extracranial soft tissues are unremarkable. Left maxillary sinus  debris/air fluid level.      Impression    IMPRESSION:   No change of subarachnoid hemorrhage within the right sylvian fissure.    RENATO GREEN MD         SYSTEM ID:  [id number]       Jonathan Dey MD  Text Page  (7am to 6pm)

## 2022-05-13 NOTE — PLAN OF CARE
Pt arrived to the ICU @1850. Pt very lethargic and falling asleep between settling. Pt is disoriented to time, but able to answer other orientation questions appropriately. Equal  strength with upper ext. And moves both legs equally, generally weak. Pt's purse, pillow and blanket are in the room at bedside with the pt.

## 2022-05-13 NOTE — PROGRESS NOTES
Patient has an mra brain and neck ordered, she does have a pacemaker. Model and serial number provided to mri, they will determine if it is mri compatible and call back.

## 2022-05-13 NOTE — PROGRESS NOTES
"Nephrology Progress Note  05/13/2022         ASSESSMENT AND RECOMMENDATIONS:   1 ESRD -   Outpt HD orders -  LUIS Contreras, Derrick Ro  Dialyzer: 160NRe Optiflux   Na: 137 mEq/L   Bicarb: 38 mEq/L   Dialysate: 2.0 K, 2.5 Ca, 1.0 Mg, 100 Dextrose ()   BFR (prescribed): 400   BFR (actual): 400   Prescribed time: 03:00   EDW: 59 kg      2 Hyponatremia - chronic 2/2 ESRD and large fluid intake.   128 with HD yesterday.      3 Mechanical fall/ Femur fracture - planned for ORIF today     4 Anemia in ESRD -   Hgb at goal     5 SAH - Had fall on 5/8 ? . Apixaban reversed with K Centra. Neurology on board. Target BP < 150      Plan -   HD tomorrow.  No heparin.  2-3 L UF.  Limit fluid intake to < 1800 ml/ day       Kina Chen MD  [facility] - Nephrology   [phone]      Interval History :   Seen / examined   Events over last 24 hrs noted. Pt noted to be more confused and CT Scan shows SAH. Now in ICU .   HD yesterday with 4 L UF. Hemodynamically stable through HD.   Sodium 128   Lethargic but arousable.  Oriented to place and year    Review system  Afebrile.  No nausea vomiting.  No chest pain or shortness of breath.        Physical Exam:   I/O last 3 completed shifts:  In: 580 [P.O.:580]  Out: 4000 [Other:4000]    /62   Pulse 70   Temp 98  F (36.7  C) (Axillary)   Resp 17   Ht 1.651 m (5' 5\")   Wt 66.1 kg (145 lb 11.6 oz)   SpO2 97%   BMI 24.25 kg/m      GENERAL APPEARANCE: Lethargic  Pulmonary: lungs clear to auscultation   CV: regular rhythm, normal rate, no rub   - JVP -  GI: soft, nontender,   MS: no evidence of inflammation in joints  : no lott  SKIN: no rash, warm, dry, no cyanosis  NEURO: face symmetric, no asterixis   Rt internal jugular TDC   Labs:   All labs reviewed by me  Electrolytes/Renal -   Recent Labs   Lab Test 05/13/22  0520 05/12/22  1855 05/12/22  1158 05/12/22  0913 05/12/22  0633 05/11/22  0845   *  --   --  127*  --  123*   POTASSIUM 4.3  --  "  --  5.1  --  5.1   CHLORIDE 95  --   --  94  --  89*   CO2 22  --   --  22  --  20   BUN 43*  --   --  41*  --  48*   CR 3.83*  --   --  3.03*  --  3.82*   * 181* 89 95   < > 160*   SILVIA 8.7  --   --  7.6*  --  9.8   PHOS 4.7*  --   --  3.2  --  6.8*    < > = values in this interval not displayed.       CBC -   Recent Labs   Lab Test 05/12/22 0913 05/11/22  0845 05/10/22  0729 05/08/22  1752   WBC 9.3 8.4  --  6.3   HGB 11.4* 11.2* 10.8* 10.5*   * 151  --  129*       LFTs -   Recent Labs   Lab Test 05/13/22 0520 05/12/22 0913 05/11/22 0845 05/10/22  0729 02/15/22  0624 01/26/22  0638 01/12/22  0113   ALKPHOS  --   --   --   --  122 148 148   BILITOTAL  --   --   --   --  1.0 1.4* 0.9   ALT  --   --   --   --  32 46 49   AST  --   --   --   --  37 43 76*   PROTTOTAL  --   --   --   --  7.6 7.6 7.6   ALBUMIN 2.7* 3.4 3.2*   < > 3.0* 3.1* 3.1*    < > = values in this interval not displayed.         Current Medications:    - MEDICATION INSTRUCTIONS for Dialysis Patients -   Does not apply See Admin Instructions     acetaminophen  975 mg Oral Q8H     atorvastatin  40 mg Oral Daily     famotidine  10 mg Intravenous QPM    Or     famotidine  10 mg Oral QPM     levETIRAcetam  500 mg Intravenous Q12H    Or     levETIRAcetam  500 mg Oral Q12H    Or     levETIRAcetam  500 mg Per NG tube Q12H     polyethylene glycol  17 g Oral Daily     senna-docusate  1 tablet Oral BID     sodium chloride (PF)  3 mL Intracatheter Q8H       niCARdipine       Kina Chen MD

## 2022-05-13 NOTE — PROGRESS NOTES
"[facility] Nurse Inpatient Assessment     Today's Assessment: RUE    Patient History (according to provider note(s):    \"76 year old female admitted on 5/8/2022 after a mechanical fall. Found to have left femoral neck fracture.\"    AREAS ASSESSED:    Areas visualized during today's visit: Focused: and RUE       Wound Location: RUE near AC         Last photo: 5/13  Wound due to: Surgical Wound  Wound history/plan of care:   Per sister at bedside, was previously using a silver alginate   Wound base:  granulation tissue, non-granular tissue, fibrin and slough,     Palpation of the wound bed: firm      Drainage: moderate     Description of drainage: serosanguinous     Measurements (length x width x depth, in cm) 2  x 2  x  0.8 cm      Tunneling N/A     Undermining N/A  Periwound skin: Intact      Color: pink and purple      Temperature: normal   Odor: moderate  Pain: no grimacing or signs of discomfort  Pain interventions prior to dressing change: patient tolerated well  Treatment goal: Heal , Infection control/prevention and Remove necrotic tissue  STATUS: improving, nonviable tissue lifting, odor noted as reduced compared to prior assessment   Supplies ordered: supplies stored on unit, iodosorb and wound cleanser left at bedside       TREATMENT PLAN:     RUE wound(s): Daily     Wound care  Start:  05/14/22 0600,   DAILY,   Routine        Comments: Comments: Location: RUE AC   Care: provided daily by primary RN   1. Cleanse with Vashe ( #[id number]) and 4x4\" gauze, pat dry   2. Apply Iodosorb gel ( #[id number]) nickel thickness into wound base   3. Cover with adaptic gauze, then with cover sponges or ABD pad   4. Secure with flexinet or with cut to fit piece of edemawear size small with navy stripe ( #[id number])            Orders: Updated    RECOMMEND PRIMARY TEAM ORDER: None, at this time  Education provided: plan of care and Infection prevention   Discussed plan of care with: Patient and " Nurse  WOC Nurse follow-up plan:weekly  Notify WOC if wound(s) deteriorate.  Nursing to notify the Provider(s) and re-consult the WOC Nurse if new skin concern.    DATA:     Current support surface: Standard  Low air loss mattress  Containment of urine/stool: Anuric and Continent of bowel  BMI: Body mass index is 24.25 kg/m .   Active Diet Order: Orders Placed This Encounter      Advance Diet as Tolerated: Regular Diet Adult      Diet     Output: I/O last 3 completed shifts:  In: 580 [P.O.:580]  Out: 4000 [Other:4000]   Labs:   Recent Labs   Lab 05/13/22  0520 05/12/22  0913 05/11/22  0845 05/10/22  0729   ALBUMIN 2.7* 3.4   < > 3.3*   HGB  --  11.4*   < > 10.8*   INR  --   --   --  1.42*   WBC  --  9.3   < >  --     < > = values in this interval not displayed.     Pressure Injury Risk Assessment:   Miguel Risk Assessment  Sensory Perception: 3-->slightly limited  Moisture: 3-->occasionally moist  Activity: 1-->bedfast  Mobility: 2-->very limited  Nutrition: 2-->probably inadequate  Friction and Shear: 2-->potential problem  Miguel Score: 13    Lima PURCELLNAFISA   Dept. Pager: [phone]  Dept. Office Number: [phone]

## 2022-05-13 NOTE — PROGRESS NOTES
SPIRITUAL HEALTH SERVICES Progress Note  [facility]  ICU    PT sleeping soundly or unresponsive in multiple attempts to visit PT    SH remains available.    Jonathan Brooks  Associate

## 2022-05-13 NOTE — PROVIDER NOTIFICATION
Patient is at MRI and very anxious unable to be still., Neurocrit paged for antianxiety med order; however, no reply so patient is done.

## 2022-05-14 NOTE — PROGRESS NOTES
[facility]    Stroke Progress Note    Interval Events  Today, Francine is much more alert and engaged. She is undergoing dialysis during our visit. She reports some itchy skin, but otherwise denies complaint at this time.     HPI Summary  Francine is a 76 year old female with prior history of atrial fibrillation ofn Coumadin and ESRD on dialysis. She presented to the [facility] ED 5/8/2022 for evaluation after a fall.  She initially fell on 5/2/2022, received a CT head back then which was unremarkable, she fell again on 5/8/2022 that resulted in left femoral fracture.  The second time she fell there was no CT scan that was performed.  She subsequently underwent left hip arthroplasty, postop she was recovering but had a lot of confusion altered mental status, she was eventually started on apixaban for anticoagulation. Due to altered mental status CT head was obtained 5/12 that showed a new subarachnoid hemorrhage in the posterior aspect of the right sylvian fissure. Apixaban was reversed with Kcentra and she was transferred to the ICU.     Stroke Evaluation Summarized    MRI/Head CT CT head 5/13 @ 0805: stable hemorrhage  CT head 5/12 @ 2117: stable hemorrhage  CT head 5/12 @ 1539: new small subarachnoid hemorrhage in posterior aspect of right Sylvian fissure    Intracranial Vasculature MRA:  1.  Mild tapering of the internal carotid arteries, suggestive of atherosclerosis.  2.  No proximal occlusion, saccular aneurysm, or high flow vascular malformation.   Cervical Vasculature Carotid US: pending     MRA:  1.  Incomplete and motion degraded exam. Narrowing of the bilateral carotid bifurcations with at least mild stenosis on the left and mild to moderate stenosis on the right. Consider ultrasound for better evaluation.         EKG/Telemetry Ventricular-paced rhythm    Other Testing Not Applicable     LDL  No lab value available in past 30 days   A1C  No lab value available in past 30 days  "  Troponin No lab value available in past 48 hrs       Impression   Acute hemorrhage in right Sylvian fissure secondary to trauma  S/p Kcentra for anticoagulation reversal     Plan  -neuro checks q4 hours  -from stroke standpoint, okay for patient to be transferred out of the ICU and onto the floor  -carotid US to assess vasculature of neck as MRA was degraded and non-diagnostic (ordered)   -continue to hold anticoagulation; repeat CT in 2 weeks (ordered) and consider restarting Eliquis if stable  -if no concern of altered mental status/seizure, discontinue Keppra 5/17/2022  -Goal BP is <140/90 with tighter control associated with improved vascular outcomes    Patient Follow-up    - in the next 1-2 week(s) with PCP  - in 6-8 weeks with [facility] or other local neurologist of patient's choice    We will follow peripherally for results of the carotid US and if unrevealing we will sign off.     Angela KEMP, CNP  Vascular Neurology  To page me or covering stroke neurology team member, click here: [facility]COM   Choose \"On Call\" tab at top, then search dropdown box for \"Neurology Adult\", select location, press Enter, then look for stroke/neuro ICU/telestroke.    ______________________________________________________    Clinically Significant Risk Factors Present on Admission                  Medications   Scheduled Meds    - MEDICATION INSTRUCTIONS for Dialysis Patients -   Does not apply See Admin Instructions     atorvastatin  40 mg Oral Daily     famotidine  10 mg Intravenous QPM    Or     famotidine  10 mg Oral QPM     levETIRAcetam  500 mg Intravenous Daily    Or     levETIRAcetam  500 mg Oral Daily    Or     levETIRAcetam  500 mg Per NG tube Daily     LORazepam  0.5 mg Intravenous Once     polyethylene glycol  17 g Oral Daily     senna-docusate  1 tablet Oral BID     sodium chloride (PF)  3 mL Intracatheter Q8H       Infusion Meds    niCARdipine         PRN Meds  acetaminophen, sore throat " lozenge, bisacodyl, hydrALAZINE, HYDROmorphone, hydrOXYzine, labetalol, lidocaine 4%, lidocaine (buffered or not buffered), magnesium hydroxide, naloxone **OR** naloxone **OR** naloxone **OR** naloxone, oxyCODONE **OR** [DISCONTINUED] oxyCODONE, prochlorperazine **OR** prochlorperazine, sodium chloride, sodium chloride (PF)       PHYSICAL EXAMINATION  Temp:  [97.6  F (36.4  C)] 97.6  F (36.4  C)  Pulse:  [] 74  Resp:  [10-26] 11  BP: (117-149)/(58-94) 136/74  SpO2:  [96 %-99 %] 97 %      General Exam  General:  patient lying in bed without any acute distress    HEENT:  normocephalic/atraumatic  Pulmonary:  no respiratory distress      Neuro Exam  Mental Status:  alert, oriented x 3, follows commands, speech clear and fluent, naming and repetition normal  Cranial Nerves:  EOMI with normal smooth pursuit, facial sensation intact and symmetric, facial movements symmetric, hearing not formally tested but intact to conversation, no dysarthria  Motor:  normal muscle tone and bulk, no abnormal movements, able to move all limbs spontaneously  Sensory:  light touch sensation intact and symmetric throughout upper and lower extremities    Station/Gait:  deferred    Imaging  I personally reviewed all imaging; relevant findings per HPI.     Lab Results Data   CBC  Recent Labs   Lab 05/14/22 0528 05/12/22  0913 05/11/22  0845   WBC 12.1* 9.3 8.4   RBC 3.24* 3.16* 3.17*   HGB 11.5* 11.4* 11.2*   HCT 34.2* 32.7* 34.5*   * 146* 151     Basic Metabolic Panel    Recent Labs   Lab 05/14/22  1533 05/14/22  0528 05/13/22  1401 05/13/22  0835 05/13/22  0520 05/12/22  1158 05/12/22  0913   NA  --  124*  --   --  128*  --  127*   POTASSIUM  --  5.5*  --   --  4.3  --  5.1   CHLORIDE  --  93*  --   --  95  --  94   CO2  --  13*  --   --  22  --  22   BUN  --  62*  --   --  43*  --  41*   CR  --  4.99*  --   --  3.83*  --  3.03*   * 71 106*   < > 104*   < > 95   SILVIA  --  8.9  --   --  8.7  --  7.6*    < > = values in this  interval not displayed.     Liver Panel  Recent Labs   Lab 05/14/22  0528 05/13/22  0520 05/12/22  0913   ALBUMIN 2.8* 2.7* 3.4     INR    Recent Labs   Lab Test 05/10/22  0729 05/08/22  1752 04/17/22  0737   INR 1.42* 1.62* 1.15      Lipid Profile  No lab results found.  A1C  No lab results found.  Troponin I  No results for input(s): TROPONINIS, TROPONINI, GHTROP in the last 168 hours.

## 2022-05-14 NOTE — PROGRESS NOTES
Potassium   Date Value Ref Range Status   05/14/2022 5.5 (H) 3.4 - 5.3 mmol/L Final   01/12/2005 4.5 3.4 - 5.3 mmol/L Final     Hemoglobin   Date Value Ref Range Status   05/14/2022 11.5 (L) 11.7 - 15.7 g/dL Final   01/12/2005 15.6 11.7 - 15.7 g/dL Final     Creatinine   Date Value Ref Range Status   05/14/2022 4.99 (H) 0.52 - 1.04 mg/dL Final   01/12/2005 0.80 0.60 - 1.30 mg/dL Final     Urea Nitrogen   Date Value Ref Range Status   05/14/2022 62 (H) 7 - 30 mg/dL Final   01/12/2005 16 7 - 30 mg/dL Final     Sodium   Date Value Ref Range Status   05/14/2022 124 (L) 133 - 144 mmol/L Final   01/12/2005 141 133 - 144 mmol/L Final     INR   Date Value Ref Range Status   05/10/2022 1.42 (H) 0.85 - 1.15 Final      Latest Reference Range & Units 01/12/22 11:38   Hep B Surface Agn Nonreactive  Nonreactive   Hepatitis B Surface Antibody <8.00 m[IU]/mL >1,000.00 (H) [1]   (H): Data is abnormally high  [1] Reactive, Patient is considered to be immune to infection with hepatitis B when the value is greater than or equal to 12.0 mIU/mL.    DIALYSIS PROCEDURE NOTE  Hepatitis status of previous patient on machine log was checked and verified ok to use with this patients hepatitis status.  Patient dialyzed for 3 hrs. on a K2 bath with a net fluid removal of  3L.  A BFR of 400 ml/min was obtained via a Right CVC.  The treatment plan was discussed with Dr. Chen during the treatment.    Total heparin received during the treatment: 0 units.   Line flushed, clamped and capped with heparin 1:1000 1.6 mL (1600 units) per lumen    Meds  given: none   Complications: none      Person educated: patient. Knowledge base moderate. Barriers to learning: lethargy. Educated on procedure via verbal mode. Patient verbalized understanding. Pt prefers verbal education style.     ICEBOAT? Timeout performed pre-treatment  I: Patient was identified using 2 identifiers  C:  Consent Signed Yes  E: Equipment preventative maintenance is current and dialysis  delivery system OK to use  B: See note above  O: Dialysis orders present and complete prior to treatment  A: Vascular access verified and assessed prior to treatment  T: Treatment was performed at a clinically appropriate time  ?: Patient was allowed to ask questions and address concerns prior to treatment  See flowsheet in EPIC for further details and post assessment.  Machine water alarm in place and functioning. Transducer pods intact and checked every 15min.   Pt received treatment in ICU.  Chlorine/Chloramine water system checked every 4 hours.  Outpatient Dialysis at [facility]

## 2022-05-14 NOTE — PROGRESS NOTES
"Nephrology Progress Note  05/14/2022         ASSESSMENT AND RECOMMENDATIONS:   1 ESRD -   Outpt HD orders -  Racquel Ding , LUIS, Derrick Ro  Dialyzer: 160NRe Optiflux   Na: 137 mEq/L   Bicarb: 38 mEq/L   Dialysate: 2.0 K, 2.5 Ca, 1.0 Mg, 100 Dextrose ()   BFR (prescribed): 400   BFR (actual): 400   Prescribed time: 03:00   EDW: 59 kg      2 Hyponatremia - chronic 2/2 ESRD and large fluid intake.   Should improve with dialysis and ultrafiltration today.  P.o. fluid restriction to 1500 cc/day     3 Mechanical fall/ Femur fracture - planned for ORIF today     4 Anemia in ESRD -   Hgb at goal     5 SAH - Had fall on 5/8 ? . Apixaban reversed with [facility]. Neurology on board. Target BP < 150      Plan -   HD today.  No heparin.  2-3 L UF.  Limit fluid intake to 1500 ml/ day   Ran off schedule this week secondary to hospitalization.  Switch back to Monday Wednesday Friday next week.      Kina Chen MD  [facility] - Nephrology   [phone]      Interval History :   Seen / examined on dialysis.  Early part of dialysis going okay.  Target ultrafiltration of 3 L.  Awake.  Oriented to place, self, president.  Thinks this is 1923  Sodium 124    Review system  Afebrile.  No nausea vomiting.  No chest pain or shortness of breath.        Physical Exam:   I/O last 3 completed shifts:  In: 1260 [P.O.:1260]  Out: -     /75   Pulse 86   Temp 97.6  F (36.4  C) (Axillary)   Resp 16   Ht 1.651 m (5' 5\")   Wt 60 kg (132 lb 4.4 oz)   SpO2 97%   BMI 22.01 kg/m      GENERAL APPEARANCE: Awake and alert  Pulmonary: lungs clear to auscultation   CV: regular rhythm, normal rate, no rub   - JVP -  GI: soft, nontender,   MS: no evidence of inflammation in joints  : no lott  SKIN: no rash, warm, dry, no cyanosis  NEURO: face symmetric, no asterixis   Rt internal jugular TDC   Labs:   All labs reviewed by me  Electrolytes/Renal -   Recent Labs   Lab Test 05/14/22  0528 05/13/22  1401 05/13/22  0835 " 05/13/22 0520 05/12/22 1158 05/12/22 0913   *  --   --  128*  --  127*   POTASSIUM 5.5*  --   --  4.3  --  5.1   CHLORIDE 93*  --   --  95  --  94   CO2 13*  --   --  22 -- 22   BUN 62*  --   --  43*  --  41*   CR 4.99*  --   --  3.83*  --  3.03*   GLC 71 106* 108* 104*   < > 95   SILVIA 8.9  --   --  8.7  --  7.6*   PHOS 6.7*  --   --  4.7*  --  3.2    < > = values in this interval not displayed.       CBC -   Recent Labs   Lab Test 05/14/22 0528 05/12/22 0913 05/11/22  0845   WBC 12.1* 9.3 8.4   HGB 11.5* 11.4* 11.2*   * 146* 151       LFTs -   Recent Labs   Lab Test 05/14/22  0528 05/13/22  0520 05/12/22  0913 05/10/22  0729 02/15/22  0624 01/26/22  0638 01/12/22  0113   ALKPHOS  --   --   --   --  122 148 148   BILITOTAL  --   --   --   --  1.0 1.4* 0.9   ALT  --   --   --   --  32 46 49   AST  --   --   --   --  37 43 76*   PROTTOTAL  --   --   --   --  7.6 7.6 7.6   ALBUMIN 2.8* 2.7* 3.4   < > 3.0* 3.1* 3.1*    < > = values in this interval not displayed.         Current Medications:    - MEDICATION INSTRUCTIONS for Dialysis Patients -   Does not apply See Admin Instructions     sodium chloride 0.9%  250 mL Intravenous Once in dialysis/CRRT     sodium chloride 0.9%  300 mL Hemodialysis Machine Once     atorvastatin  40 mg Oral Daily     famotidine  10 mg Intravenous QPM    Or     famotidine  10 mg Oral QPM     levETIRAcetam  500 mg Intravenous Daily    Or     levETIRAcetam  500 mg Oral Daily    Or     levETIRAcetam  500 mg Per NG tube Daily     LORazepam  0.5 mg Intravenous Once     - MEDICATION INSTRUCTIONS -   Does not apply Once     polyethylene glycol  17 g Oral Daily     senna-docusate  1 tablet Oral BID     sodium chloride (PF)  3 mL Intracatheter Q8H       niCARdipine       Kina Chen MD

## 2022-05-14 NOTE — PROGRESS NOTES
[facility]  Hospitalist Progress Note  Jonathan Dey MD  05/14/2022    Assessment & Plan      Francine Chris is a 76 year old female with history of atrial fibrillation on anticoagulation, severe tricuspid regurgitation, pulmonary hypertension, chronic right-sided heart failure, COPD, SKY, anxiety, depression, gout, peptic ulcer disease, peripheral arterial disease admitted on 5/8/2022 after a mechanical fall, found to have left femoral neck fracture.     Left femoral neck fracture s/p bipolar hemiarthroplasty POD # 4  Mechanical fall  *Presents with fall slipping on wet floor  *XR with above fracture  - Orthopedic surgery following, routine postoperative cares per orthopedic surgery  - Holding apixaban as below     Subarachnoid hemorrhage, posterior right Sylvian fissure  Encephalopathy, multifactorial from SAH, opioids, acute illness  *Noted to be more confused 5/12, sister reports is not at baseline  *Head CT negative with initial fall 5/2, not checked this admission  *Apixaban resumed 5/11/22   - Head CT STAT ordered, demonstrated hemorrhage as above  - Discussed with neuro critical care and consulted  - Levetiracetam ordered post HD x 7 days post HD for seizure prophylaxis  - Received k [facility]  - Hold apixaban for 2 weeks total before resumptioln  - Q4H neuro checks with stable head CT  - transfer to stroke floor  - MRI/MRA shows stable small sylvian fissure SAH, no stroke, no significant stenosis although US would better characterize due to motion artifact.     ESRD on HD MWF  Hyponatremia  On HD since 2021. Dialyzes MWF under care of Dr. Ro. Na 120 in ED (recent baseline runs low). Per Dr. Palmer, hyponatremia is due to large intradialytic weight gains.  5 kg is a typical goal in HD for fluid removal.  - Nephrology consulted  - HD 5/12, 5/14, will resume usual MWF HD      Permanent atrial fibrillation  S/p AVN ablation with ppm 6/2021  - apixaban on hold for 14 days due to  "SAH     Severe tricuspid regurgitation   Pulmonary hypertension  Chronic R heart failure, compensated  HLD  [needs rec- atorvastatin 40 mg daily, metoprolol 12.5 mg BID, torsemide 40 mg daily]  Echo with EF 50-55%, nl LV fn, severe TR, pulmonary pressures 60-65 mmHg. Seen by CV surgery, deemed to be too high of a surgical risk for valve replacement.   - continue lipitor, metoprolol      Anemia  Hgb at 10.5 on presentation, stable from recent  - Hgb stable 5/12     COPD  [needs rec- prn albuterol, tiotropium-olodaterol 2 puffs daily]  - Continue prior to admission inhaler regimen      SKY  No longer wears CPAP     Anxiety  Depression   Doesn't appear to currently be on meds for this     Gout  - Continue allopurinol 100 mg MWF after dialysis      PUD  - Continue pantoprazole 40 mg daily      PAD  noted    Clinically Significant Risk Factors Present on Admission     Diet: Advance Diet as Tolerated: Regular Diet Adult  Diet    DVT Prophylaxis: Pneumatic Compression Devices  Schuler Catheter: Not present  Code Status: Full Code          Disposition Plan        Transfer out of ICU can go to neurology or orthopedic floor.  Expected Discharge unclear, eventually back to LTC      Interval History   -- discussed with RN  -- had HD today  -- she is no longer encephalopathic    -Data reviewed today: I reviewed all new labs and imaging over the last 24 hours. I personally reviewed no images or EKG's today.    Physical Exam    , Blood pressure 136/74, pulse 74, temperature 97.6  F (36.4  C), temperature source Axillary, resp. rate 11, height 1.651 m (5' 5\"), weight 60 kg (132 lb 4.4 oz), SpO2 97 %.  Vitals:    05/09/22 1330 05/10/22 0500 05/14/22 0000   Weight: 64 kg (141 lb 1.5 oz) 66.1 kg (145 lb 11.6 oz) 60 kg (132 lb 4.4 oz)     Vital Signs with Ranges  Temp:  [97.6  F (36.4  C)] 97.6  F (36.4  C)  Pulse:  [] 74  Resp:  [10-26] 11  BP: (111-149)/(58-94) 136/74  SpO2:  [93 %-99 %] 97 %  I/O's Last 24 hours  I/O last 3 " completed shifts:  In: 1260 [P.O.:1260]  Out: -     Constitutional: Left periorbital ecchymosis,    Respiratory: Clear to auscultation bilaterally, no crackles or wheezing  Cardiovascular: Regular rate and rhythm, normal S1 and S2, intermittently paced  GI: Normal bowel sounds, soft, non-distended, non-tender  Skin/Integumen: No rashes, no cyanosis, no edema  Other:      Medications   All medications were reviewed.    niCARdipine         - MEDICATION INSTRUCTIONS for Dialysis Patients -   Does not apply See Admin Instructions     atorvastatin  40 mg Oral Daily     famotidine  10 mg Intravenous QPM    Or     famotidine  10 mg Oral QPM     levETIRAcetam  500 mg Intravenous Daily    Or     levETIRAcetam  500 mg Oral Daily    Or     levETIRAcetam  500 mg Per NG tube Daily     LORazepam  0.5 mg Intravenous Once     - MEDICATION INSTRUCTIONS -   Does not apply Once     polyethylene glycol  17 g Oral Daily     senna-docusate  1 tablet Oral BID     sodium chloride (PF)  3 mL Intracatheter Q8H        Data   Recent Labs   Lab 05/14/22  0528 05/13/22  1401 05/13/22  0835 05/13/22  0520 05/12/22  1158 05/12/22  0913 05/12/22  0633 05/11/22  0845 05/10/22  1449 05/10/22  0729 05/08/22  1752 05/08/22  1752   WBC 12.1*  --   --   --   --  9.3  --  8.4  --   --   --  6.3   HGB 11.5*  --   --   --   --  11.4*  --  11.2*  --  10.8*  --  10.5*   *  --   --   --   --  104*  --  109*  --   --   --  108*   *  --   --   --   --  146*  --  151  --   --   --  129*   INR  --   --   --   --   --   --   --   --   --  1.42*  --  1.62*   *  --   --  128*  --  127*  --  123*   < > 124*  --  120*   POTASSIUM 5.5*  --   --  4.3  --  5.1  --  5.1   < > 4.3  --  4.5   CHLORIDE 93*  --   --  95  --  94  --  89*  --  88*  --  80*   CO2 13*  --   --  22  --  22  --  20  --  22  --  24   BUN 62*  --   --  43*  --  41*  --  48*  --  48*  --  52*   CR 4.99*  --   --  3.83*  --  3.03*  --  3.82*  --  4.45*  --  5.21*   ANIONGAP 18*  --    --  11  --  11  --  14  --  14  --  16*   SILVIA 8.9  --   --  8.7  --  7.6*  --  9.8  --  10.2*  --  10.5*   GLC 71 106* 108* 104*   < > 95   < > 160*   < > 86  --  129*   ALBUMIN 2.8*  --   --  2.7*  --  3.4  --  3.2*  --  3.3*   < >  --     < > = values in this interval not displayed.       Recent Results (from the past 24 hour(s))   MRA Neck (Carotids) wo Contrast    Narrative    EXAM: MR BRAIN W/O CONTRAST, MRA NECK (CAROTIDS) W/O CONTRAST, MRA BRAIN ([place]) W/O CONTRAST  LOCATION: [facility]  DATE/TIME: 5/13/2022 4:48 PM    INDICATION: Neuro deficit. Stroke follow-up. Subarachnoid hemorrhage.  COMPARISON: 05/12/2022  TECHNIQUE:   1) Routine multiplanar multisequence head MRI without intravenous contrast.  2) 3D time-of-flight head MRA without intravenous contrast.  3) Neck MRA without IV contrast. Stenosis measurements made according to NASCET criteria unless otherwise specified.      FINDINGS:  HEAD MRI:  INTRACRANIAL CONTENTS: Stable subarachnoid hemorrhage along the right sylvian fissure. Adjacent diffusion restriction is favored to be due to susceptibility artifact from the hemorrhage. No definite acute or subacute infarct. No adverse intracranial mass   effect. Scattered nonspecific T2/FLAIR hyperintensities within the cerebral white matter most consistent with mild chronic microvascular ischemic change. Mild generalized cerebral atrophy. No hydrocephalus. Normal position of the cerebellar tonsils.     SELLA: No abnormality accounting for technique.    OSSEOUS STRUCTURES/SOFT TISSUES: Normal marrow signal. The major intracranial vascular flow voids are maintained.     ORBITS: Prior bilateral cataract surgery. Visualized portions of the orbits are otherwise unremarkable.     SINUSES/MASTOIDS: Moderate mucosal thickening in the left maxillary sinus. No middle ear or mastoid effusion.     HEAD MRA:   ANTERIOR CIRCULATION: There is a band of artifact that obscures the  proximal internal carotid arteries at the petrous cavernous junction. No proximal occlusion. Mild narrowing of the supraclinoid segments of the internal carotid arteries likely reflects   atherosclerotic disease. No saccular aneurysm. Standard [place] anatomy.    POSTERIOR CIRCULATION: No stenosis/occlusion, aneurysm, or high flow vascular malformation. Dominant right and smaller left vertebral artery contribute to a normal basilar artery.     NECK MRA: Patient aborted the exam prior to completion. Axial 2-D time-of-flight images were the only sequence obtained. Significant motion artifact limits evaluation.    RIGHT CAROTID: At least mild to moderate narrowing of the proximal internal carotid artery at the bifurcation.    LEFT CAROTID: At least mild narrowing of the proximal internal carotid artery at the carotid bifurcation.    VERTEBRAL ARTERIES: The proximal vertebral arteries are obscured by motion artifact. The remainder of the vertebral arteries appear grossly patent. Dominant right and smaller left vertebral arteries.    AORTIC ARCH: Suboptimally visualized on this exam.      Impression    IMPRESSION:  HEAD MRI:   1.  Stable subarachnoid hemorrhage along the right sylvian fissure.  2.  No definite acute infarct.  3.  Mild generalized parenchymal volume loss and sequelae of chronic microvascular ischemic disease.    HEAD MRA:   1.  Mild tapering of the internal carotid arteries, suggestive of atherosclerosis.  2.  No proximal occlusion, saccular aneurysm, or high flow vascular malformation.    NECK MRA:  1.  Incomplete and motion degraded exam. Narrowing of the bilateral carotid bifurcations with at least mild stenosis on the left and mild to moderate stenosis on the right. Consider ultrasound for better evaluation.   MRA Brain ([place]) wo Contrast    Narrative    EXAM: MR BRAIN W/O CONTRAST, MRA NECK (CAROTIDS) W/O CONTRAST, MRA BRAIN ([place]) W/O CONTRAST  LOCATION: [facility]  DATE/TIME: 5/13/2022 4:48 PM    INDICATION: Neuro deficit. Stroke follow-up. Subarachnoid hemorrhage.  COMPARISON: 05/12/2022  TECHNIQUE:   1) Routine multiplanar multisequence head MRI without intravenous contrast.  2) 3D time-of-flight head MRA without intravenous contrast.  3) Neck MRA without IV contrast. Stenosis measurements made according to NASCET criteria unless otherwise specified.      FINDINGS:  HEAD MRI:  INTRACRANIAL CONTENTS: Stable subarachnoid hemorrhage along the right sylvian fissure. Adjacent diffusion restriction is favored to be due to susceptibility artifact from the hemorrhage. No definite acute or subacute infarct. No adverse intracranial mass   effect. Scattered nonspecific T2/FLAIR hyperintensities within the cerebral white matter most consistent with mild chronic microvascular ischemic change. Mild generalized cerebral atrophy. No hydrocephalus. Normal position of the cerebellar tonsils.     SELLA: No abnormality accounting for technique.    OSSEOUS STRUCTURES/SOFT TISSUES: Normal marrow signal. The major intracranial vascular flow voids are maintained.     ORBITS: Prior bilateral cataract surgery. Visualized portions of the orbits are otherwise unremarkable.     SINUSES/MASTOIDS: Moderate mucosal thickening in the left maxillary sinus. No middle ear or mastoid effusion.     HEAD MRA:   ANTERIOR CIRCULATION: There is a band of artifact that obscures the proximal internal carotid arteries at the petrous cavernous junction. No proximal occlusion. Mild narrowing of the supraclinoid segments of the internal carotid arteries likely reflects   atherosclerotic disease. No saccular aneurysm. Standard [place] anatomy.    POSTERIOR CIRCULATION: No stenosis/occlusion, aneurysm, or high flow vascular malformation. Dominant right and smaller left vertebral artery contribute to a normal basilar artery.     NECK MRA: Patient aborted the exam prior to completion.  Axial 2-D time-of-flight images were the only sequence obtained. Significant motion artifact limits evaluation.    RIGHT CAROTID: At least mild to moderate narrowing of the proximal internal carotid artery at the bifurcation.    LEFT CAROTID: At least mild narrowing of the proximal internal carotid artery at the carotid bifurcation.    VERTEBRAL ARTERIES: The proximal vertebral arteries are obscured by motion artifact. The remainder of the vertebral arteries appear grossly patent. Dominant right and smaller left vertebral arteries.    AORTIC ARCH: Suboptimally visualized on this exam.      Impression    IMPRESSION:  HEAD MRI:   1.  Stable subarachnoid hemorrhage along the right sylvian fissure.  2.  No definite acute infarct.  3.  Mild generalized parenchymal volume loss and sequelae of chronic microvascular ischemic disease.    HEAD MRA:   1.  Mild tapering of the internal carotid arteries, suggestive of atherosclerosis.  2.  No proximal occlusion, saccular aneurysm, or high flow vascular malformation.    NECK MRA:  1.  Incomplete and motion degraded exam. Narrowing of the bilateral carotid bifurcations with at least mild stenosis on the left and mild to moderate stenosis on the right. Consider ultrasound for better evaluation.   MR Brain w/o Contrast    Narrative    EXAM: MR BRAIN W/O CONTRAST, MRA NECK (CAROTIDS) W/O CONTRAST, MRA BRAIN ([place]) W/O CONTRAST  LOCATION: [facility]  DATE/TIME: 5/13/2022 4:48 PM    INDICATION: Neuro deficit. Stroke follow-up. Subarachnoid hemorrhage.  COMPARISON: 05/12/2022  TECHNIQUE:   1) Routine multiplanar multisequence head MRI without intravenous contrast.  2) 3D time-of-flight head MRA without intravenous contrast.  3) Neck MRA without IV contrast. Stenosis measurements made according to NASCET criteria unless otherwise specified.      FINDINGS:  HEAD MRI:  INTRACRANIAL CONTENTS: Stable subarachnoid hemorrhage along the right sylvian  fissure. Adjacent diffusion restriction is favored to be due to susceptibility artifact from the hemorrhage. No definite acute or subacute infarct. No adverse intracranial mass   effect. Scattered nonspecific T2/FLAIR hyperintensities within the cerebral white matter most consistent with mild chronic microvascular ischemic change. Mild generalized cerebral atrophy. No hydrocephalus. Normal position of the cerebellar tonsils.     SELLA: No abnormality accounting for technique.    OSSEOUS STRUCTURES/SOFT TISSUES: Normal marrow signal. The major intracranial vascular flow voids are maintained.     ORBITS: Prior bilateral cataract surgery. Visualized portions of the orbits are otherwise unremarkable.     SINUSES/MASTOIDS: Moderate mucosal thickening in the left maxillary sinus. No middle ear or mastoid effusion.     HEAD MRA:   ANTERIOR CIRCULATION: There is a band of artifact that obscures the proximal internal carotid arteries at the petrous cavernous junction. No proximal occlusion. Mild narrowing of the supraclinoid segments of the internal carotid arteries likely reflects   atherosclerotic disease. No saccular aneurysm. Standard [place] anatomy.    POSTERIOR CIRCULATION: No stenosis/occlusion, aneurysm, or high flow vascular malformation. Dominant right and smaller left vertebral artery contribute to a normal basilar artery.     NECK MRA: Patient aborted the exam prior to completion. Axial 2-D time-of-flight images were the only sequence obtained. Significant motion artifact limits evaluation.    RIGHT CAROTID: At least mild to moderate narrowing of the proximal internal carotid artery at the bifurcation.    LEFT CAROTID: At least mild narrowing of the proximal internal carotid artery at the carotid bifurcation.    VERTEBRAL ARTERIES: The proximal vertebral arteries are obscured by motion artifact. The remainder of the vertebral arteries appear grossly patent. Dominant right and smaller left vertebral  arteries.    AORTIC ARCH: Suboptimally visualized on this exam.      Impression    IMPRESSION:  HEAD MRI:   1.  Stable subarachnoid hemorrhage along the right sylvian fissure.  2.  No definite acute infarct.  3.  Mild generalized parenchymal volume loss and sequelae of chronic microvascular ischemic disease.    HEAD MRA:   1.  Mild tapering of the internal carotid arteries, suggestive of atherosclerosis.  2.  No proximal occlusion, saccular aneurysm, or high flow vascular malformation.    NECK MRA:  1.  Incomplete and motion degraded exam. Narrowing of the bilateral carotid bifurcations with at least mild stenosis on the left and mild to moderate stenosis on the right. Consider ultrasound for better evaluation.       Jonathan Dey MD  Text Page  (7am to 6pm)

## 2022-05-14 NOTE — PROGRESS NOTES
Orthopedic Surgery  Francine Chris  2022  Admit Date:  2022  POD # 4  S/P left bipolar hip hemiarthroplasty    Alert and oriented.  Patient resting comfortably in bed.    Pain controlled.  Tolerating oral intake.    Denies nausea or vomiting.  Denies chest pain or shortness of breath.    Vital Sign Ranges  Temperature No data recorded   Blood pressure Systolic (24hrs), Av , Min:111 , Max:140        Diastolic (24hrs), Av, Min:59, Max:81      Pulse Pulse  Av.3  Min: 70  Max: 98   Respirations Resp  Av.4  Min: 9  Max: 26   Pulse oximetry SpO2  Av.9 %  Min: 93 %  Max: 99 %       Dressing with moderate about of drainage.  Minimal erythema of the surrounding skin.   Bilateral calves are soft, non-tender.  Left lower extremity is NVI.  Sensation intact bilateral lower extremities  Patient able to resist dorsi and plantar flexion bilaterally  +Dp pulse    Labs:  Recent Labs   Lab Test 22  0520 22  0913 22  0845   POTASSIUM 4.3 5.1 5.1     Recent Labs   Lab Test 22  0528 22  0913 22  0845   HGB 11.5* 11.4* 11.2*     Recent Labs   Lab Test 05/10/22  0729 22  1752 22  0737   INR 1.42* 1.62* 1.15     Recent Labs   Lab Test 22  0528 22  0913 22  0845   * 146* 151       A/P  1. PLAN   Continue Eliquis for DVT prophylaxis.     Mobilize with PT/OT    WBAT left LE with walker.     Continue current pain regiment.   Dressing: leave intact. Change if >60% saturated.    2. Disposition   Anticipate d/c to TCU when medically cleared and progressing in PT.    Shannan Reese PA-C

## 2022-05-14 NOTE — PROGRESS NOTES
Neuro: Oriented to self only, agitated at times  CV: Afib, BP stable  Resp: RA, lungs dim  GI/: Smear of BM  Skin: Multiple skin issues, please see flowsheets  Pain/Gtts: Given Oxycodone 2.5 twice for pain. Also given hydroxyzine for pain/agitation once.  Family: No contact with family this shift.

## 2022-05-14 NOTE — PROGRESS NOTES
Called CT to clarify order for follow up CT without contrast was discontinued. It has been cancelled.

## 2022-05-15 NOTE — PROGRESS NOTES
Chart reviewed.  Dialyzed yesterday  3 L ultrafiltration.  Sodium remains low at 125.    Plan-  Next dialysis on Tuesday  P.o. fluid restriction of 1500 cc/day    Kina Chen MD  [facility] - Nephrology   [phone]

## 2022-05-15 NOTE — PROGRESS NOTES
[facility]    Stroke Progress Note    Interval EventsCarotid US shows nearly occlusive DVT of the internal jugular vein. It is unclear if this is new or chronic as no prior imaging is available for comparison; Francine is not aware of this being present previously. Fortunately, this appears to be asymptomatic with no associate stroke on prior MRI or change in clinical symptoms. Anticoagulation continues to be held due to acute intracranial bleed which has been stable on serial CT.     HPI Summary  Francine is a 76 year old female with prior history of atrial fibrillation ofn Coumadin and ESRD on dialysis. She presented to the [facility] ED 5/8/2022 for evaluation after a fall.  She initially fell on 5/2/2022, received a CT head back then which was unremarkable, she fell again on 5/8/2022 that resulted in left femoral fracture.  The second time she fell there was no CT scan that was performed.  She subsequently underwent left hip arthroplasty, postop she was recovering but had a lot of confusion altered mental status, she was eventually started on apixaban for anticoagulation. Due to altered mental status CT head was obtained 5/12 that showed a new subarachnoid hemorrhage in the posterior aspect of the right sylvian fissure. Apixaban was reversed with Kcentra and she was transferred to the ICU.     Stroke Evaluation Summarized     MRI/Head CT CT head 5/13 @ 0805: stable hemorrhage  CT head 5/12 @ 2117: stable hemorrhage  CT head 5/12 @ 1539: new small subarachnoid hemorrhage in posterior aspect of right Sylvian fissure    Intracranial Vasculature MRA:  1.  Mild tapering of the internal carotid arteries, suggestive of atherosclerosis.  2.  No proximal occlusion, saccular aneurysm, or high flow vascular malformation.   Cervical Vasculature Carotid US: nearly occlusive DVT of internal jugular vein; 50-60% stenosis of right ICA, <50% stenosis of left ICA     MRA:  1.  Incomplete and motion  "degraded exam. Narrowing of the bilateral carotid bifurcations with at least mild stenosis on the left and mild to moderate stenosis on the right. Consider ultrasound for better evaluation.            EKG/Telemetry Ventricular-paced rhythm    Other Testing Not Applicable      LDL  No lab value available in past 30 days   A1C  No lab value available in past 30 days   Troponin No lab value available in past 48 hrs         Impression & recommendations:  1.Acute hemorrhage in right Sylvian fissure secondary to trauma, s/p Kcentra for anticoagulation reversal   -neuro checks q4 hours  -continue to hold anticoagulation; no strong indication to start for asymptomatic jugular DVT (more below)  *for now, plan to repeat CT in 2 weeks (ordered) and consider restarting Eliquis if hemorrhage is stable  -Goal BP is <140/90 with tighter control associated with improved vascular outcomes  -if no concern of altered mental status/seizure, discontinue Keppra 5/17/2022    2. DVT of internal jugular vein  -clinically and radiographically asymptomatic  -MRV to confirm adequate venous drainage   -repeat MRI to confirm no interval development if related infarct  -discussed with interventional neuroradiology     3. 50-60% stenosis of right ICA, asymptomatic  -outpatient follow up with neurosurgery (order placed)     We will continue to follow.     Angela KEMP, CNP  Vascular Neurology  To page me or covering stroke neurology team member, click here: [facility]   Choose \"On Call\" tab at top, then search dropdown box for \"Neurology Adult\", select location, press Enter, then look for stroke/neuro ICU/telestroke.    ______________________________________________________    Clinically Significant Risk Factors Present on Admission                  Medications   Scheduled Meds    - MEDICATION INSTRUCTIONS for Dialysis Patients -   Does not apply See Admin Instructions     atorvastatin  40 mg Oral Daily     famotidine  10 mg Intravenous QPM    Or "     famotidine  10 mg Oral QPM     levETIRAcetam  500 mg Intravenous Daily    Or     levETIRAcetam  500 mg Oral Daily    Or     levETIRAcetam  500 mg Per NG tube Daily     LORazepam  0.5 mg Intravenous Once     polyethylene glycol  17 g Oral Daily     senna-docusate  1 tablet Oral BID     sodium chloride (PF)  3 mL Intracatheter Q8H       Infusion Meds    niCARdipine         PRN Meds  acetaminophen, sore throat lozenge, bisacodyl, hydrALAZINE, HYDROmorphone, hydrOXYzine, labetalol, lidocaine 4%, lidocaine (buffered or not buffered), magnesium hydroxide, naloxone **OR** naloxone **OR** naloxone **OR** naloxone, oxyCODONE **OR** [DISCONTINUED] oxyCODONE, prochlorperazine **OR** prochlorperazine, sodium chloride, sodium chloride (PF)       PHYSICAL EXAMINATION  Temp:  [97.4  F (36.3  C)-97.8  F (36.6  C)] 97.4  F (36.3  C)  Pulse:  [72-86] 84  Resp:  [11-26] 16  BP: ()/(60-86) 167/86  SpO2:  [95 %-100 %] 95 %      General Exam  General:  patient lying in bed without any acute distress    HEENT:  normocephalic/atraumatic  Pulmonary:  no respiratory distress    Neuro Exam  Mental Status: somnolent but arouses easily to vocal stimulation; oriented to person, place and situation; naming and repetition intact, no dysarthria   Cranial Nerves:  visual fields intact, PERRL, EOMI with normal smooth pursuit, facial sensation intact and symmetric, facial movements symmetric, hearing not formally tested but intact to conversation, no dysarthria  Motor:  normal muscle tone and bulk, no abnormal movements, able to move all limbs spontaneously, no pronator drift, no RLE drift (LLE not tested d/to fracture)  Sensory:  light touch sensation intact and symmetric throughout upper and lower extremities, no extinction on double simultaneous stimulation   Coordination:  FNF normal bilaterally  Station/Gait:  deferred    Imaging  I personally reviewed all imaging; relevant findings per HPI.     Lab Results Data   CBC  Recent Labs   Lab  05/14/22  0528 05/12/22  0913 05/11/22  0845   WBC 12.1* 9.3 8.4   RBC 3.24* 3.16* 3.17*   HGB 11.5* 11.4* 11.2*   HCT 34.2* 32.7* 34.5*   * 146* 151     Basic Metabolic Panel    Recent Labs   Lab 05/15/22  0723 05/14/22  1533 05/14/22  0528 05/13/22  0835 05/13/22  0520   *  --  124*  --  128*   POTASSIUM 4.5  --  5.5*  --  4.3   CHLORIDE 93*  --  93*  --  95   CO2 20  --  13*  --  22   BUN 38*  --  62*  --  43*   CR 3.56*  --  4.99*  --  3.83*   * 199* 71   < > 104*   SILVIA 9.0  --  8.9  --  8.7    < > = values in this interval not displayed.     Liver Panel  Recent Labs   Lab 05/15/22  0723 05/14/22  0528 05/13/22  0520   ALBUMIN 2.8* 2.8* 2.7*     INR    Recent Labs   Lab Test 05/10/22  0729 05/08/22  1752 04/17/22  0737   INR 1.42* 1.62* 1.15      Lipid Profile  No lab results found.  A1C  No lab results found.  Troponin I  No results for input(s): TROPONINIS, TROPONINI, GHTROP in the last 168 hours.

## 2022-05-15 NOTE — PROVIDER NOTIFICATION
Dr. Dey notified that patient's mentation waxes and wanes. Patient was awake and alert at the beginning of the shift but has had many periods of lethargy throughout the shift. No narcotics have been given this shift. MD discontinued all narcotics. Will continue to monitor.

## 2022-05-15 NOTE — PROVIDER NOTIFICATION
Notified Angela Lorenzo NP that MRI called and said that they cannot do scans on patient's with pacemakers on the weekends. The earliest it can be done is tomorrow. PA stated that it is fine if scan is done tomorrow.

## 2022-05-15 NOTE — PLAN OF CARE
Transfer from ICU. Very lethargic upon arrival to the unit and throughout shift. Vss on ra. Became more awake towards morning. A&O x 3, up with lift, neuros and cms intact. On 1500ml fluid restriction. Had hemodialysis yesterday. Incontinent of bowel, 1BM overnight. Wound on RUE and L Hip, dressing moderate drainage. Will continue to monitor

## 2022-05-15 NOTE — PROGRESS NOTES
Hospitalist Progress Note  Jonathan Dey MD  05/15/2022    Assessment & Plan      Francine Chris is a 76 year old female with history of atrial fibrillation on anticoagulation, severe tricuspid regurgitation, pulmonary hypertension, chronic right-sided heart failure, COPD, SKY, anxiety, depression, gout, peptic ulcer disease, peripheral arterial disease admitted on 5/8/2022 after a mechanical fall, found to have left femoral neck fracture.     Left femoral neck fracture s/p bipolar hemiarthroplasty POD # 5  Mechanical fall  *Presents with fall slipping on wet floor  *XR with above fracture  - Orthopedic surgery following, routine postoperative cares per orthopedic surgery  - Holding apixaban for 2 weeks due to SAH     Subarachnoid hemorrhage, posterior right Sylvian fissure  Encephalopathy, multifactorial from SAH, opioids, acute illness  *Noted to be more confused 5/12, sister reports is not at baseline  *Head CT negative with initial fall 5/2, not checked this admission  *Apixaban resumed 5/11/22   - Head CT STAT ordered, demonstrated hemorrhage as above  - Discussed with neuro critical care and consulted  - Levetiracetam ordered post HD x 7 days post HD for seizure prophylaxis  - Received k centra  - Hold apixaban for 2 weeks total before resumption  - Q4H neuro checks with stable head CT  - transfer to stroke floor  - MRI/MRA shows stable small sylvian fissure SAH, no stroke, no significant stenosis although US would better characterize due to motion artifact.  - discontinue all narcotics and anticholinergics, tylenol for pain     ESRD on HD MWF  Hyponatremia  On HD since 2021. Dialyzes MWF under care of Dr. Ro. Na 120 in ED (recent baseline runs low). Per Dr. Palmer, hyponatremia is due to large intradialytic weight gains.  5 kg is a typical goal in HD for fluid removal.  - Nephrology following  - fluid restriction  - HD 5/12, 5/14, will resume usual MWF HD,next  "HD on 5/16     Permanent atrial fibrillation  S/p AVN ablation with ppm 6/2021  - apixaban on hold for 14 days due to SAH     Severe tricuspid regurgitation   Pulmonary hypertension  Chronic R heart failure, compensated  HLD  [needs rec- atorvastatin 40 mg daily, metoprolol 12.5 mg BID, torsemide 40 mg daily]  Echo with EF 50-55%, nl LV fn, severe TR, pulmonary pressures 60-65 mmHg. Seen by CV surgery, deemed to be too high of a surgical risk for valve replacement.   - continue lipitor, metoprolol      Anemia  Hgb at 10.5 on presentation, stable from recent  - Hgb stable 5/12     COPD  [needs rec- prn albuterol, tiotropium-olodaterol 2 puffs daily]  - Continue prior to admission inhaler regimen      SKY  No longer wears CPAP     Anxiety  Depression   Doesn't appear to currently be on meds for this     Gout  - Continue allopurinol 100 mg MWF after dialysis      PUD  - Continue pantoprazole 40 mg daily      PAD  noted    Clinically Significant Risk Factors Present on Admission     Diet: Advance Diet as Tolerated: Regular Diet Adult  Diet    DVT Prophylaxis: Pneumatic Compression Devices  Schuler Catheter: Not present  Code Status: Full Code          Disposition Plan  Expected Discharge unclear, back to LTC vs TCU on 5/17?      Interval History   -- Na is low  -- mentation waxes and wanes at times  -- discussed with RN    -Data reviewed today: I reviewed all new labs and imaging over the last 24 hours. I personally reviewed no images or EKG's today.    Physical Exam    , Blood pressure (!) 142/74, pulse 79, temperature 97.4  F (36.3  C), temperature source Oral, resp. rate 16, height 1.651 m (5' 5\"), weight 60 kg (132 lb 4.4 oz), SpO2 93 %.  Vitals:    05/09/22 1330 05/10/22 0500 05/14/22 0000   Weight: 64 kg (141 lb 1.5 oz) 66.1 kg (145 lb 11.6 oz) 60 kg (132 lb 4.4 oz)     Vital Signs with Ranges  Temp:  [97.4  F (36.3  C)-97.8  F (36.6  C)] 97.4  F (36.3  C)  Pulse:  [72-85] 79  Resp:  [14-26] 16  BP: ()/(60-86) " 142/74  SpO2:  [93 %-100 %] 93 %  I/O's Last 24 hours  I/O last 3 completed shifts:  In: 1600 [P.O.:1600]  Out: 3000 [Other:3000]    Constitutional: Left periorbital ecchymosis,    Respiratory: Clear to auscultation bilaterally, no crackles or wheezing  Cardiovascular: Regular rate and rhythm, normal S1 and S2, intermittently paced  GI: Normal bowel sounds, soft, non-distended, non-tender  Skin/Integumen: No rashes, no cyanosis, no edema  Other:      Medications   All medications were reviewed.    niCARdipine         - MEDICATION INSTRUCTIONS for Dialysis Patients -   Does not apply See Admin Instructions     atorvastatin  40 mg Oral Daily     famotidine  10 mg Intravenous QPM    Or     famotidine  10 mg Oral QPM     levETIRAcetam  500 mg Intravenous Daily    Or     levETIRAcetam  500 mg Oral Daily    Or     levETIRAcetam  500 mg Per NG tube Daily     LORazepam  0.5 mg Intravenous Once     polyethylene glycol  17 g Oral Daily     senna-docusate  1 tablet Oral BID     sodium chloride (PF)  3 mL Intracatheter Q8H        Data   Recent Labs   Lab 05/15/22  0723 05/14/22  1533 05/14/22  0528 05/13/22  0835 05/13/22  0520 05/12/22  1158 05/12/22  0913 05/12/22  0633 05/11/22  0845 05/10/22  1449 05/10/22  0729 05/08/22  1752 05/08/22  1752   WBC  --   --  12.1*  --   --   --  9.3  --  8.4  --   --   --  6.3   HGB  --   --  11.5*  --   --   --  11.4*  --  11.2*  --  10.8*  --  10.5*   MCV  --   --  106*  --   --   --  104*  --  109*  --   --   --  108*   PLT  --   --  115*  --   --   --  146*  --  151  --   --   --  129*   INR  --   --   --   --   --   --   --   --   --   --  1.42*  --  1.62*   *  --  124*  --  128*  --  127*  --  123*   < > 124*  --  120*   POTASSIUM 4.5  --  5.5*  --  4.3  --  5.1  --  5.1   < > 4.3  --  4.5   CHLORIDE 93*  --  93*  --  95  --  94  --  89*  --  88*  --  80*   CO2 20  --  13*  --  22  --  22  --  20  --  22  --  24   BUN 38*  --  62*  --  43*  --  41*  --  48*  --  48*  --  52*   CR  3.56*  --  4.99*  --  3.83*  --  3.03*  --  3.82*  --  4.45*  --  5.21*   ANIONGAP 12  --  18*  --  11  --  11  --  14  --  14  --  16*   SILVIA 9.0  --  8.9  --  8.7  --  7.6*  --  9.8  --  10.2*  --  10.5*   * 199* 71   < > 104*   < > 95   < > 160*   < > 86  --  129*   ALBUMIN 2.8*  --  2.8*  --  2.7*  --  3.4  --  3.2*  --  3.3*   < >  --     < > = values in this interval not displayed.       Recent Results (from the past 24 hour(s))   US Carotid Bilateral    Addendum: 5/14/2022    Findings were communicated to Nurse Shanti Alcantara RN 17:42 hours on 5/14/2022      Narrative    EXAM: US CAROTID BILATERAL  LOCATION: [facility]  DATE/TIME: 5/14/2022 4:16 PM    INDICATION: History of CVA.    COMPARISON: None.    TECHNIQUE: Duplex exam performed utilizing 2D gray-scale imaging, Doppler interrogation with color-flow and spectral waveform analysis. The percent diameter stenosis is determined using NASCET criteria and Society of Radiologists in Ultrasound Consensus   Criteria.    FINDINGS:  RIGHT: Severe plaque at the bifurcation. The peak systolic velocity in the ICA is greater than 230 cm/sec, consistent with greater than 70% stenosis. Normal velocities in the ECA. Antegrade flow within the vertebral artery.     LEFT: Mild plaque at the bifurcation. The peak systolic velocity in the ICA is less than 125 cm/sec, consistent with less than 50% stenosis. Normal velocities in the ECA. Antegrade flow within the vertebral artery.    VELOCITY CHART:  Prox CCA   Right: 57/6 cm/s   Left: 78/20 cm/s  Mid/Dist CCA   Right: 42/8 cm/s   Left: 88/19 cm/s  Prox ICA   Right: 210/30 cm/s   Left: 82/20 cm/s  Mid ICA   Right: 201/35 cm/s   Left: 100/24 cm/s  Dist ICA   Right: 78/27 cm/s   Left: 75/24 cm/s  ECA   Right: 176/19 cm/s   Left: 60/0 cm/s  Vertebral   Right: 133/30 cm/s   Left: 82/17 cm/s  ICA/CCA Ratio   Right: 5.0   Left: 1.1      Impression    IMPRESSION:  1.  Moderate plaque formation,  velocities consistent with 50-69% stenosis in the right internal carotid artery.  2.  Mild plaque formation, velocities consistent with less than 50% stenosis in the left internal carotid artery.  3.  Flow within the vertebral arteries is antegrade.  4.  Nearly occlusive deep venous thrombus of the internal jugular vein.         Jonathan Dey MD  Text Page  (7am to 6pm)

## 2022-05-16 NOTE — PROGRESS NOTES
[facility]    Stroke Progress Note    Interval EventsToday, Francine denies any significant changes. She would like to go home. She recalls she has a broken leg, but not that she has a brain bleed. Anticoagulation continues to be held. MRI/MRV are being completed to confirm the right jugular DVT is both radiographically and clinically asymptomatic.     HPI Summary  Francine is a 76 year old female with prior history of atrial fibrillation ofn Coumadin and ESRD on dialysis. She presented to the [facility] ED 5/8/2022 for evaluation after a fall.  She initially fell on 5/2/2022, received a CT head back then which was unremarkable, she fell again on 5/8/2022 that resulted in left femoral fracture.  The second time she fell there was no CT scan that was performed.  She subsequently underwent left hip arthroplasty, postop she was recovering but had a lot of confusion altered mental status, she was eventually started on apixaban for anticoagulation. Due to altered mental status CT head was obtained 5/12 that showed a new subarachnoid hemorrhage in the posterior aspect of the right sylvian fissure. Apixaban was reversed with Kcentra. Carotid US revealed right jugular DVT, felt to be asymptomatic with no urgent treatment recommended.       Stroke Evaluation Summarized     MRI/Head CT MRI: small stable right sylvian fissure hemorrhage; no acute infarct    CT head 5/13 @ 0805: stable hemorrhage  CT head 5/12 @ 2117: stable hemorrhage  CT head 5/12 @ 1539: new small subarachnoid hemorrhage in posterior aspect of right Sylvian fissure    Intracranial Vasculature MRV: motion degraded exam with no definite abnormal findings    MRA:  1.  Mild tapering of the internal carotid arteries, suggestive of atherosclerosis.  2.  No proximal occlusion, saccular aneurysm, or high flow vascular malformation.   Cervical Vasculature Carotid US: nearly occlusive DVT of internal jugular vein; 50-60% stenosis of right  "ICA, <50% stenosis of left ICA     MRA:  1.  Incomplete and motion degraded exam. Narrowing of the bilateral carotid bifurcations with at least mild stenosis on the left and mild to moderate stenosis on the right. Consider ultrasound for better evaluation.            EKG/Telemetry Ventricular-paced rhythm    Other Testing Not Applicable      LDL  No lab value available in past 30 days   A1C  No lab value available in past 30 days   Troponin No lab value available in past 48 hrs     Impression & recommendations:  1.Acute hemorrhage in right Sylvian fissure secondary to trauma, s/p Kcentra for anticoagulation reversal   -neuro checks q4 hours  -continue to hold anticoagulation; no strong indication to start for asymptomatic jugular DVT (more below)  *for now, plan to repeat CT in 2 weeks (ordered) and consider restarting Eliquis if hemorrhage is stable  -Goal BP is <140/90 with tighter control associated with improved vascular outcomes  -if no concern of altered mental status/seizure, discontinue Keppra 5/17/2022     2. DVT of internal jugular vein  -clinically and radiographically asymptomatic  -MRV to confirm adequate venous drainage   -repeat MRI to confirm no interval development if related infarct  -discussed with interventional neuroradiology      3. 50-60% stenosis of right ICA, asymptomatic  -outpatient follow up with neurosurgery (order placed)     Patient Follow-up    - final recommendation pending work-up    We will continue to follow.     Angela KEMP CNP  Vascular Neurology  To page me or covering stroke neurology team member, click here: [facility]COM   Choose \"On Call\" tab at top, then search dropdown box for \"Neurology Adult\", select location, press Enter, then look for stroke/neuro ICU/telestroke.    ______________________________________________________    Clinically Significant Risk Factors Present on Admission                  Medications   Scheduled Meds    - MEDICATION INSTRUCTIONS for Dialysis " Patients -   Does not apply See Admin Instructions     atorvastatin  40 mg Oral Daily     famotidine  10 mg Intravenous QPM    Or     famotidine  10 mg Oral QPM     levETIRAcetam  500 mg Intravenous Daily    Or     levETIRAcetam  500 mg Oral Daily    Or     levETIRAcetam  500 mg Per NG tube Daily     polyethylene glycol  17 g Oral Daily     senna-docusate  1 tablet Oral BID     sodium chloride (PF)  3 mL Intracatheter Q8H       Infusion Meds      PRN Meds  acetaminophen, sore throat lozenge, bisacodyl, hydrALAZINE, labetalol, lidocaine 4%, lidocaine (buffered or not buffered), naloxone **OR** naloxone **OR** naloxone **OR** naloxone, prochlorperazine **OR** prochlorperazine, sodium chloride, sodium chloride (PF)       PHYSICAL EXAMINATION  Temp:  [97.4  F (36.3  C)-98  F (36.7  C)] 97.5  F (36.4  C)  Pulse:  [72-82] 72  Resp:  [16] 16  BP: (111-162)/(78-89) 158/82  SpO2:  [92 %-100 %] 100 %      General Exam  General:  patient lying in bed without any acute distress    HEENT:  normocephalic/atraumatic  Pulmonary:  no respiratory distress    Neuro Exam  Mental Status: oriented to person, place and partially to situation; somnolent but arouses easily to voice; follows one step commands, speech clear and fluent   Cranial Nerves:  visual fields intact, EOMI with normal smooth pursuit, facial sensation intact and symmetric, facial movements symmetric, hearing not formally tested but intact to conversation, no dysarthria, tongue protrusion midline  Motor:  normal muscle tone and bulk, no abnormal movements, able to move all limbs spontaneously, no pronator drift  Sensory:  light touch sensation intact and symmetric throughout upper and lower extremities, no extinction on double simultaneous stimulation   Coordination: deferred  Station/Gait:  deferred    Imaging  I personally reviewed all imaging; relevant findings per HPI.     Lab Results Data   CBC  Recent Labs   Lab 05/14/22  0528 05/12/22  0913 05/11/22  0845   WBC  12.1* 9.3 8.4   RBC 3.24* 3.16* 3.17*   HGB 11.5* 11.4* 11.2*   HCT 34.2* 32.7* 34.5*   * 146* 151     Basic Metabolic Panel    Recent Labs   Lab 05/16/22  0743 05/15/22  0723 05/14/22  1533 05/14/22  0528   * 125*  --  124*   POTASSIUM 5.3 4.5  --  5.5*   CHLORIDE 89* 93*  --  93*   CO2 17* 20  --  13*   BUN 54* 38*  --  62*   CR 4.86* 3.56*  --  4.99*   GLC 97 102* 199* 71   SILVIA 8.7 9.0  --  8.9     Liver Panel  Recent Labs   Lab 05/16/22  0743 05/15/22  0723 05/14/22  0528   ALBUMIN 2.7* 2.8* 2.8*     INR    Recent Labs   Lab Test 05/10/22  0729 05/08/22  1752 04/17/22  0737   INR 1.42* 1.62* 1.15      Lipid Profile  No lab results found.  A1C  No lab results found.  Troponin I  No results for input(s): TROPONINIS, TROPONINI, GHTROP in the last 168 hours.

## 2022-05-16 NOTE — PROGRESS NOTES
"[facility]  Hospitalist Progress Note  Emily Castle MD  05/16/2022    Assessment & Plan      Francine Chris is a 76 year old female with history of atrial fibrillation on anticoagulation, severe tricuspid regurgitation, pulmonary hypertension, chronic right-sided heart failure, COPD, SKY, anxiety, depression, gout, peptic ulcer disease, peripheral arterial disease admitted on 5/8/2022 after a mechanical fall, found to have left femoral neck fracture.     Left femoral neck fracture s/p bipolar hemiarthroplasty POD # 6  Mechanical fall  *Presents with fall slipping on wet floor  *XR with above fracture    Orthopedic surgery following, routine postoperative cares per orthopedic surgery    Holding apixaban for 2 weeks due to SAH     Subarachnoid hemorrhage, posterior right Sylvian fissure  Encephalopathy, multifactorial from SAH, opioids, acute illness    Nearly occlusive R internal jugular DVT   *Noted to be more confused 5/12, sister reports is not at baseline  *Head CT negative with initial fall 5/2, not checked this admission  *Apixaban resumed 5/11/22     Head CT STAT ordered, demonstrated hemorrhage as above    Discussed with neuro critical care and consulted    Levetiracetam ordered post HD x 7 days post HD for seizure prophylaxis    Received k centra    Hold apixaban for 2 weeks \"from bleed stability\" before resuming    Q4H neuro checks with stable head CT    transfer to stroke floor    MRI/MRA shows stable small sylvian fissure SAH, no stroke, no significant stenosis although US would better characterize due to motion artifact.    discontinue all narcotics and anticholinergics, tylenol for pain    MRI, MRV ordered by Neurology to verify collateral flow on contralateral (left) side    Also per Neurology, \"If there is a clinical status change or venous infarct evident on MRI then we will consider anticoagulation vs venous thrombectomy earlier than that. Neuro IR is aware of her " "case and in agreement with this course of action.\"     ESRD on HD MWF  Hyponatremia  On HD since 2021. Dialyzes MWF under care of Dr. Ro. Na 120 in ED (recent baseline runs low). Per Dr. Palmer, hyponatremia is due to large intradialytic weight gains.  5 kg is a typical goal in HD for fluid removal.    Nephrology following    fluid restriction    HD 5/12, 5/14, will resume usual MWF HD,next HD on 5/16     Permanent atrial fibrillation  S/p AVN ablation with ppm 6/2021    apixaban on hold for 14 days due to SAH     Severe tricuspid regurgitation   Pulmonary hypertension  Chronic R heart failure, compensated  HLD  [needs rec- atorvastatin 40 mg daily, metoprolol 12.5 mg BID, torsemide 40 mg daily]  Echo with EF 50-55%, nl LV fn, severe TR, pulmonary pressures 60-65 mmHg. Seen by CV surgery, deemed to be too high of a surgical risk for valve replacement.     continue lipitor, metoprolol      Anemia  Hgb at 10.5 on presentation, stable from recent    Hgb stable 5/12     COPD  [needs rec- prn albuterol, tiotropium-olodaterol 2 puffs daily]    Continue prior to admission inhaler regimen      SKY  No longer wears CPAP     Anxiety  Depression   Doesn't appear to currently be on meds for this     Gout    Continue allopurinol 100 mg MWF after dialysis      PUD    Continue pantoprazole 40 mg daily      PAD  noted    Clinically Significant Risk Factors Present on Admission     Diet: Advance Diet as Tolerated: Regular Diet Adult  Diet    DVT Prophylaxis: Pneumatic Compression Devices  Schuler Catheter: Not present  Code Status: Full Code          Disposition Plan  Likely in hospital for most of this week, will need TCU at discharge    Total time spent:  > 35 minutes  Time spent counseling, coordination of care: 25 minutes including discussion with care team and patient's sisterTereza regarding internal jugular vein occlusion, MRI/MRV cognitive impairment hyponatremia, frequent falls, discharge planning, also, personal " "review and interpretation of labs and studies as noted above      Emily Castle MD  Hospitalist  [facility]  Text Page (7am - 6pm, M-F)    Interval History   Patient received sedation for MRI, she is drowsy and nonverbal, cannot obtain review of systems from her.  I had a lengthy discussion at the bedside with her sister, Tereza.  Tereza says patient has had increasing memory problems for at least the past year.  She also has frequent falls.  She says that her sister is often resistant to receiving care and will not come to the hospital, even when she is instructed to do so.  Tereza asked about plan for discharge, says that Francine is currently at [facility].    -Data reviewed today: I reviewed all new labs and imaging over the last 24 hours. I personally reviewed no images or EKG's today.    Physical Exam    , Blood pressure (!) 158/82, pulse 72, temperature 97.5  F (36.4  C), temperature source Axillary, resp. rate 16, height 1.651 m (5' 5\"), weight 60 kg (132 lb 4.4 oz), SpO2 100 %.  Vitals:    05/09/22 1330 05/10/22 0500 05/14/22 0000   Weight: 64 kg (141 lb 1.5 oz) 66.1 kg (145 lb 11.6 oz) 60 kg (132 lb 4.4 oz)     Vital Signs with Ranges  Temp:  [97.4  F (36.3  C)-98  F (36.7  C)] 97.5  F (36.4  C)  Pulse:  [72-82] 72  Resp:  [16] 16  BP: (111-162)/(74-89) 158/82  SpO2:  [92 %-100 %] 100 %  I/O's Last 24 hours  I/O last 3 completed shifts:  In: 770 [P.O.:770]  Out: -     Constitutional: Left periorbital ecchymosis,    Respiratory: Clear to auscultation bilaterally, no crackles or wheezing  Cardiovascular: Regular rate and rhythm, normal S1 and S2, intermittently paced  GI: Normal bowel sounds, soft, non-distended, non-tender  Skin/Integumen: No rashes, no cyanosis, no edema  Other:      Medications   All medications were reviewed.      - MEDICATION INSTRUCTIONS for Dialysis Patients -   Does not apply See Admin Instructions     atorvastatin  40 mg Oral Daily     " famotidine  10 mg Intravenous QPM    Or     famotidine  10 mg Oral QPM     levETIRAcetam  500 mg Intravenous Daily    Or     levETIRAcetam  500 mg Oral Daily    Or     levETIRAcetam  500 mg Per NG tube Daily     polyethylene glycol  17 g Oral Daily     senna-docusate  1 tablet Oral BID     sodium chloride (PF)  3 mL Intracatheter Q8H        Data   Recent Labs   Lab 05/16/22  0743 05/15/22  0723 05/14/22  1533 05/14/22  0528 05/12/22  1158 05/12/22  0913 05/12/22  0633 05/11/22  0845 05/10/22  1449 05/10/22  0729   WBC  --   --   --  12.1*  --  9.3  --  8.4  --   --    HGB  --   --   --  11.5*  --  11.4*  --  11.2*  --  10.8*   MCV  --   --   --  106*  --  104*  --  109*  --   --    PLT  --   --   --  115*  --  146*  --  151  --   --    INR  --   --   --   --   --   --   --   --   --  1.42*   * 125*  --  124*   < > 127*  --  123*   < > 124*   POTASSIUM 5.3 4.5  --  5.5*   < > 5.1  --  5.1   < > 4.3   CHLORIDE 89* 93*  --  93*   < > 94  --  89*  --  88*   CO2 17* 20  --  13*   < > 22  --  20  --  22   BUN 54* 38*  --  62*   < > 41*  --  48*  --  48*   CR 4.86* 3.56*  --  4.99*   < > 3.03*  --  3.82*  --  4.45*   ANIONGAP 15* 12  --  18*   < > 11  --  14  --  14   SILVIA 8.7 9.0  --  8.9   < > 7.6*  --  9.8  --  10.2*   GLC 97 102* 199* 71   < > 95   < > 160*   < > 86   ALBUMIN 2.7* 2.8*  --  2.8*   < > 3.4  --  3.2*  --  3.3*    < > = values in this interval not displayed.       No results found for this or any previous visit (from the past 24 hour(s)).

## 2022-05-16 NOTE — PLAN OF CARE
Goal Outcome Evaluation:      A&O x 3 forgetful, sedated after ativan from MRI.  Neuro unchanged. Denies pain. Dressing on left hip unchanged.  R-arm dressing changed, Up with 2 and lift.  Incontinent of stool, dialysis pt, does not make urine.  Dialysis tomorrow.

## 2022-05-16 NOTE — PROGRESS NOTES
Assessment and Plan:   ESRD: on HD T TH S while inpatient. Labs today with Na 121, K 5.3, HCO3 17, Cr 4.86. Orders placed to run in am.               Interval History:   S/P L MANA: POD #6. Hx of hip fracture.       PVD:   Afib on anticoagulation.   Fall: CT showed SAH. Apixaban on hold.           Review of Systems:   Somnolent         Medications:       - MEDICATION INSTRUCTIONS for Dialysis Patients -   Does not apply See Admin Instructions     atorvastatin  40 mg Oral Daily     famotidine  10 mg Intravenous QPM    Or     famotidine  10 mg Oral QPM     levETIRAcetam  500 mg Intravenous Daily    Or     levETIRAcetam  500 mg Oral Daily    Or     levETIRAcetam  500 mg Per NG tube Daily     polyethylene glycol  17 g Oral Daily     senna-docusate  1 tablet Oral BID     sodium chloride (PF)  3 mL Intracatheter Q8H         Current active medications and PTA medications reviewed, see medication list for details.            Physical Exam:   Vitals were reviewed  Patient Vitals for the past 24 hrs:   BP Temp Temp src Pulse Resp SpO2   22 0808 (!) 158/82 -- -- 72 16 100 %   22 0017 (!) 162/89 97.5  F (36.4  C) Axillary 76 16 92 %   05/15/22 2236 111/78 -- -- 76 16 98 %   05/15/22 1927 132/80 97.4  F (36.3  C) Axillary 82 16 98 %   05/15/22 1711 (!) 150/83 98  F (36.7  C) Oral 74 16 94 %       Temp:  [97.4  F (36.3  C)-98  F (36.7  C)] 97.5  F (36.4  C)  Pulse:  [72-82] 72  Resp:  [16] 16  BP: (111-162)/(78-89) 158/82  SpO2:  [92 %-100 %] 100 %    Temperatures:  Current - Temp: 97.5  F (36.4  C); Max - Temp  Av.6  F (36.4  C)  Min: 97.4  F (36.3  C)  Max: 98  F (36.7  C)  Respiration range: Resp  Av  Min: 16  Max: 16  Pulse range: Pulse  Av  Min: 72  Max: 82  Blood pressure range: Systolic (24hrs), Av , Min:111 , Max:162   ; Diastolic (24hrs), Av, Min:78, Max:89    Pulse oximetry range: SpO2  Av.4 %  Min: 92 %  Max: 100 %    I/O last 3 completed shifts:  In: 770  [P.O.:770]  Out: -       Intake/Output Summary (Last 24 hours) at 5/16/2022 1407  Last data filed at 5/16/2022 0027  Gross per 24 hour   Intake 300 ml   Output --   Net 300 ml       Somnolent, cachextic, chronically ill appearing  Lungs with clear ant BS  Cor pacer on L, nl S1 S2 no rub  LE no edema  R CVC in place, no redness or tenderness       Wt Readings from Last 4 Encounters:   05/14/22 60 kg (132 lb 4.4 oz)   05/05/22 64 kg (141 lb)   05/02/22 64 kg (141 lb)   04/25/22 63.8 kg (140 lb 9.6 oz)          Data:          Lab Results   Component Value Date     05/16/2022     05/15/2022     05/14/2022     01/12/2005    Lab Results   Component Value Date    CHLORIDE 89 05/16/2022    CHLORIDE 93 05/15/2022    CHLORIDE 93 05/14/2022    CHLORIDE 105 01/12/2005    Lab Results   Component Value Date    BUN 54 05/16/2022    BUN 38 05/15/2022    BUN 62 05/14/2022    BUN 16 01/12/2005      Lab Results   Component Value Date    POTASSIUM 5.3 05/16/2022    POTASSIUM 4.5 05/15/2022    POTASSIUM 5.5 05/14/2022    POTASSIUM 4.5 01/12/2005    Lab Results   Component Value Date    CO2 17 05/16/2022    CO2 20 05/15/2022    CO2 13 05/14/2022    CO2 27 01/12/2005    Lab Results   Component Value Date    CR 4.86 05/16/2022    CR 3.56 05/15/2022    CR 4.99 05/14/2022    CR 0.80 01/12/2005        Recent Labs   Lab Test 05/14/22  0528 05/12/22  0913 05/11/22  0845   WBC 12.1* 9.3 8.4   HGB 11.5* 11.4* 11.2*   HCT 34.2* 32.7* 34.5*   * 104* 109*   * 146* 151     Recent Labs   Lab Test 02/15/22  0624 01/26/22  0638 01/12/22  0113   AST 37 43 76*   ALT 32 46 49   ALKPHOS 122 148 148   BILITOTAL 1.0 1.4* 0.9       No results for input(s): MAG in the last 03130 hours.  Recent Labs   Lab Test 05/16/22  0743 05/15/22  0723 05/14/22 0528   PHOS 5.1* 4.7* 6.7*     Recent Labs   Lab Test 05/16/22  0743 05/15/22  0723 05/14/22 0528   SILVIA 8.7 9.0 8.9       Lab Results   Component Value Date    SILVIA 8.7  05/16/2022     Lab Results   Component Value Date    WBC 12.1 (H) 05/14/2022    HGB 11.5 (L) 05/14/2022    HCT 34.2 (L) 05/14/2022     (H) 05/14/2022     (L) 05/14/2022     Lab Results   Component Value Date     (L) 05/16/2022    POTASSIUM 5.3 05/16/2022    CHLORIDE 89 (L) 05/16/2022    CO2 17 (L) 05/16/2022    GLC 97 05/16/2022     Lab Results   Component Value Date    BUN 54 (H) 05/16/2022    CR 4.86 (H) 05/16/2022     No results found for: MAG  Lab Results   Component Value Date    PHOS 5.1 (H) 05/16/2022       Creatinine   Date Value Ref Range Status   05/16/2022 4.86 (H) 0.52 - 1.04 mg/dL Final   05/15/2022 3.56 (H) 0.52 - 1.04 mg/dL Final   05/14/2022 4.99 (H) 0.52 - 1.04 mg/dL Final   05/13/2022 3.83 (H) 0.52 - 1.04 mg/dL Final   05/12/2022 3.03 (H) 0.52 - 1.04 mg/dL Final   05/11/2022 3.82 (H) 0.52 - 1.04 mg/dL Final   01/12/2005 0.80 0.60 - 1.30 mg/dL Final       Attestation:  I have reviewed today's vital signs, notes, medications, labs and imaging.     Amrit Mo MD

## 2022-05-16 NOTE — PLAN OF CARE
Trauma/Ortho/Medical     Diagnosis: L hip hemiarthroplasty,Subarachnoid hemorrhage  POD#:6  Mental Status:A&O x 2-3, pt waxes and wanes frequently.  Activity:A2 GB/walker  Diet: Reg/ 1500ml fluid restriciton  Pain: denies  Schulre/Voiding: Hemodialysis no urine output. 1 small BM  LDA:IV SL  D/C Date: pending improvement LTC or TCU  Other Info: neuros intact, MRI scheduled for today.

## 2022-05-16 NOTE — PLAN OF CARE
Goal Outcome Evaluation:  Alert, somnolent, disoriented to time and situation. Neuros intact. Up with assist of 2 and walker. Dressing changed.  Dialysis patient. Had two loose stools, incontinent.  Total fed.

## 2022-05-16 NOTE — PROGRESS NOTES
Orthopedic Surgery  Francine Chris  2022  Admit Date:  2022  POD: 6 Days Post-Op   Procedure(s):  left bipolar hip hemiarthroplasty    Patient sleepy at time of exam as she was given Ativan prior to the MRI    Vital Sign Ranges  Temperature Temp  Av.6  F (36.4  C)  Min: 97.4  F (36.3  C)  Max: 98  F (36.7  C)   Blood pressure Systolic (24hrs), Av , Min:111 , Max:162        Diastolic (24hrs), Av, Min:78, Max:89      Pulse Pulse  Av  Min: 72  Max: 82   Respirations Resp  Av  Min: 16  Max: 16   Pulse oximetry SpO2  Av.4 %  Min: 92 %  Max: 100 %       Dressing is clean, dry, and intact.   She is flexing her left hip without pain response/grimace  Bilateral feet are cold and legs are dusky (PAD)  Labs:  Recent Labs   Lab Test 22  0528 22  0913 22  0845   WBC 12.1* 9.3 8.4     Recent Labs   Lab Test 22  0528 22  0913 22  0845   HGB 11.5* 11.4* 11.2*     Recent Labs   Lab Test 05/10/22  0729 22  1752 22  0737   INR 1.42* 1.62* 1.15     Recent Labs   Lab Test 22  0528 22  0913 22  0845   * 146* 151       1. PLAN:   Eliquis held x 2 weeks due to SAH.     Mobilize with PT   WBAT Left LE.     Continue current pain regiment.   Dressings: Keep intact.  Change if >60% saturated or peeling off.    Will recheck patient on POD #14 if still in hospital    2. Disposition   Anticipate d/c to TCU when medically cleared and progressing in PT.  Nothing further from ortho standpoint.     Joanne Kam PA-C

## 2022-05-16 NOTE — PROGRESS NOTES
CLINICAL NUTRITION SERVICES  -  ASSESSMENT NOTE      Recommendations Ordered by Registered Dietitian (RD):   Miguel Garner with lunch and dinner   Malnutrition:   % Weight Loss:  > 2% in 1 week (severe malnutrition)  % Intake:  </= 50% for >/= 5 days (severe malnutrition)  Subcutaneous Fat Loss:  Orbital region moderate depletion and Upper arm region moderate depletion  Muscle Loss:  Temporal region severe depletion, Clavicle bone region moderate-severe depletion, Acromion bone region severe depletion and Dorsal hand region moderate depletion  Fluid Retention:  Mild     Malnutrition Diagnosis: Severe malnutrition  In Context of:  Acute illness or injury  Chronic illness or disease        REASON FOR ASSESSMENT  Francine Chris is a 76 year old female seen by Registered Dietitian for [facility]    DX:   Mechanical fall --> femoral neck fracture   SAH     PMH:  ESRD on dialysis (MWF at [facility])   A.fib ( s/p ablation and PPM)  Pulm HTN  PAD    NUTRITION HISTORY  - Information obtained from sister Tereza (as pt somnolent).  - Patient is on a dialysis diet at her long term care facility.  - Typical food/fluid intake is variable.  - Diet history: Meals are provided for her, and she hasn't cared for the food at various places she's stayed. Breakfast is typically her best meal in terms of adequate intake.  - Supplements: While at [facility], they sent Nepro supplements, which she tolerated well.   - No food allergies/intolerances.  Pt is weak and deconditioned.    CURRENT NUTRITION ORDERS  Diet Order:     Regular with Fluid Restriction 1500 mL/day    Current Intake/Tolerance:  Patient has missed multiple meals due to decreased mentation and procedures.  When recorded on nursing flow sheet, pt with variable oral intake (25-50-75%), but mostly poor-fair at best.  No meals sent today due to somnolence.    5/9:  Resume HD schedule (M-W-F)  5/10:  Surgery = left bipolar hip hemiarthroplasty   5/12: CT head obtained for AMS =  "new subarachnoid hemorrhage in the posterior aspect of the right sylvian fissure    NUTRITION FOCUSED PHYSICAL ASSESSMENT FOR DIAGNOSING MALNUTRITION)  Yes                Observed:    Muscle wasting (refer to documentation in Malnutrition section)  Subcutaneous fat loss (refer to documentation in Malnutrition section)  Fluid retention (refer to documentation in Malnutrition section)  Dry skin   Lackluster hair  Pale    Obtained from Chart/Interdisciplinary Team:  Cachectic  Chronically ill appearing  Pale  Dry, scaly skin  Nonhealing Wound   5/9:  WOCN - RUE Wound due to: Surgical Wound. Wound base:  fibrin and slough,     Palpation of the wound bed: firm      Drainage: moderate     Description of drainage: serosanguinous   5/13:  WOCN = STATUS: improving, nonviable tissue lifting, odor noted as reduced compared to prior assessment     ANTHROPOMETRICS  Height: 5' 5\"  Admit Wt:  64 kg  Last Recorded Wt:  60 kg (5/14)  Body mass index is 22.01 kg/m .  Weight Status:  Normal BMI  IBW: 56.8 kg  % IBW: 106%  Weight History:   - Weight down 4 kg (6%) since admission, which is likely due to combination fluid and decreased oral intake.  - Sister reports her weight got down as low as 115 lbs while at [facility], then increased to 120-125 lbs, and then lately weight has been running 130-140 range.     Wt Readings from Last 10 Encounters:   05/14/22 60 kg (132 lb 4.4 oz)   05/05/22 64 kg (141 lb)   05/02/22 64 kg (141 lb)   04/25/22 63.8 kg (140 lb 9.6 oz)   04/22/22 60 kg (132 lb 4.8 oz)   04/18/22 59.9 kg (132 lb 0.9 oz)   04/13/22 59.9 kg (132 lb)   04/08/22 59.5 kg (131 lb 3.2 oz)   03/28/22 60.1 kg (132 lb 9.6 oz)   03/23/22 60.1 kg (132 lb 9.6 oz)     LABS  Labs reviewed  Na 121 (L)  BUN 54 (H), Cr 4.86 (H) - HD dependent    MEDICATIONS  Medications reviewed    ASSESSED NUTRITION NEEDS PER APPROVED PRACTICE GUIDELINES:    Dosing Weight:  60 kg (5/14)  Estimated Energy Needs: 4162-9121+ kcals (25-30+ Kcal/Kg)  Justification: " maintenance  Estimated Protein Needs: 72-90 grams protein (1.2-1.5 g pro/Kg)  Justification: Repletion, wound healing, hypercatabolism with acute illness and dialysis  Estimated Fluid Needs: 6643-7031 mL (1 mL/Kcal)  Justification: per provider pending fluid status    MALNUTRITION:  % Weight Loss:  > 2% in 1 week (severe malnutrition)  % Intake:  </= 50% for >/= 5 days (severe malnutrition)  Subcutaneous Fat Loss:  Orbital region moderate depletion and Upper arm region moderate depletion  Muscle Loss:  Temporal region severe depletion, Clavicle bone region moderate-severe depletion, Acromion bone region severe depletion and Dorsal hand region moderate depletion  Fluid Retention:  Mild     Malnutrition Diagnosis: Severe malnutrition  In Context of:  Acute illness or injury  Chronic illness or disease    NUTRITION DIAGNOSIS:  Inadequate oral intake related to decreased mentation and decreased appetite as evidenced by variable oral intake since admission x 7 days, mostly poor-fair, and loss of subcutaneous fat and muscle mass    NUTRITION INTERVENTIONS  Recommendations / Nutrition Prescription  Miguel Garner with lunch and dinner    Implementation  Nutrition education: Provided education to sister Tereza on the use of supplements to optimize intake.  Medical Food Supplement - Ordered above in Epic    Nutrition Goals  Pt will consume > 50% meals TID and supplements in 3-4 days.    MONITORING AND EVALUATION:  Progress towards goals will be monitored and evaluated per protocol and Practice Guidelines    Ekaterina Corral, RD, LD, CNSC

## 2022-05-16 NOTE — PROGRESS NOTES
Care Management Follow Up    Length of Stay (days): 8    Expected Discharge Date: 05/19/2022     Concerns to be Addressed:       Patient plan of care discussed at interdisciplinary rounds: Yes    Anticipated Discharge Disposition:       Anticipated Discharge Services:    Anticipated Discharge DME:      Patient/family educated on Medicare website which has current facility and service quality ratings:    Education Provided on the Discharge Plan:    Patient/Family in Agreement with the Plan:      Referrals Placed by CM/SW:    Private pay costs discussed: Not applicable    Additional Information:  Call to [facility] to update and to confirm that patient can return when/if she's ready this week. Message left.      GAYLE Becerril

## 2022-05-17 NOTE — PROGRESS NOTES
"[facility]  Hospitalist Progress Note  Emily Castle MD  05/17/2022    Assessment & Plan    Francine Chris is a 76 year old female with history of atrial fibrillation on anticoagulation, severe tricuspid regurgitation, pulmonary hypertension, chronic right-sided heart failure, COPD, SKY, anxiety, depression, gout, peptic ulcer disease, peripheral arterial disease admitted on 5/8/2022 after a mechanical fall, found to have left femoral neck fracture.     Left femoral neck fracture s/p bipolar hemiarthroplasty  Mechanical fall  *Presents with fall slipping on wet floor  *XR with above fracture    Orthopedic surgery following, routine postoperative cares per orthopedic surgery    Holding apixaban for 2 weeks due to SAH     Subarachnoid hemorrhage, posterior right Sylvian fissure  Encephalopathy, multifactorial from SAH, opioids, acute illness    Nearly occlusive R internal jugular DVT   *Noted to be more confused 5/12, sister reports is not at baseline  *Head CT negative with initial fall 5/2, not checked this admission  *Apixaban resumed 5/11/22     Head CT STAT ordered, demonstrated hemorrhage as above    Discussed with neuro critical care and consulted    Levetiracetam ordered post HD x 7 days post HD for seizure prophylaxis    Received k [facility]    Hold apixaban for 2 weeks \"from bleed stability\" before resuming    Q4H neuro checks with stable head CT    transfer to stroke floor    MRI/MRA shows stable small sylvian fissure SAH, no stroke, no significant stenosis although US would better characterize due to motion artifact.    discontinue all narcotics and anticholinergics, tylenol for pain    MRI, MRV ordered by Neurology, did not show new venous-related stroke or other acute findings; internal jugular clot not well visualized     They also recommend CT C/A/P to rule out underlying malignancy and Heme/Onc consultation recommended re: internal jugular clot in setting of anticoagulation " "(?need for hypercoag, malignancy workup)      finally, \"Plan for repeat NCCT in 2 weeks, if bleed is stable/improved then re-initiation of DOAC can be discussed\"     ESRD on HD MWF  Hyponatremia  On HD since 2021. Dialyzes MWF under care of Dr. Ro. Na 120 in ED (recent baseline runs low). Per Dr. Palmer, hyponatremia is due to large intradialytic weight gains.  5 kg is a typical goal in HD for fluid removal.    Nephrology following    fluid restriction    resume usual MWF HD     Permanent atrial fibrillation  S/p AVN ablation with ppm 6/2021    apixaban on hold for 14 days due to SAH     Severe tricuspid regurgitation   Pulmonary hypertension  Chronic R heart failure, compensated  HLD  [needs rec- atorvastatin 40 mg daily, metoprolol 12.5 mg BID, torsemide 40 mg daily]  Echo with EF 50-55%, nl LV fn, severe TR, pulmonary pressures 60-65 mmHg. Seen by CV surgery, deemed to be too high of a surgical risk for valve replacement.     continue lipitor, metoprolol      Anemia  Hgb at 10.5 on presentation, stable from recent    Hgb stable 5/12     COPD  [needs rec- prn albuterol, tiotropium-olodaterol 2 puffs daily]    Continue prior to admission inhaler regimen      SKY  No longer wears CPAP     Anxiety  Depression   Doesn't appear to currently be on meds for this     Gout    Continue allopurinol 100 mg MWF after dialysis      PUD    Continue pantoprazole 40 mg daily      PAD  noted    Clinically Significant Risk Factors Present on Admission     Diet: Advance Diet as Tolerated: Regular Diet Adult  Diet    DVT Prophylaxis: Pneumatic Compression Devices  Schuler Catheter: Not present  Code Status: Full Code          Disposition Plan  Likely in hospital for most of this week, will need TCU at discharge    Emily Castle MD  Hospitalist  [facility]  Text Page (7am - 6pm, M-F)    Interval History   \"Why there are cats in here?\"  Patient is hallucinating, she sees cats.  (Patient's sister, Tereza told me " "that patient had been seeing cats in her room.)  Cannot obtain meaningful review of systems from patient due to confusion.  There are no new respiratory or GI issues.    -Data reviewed today: I reviewed all new labs and imaging over the last 24 hours. I personally reviewed no images or EKG's today.    Physical Exam    , Blood pressure (!) 142/78, pulse 82, temperature 97  F (36.1  C), temperature source Axillary, resp. rate 16, height 1.651 m (5' 5\"), weight 60 kg (132 lb 4.4 oz), SpO2 100 %.  Vitals:    05/09/22 1330 05/10/22 0500 05/14/22 0000   Weight: 64 kg (141 lb 1.5 oz) 66.1 kg (145 lb 11.6 oz) 60 kg (132 lb 4.4 oz)     Vital Signs with Ranges  Temp:  [95.8  F (35.4  C)-98  F (36.7  C)] 97  F (36.1  C)  Pulse:  [74-88] 82  Resp:  [16] 16  BP: (142-152)/(71-85) 142/78  SpO2:  [96 %-100 %] 100 %  I/O's Last 24 hours  I/O last 3 completed shifts:  In: 120 [P.O.:120]  Out: -     Constitutional: Left periorbital ecchymosis,    Respiratory: Clear to auscultation bilaterally, no crackles or wheezing  Cardiovascular: Regular rate and rhythm, normal S1 and S2, intermittently paced  GI: Normal bowel sounds, soft, non-distended, non-tender  Skin/Integumen: Ecchymosis left knee with scattered ecchymoses elsewhere  Other: Mood is confused    Medications   All medications were reviewed.      - MEDICATION INSTRUCTIONS for Dialysis Patients -   Does not apply See Admin Instructions     atorvastatin  40 mg Oral Daily     famotidine  10 mg Intravenous QPM    Or     famotidine  10 mg Oral QPM     sodium chloride (PF) 0.9%  1.3-2.6 mL Intracatheter Once in dialysis/CRRT    Followed by     heparin  3 mL Intracatheter Once in dialysis/CRRT     sodium chloride (PF) 0.9%  1.3-2.6 mL Intracatheter Once in dialysis/CRRT    Followed by     heparin  3 mL Intracatheter Once in dialysis/CRRT     levETIRAcetam  500 mg Intravenous Daily    Or     levETIRAcetam  500 mg Oral Daily    Or     levETIRAcetam  500 mg Per NG tube Daily     - " MEDICATION INSTRUCTIONS -   Does not apply Once     polyethylene glycol  17 g Oral Daily     senna-docusate  1 tablet Oral BID     sodium chloride (PF)  3 mL Intracatheter Q8H     sodium chloride (PF)  9 mL Intracatheter During Dialysis/CRRT (from stock)     sodium chloride (PF)  9 mL Intracatheter During Dialysis/CRRT (from stock)        Data   Recent Labs   Lab 05/17/22  0833 05/16/22  0743 05/15/22  0723 05/14/22  1533 05/14/22  0528 05/12/22  1158 05/12/22  0913   WBC 8.6  --   --   --  12.1*  --  9.3   HGB 11.2*  --   --   --  11.5*  --  11.4*   *  --   --   --  106*  --  104*   *  --   --   --  115*  --  146*   * 121* 125*  --  124*   < > 127*   POTASSIUM 5.1 5.3 4.5  --  5.5*   < > 5.1   CHLORIDE 89* 89* 93*  --  93*   < > 94   CO2 22 17* 20  --  13*   < > 22   BUN 59* 54* 38*  --  62*   < > 41*   CR 5.07* 4.86* 3.56*  --  4.99*   < > 3.03*   ANIONGAP 12 15* 12  --  18*   < > 11   SILVIA 9.0 8.7 9.0  --  8.9   < > 7.6*   GLC 86 97 102*   < > 71   < > 95   ALBUMIN 2.8* 2.7* 2.8*  --  2.8*   < > 3.4    < > = values in this interval not displayed.       Recent Results (from the past 24 hour(s))   MR Brain w/o Contrast    Narrative    MR BRAIN WITHOUT CONTRAST, MRV BRAIN WITHOUT CONTRAST  5/16/2022 12:46  PM    INDICATION: Evaluate for infarct associated with newly identified  jugular venous thrombosis.  TECHNIQUE:   1.  Noncontrast MRI of the brain.  2. Brain MRV without IV contrast.   CONTRAST: None.  COMPARISON: 5/13/2022 brain MRI and 5/13/2022 head CT. 5/14/2022  vascular ultrasound.    FINDINGS:    Head MRI:    No finding for acute infarct. Small volume acute subarachnoid  hemorrhage in the posterior right sylvian fissure is unchanged. No new  intracranial hemorrhage elsewhere. Mild burden scattered chronic small  vessel ischemic change. Moderate diffuse parenchymal volume loss.  Nondilated ventricles. Major intracranial arterial flow voids at the  skull base are preserved. Brainstem and  cerebellum are unremarkable.  Cerebellar tonsils are normally positioned.    Mild mucosal thickening in the left maxillary sinus with a small  air-fluid level. Unremarkable orbits. No fluid in the mastoid air  cells. Unremarkable marrow signal. Remainder negative.    HEAD MRV:  Motion degraded exam. Asymmetric prominence of the left transverse and  sigmoid sinuses relative to the right is presumably physiologic in  nature. While motion limits evaluation for vessel patency, the venous  sinuses appear widely patent. Motion more noticeably limited  evaluation of the deep venous drainage pathways, but there is no  definite occlusion. The known thrombus in the right internal jugular  vein is not well visualized.      Impression    IMPRESSION:   Head MRI:  1.  Overall unchanged exam with no finding for acute infarct. Small  volume acute subarachnoid hemorrhage along the posterior right sylvian  fissure is relatively unchanged.  2.  Mild burden scattered chronic small vessel ischemic change and a  moderate diffuse parenchymal volume loss are unchanged.      HEAD MRV:  1.  Motion degraded exam with no definite intracranial venous sinus  thrombosis, noting that motion on this exam limits evaluation.    2.  Known thrombosis in the right internal jugular vein is not well  demonstrated on this exam.    RENATO THOMAS MD         SYSTEM ID:  [id number]   MRV Brain wo Contrast    Narrative    MR BRAIN WITHOUT CONTRAST, MRV BRAIN WITHOUT CONTRAST  5/16/2022 12:46  PM    INDICATION: Evaluate for infarct associated with newly identified  jugular venous thrombosis.  TECHNIQUE:   1.  Noncontrast MRI of the brain.  2. Brain MRV without IV contrast.   CONTRAST: None.  COMPARISON: 5/13/2022 brain MRI and 5/13/2022 head CT. 5/14/2022  vascular ultrasound.    FINDINGS:    Head MRI:    No finding for acute infarct. Small volume acute subarachnoid  hemorrhage in the posterior right sylvian fissure is unchanged. No new  intracranial hemorrhage  elsewhere. Mild burden scattered chronic small  vessel ischemic change. Moderate diffuse parenchymal volume loss.  Nondilated ventricles. Major intracranial arterial flow voids at the  skull base are preserved. Brainstem and cerebellum are unremarkable.  Cerebellar tonsils are normally positioned.    Mild mucosal thickening in the left maxillary sinus with a small  air-fluid level. Unremarkable orbits. No fluid in the mastoid air  cells. Unremarkable marrow signal. Remainder negative.    HEAD MRV:  Motion degraded exam. Asymmetric prominence of the left transverse and  sigmoid sinuses relative to the right is presumably physiologic in  nature. While motion limits evaluation for vessel patency, the venous  sinuses appear widely patent. Motion more noticeably limited  evaluation of the deep venous drainage pathways, but there is no  definite occlusion. The known thrombus in the right internal jugular  vein is not well visualized.      Impression    IMPRESSION:   Head MRI:  1.  Overall unchanged exam with no finding for acute infarct. Small  volume acute subarachnoid hemorrhage along the posterior right sylvian  fissure is relatively unchanged.  2.  Mild burden scattered chronic small vessel ischemic change and a  moderate diffuse parenchymal volume loss are unchanged.      HEAD MRV:  1.  Motion degraded exam with no definite intracranial venous sinus  thrombosis, noting that motion on this exam limits evaluation.    2.  Known thrombosis in the right internal jugular vein is not well  demonstrated on this exam.    RENATO THOMAS MD         SYSTEM ID:  [id number]

## 2022-05-17 NOTE — PLAN OF CARE
Drowsy, answers to name, arouses to voice, neuros unchanged, VSS, no PRNs for pain. Ax2 lift. Will continue to monitor.

## 2022-05-17 NOTE — PROGRESS NOTES
Assessment and Plan:   ESRD: HD today for UF and clearance. Labs today show Na 123, K 5.1, HCO3 22, Cr 5.07. Hgb 11.2.   Dialysis today: 3L UF, R CVC, 400 BFR, 3h, 2K, 38 HCO3, 138 Na. IV hectorol on the run. No heparin.             Interval History:   S/P L MANA: POD #6. Hx of hip fracture.       PVD:   Afib on anticoagulation.   Fall: CT showed SAH. Apixaban on hold.                 Review of Systems:   Non-conversant.          Medications:       - MEDICATION INSTRUCTIONS for Dialysis Patients -   Does not apply See Admin Instructions     atorvastatin  40 mg Oral Daily     famotidine  10 mg Intravenous QPM    Or     famotidine  10 mg Oral QPM     sodium chloride (PF) 0.9%  1.3-2.6 mL Intracatheter Once in dialysis/CRRT    Followed by     heparin  3 mL Intracatheter Once in dialysis/CRRT     sodium chloride (PF) 0.9%  1.3-2.6 mL Intracatheter Once in dialysis/CRRT    Followed by     heparin  3 mL Intracatheter Once in dialysis/CRRT     levETIRAcetam  500 mg Intravenous Daily    Or     levETIRAcetam  500 mg Oral Daily    Or     levETIRAcetam  500 mg Per NG tube Daily     - MEDICATION INSTRUCTIONS -   Does not apply Once     polyethylene glycol  17 g Oral Daily     senna-docusate  1 tablet Oral BID     sodium chloride (PF)  3 mL Intracatheter Q8H     sodium chloride (PF)  9 mL Intracatheter During Dialysis/CRRT (from stock)     sodium chloride (PF)  9 mL Intracatheter During Dialysis/CRRT (from stock)         Current active medications and PTA medications reviewed, see medication list for details.            Physical Exam:   Vitals were reviewed  Patient Vitals for the past 24 hrs:   BP Temp Temp src Pulse Resp SpO2   05/17/22 0915 (!) 142/78 -- -- 82 -- --   05/17/22 0900 (!) 149/77 -- -- 82 -- --   05/17/22 0845 (!) 149/83 -- -- 82 -- --   05/17/22 0830 (!) 152/82 -- -- 86 -- --   05/17/22 0825 (!) 144/82 -- -- 88 -- --   05/17/22 0812 (!) 147/84 97  F (36.1  C) Axillary 81 16 100 %   05/17/22 0748 (!)  151/81 96.8  F (36  C) Axillary 80 16 98 %   22 2200 (!) 148/71 (!) 95.8  F (35.4  C) Axillary 78 16 98 %   22 1600 (!) 143/85 98  F (36.7  C) Axillary 74 16 96 %       Temp:  [95.8  F (35.4  C)-98  F (36.7  C)] 97  F (36.1  C)  Pulse:  [74-88] 82  Resp:  [16] 16  BP: (142-152)/(71-85) 142/78  SpO2:  [96 %-100 %] 100 %    Temperatures:  Current - Temp: 97  F (36.1  C); Max - Temp  Av.9  F (36.1  C)  Min: 95.8  F (35.4  C)  Max: 98  F (36.7  C)  Respiration range: Resp  Av  Min: 16  Max: 16  Pulse range: Pulse  Av.4  Min: 74  Max: 88  Blood pressure range: Systolic (24hrs), Av , Min:142 , Max:152   ; Diastolic (24hrs), Av, Min:71, Max:85    Pulse oximetry range: SpO2  Av %  Min: 96 %  Max: 100 %    I/O last 3 completed shifts:  In: 120 [P.O.:120]  Out: -     No intake or output data in the 24 hours ending 22 0917    Somnolent, cachectic  R CVC with no redness or tenderness         Wt Readings from Last 4 Encounters:   22 60 kg (132 lb 4.4 oz)   22 64 kg (141 lb)   22 64 kg (141 lb)   22 63.8 kg (140 lb 9.6 oz)          Data:          Lab Results   Component Value Date     2022     2022     05/15/2022     2005    Lab Results   Component Value Date    CHLORIDE 89 2022    CHLORIDE 89 2022    CHLORIDE 93 05/15/2022    CHLORIDE 105 2005    Lab Results   Component Value Date    BUN 59 2022    BUN 54 2022    BUN 38 05/15/2022    BUN 16 2005      Lab Results   Component Value Date    POTASSIUM 5.1 2022    POTASSIUM 5.3 2022    POTASSIUM 4.5 05/15/2022    POTASSIUM 4.5 2005    Lab Results   Component Value Date    CO2 22 2022    CO2 17 2022    CO2 20 05/15/2022    CO2 27 2005    Lab Results   Component Value Date    CR 5.07 2022    CR 4.86 2022    CR 3.56 05/15/2022    CR 0.80 2005        Recent Labs   Lab Test 22  0833  05/14/22  0528 05/12/22  0913   WBC 8.6 12.1* 9.3   HGB 11.2* 11.5* 11.4*   HCT 33.9* 34.2* 32.7*   * 106* 104*   * 115* 146*     Recent Labs   Lab Test 02/15/22  0624 01/26/22  0638 01/12/22  0113   AST 37 43 76*   ALT 32 46 49   ALKPHOS 122 148 148   BILITOTAL 1.0 1.4* 0.9       No results for input(s): MAG in the last 36688 hours.  Recent Labs   Lab Test 05/17/22  0833 05/16/22  0743 05/15/22  0723   PHOS 5.9* 5.1* 4.7*     Recent Labs   Lab Test 05/17/22  0833 05/16/22  0743 05/15/22  0723   SILVIA 9.0 8.7 9.0       Lab Results   Component Value Date    SILVIA 9.0 05/17/2022     Lab Results   Component Value Date    WBC 8.6 05/17/2022    HGB 11.2 (L) 05/17/2022    HCT 33.9 (L) 05/17/2022     (H) 05/17/2022     (L) 05/17/2022     Lab Results   Component Value Date     (L) 05/17/2022    POTASSIUM 5.1 05/17/2022    CHLORIDE 89 (L) 05/17/2022    CO2 22 05/17/2022    GLC 86 05/17/2022     Lab Results   Component Value Date    BUN 59 (H) 05/17/2022    CR 5.07 (H) 05/17/2022     No results found for: MAG  Lab Results   Component Value Date    PHOS 5.9 (H) 05/17/2022       Creatinine   Date Value Ref Range Status   05/17/2022 5.07 (H) 0.52 - 1.04 mg/dL Final   05/16/2022 4.86 (H) 0.52 - 1.04 mg/dL Final   05/15/2022 3.56 (H) 0.52 - 1.04 mg/dL Final   05/14/2022 4.99 (H) 0.52 - 1.04 mg/dL Final   05/13/2022 3.83 (H) 0.52 - 1.04 mg/dL Final   05/12/2022 3.03 (H) 0.52 - 1.04 mg/dL Final   01/12/2005 0.80 0.60 - 1.30 mg/dL Final       Attestation:  I have reviewed today's vital signs, notes, medications, labs and imaging.  Seen on dialysis.      Amrit Mo MD

## 2022-05-17 NOTE — PROGRESS NOTES
"MD Notification    Notified Person: MD    Notified Person Name: Patric Sutherland    Notification Date/Time: 5/16/22 2056    Notification Interaction: Paged    Purpose of Notification: \"Received call, ERCT needs more info why patient needs CT for chest order does not have enough info, may need to update order.\"    Orders Received: Orders received     Comments:    "

## 2022-05-17 NOTE — PLAN OF CARE
Goal Outcome Evaluation:    Plan of Care Reviewed With: patient, sibling      A&O X 3, very forgetful. Lightly sedated.  Dialysis this AM, 3L removed. VSS, RA.  Unable to do neuro exam. CMS intact. Denies pain. Incontinent of stool. Repo q2hrs. Dressing on right arm CDI. Dressing unchanged on left hip. Skin with scattered bruised.

## 2022-05-17 NOTE — CONSULTS
Consult Date: 05/17/2022    This consult has been requested by Dr. Emily Castle for right IJ thrombosis.     Ms. Chris is a 76-year-old female with multiple medical problems including end stage renal disease on dialysis.  The patient was brought to the emergency room on 05/08/2022 after a fall.  Multiple investigations were done.    - Hemoglobin of 10.5 with platelets of 129.  MCV of 108.  - Low sodium of 120.  - Elevated creatinine of 5.21.  - Calcium of 10.5.  - Left femur x-ray revealed fracture through left femoral neck.  - Chest x-ray revealed pulmonary vascular congestion.    The patient had left bipolar hemiarthroplasty on 05/08/2022.    The patient was seen by Neurology on 05/12/2022 because of confusion and altered mental status.  CT head revealed new small volume subarachnoid hemorrhage.     As outpatient, patient was on Eliquis for atrial fibrillation.  On admission on 05/08/2022, Eliquis was put on hold.  It was resumed on 05/11/2022.  Because of intracranial bleed, patient was given Kcentra.     The patient had carotid ultrasound on 05/14/2022.  There was nearly occlusive DVT of the internal jugular vein. The patient's right IJ thrombosis is secondary to CVC double lumen right internal jugular line.     I spoke to the nurse.  Patient has been a little more sleepy.  The patient is not having bleeding from any site.  No bleeding from ear, nose or throat.  No blood in the stool.     The patient is weak.  Denied any pain.  Denied nausea or vomiting.  Denied any difficulty breathing.  I am not sure how much she understood.    All other review of systems negative.    ALLERGIES:  Reviewed.    MEDICATIONS:  Reviewed.    PAST MEDICAL HISTORY:    1.  End stage renal disease on dialysis.  2.  Mechanical fall and left hip fracture status post surgery.  3.  Anemia.  4.  Chronic atrial fibrillation.  5.  COPD.  6.  Cor pulmonale.  7.  Depletion.  8.  Hypertension.  9.  Gout.  10.  Hypercalcemia.  11.  History of  melanoma.  12.  Migraine.  13.  Sleep apnea.   14.  Osteoporosis.  15.  Peripheral arterial disease.  16.  Right ankle fracture.  17.  Tubal ligation.  18.  Tonsillectomy.    SOCIAL HISTORY:  She quit smoking about a year ago.  No alcohol use.    PHYSICAL EXAMINATION:    GENERAL:  The patient looked weak.  Not in any distress.  Not in pain.  VITAL SIGNS:  Reviewed.  Rest of systems not examined.    LABORATORY DATA:  Reviewed.    ASSESSMENT:  1.  A 76-year-old female with right internal jugular vein thrombosis secondary to right internal jugular line.   2.  Fall causing subarachnoid hemorrhage and left hip fracture.  3.  End stage renal disease on dialysis.  4.  Anemia secondary to renal disease, anemia of chronic disease and recent hip fracture and surgery.  5.  Mild thrombocytopenia.    RECOMMENDATIONS:   1.  The patient has right IJ thrombosis.  This is secondary to right internal jugular line.    The patient nees anticoagulation for this right IJ thrombosis.  Anticoagulation is on hold because of subarachnoid hemorrhage. When okay with Neurology and hospitalist team, patient should be restarted on anticoagulation.  Patient has other reasons for anticoagulation including atrial fibrillation.      As long as patient is on anticoagulation and IV line is functioning, no need to remove it.    Duration of anticoagulation is dictated by atrial fibrillation.  Patient will be on long term anticoagulation because of atrial fibrillation.      2.  The patient has traumatic subarachnoid hemorrhage.  That is stable.  Neurology following.    3.  CBC revealed anemia and thrombocytopenia.  Anemia is due to renal disease, anemia of chronic disease and blood loss from her hip fracture and surgery.  The patient's thrombocytopenia is mild and intermittent.  CBC will be monitored during dialysis.    4.  Case discussed with Dr. Castle.  Hematology/Oncology will sign off.  Please call us with any questions.    Thanks for the  consult.    TIME SPENT:  Total visit time of 60 minutes.  Time was spent in today's visit, review of chart/investigations today, communicating with providers and documentation.      Luis Enrique Rodriguez MD        D: 2022   T: 2022   MT: CRISTAL    Name:     TIFFANIE CHO  MRN:      [id number]        Account:      [id number]   :      1945           Consult Date: 2022     Document: [id number]

## 2022-05-17 NOTE — PROGRESS NOTES
MD Notification    Notified Person: MD    Notified Person Name: Francine Chris     Notification Date/Time: 5/17/22 0035     Notification Interaction: Paged    Purpose of Notification: Patient needs IV for CT contrast, has restricted extremity right side old fistula, are we able to use this side for IV placement?    Orders Received: Awaiting call    Comments:

## 2022-05-17 NOTE — PROGRESS NOTES
Potassium   Date Value Ref Range Status   05/17/2022 5.1 3.4 - 5.3 mmol/L Final   01/12/2005 4.5 3.4 - 5.3 mmol/L Final     Hemoglobin   Date Value Ref Range Status   05/17/2022 11.2 (L) 11.7 - 15.7 g/dL Final   01/12/2005 15.6 11.7 - 15.7 g/dL Final     Creatinine   Date Value Ref Range Status   05/17/2022 5.07 (H) 0.52 - 1.04 mg/dL Final   01/12/2005 0.80 0.60 - 1.30 mg/dL Final     Urea Nitrogen   Date Value Ref Range Status   05/17/2022 59 (H) 7 - 30 mg/dL Final   01/12/2005 16 7 - 30 mg/dL Final     Sodium   Date Value Ref Range Status   05/17/2022 123 (L) 133 - 144 mmol/L Final   01/12/2005 141 133 - 144 mmol/L Final     INR   Date Value Ref Range Status   05/10/2022 1.42 (H) 0.85 - 1.15 Final       DIALYSIS PROCEDURE NOTE  Hepatitis status of previous patient on machine log was checked and verified ok to use with this patients hepatitis status.     Latest Reference Range & Units 01/12/22 11:38   Hep B Surface Agn Nonreactive  Nonreactive   Hepatitis B Surface Antibody <8.00 m[IU]/mL >1,000.00 (H) [1]   (H): Data is abnormally high  [1] Reactive, Patient is considered to be immune to infection with hepatitis B when the value is greater than or equal to 12.0 mIU/mL.    Patient dialyzed for 3 hrs. on a K2 bath with a net fluid removal of  3L.  A BFR of 400 ml/min was obtained via a right tunneled CVC.   The treatment plan was discussed with Dr. Mo during the treatment.    Total heparin received during the treatment: 0 units.   Line flushed, clamped and capped with heparin 1:1000 1.6 mL (1600 units) per lumen    Meds  given: hectorol 4mcg   Complications: none      Person educated: patient. Knowledge base: SWEETIE. Barriers to learning: lethargic/disoriented.    ICEBOAT? Timeout performed pre-treatment  I: Patient was identified using 2 identifiers  C:  Consent Signed Yes  E: Equipment preventative maintenance is current and dialysis delivery system OK to use  B: Hepatitis B Status: see above  O: Dialysis orders  present and complete prior to treatment  A: Vascular access verified and assessed prior to treatment  T: Treatment was performed at a clinically appropriate time  ?: Patient was allowed to ask questions and address concerns prior to treatment  See flowsheet in EPIC for further details and post assessment.  Machine water alarm in place and functioning. Transducer pods intact and checked every 15min.   Pt returned via bed to [facility].  Chlorine/Chloramine water system checked every 4 hours.  Outpatient Dialysis at [facility] on MWF.

## 2022-05-18 NOTE — PLAN OF CARE
"A&Ox3. Pt able to answer \"yes\" \"no\" questions, unclear how much pt is actually comprehending. VSS on Rm Air. Pt Denies pain. Unable to complete full neuro exam. CMS intact. Incontinent of bowel x1. Drsg to AV Fistula CDI. Drsg CDI to L hip. PIV patent, intact to L wrist. Pt continues w/ scattered bruising & scabbed lac to L eye. Ate approx. 25% of dinner, no difficulties chewing/swallowing. CT w/ contrast of chest, abd, pelvis completed.  "

## 2022-05-18 NOTE — PROGRESS NOTES
Care Management Initial Consult    General Information  Assessment completed with: Patient,    Type of CM/SW Visit: Initial Assessment    Primary Care Provider verified and updated as needed: Yes   Readmission within the last 30 days:        Reason for Consult: discharge planning  Advance Care Planning: Advance Care Planning Reviewed: other (see comments) (Has ACP docs)          Communication Assessment  Patient's communication style: spoken language (English or Bilingual)    Hearing Difficulty or Deaf: no   Wear Glasses or Blind: yes    Cognitive  Cognitive/Neuro/Behavioral: .WDL except, arousability  Level of Consciousness: lethargic, confused, somnolent  Arousal Level: arouses to voice, arouses to touch/gentle shaking  Orientation: disoriented to, time, situation  Mood/Behavior: calm  Best Language: 0 - No aphasia  Speech: slow    Living Environment:   People in home: facility resident     Current living Arrangements: other (see comments) (Long term care)  Name of Facility: [facility]   Able to return to prior arrangements: yes       Family/Social Support:  Care provided by: other (see comments) (facility resident)  Provides care for: no one  Marital Status: Single  Sibling(s)          Description of Support System: Involved, Supportive    Support Assessment: Adequate social supports, Adequate family and caregiver support    Current Resources:   Patient receiving home care services: No     Community Resources: None  Equipment currently used at home: walker, rolling, wheelchair, manual  Supplies currently used at home: None    Employment/Financial:  Employment Status: retired        Financial Concerns:             Lifestyle & Psychosocial Needs:  Social Determinants of Health     Tobacco Use: Medium Risk     Smoking Tobacco Use: Former Smoker     Smokeless Tobacco Use: Never Used   Alcohol Use: Not on file   Financial Resource Strain: Not on file   Food Insecurity: Not on file   Transportation  Needs: Not on file   Physical Activity: Not on file   Stress: Not on file   Social Connections: Not on file   Intimate Partner Violence: Not on file   Depression: Not on file   Housing Stability: Not on file       Functional Status:  Prior to admission patient needed assistance:              Mental Health Status:          Chemical Dependency Status:                Values/Beliefs:  Spiritual, Cultural Beliefs, Buddhism Practices, Values that affect care: no               Additional Information:  Per care management/social work consult for discharge planning & elevated risk score, patient was admitted on 5/8/2022 with a mechanical fall. Tentative date of discharge is 5/20/2022. Reviewed chart and saw that patient lives at [facility]. Writer called [facility] admissions and asked if patient could come back to LTC with home care PT. [facility] said they could accept patient back there with home PT/OT ordered on discharge orders. Writer paged [facility] to make sure PT & OT will be ordered on discharge orders. Writer called Tereza (sister) and she said that patient receives all services at [facility]. She said that patient uses a walker and wheelchair as needed. Mentioned that [facility] can accept patient back with PT/OT home care. She said that she would like patient to go back there as she's familiar with [facility]. She said patient will need transport set up and is aware of cost.     GAYLE Shipman

## 2022-05-18 NOTE — PROGRESS NOTES
A&O x2, disorient to time and situation. Drowsy & lethargic most of the shift. Unable to follow and complete the whole neuro exam. VSS on RA, except HTN. Gave total 20 mg of IV hydralazine (see flowsheets). Denies pain. Had 1 small BM this shift. CMS intact per baseline. Dressings CDI. Turn & repo Q2h. Will cont' to monitor.

## 2022-05-18 NOTE — CONSULTS
VASCULAR SURGERY      Francine Chris [facility].  This 76-year-old very frail woman has End-stage renal disease on dialysis via right jugular tunneled catheter.  History of a transposed right upper arm antecubital brachial to brachial arteriovenous fistula that developed a large hematoma with overlying skin ulceration while trying to access the fistula.  4/18/2022 surgical evacuation of the hematoma and excision of the skin ulcer.  No obvious bleeding from the fistula.  Very thin skin attempted closure with pledgets.  Seen on 5/5/2022 where there was breakdown but not exposing the fistula measuring 2 x 2 x 0.4 cm.  Debrided and treated with Aquacel Ag.      Patient was seen for a fall in the ED on 5/15/2022.  Admitted to Ortho floor.  Subarachnoid hemorrhage appreciated that was small.  Had been on Eliquis that was on hold from 5/8/2022 to 5/11/2022 and given Kcentra in the ED due to the subarachnoid hemorrhage.  Stable by MRI follow-up.    5/14/2022 carotid duplex revealed near occlusive DVT of right internal jugular vein felt secondary to her dialysis catheter.  Recommended eventual anticoagulation.    Exam: Seen at bedside.  Very frail   Does recognize me.   Blood pressure 125/65 pulse 80   Neck with dilated external jugular vein.    Right jugular tunneled catheter with no swelling    Left-sided pacemaker     Chest= clear   Right upper arm with clean ulcer measuring 1.2 x 1.2 cm    Resolving ecchymosis.  Palpable pulse in aVF.    Ulcer is lateral to fistula which is not involved     Applied Iodosorb ointment to ulcer followed by overlying gauze.    CT of chest/abdomen reviewed.  Thrombosed right internal jugular vein confirmed.  IMPRESSION: #1.  Improving right upper arm ulcer.  Can continue with daily Iodosorb and protective dressing.  Allowed to heal by secondary intent.       #2.   Right upper arm fistula is patent.  Good size by duplex ultrasound  preoperatively.  However, patient is not interested in using fistula though this would be her best dialysis access.     #3.  Right jugular tunneled catheter.  Occluded right internal jugular vein secondary to this.  Agree with eventual anticoagulation but not concerned about the jugular vein occlusion.  Would not remove internal jugular catheter unless she transitions over to the fistula.  Very low likelihood that this occlusion would lead to a pulmonary embolus or other complications.    Over 40 minutes with patient today including review of admission.  Office visit for tomorrow canceled.   Sree Eric MD

## 2022-05-18 NOTE — PROGRESS NOTES
"[facility]  Hospitalist Progress Note  Emily Castle MD  05/18/2022    Assessment & Plan    Francine Chris is a 76 year old female with history of atrial fibrillation on anticoagulation, severe tricuspid regurgitation, pulmonary hypertension, chronic right-sided heart failure, COPD, SKY, anxiety, depression, gout, peptic ulcer disease, peripheral arterial disease admitted on 5/8/2022 after a mechanical fall, found to have left femoral neck fracture.     Left femoral neck fracture s/p bipolar hemiarthroplasty  Mechanical fall  *Presents with fall slipping on wet floor  *XR with above fracture    Orthopedic surgery following, routine postoperative cares per orthopedic surgery    Holding apixaban for 2 weeks due to SAH     Subarachnoid hemorrhage, posterior right Sylvian fissure  Encephalopathy, multifactorial from SAH, opioids, acute illness    Nearly occlusive R internal jugular DVT   *Noted to be more confused 5/12, sister reports is not at baseline  *Head CT negative with initial fall 5/2, not checked this admission  *Apixaban resumed 5/11/22     Head CT STAT ordered, demonstrated hemorrhage as above    Discussed with neuro critical care and consulted    Levetiracetam ordered post HD x 7 days post HD for seizure prophylaxis (5/13-5/20)    Received Kcentra    Hold apixaban for 2 weeks \"from bleed stability\" before resuming    Q4H neuro checks with stable head CT    MRI/MRA shows stable small sylvian fissure SAH, no stroke, no significant stenosis although US would better characterize due to motion artifact.    discontinue all narcotics and anticholinergics, tylenol for pain    Goal SBP is < 140 mmHg; patient received 2 doses 10 mg IV Hydralazine during early morning hours (0120 and 0209); added Amlodipine 5 mg daily    MRI, MRV ordered by Neurology, did not show new venous-related stroke or other acute findings; internal jugular clot not well visualized     They also recommend CT C/A/P " "to rule out underlying malignancy.  This has not been done yet due to lack of IV access (needs large bore IV for IV contrast, can't get IV on left, has CVC on right)     Heme/Onc consultation recommended by Neurology re: internal jugular clot in setting of anticoagulation.  Discussed with Dr. Rodriguez, right IJ thrombosis is secondary to CVC double lumen right internal jugular line. No additional workup needed, no change in ultimate plan for resuming anticoagulants    See comments in Dr. Eric's note, \"Would not remove internal jugular catheter unless she transitions over to the fistula.  Very low likelihood that this occlusion would lead to a pulmonary embolus or other complications.\"  I appreciate his followup.     finally, \"Plan for repeat NCCT in 2 weeks, if bleed is stable/improved then re-initiation of DOAC can be discussed\"     ESRD on HD MWF  Hyponatremia, acute on chronic  On HD since 2021. Dialyzes MWF under care of Dr. Ro. Na 120 in ED (recent baseline runs low). Per Dr. Palmer, hyponatremia is due to large intradialytic weight gains.  5 kg is a typical goal in HD for fluid removal.    Nephrology following    fluid restriction    resumed usual MWF HD.  She has also been having UF due to hyponatremia, 3 liters removed 5/17    Baseline sodium readings vary from 119 (4/18) - 130 (5/10)     Permanent atrial fibrillation  S/p AVN ablation with ppm 6/2021    apixaban on hold for 14 days due to SAH     Severe tricuspid regurgitation   Pulmonary hypertension  Chronic R heart failure, compensated  HLD  [needs rec- atorvastatin 40 mg daily, metoprolol 12.5 mg BID, torsemide 40 mg daily]  Echo with EF 50-55%, nl LV fn, severe TR, pulmonary pressures 60-65 mmHg. Seen by CV surgery, deemed to be too high of a surgical risk for valve replacement.     continue lipitor, metoprolol      Anemia  Hgb at 10.5 on presentation, stable from recent    Hgb stable 5/12     COPD  [needs rec- prn albuterol, tiotropium-olodaterol 2 " "puffs daily]    Continue prior to admission inhaler regimen      SKY  No longer wears CPAP     Anxiety  Depression   Doesn't appear to currently be on meds for this     Gout    Continue allopurinol 100 mg MWF after dialysis      PUD    Continue pantoprazole 40 mg daily      PAD  noted    Clinically Significant Risk Factors Present on Admission     Diet: Advance Diet as Tolerated: Regular Diet Adult  Diet    DVT Prophylaxis: Pneumatic Compression Devices  Schuler Catheter: Not present  Code Status: Full Code          Disposition Plan  Pending improved encephalopathy. needs TCU at discharge    Emily Castle MD  Hospitalist  [facility]  Text Page (7am - 6pm, M-F)    Interval History   \"Oh.\"  Francine is very drowsy, mostly nonverbal, cannot obtain meaningful review of systems from her.  Received 2 doses of IV Hydralazine during early morning hours for blood pressure readings above goal.  No new respiratory or GI complaints.    -Data reviewed today: I reviewed all new labs and imaging over the last 24 hours. I personally reviewed no images or EKG's today.    Physical Exam    , Blood pressure 125/65, pulse 85, temperature 98  F (36.7  C), temperature source Oral, resp. rate 16, height 1.651 m (5' 5\"), weight 60 kg (132 lb 4.4 oz), SpO2 97 %.  Vitals:    05/09/22 1330 05/10/22 0500 05/14/22 0000   Weight: 64 kg (141 lb 1.5 oz) 66.1 kg (145 lb 11.6 oz) 60 kg (132 lb 4.4 oz)     Vital Signs with Ranges  Temp:  [97  F (36.1  C)-98  F (36.7  C)] 98  F (36.7  C)  Pulse:  [76-96] 85  Resp:  [16] 16  BP: (125-170)/(65-86) 125/65  SpO2:  [95 %-100 %] 97 %  I/O's Last 24 hours  I/O last 3 completed shifts:  In: 100 [P.O.:100]  Out: 3000 [Other:3000]    Constitutional: Frail lady  HEENT:  Left periorbital ecchymosis,    Respiratory: Clear to auscultation bilaterally, no crackles or wheezing  Cardiovascular: Regular rate and rhythm, normal S1 and S2, intermittently paced  GI: Normal bowel sounds, soft, non-distended, " non-tender  Skin/Integumen: Ecchymosis left knee with scattered ecchymoses elsewhere  Other: Mood is drowsy, confused    Medications   All medications were reviewed.      - MEDICATION INSTRUCTIONS for Dialysis Patients -   Does not apply See Admin Instructions     atorvastatin  40 mg Oral Daily     famotidine  10 mg Intravenous QPM    Or     famotidine  10 mg Oral QPM     levETIRAcetam  500 mg Intravenous Daily    Or     levETIRAcetam  500 mg Oral Daily    Or     levETIRAcetam  500 mg Per NG tube Daily     polyethylene glycol  17 g Oral Daily     senna-docusate  1 tablet Oral BID     sodium chloride (PF)  3 mL Intracatheter Q8H        Data   Recent Labs   Lab 05/18/22  0754 05/17/22  0833 05/16/22  0743 05/15/22  0723 05/14/22  1533 05/14/22  0528 05/12/22  1158 05/12/22  0913   WBC  --  8.6  --   --   --  12.1*  --  9.3   HGB  --  11.2*  --   --   --  11.5*  --  11.4*   MCV  --  106*  --   --   --  106*  --  104*   PLT  --  115*  --   --   --  115*  --  146*   * 123* 121* 125*  --  124*   < > 127*   POTASSIUM 4.4 5.1 5.3 4.5  --  5.5*   < > 5.1   CHLORIDE 90* 89* 89* 93*  --  93*   < > 94   CO2  --  22 17* 20  --  13*   < > 22   BUN  --  59* 54* 38*  --  62*   < > 41*   CR  --  5.07* 4.86* 3.56*  --  4.99*   < > 3.03*   ANIONGAP  --  12 15* 12  --  18*   < > 11   SILVIA  --  9.0 8.7 9.0  --  8.9   < > 7.6*   GLC  --  86 97 102*   < > 71   < > 95   ALBUMIN  --  2.8* 2.7* 2.8*  --  2.8*   < > 3.4    < > = values in this interval not displayed.       Recent Results (from the past 24 hour(s))   CT Chest/Abdomen/Pelvis w Contrast    Narrative    EXAM: CT CHEST/ABDOMEN/PELVIS W CONTRAST  LOCATION: [facility]  DATE/TIME: 5/17/2022 5:06 PM    INDICATION: Unknown primary, initial workup, vascular thrombosis, prior fall and hip fracture  COMPARISON: CT 05/18/2017  TECHNIQUE: CT scan of the chest, abdomen, and pelvis was performed following injection of IV contrast. Multiplanar reformats were  obtained. Dose reduction techniques were used.   CONTRAST: 68 mL Isovue 370    FINDINGS:   LUNGS AND PLEURA: Upper lobe predominant centrilobular emphysema. Single indeterminate 5 mm left lower lobe pulmonary nodule, not definitely visualized on prior abdominal CT (series 4 image 178). No pulmonary mass or focal airspace consolidation.    MEDIASTINUM/AXILLAE: Heart is enlarged, particularly the right atrium and right ventricle. Pacemaker leads again noted. There is a dialysis catheter with tip in the right atrium. No contrast seen in the right internal jugular vein, might correspond to   known thrombus. No suspicious mediastinal or axillary lymphadenopathy.    CORONARY ARTERY CALCIFICATION: Severe.    HEPATOBILIARY: Normal.    PANCREAS: Normal.    SPLEEN: Normal.    ADRENAL GLANDS: Bilateral adrenal hyperplasia without distinct nodule, unchanged since 2017.    KIDNEYS/BLADDER: Left kidney is atrophic in comparison to the right. Nonobstructing right renal stones. No hydronephrosis. Exophytic likely left renal cyst, no specific follow-up recommended. Urinary bladder is partially obscured but otherwise   unremarkable.    BOWEL: Diverticulosis of the colon. No acute inflammatory change. No obstruction. Large volume of stool in the rectum without significant associated inflammatory changes.    LYMPH NODES: No suspicious lymphadenopathy.    VASCULATURE: Extensive calcified atherosclerosis. Incidental subcentimeter left renal artery aneurysm, of doubtful clinical significance (coronal image 38).    PELVIC ORGANS: Unremarkable.    MUSCULOSKELETAL: Left hip arthroplasty partially visualized. Age-indeterminate compression deformities in the lower thoracic spine. Thoracolumbar scoliosis. No suspicious bony lesions.      Impression    IMPRESSION:  1.  No definite evidence of primary or metastatic malignancy in the chest, abdomen or pelvis.  2.  Single indeterminate 5 mm left lower lobe pulmonary nodule. If no primary malignancy  is identified, consider follow-up chest CT in 12 months.  3.  Incidental subcentimeter left renal artery aneurysm.  4.  Known right internal jugular vein thrombus again noted.    REFERENCE:  Guidelines for Management of Incidental Pulmonary Nodules Detected on CT Images: From the Fleischner Society 2017.   Guidelines apply to incidental nodules in patients who are 35 years or older.  Guidelines do not apply to lung cancer screening, patients with immunosuppression, or patients with known primary cancer.    SINGLE NODULE  Nodule size <6 mm  Low-risk patients: No follow-up needed.  High-risk patients: Optional follow-up at 12 months.

## 2022-05-18 NOTE — PROGRESS NOTES
[facility]    Stroke Progress Note    Interval EventsPatient continues to lethargic during exam but able to follow simple commands. Recommend routine EEG to rule out seizures.     HPI Summary  Francine is a 76 year old female with prior history of atrial fibrillation on Coumadin and ESRD on dialysis. She presented to the [facility] ED 5/8/2022 for evaluation after a fall.  She initially fell on 5/2/2022, received a CT head back then which was unremarkable, she fell again on 5/8/2022 that resulted in left femoral fracture.  The second time she fell there was no CT scan that was performed.  She subsequently underwent left hip arthroplasty, postop she was recovering but had a lot of confusion altered mental status, she was eventually started on apixaban for anticoagulation. Due to altered mental status CT head was obtained 5/12 that showed a new subarachnoid hemorrhage in the posterior aspect of the right sylvian fissure. Apixaban was reversed with Kcentra. Carotid US revealed right jugular DVT, felt to be asymptomatic with no urgent treatment recommended.        Stroke Evaluation Summarized     MRI/Head CT MRI: small stable right sylvian fissure hemorrhage; no acute infarct     CT head 5/13 @ 0805: stable hemorrhage  CT head 5/12 @ 2117: stable hemorrhage  CT head 5/12 @ 1539: new small subarachnoid hemorrhage in posterior aspect of right Sylvian fissure    Intracranial Vasculature MRV: motion degraded exam with no definite abnormal findings     MRA:  1.  Mild tapering of the internal carotid arteries, suggestive of atherosclerosis.  2.  No proximal occlusion, saccular aneurysm, or high flow vascular malformation.   Cervical Vasculature Carotid US: nearly occlusive DVT of internal jugular vein; 50-60% stenosis of right ICA, <50% stenosis of left ICA     MRA:  1.  Incomplete and motion degraded exam. Narrowing of the bilateral carotid bifurcations with at least mild stenosis on the left and  "mild to moderate stenosis on the right. Consider ultrasound for better evaluation.         EKG/Telemetry Ventricular-paced rhythm    Other Testing CT chest/abdomen/pelvis:   1.  No definite evidence of primary or metastatic malignancy in the chest, abdomen or pelvis.  2.  Single indeterminate 5 mm left lower lobe pulmonary nodule. If no primary malignancy is identified, consider follow-up chest CT in 12 months.  3.  Incidental subcentimeter left renal artery aneurysm.  4.  Known right internal jugular vein thrombus again noted.      LDL  No lab value available in past 30 days   A1C  No lab value available in past 30 days   Troponin No lab value available in past 48 hrs      Impression & recommendations:  1. Acute hemorrhage in right Sylvian fissure secondary to trauma, s/p Kcentra for anticoagulation reversal   -neuro checks q4 hours  -continue to hold anticoagulation; no strong indication to start for asymptomatic jugular DVT (more below)  - for now, plan to repeat CT in 2 weeks (ordered) and consider restarting Eliquis if hemorrhage is stable  -Goal BP is <140/90 with tighter control associated with improved vascular outcomes  - Routine EEG to rule out seizures given prolonged lethargy        2. 50-60% stenosis of right ICA, asymptomatic  -outpatient follow up with neurosurgery (order placed)       Patient Follow-up    - in the next 1-2 week(s) with PCP  - in 4-6 weeks with [facility] or other local neurologist of patient's choice      We will follow peripherally for results of EEG and if no concerns then will sign off. Please contact us with any additional questions.    JUSTO Hartman, CNP  Vascular Neurology  To page me or covering stroke neurology team member, click here: AMCOM   Choose \"On Call\" tab at top, then search dropdown box for \"Neurology Adult\", select location, press Enter, then look for stroke/neuro " ICU/telestroke.    ______________________________________________________    Clinically Significant Risk Factors Present on Admission                  Medications   Scheduled Meds    - MEDICATION INSTRUCTIONS for Dialysis Patients -   Does not apply See Admin Instructions     amLODIPine  5 mg Oral Daily     atorvastatin  40 mg Oral Daily     famotidine  10 mg Intravenous QPM    Or     famotidine  10 mg Oral QPM     levETIRAcetam  500 mg Intravenous Daily    Or     levETIRAcetam  500 mg Oral Daily    Or     levETIRAcetam  500 mg Per NG tube Daily     polyethylene glycol  17 g Oral Daily     senna-docusate  1 tablet Oral BID     sodium chloride (PF)  3 mL Intracatheter Q8H       Infusion Meds      PRN Meds  acetaminophen, sore throat lozenge, bisacodyl, hydrALAZINE, labetalol, lidocaine 4%, lidocaine (buffered or not buffered), naloxone **OR** naloxone **OR** naloxone **OR** naloxone, prochlorperazine **OR** prochlorperazine, sodium chloride, sodium chloride (PF)       PHYSICAL EXAMINATION  Temp:  [97  F (36.1  C)-98  F (36.7  C)] 98  F (36.7  C)  Pulse:  [80-96] 85  Resp:  [16] 16  BP: (125-170)/(65-86) 125/65  SpO2:  [95 %-100 %] 97 %      General Exam  General:  patient lying in bed without any acute distress    HEENT:  normocephalic/atraumatic  Pulmonary:  no respiratory distress     Neuro Exam  Mental Status: oriented to person, place, lethargic but arouses to voice and stimulation; follows one step commands, speech clear  Cranial Nerves:  visual fields intact, EOMI with normal smooth pursuit, facial sensation intact and symmetric, facial movements symmetric, hearing not formally tested but intact to conversation, no dysarthria, tongue protrusion midline  Motor:  normal muscle tone and bulk, no abnormal movements, able to move all limbs spontaneously, no pronator drift  Sensory:  light touch sensation intact and symmetric throughout upper and lower extremities, no extinction on double simultaneous stimulation    Coordination: deferred  Station/Gait:  deferred    Imaging  I personally reviewed all imaging; relevant findings per HPI.     Lab Results Data   CBC  Recent Labs   Lab 05/17/22  0833 05/14/22  0528 05/12/22  0913   WBC 8.6 12.1* 9.3   RBC 3.21* 3.24* 3.16*   HGB 11.2* 11.5* 11.4*   HCT 33.9* 34.2* 32.7*   * 115* 146*     Basic Metabolic Panel    Recent Labs   Lab 05/18/22  0754 05/17/22  0833 05/16/22  0743   * 123* 121*   POTASSIUM 4.4 5.1 5.3   CHLORIDE 90* 89* 89*   CO2 22 22 17*   BUN 42* 59* 54*   CR 4.08* 5.07* 4.86*   * 86 97   SILVIA 9.5 9.0 8.7     Liver Panel  Recent Labs   Lab 05/18/22  0754 05/17/22  0833 05/16/22  0743   ALBUMIN 2.9* 2.8* 2.7*     INR    Recent Labs   Lab Test 05/10/22  0729 05/08/22  1752 04/17/22  0737   INR 1.42* 1.62* 1.15      Lipid Profile  No lab results found.  A1C  No lab results found.  Troponin I  No results for input(s): TROPONINIS, TROPONINI, GHTROP in the last 168 hours.

## 2022-05-19 NOTE — PLAN OF CARE
Goal Outcome Evaluation:    Plan of Care Reviewed With: patient     Overall Patient Progress: no change    A&O x1 to Person Only.   CMS Intact.   VSS on RA.   Up with Ax2 GB and walker.   Due to Void.  1500mL taken off during dialysis today.   Denies Pain.   Continue to monitor.    Patient vital signs are at baseline: Yes  Patient able to ambulate as they were prior to admission or with assist devices provided by therapies during their stay:  Yes  Patient MUST void prior to discharge:  Yes, Receives dialysis Tues, Thur, Sat.  Patient able to tolerate oral intake:  Yes  Pain has adequate pain control using Oral analgesics:  Yes  Does patient have an identified :  No,  Reason:  Discharging 05/20/2022 to [facility] facility.  Has goal D/C date and time been discussed with patient:  Yes  Discharging 05/20/2022 to [facility] facility.

## 2022-05-19 NOTE — PROGRESS NOTES
Spoke w/ Dialysis.  They stated that pt will be receiving dialysis Tuesday, Thursday, Saturday while in hospital.  Pt received dialysis M,W,F as outpatient.

## 2022-05-19 NOTE — PROGRESS NOTES
Potassium   Date Value Ref Range Status   05/19/2022 4.5 3.4 - 5.3 mmol/L Final   01/12/2005 4.5 3.4 - 5.3 mmol/L Final     Hemoglobin   Date Value Ref Range Status   05/17/2022 11.2 (L) 11.7 - 15.7 g/dL Final   01/12/2005 15.6 11.7 - 15.7 g/dL Final     Creatinine   Date Value Ref Range Status   05/19/2022 5.23 (H) 0.52 - 1.04 mg/dL Final   01/12/2005 0.80 0.60 - 1.30 mg/dL Final     Urea Nitrogen   Date Value Ref Range Status   05/19/2022 58 (H) 7 - 30 mg/dL Final   01/12/2005 16 7 - 30 mg/dL Final     INR   Date Value Ref Range Status   05/10/2022 1.42 (H) 0.85 - 1.15 Final      Latest Reference Range & Units 01/12/22 11:38   Hep B Surface Agn Nonreactive  Nonreactive   Hepatitis B Surface Antibody <8.00 m[IU]/mL >1,000.00 (H) [1]   (H): Data is abnormally high  [1] Reactive, Patient is considered to be immune to infection with hepatitis B when the value is greater than or equal to 12.0 mIU/mL.  DIALYSIS PROCEDURE NOTE  Hepatitis status of previous patient on machine log was checked and verified ok to use with this patients hepatitis status.  Patient dialyzed for 3 hrs. on a 3 K bath Na 140 Bicarb 38 with a net fluid removal of 1.5L.  A BFR of 400 ml/min was obtained via a R CVC.  The treatment plan was discussed with Chepe during the treatment.  Total heparin received during the treatment: 0 units.    Incapacitated Nurse education completed at 0840, Machine test alarm pass at 0749.  Line flushed, clamped and capped with heparin 1:1000 1.6mL (1600 units) per lumen  Meds  Given: none IV Complications: none    Person educated: patient, Knowledge base: SWEETIE,Barriers to learning none: pt. disoriented ,   ICEBOAT?   I: Patient was identified using 2 identifiers  C: Consent obtained/verified current before treatment  E: Equipment preventative maintenance is current and dialysis delivery system OK to use  B: Hepatitis B Status: See above  O: Dialysis orders present and complete prior to treatment  A: Vascular access  verified and assessed prior to treatment  T: Treatment was performed at a clinically appropriate time  ?: Patient was allowed to ask questions and address concerns prior to treatment  See flowsheet in EPIC for further details and post assessment.  Machine water alarm in place and functioning. Transducer pods intact and checked every 15min.  Pt dialyzed in 377.  Chlorine/Chloramine water system checked every 4 hours.  Outpatient Dialysis at [facility] on MWF

## 2022-05-19 NOTE — PLAN OF CARE
Goal Outcome Evaluation:    A&Ox2. Confused at times, VSS-RA. Pt Denies pain. Unable to complete full neuro exam. CMS intact. Incontinent of bowel x2. CHRISTIANE ulcer dressing changed by the vascular MD. Drsg to AV Fistula CDI. Drsg CDI to L hip. PIV patent, SL,  Multiple scabs, bruises and redness upper and lower extremity. EEG completed. Poor appetite- drink 3/4 of unsure. Will continue to monitor.

## 2022-05-19 NOTE — PROGRESS NOTES
Assessment and Plan:   ESRD: seen on dialysis. Running with R CVC, 400 BFR, 3 h, 3L UF. 2K, 38 HCO3, 140 Na. No heparin. Now running T Th S while in-patient.  Labs today show Na 122 (this will improve with 3 L UF), K 4.5, HCO3 22. Ca 9.8, phos 5.6. Her alb is 2.8.             Interval History:   S/P L MANA: POD #6. Hx of hip fracture.       PVD:   Afib on anticoagulation.   Fall: CT showed SAH. Apixaban on hold. No heparin on dialysis.                  Review of Systems:   Non-conversant.           Medications:       - MEDICATION INSTRUCTIONS for Dialysis Patients -   Does not apply See Admin Instructions     sodium chloride 0.9%  250 mL Intravenous Once in dialysis/CRRT     sodium chloride 0.9%  300 mL Hemodialysis Machine Once     amLODIPine  5 mg Oral Daily     atorvastatin  40 mg Oral Daily     famotidine  10 mg Intravenous QPM    Or     famotidine  10 mg Oral QPM     sodium chloride (PF) 0.9%  1.3-2.6 mL Intracatheter Once in dialysis/CRRT    Followed by     heparin  3 mL Intracatheter Once in dialysis/CRRT     sodium chloride (PF) 0.9%  1.3-2.6 mL Intracatheter Once in dialysis/CRRT    Followed by     heparin  3 mL Intracatheter Once in dialysis/CRRT     levETIRAcetam  500 mg Intravenous Daily    Or     levETIRAcetam  500 mg Oral Daily    Or     levETIRAcetam  500 mg Per NG tube Daily     - MEDICATION INSTRUCTIONS -   Does not apply Once     polyethylene glycol  17 g Oral Daily     senna-docusate  1 tablet Oral BID     sodium chloride (PF)  3 mL Intracatheter Q8H     sodium chloride (PF)  9 mL Intracatheter During Dialysis/CRRT (from stock)     sodium chloride (PF)  9 mL Intracatheter During Dialysis/CRRT (from stock)         Current active medications and PTA medications reviewed, see medication list for details.            Physical Exam:   Vitals were reviewed  Patient Vitals for the past 24 hrs:   BP Temp Temp src Pulse Resp SpO2   05/19/22 0754 (!) 143/76 97.2  F (36.2  C) Axillary 77 16 96 %    22 0658 121/63 -- -- 72 -- --   22 0640 (!) 149/77 -- -- 82 -- --   22 0100 (!) 141/88 97.3  F (36.3  C) Axillary 79 16 99 %   22 1520 (!) 146/79 98.2  F (36.8  C) Oral 70 16 99 %       Temp:  [97.2  F (36.2  C)-98.2  F (36.8  C)] 97.2  F (36.2  C)  Pulse:  [70-82] 77  Resp:  [16] 16  BP: (121-149)/(63-88) 143/76  SpO2:  [96 %-99 %] 96 %    Temperatures:  Current - Temp: 97.2  F (36.2  C); Max - Temp  Av.6  F (36.4  C)  Min: 97.2  F (36.2  C)  Max: 98.2  F (36.8  C)  Respiration range: Resp  Av  Min: 16  Max: 16  Pulse range: Pulse  Av  Min: 70  Max: 82  Blood pressure range: Systolic (24hrs), Av , Min:121 , Max:149   ; Diastolic (24hrs), Av, Min:63, Max:88    Pulse oximetry range: SpO2  Av %  Min: 96 %  Max: 99 %    I/O last 3 completed shifts:  In: 440 [P.O.:440]  Out: -       Intake/Output Summary (Last 24 hours) at 2022 0907  Last data filed at 2022 1800  Gross per 24 hour   Intake 440 ml   Output --   Net 440 ml       Somnolent, non-conversant  Head with ecchymosis and laceration over left face  R CVC with no redness or tenderness  Lungs with clear ant BS  Cor RRR nl S1 S2 2/6 sys M  LE no edema, LLE with marked ecchymosis       Wt Readings from Last 4 Encounters:   22 60 kg (132 lb 4.4 oz)   22 64 kg (141 lb)   22 64 kg (141 lb)   22 63.8 kg (140 lb 9.6 oz)          Data:          Lab Results   Component Value Date     2022     2022     2022     2005    Lab Results   Component Value Date    CHLORIDE 87 2022    CHLORIDE 90 2022    CHLORIDE 89 2022    CHLORIDE 105 2005    Lab Results   Component Value Date    BUN 58 2022    BUN 42 2022    BUN 59 2022    BUN 16 2005      Lab Results   Component Value Date    POTASSIUM 4.5 2022    POTASSIUM 4.4 2022    POTASSIUM 5.1 2022    POTASSIUM 4.5 2005    Lab Results    Component Value Date    CO2 22 05/19/2022    CO2 22 05/18/2022    CO2 22 05/17/2022    CO2 27 01/12/2005    Lab Results   Component Value Date    CR 5.23 05/19/2022    CR 4.08 05/18/2022    CR 5.07 05/17/2022    CR 0.80 01/12/2005        Recent Labs   Lab Test 05/17/22  0833 05/14/22  0528 05/12/22  0913   WBC 8.6 12.1* 9.3   HGB 11.2* 11.5* 11.4*   HCT 33.9* 34.2* 32.7*   * 106* 104*   * 115* 146*     Recent Labs   Lab Test 02/15/22  0624 01/26/22  0638 01/12/22  0113   AST 37 43 76*   ALT 32 46 49   ALKPHOS 122 148 148   BILITOTAL 1.0 1.4* 0.9       No results for input(s): MAG in the last 47947 hours.  Recent Labs   Lab Test 05/19/22  0707 05/18/22  0754 05/17/22  0833   PHOS 5.6* 4.7* 5.9*     Recent Labs   Lab Test 05/19/22  0707 05/18/22  0754 05/17/22  0833   SILVIA 9.8 9.5 9.0       Lab Results   Component Value Date    SILVIA 9.8 05/19/2022     Lab Results   Component Value Date    WBC 8.6 05/17/2022    HGB 11.2 (L) 05/17/2022    HCT 33.9 (L) 05/17/2022     (H) 05/17/2022     (L) 05/17/2022     Lab Results   Component Value Date     (L) 05/19/2022    POTASSIUM 4.5 05/19/2022    CHLORIDE 87 (L) 05/19/2022    CO2 22 05/19/2022     (H) 05/19/2022     Lab Results   Component Value Date    BUN 58 (H) 05/19/2022    CR 5.23 (H) 05/19/2022     No results found for: MAG  Lab Results   Component Value Date    PHOS 5.6 (H) 05/19/2022       Creatinine   Date Value Ref Range Status   05/19/2022 5.23 (H) 0.52 - 1.04 mg/dL Final   05/18/2022 4.08 (H) 0.52 - 1.04 mg/dL Final   05/17/2022 5.07 (H) 0.52 - 1.04 mg/dL Final   05/16/2022 4.86 (H) 0.52 - 1.04 mg/dL Final   05/15/2022 3.56 (H) 0.52 - 1.04 mg/dL Final   05/14/2022 4.99 (H) 0.52 - 1.04 mg/dL Final   01/12/2005 0.80 0.60 - 1.30 mg/dL Final       Attestation:  I have reviewed today's vital signs, notes, medications, labs and imaging.  Seen on dialysis.      Amrit Mo MD

## 2022-05-19 NOTE — PLAN OF CARE
Pt A&O x 2, vss on ra labetalol given x 1 for sbp > 140, cms intact. Lethargic overnight, unable to complete full neuro assessment as pt would fall asleep. Dressing RUE and L hip CDI. Incontinent of bowel 1 BM this shift. On 1500ml fluid restriction.Will continue to monitor.

## 2022-05-19 NOTE — PROGRESS NOTES
"[facility]  WOC Nurse Inpatient Assessment     Today's Assessment: RUE    Patient History (according to provider note(s):    \"76 year old female admitted on 5/8/2022 after a mechanical fall. Found to have left femoral neck fracture.\"    AREAS ASSESSED:    Areas visualized during today's visit: Focused: and RUE       Wound Location: RUE near AC             Last photo: 5/13  Wound due to: Surgical Wound  Wound history/plan of care:   Per sister at bedside, was previously using a silver alginate and then iodosorb in hospital.   Wound base:  Very dry lifeless tissue, 80% yellow slough, 20% dusky maroon at right lateral edge     Palpation of the wound bed: firm      Drainage: scant     Description of drainage: serosanguinous     Measurements (length x width x depth, in cm) 1.5  x 1.1  x  0.5 cm      Tunneling N/A     Undermining N/A  Periwound skin: Intact      Color: pink and purple at distal aspect      Temperature: normal   Odor: none  Pain: no grimacing or signs of discomfort  Pain interventions prior to dressing change: patient tolerated well  Treatment goal: Heal , Infection control/prevention and Remove necrotic tissue  STATUS: stalled, nonviable tissue in base, no further odor  Supplies ordered: ordered plurogel       TREATMENT PLAN:     RUE wound(s): Daily  1. Cleanse with Vashe ( #[id number]) and 4x4\" gauze, pat dry   2. Apply Plurogel (#[id number]) to wound bed.   3. Cover with polymem foam (# [id number])  4. Time/Date/Initial dressing change     Orders: Updated    RECOMMEND PRIMARY TEAM ORDER: None, at this time  Education provided: plan of care and Infection prevention   Discussed plan of care with: Patient and Nurse  WOC Nurse follow-up plan:weekly  Notify WOC if wound(s) deteriorate.  Nursing to notify the Provider(s) and re-consult the WOC Nurse if new skin concern.    DATA:     Current support surface: Standard  Low air loss mattress  Containment of urine/stool: Anuric and Continent of " bowel  BMI: Body mass index is 22.01 kg/m .   Active Diet Order: Orders Placed This Encounter      Advance Diet as Tolerated: Regular Diet Adult      Diet     Output: I/O last 3 completed shifts:  In: 440 [P.O.:440]  Out: -    Labs:   Recent Labs   Lab 05/19/22  0707 05/18/22  0754 05/17/22  0833   ALBUMIN 2.8*   < > 2.8*   HGB  --   --  11.2*   WBC  --   --  8.6    < > = values in this interval not displayed.     Pressure Injury Risk Assessment:   Miguel Risk Assessment  Sensory Perception: 3-->slightly limited  Moisture: 3-->occasionally moist  Activity: 2-->chairfast  Mobility: 2-->very limited  Nutrition: 2-->probably inadequate  Friction and Shear: 2-->potential problem  Miguel Score: 14    Roddy Elmore [facility] Hospital[facility]FISA   Dept. Pager: [phone]  Dept. Office Number: [phone]

## 2022-05-19 NOTE — PROGRESS NOTES
"CLINICAL NUTRITION SERVICES - REASSESSMENT NOTE    Recommendations Ordered by Registered Dietitian (RD):   - modify supplements  Nepro w/ lunch  Ensure Clear w/ dinner   Malnutrition:  % Weight Loss:  > 2% in 1 week (severe malnutrition)  % Intake:  </= 50% for >/= 5 days (severe malnutrition)  Subcutaneous Fat Loss:  Orbital region moderate depletion and Upper arm region moderate depletion  Muscle Loss:  Temporal region severe depletion, Clavicle bone region moderate-severe depletion, Acromion bone region severe depletion and Dorsal hand region moderate depletion  Fluid Retention:  Mild      Malnutrition Diagnosis: Severe malnutrition  In Context of:  Acute illness or injury  Chronic illness or disease     EVALUATION OF PROGRESS TOWARD GOALS   Diet: Regular Diet   Fluid Restriction 1500 mL  Nepro supplements w/ meals   Intake/Tolerance:    - Per RN, poor appetite, did drink 3/4 of a supplement yesterday.   - No intakes recorded in flow sheets since 5/15 (prior to most recent RD assessment on )  - Pt states her appetite \"sucks\". She dislikes Nepro. Seemed willing to try the Ensure Clear, then later said she didn't want it. Will send a trial with dinner. She is aware that she needs more protein in the setting of dialysis.     - Breakfast this morning: applesauce, cream of wheat, Mauritian toast, giles, milk.   - Yesterday only received OJ and Oatmeal for breakfast and 2 supplements later in the day.   - Stooling   - x2   - x3   - x2  - Labs: Na 122 (L). BUN 58 (H), Cr 5.23 (H) - ESRD on HD  Phos 5.6 (H) - not eating enough for diet to be contributing.   - Weight trendin kg on . Fluctuations in fluid status in setting of dialysis.     ASSESSED NUTRITION NEEDS:  Dosing Weight:  60 kg ()  Estimated Energy Needs: 1774-7261+ kcals (25-30+ Kcal/Kg)  Justification: maintenance  Estimated Protein Needs: 72-90 grams protein (1.2-1.5 g pro/Kg)  Justification: Repletion, wound healing, hypercatabolism " with acute illness and dialysis  Estimated Fluid Needs: 8560-8280 mL (1 mL/Kcal)  Justification: per provider pending fluid status    NEW FINDINGS:   - HD run today.     Previous Goals:   Pt will consume > 50% meals TID and supplements in 3-4 days.  Evaluation: Not met    Previous Nutrition Diagnosis:   Inadequate oral intake related to decreased mentation and decreased appetite as evidenced by variable oral intake since admission x 7 days, mostly poor-fair, and loss of subcutaneous fat and muscle mass  Evaluation: No change    CURRENT NUTRITION DIAGNOSIS  Inadequate oral intake related to no appetite as evidenced by variable oral intake since admission x11 days, mostly poor-fair, and loss of subcutaneous fat and muscle mass, patient report of very poor appetite during visit.     INTERVENTIONS  Recommendations / Nutrition Prescription  Regular diet  1500 mL FR  Nepro w/ lunch, Ensure Clear w/ dinner    Implementation  Nutrition Education: reviewed importance of oral intakes.     Goals  Intake of >50% meals at least BID + 1 supplement daily.       MONITORING AND EVALUATION:  Progress towards goals will be monitored and evaluated per protocol and Practice Guidelines    Glenis Richard RD, LD  [facility], [facility], [facility]  Pager: [phone]  Weekend Pager: [phone]

## 2022-05-19 NOTE — PROGRESS NOTES
"[facility]  Hospitalist Progress Note  Emily Castle MD  05/19/2022    Assessment & Plan    Francine Chris is a 76 year old female with history of atrial fibrillation on anticoagulation, severe tricuspid regurgitation, pulmonary hypertension, chronic right-sided heart failure, COPD, SKY, anxiety, depression, gout, peptic ulcer disease, peripheral arterial disease admitted on 5/8/2022 after a mechanical fall, found to have left femoral neck fracture.     Left femoral neck fracture s/p bipolar hemiarthroplasty  Mechanical fall  *Presents with fall slipping on wet floor  *XR with above fracture    Orthopedic surgery following, routine postoperative cares per orthopedic surgery    Holding apixaban for 2 weeks due to SAH     Subarachnoid hemorrhage, posterior right Sylvian fissure  Encephalopathy, multifactorial from SAH, opioids, acute illness    Nearly occlusive R internal jugular DVT   *Noted to be more confused 5/12, sister reports is not at baseline  *Head CT negative with initial fall 5/2, not checked this admission  *Apixaban resumed 5/11/22     Head CT STAT ordered, demonstrated hemorrhage as above    Discussed with neuro critical care and consulted    Levetiracetam ordered post HD x 7 days post HD for seizure prophylaxis (5/13-5/20)    Received Kcentra    Hold apixaban for 2 weeks \"from bleed stability\" before resuming    Q4H neuro checks with stable head CT    MRI/MRA shows stable small sylvian fissure SAH, no stroke, no significant stenosis although US would better characterize due to motion artifact.    discontinue all narcotics and anticholinergics, tylenol for pain    Goal SBP is < 140 mmHg; patient received 2 doses 10 mg IV Hydralazine during early morning hours (0120 and 0209); added Amlodipine 5 mg daily    MRI, MRV ordered by Neurology, did not show new venous-related stroke or other acute findings; internal jugular clot not well visualized     They also recommend CT C/A/P " "to rule out underlying malignancy.  This shows single indeterminate 5 mm left lower lobe pulmonary nodule, nothing for malignancy     Heme/Onc consultation recommended by Neurology re: internal jugular clot in setting of anticoagulation.  Discussed with Dr. Rodriguez, right IJ thrombosis is secondary to CVC double lumen right internal jugular line. No additional workup needed, no change in ultimate plan for resuming anticoagulants    See comments in Dr. Eric's note, \"Would not remove internal jugular catheter unless she transitions over to the fistula.  Very low likelihood that this occlusion would lead to a pulmonary embolus or other complications.\"  I appreciate his followup.    Per Neuro, \"check EEG given lethargy\"     finally, \"Plan for repeat NCCT in 2 weeks, if bleed is stable/improved then re-initiation of DOAC can be discussed\"     ESRD on HD MWF  Hyponatremia, acute on chronic  On HD since 2021. Dialyzes MWF under care of Dr. Ro. Na 120 in ED (recent baseline runs low). Per Dr. Palmer, hyponatremia is due to large intradialytic weight gains.  5 kg is a typical goal in HD for fluid removal.    Nephrology following    fluid restriction    resumed usual MWF HD.  She has also been having UF due to hyponatremia, 3 liters removed 5/17    Baseline sodium readings vary from 119 (4/18) - 130 (5/10)     Permanent atrial fibrillation  S/p AVN ablation with ppm 6/2021    apixaban on hold for 14 days due to SAH     Severe tricuspid regurgitation   Pulmonary hypertension  Chronic R heart failure, compensated  HLD  [needs rec- atorvastatin 40 mg daily, metoprolol 12.5 mg BID, torsemide 40 mg daily]  Echo with EF 50-55%, nl LV fn, severe TR, pulmonary pressures 60-65 mmHg. Seen by CV surgery, deemed to be too high of a surgical risk for valve replacement.     continue lipitor, metoprolol      Anemia  Hgb at 10.5 on presentation, stable from recent    Hgb stable 5/12     COPD  [needs rec- prn albuterol, " "tiotropium-olodaterol 2 puffs daily]    Continue prior to admission inhaler regimen      SKY  No longer wears CPAP     Anxiety  Depression   Doesn't appear to currently be on meds for this     Gout    Continue allopurinol 100 mg MWF after dialysis      PUD    Continue pantoprazole 40 mg daily      PAD  noted    Clinically Significant Risk Factors Present on Admission     Diet: Advance Diet as Tolerated: Regular Diet Adult  Diet    DVT Prophylaxis: Pneumatic Compression Devices  Schuler Catheter: Not present  Code Status: Full Code          Disposition Plan  Pending improved encephalopathy. needs TCU at discharge    Emily Castle MD  Hospitalist  [facility]  Text Page (7am - 6pm, M-F)    Interval History   \"Rotten.\"  Francine says she feels rotten.  When I asked her what is wrong, she says, \"my roommate.\"  (She is in a private room at the hospital, she is referring to her roommate at her long-term care facility.)  She is asking for orange juice after her dialysis run.  She has no new respiratory or GI complaints.    -Data reviewed today: I reviewed all new labs and imaging over the last 24 hours. I personally reviewed no images or EKG's today.    Physical Exam    , Blood pressure (!) 143/74, pulse 71, temperature (!) 96.7  F (35.9  C), temperature source Axillary, resp. rate 16, height 1.651 m (5' 5\"), weight 60 kg (132 lb 4.4 oz), SpO2 99 %.  Vitals:    05/09/22 1330 05/10/22 0500 05/14/22 0000   Weight: 64 kg (141 lb 1.5 oz) 66.1 kg (145 lb 11.6 oz) 60 kg (132 lb 4.4 oz)     Vital Signs with Ranges  Temp:  [96.7  F (35.9  C)-98.2  F (36.8  C)] 96.7  F (35.9  C)  Pulse:  [69-82] 71  Resp:  [16] 16  BP: (121-149)/(63-88) 143/74  SpO2:  [96 %-99 %] 99 %  I/O's Last 24 hours  I/O last 3 completed shifts:  In: 440 [P.O.:440]  Out: -     Constitutional: Frail lady  HEENT:  Left periorbital ecchymosis,    Respiratory: Clear to auscultation bilaterally, no crackles or wheezing  Cardiovascular: Regular rate " and rhythm, normal S1 and S2, intermittently paced  GI: Normal bowel sounds, soft, non-distended, non-tender  Skin/Integumen: Ecchymosis both knees with scattered ecchymoses elsewhere.  Bandage covers wound on right upper arm.    Other: she is more alert today, remains confused    Medications   All medications were reviewed.      - MEDICATION INSTRUCTIONS for Dialysis Patients -   Does not apply See Admin Instructions     sodium chloride 0.9%  250 mL Intravenous Once in dialysis/CRRT     sodium chloride 0.9%  300 mL Hemodialysis Machine Once     amLODIPine  5 mg Oral Daily     atorvastatin  40 mg Oral Daily     famotidine  10 mg Intravenous QPM    Or     famotidine  10 mg Oral QPM     sodium chloride (PF) 0.9%  1.3-2.6 mL Intracatheter Once in dialysis/CRRT    Followed by     heparin  3 mL Intracatheter Once in dialysis/CRRT     sodium chloride (PF) 0.9%  1.3-2.6 mL Intracatheter Once in dialysis/CRRT    Followed by     heparin  3 mL Intracatheter Once in dialysis/CRRT     levETIRAcetam  500 mg Intravenous Daily    Or     levETIRAcetam  500 mg Oral Daily    Or     levETIRAcetam  500 mg Per NG tube Daily     - MEDICATION INSTRUCTIONS -   Does not apply Once     polyethylene glycol  17 g Oral Daily     senna-docusate  1 tablet Oral BID     sodium chloride (PF)  3 mL Intracatheter Q8H     sodium chloride (PF)  9 mL Intracatheter During Dialysis/CRRT (from stock)     sodium chloride (PF)  9 mL Intracatheter During Dialysis/CRRT (from stock)        Data   Recent Labs   Lab 05/19/22  0707 05/18/22  0754 05/17/22  0833 05/14/22  1533 05/14/22  0528   WBC  --   --  8.6  --  12.1*   HGB  --   --  11.2*  --  11.5*   MCV  --   --  106*  --  106*   PLT  --   --  115*  --  115*   * 125* 123*   < > 124*   POTASSIUM 4.5 4.4 5.1   < > 5.5*   CHLORIDE 87* 90* 89*   < > 93*   CO2 22 22 22   < > 13*   BUN 58* 42* 59*   < > 62*   CR 5.23* 4.08* 5.07*   < > 4.99*   ANIONGAP 13 13 12   < > 18*   SILVIA 9.8 9.5 9.0   < > 8.9   *  119* 86   < > 71   ALBUMIN 2.8* 2.9* 2.8*   < > 2.8*    < > = values in this interval not displayed.       No results found for this or any previous visit (from the past 24 hour(s)).

## 2022-05-20 NOTE — PROGRESS NOTES
[facility]  Hospitalist Progress Note  Emily Castle MD  05/20/2022    Assessment & Plan       Francine Chris is a 76 year old female with history of atrial fibrillation on anticoagulation, severe tricuspid regurgitation, pulmonary hypertension, chronic right-sided heart failure, COPD, SKY, anxiety, depression, gout, peptic ulcer disease, peripheral arterial disease admitted on 5/8/2022 after a mechanical fall, found to have left femoral neck fracture.     Goals of care    I received a message from Eileen Arredondo, primary care nurse practitioner with [facility] ([phone]).  rFancine's sister, Tereza called her asking about goals of care.     I requested Palliative Care consult and discussed with Julee Greene, LANRE.  I appreciate her evaluation and recommendations    Francine clearly lacks capacity for decision-making.  Her sister, Tereza is making decisions on her behalf and requested changing CODE STATUS from full code to DNR/DNI.  Tereza has been very involved with Francine's care over many years.    Tereza also notes that Francine's condition is worsening significantly over the past year resulting in frequent hospitalizations.  She would like Francine to transition to hospice focused care upon return to her facility.    Anticipate final hemodialysis session today with possible discharge back to her facility tomorrow     Left femoral neck fracture s/p bipolar hemiarthroplasty  Mechanical fall  *Presents with fall slipping on wet floor  *XR with above fracture    Orthopedic surgery following, routine postoperative cares per orthopedic surgery    Holding apixaban for 2 weeks due to SAH     Subarachnoid hemorrhage, posterior right Sylvian fissure  Encephalopathy, multifactorial from SAH, opioids, acute illness    Nearly occlusive R internal jugular DVT   *Noted to be more confused 5/12, sister reports is not at baseline  *Head CT negative with initial fall 5/2, not  "checked this admission  *Apixaban resumed 5/11/22     Head CT STAT ordered, demonstrated hemorrhage as above    Discussed with neuro critical care and consulted    Levetiracetam ordered post HD x 7 days post HD for seizure prophylaxis (5/13-5/20)    Received Kcentra    Hold apixaban for 2 weeks \"from bleed stability\" before resuming    Q4H neuro checks with stable head CT    MRI/MRA shows stable small sylvian fissure SAH, no stroke, no significant stenosis although US would better characterize due to motion artifact.    discontinue all narcotics and anticholinergics, tylenol for pain    Goal SBP is < 140 mmHg; patient received 2 doses 10 mg IV Hydralazine during early morning hours (0120 and 0209); added Amlodipine 5 mg daily    MRI, MRV ordered by Neurology, did not show new venous-related stroke or other acute findings; internal jugular clot not well visualized     They also recommend CT C/A/P to rule out underlying malignancy.  This shows single indeterminate 5 mm left lower lobe pulmonary nodule, nothing for malignancy     Heme/Onc consultation recommended by Neurology re: internal jugular clot in setting of anticoagulation.  Discussed with Dr. Rodriguez, right IJ thrombosis is secondary to CVC double lumen right internal jugular line. No additional workup needed, no change in ultimate plan for resuming anticoagulants    See comments in Dr. Eric's note, \"Would not remove internal jugular catheter unless she transitions over to the fistula.  Very low likelihood that this occlusion would lead to a pulmonary embolus or other complications.\"  I appreciate his followup.    Neuro checked EEG, given lethargy.  It shows, \"EEG with diffuse theta delta slowing, consistent with moderate diffuse nonspecific encephalopathy.    No electrographic seizures or epileptiform discharges were recorded.\"     finally, \"Plan for repeat NCCT in 2 weeks, if bleed is stable/improved then re-initiation of DOAC can be discussed\"     ESRD on HD " "MWF  Hyponatremia, acute on chronic  On HD since 2021. Dialyzes MWF under care of Dr. Ro. Na 120 in ED (recent baseline runs low). Per Dr. Palmer, hyponatremia is due to large intradialytic weight gains.  5 kg is a typical goal in HD for fluid removal.    Nephrology following    fluid restriction    resumed usual MWF HD.  She has also been having UF due to hyponatremia, 3 liters removed 5/17    Baseline sodium readings vary from 119 (4/18) - 130 (5/10)     Permanent atrial fibrillation  S/p AVN ablation with ppm 6/2021    apixaban on hold for 14 days due to SAH     Severe tricuspid regurgitation   Pulmonary hypertension  Chronic R heart failure, compensated  HLD  [needs rec- atorvastatin 40 mg daily, metoprolol 12.5 mg BID, torsemide 40 mg daily]  Echo with EF 50-55%, nl LV fn, severe TR, pulmonary pressures 60-65 mmHg. Seen by CV surgery, deemed to be too high of a surgical risk for valve replacement.     continue lipitor, metoprolol      Anemia  Hgb at 10.5 on presentation, stable from recent    Hgb stable 5/12     COPD  [needs rec- prn albuterol, tiotropium-olodaterol 2 puffs daily]    Continue prior to admission inhaler regimen      SKY  No longer wears CPAP     Anxiety  Depression   Doesn't appear to currently be on meds for this     Gout    Continue allopurinol 100 mg MWF after dialysis      PUD    Continue pantoprazole 40 mg daily      PAD    noted     Clinically Significant Risk Factors Present on Admission     Diet: Advance Diet as Tolerated: Regular Diet Adult  Diet    DVT Prophylaxis: Pneumatic Compression Devices  Schuler Catheter: Not present  Code Status: Full Code          Disposition Plan  Pending improved encephalopathy. needs TCU at discharge    Emily Castle MD  Hospitalist  [facility]  Text Page (7am - 6pm, M-F)    Interval History   \"I do not know.\"  Francine is drowsy, she offers no complaints.  Discussed with bedside RN, plan is hemodialysis session today.  I also discussed " "with Julee Greene of palliative care.  She reviewed with patient's sister, Tereza, who indicates patient has had further declines in her already frail health.  Tereza wants to change emphasis to hospice focused care upon return to her facility.    -Data reviewed today: I reviewed all new labs and imaging over the last 24 hours. I personally reviewed no images or EKG's today.    Physical Exam    , Blood pressure 135/75, pulse 73, temperature 97.4  F (36.3  C), temperature source Oral, resp. rate 18, height 1.651 m (5' 5\"), weight 60 kg (132 lb 4.4 oz), SpO2 97 %.  Vitals:    05/09/22 1330 05/10/22 0500 05/14/22 0000   Weight: 64 kg (141 lb 1.5 oz) 66.1 kg (145 lb 11.6 oz) 60 kg (132 lb 4.4 oz)     Vital Signs with Ranges  Temp:  [97.3  F (36.3  C)-98.6  F (37  C)] 97.4  F (36.3  C)  Pulse:  [69-85] 73  Resp:  [16-18] 18  BP: (121-144)/(62-77) 135/75  SpO2:  [97 %-99 %] 97 %  I/O's Last 24 hours  I/O last 3 completed shifts:  In: 240 [P.O.:240]  Out: 1500 [Other:1500]    Constitutional: Frail lady  HEENT:  Left periorbital ecchymosis,    Respiratory: Clear to auscultation bilaterally, no crackles or wheezing  Cardiovascular: Regular rate and rhythm, normal S1 and S2, intermittently paced  GI: Normal bowel sounds, soft, non-distended, non-tender  Skin/Integumen: Ecchymosis both knees with scattered ecchymoses elsewhere.  Bandage covers wound on right upper arm.    Other: she is confused    Medications   All medications were reviewed.      - MEDICATION INSTRUCTIONS for Dialysis Patients -   Does not apply See Admin Instructions     amLODIPine  5 mg Oral Daily     atorvastatin  40 mg Oral Daily     famotidine  10 mg Intravenous QPM    Or     famotidine  10 mg Oral QPM     polyethylene glycol  17 g Oral Daily     senna-docusate  1 tablet Oral BID     sodium chloride (PF)  3 mL Intracatheter Q8H        Data   Recent Labs   Lab 05/19/22  0707 05/18/22  0754 05/17/22  0833 05/14/22  1533 05/14/22  0528   WBC  --   --  " 8.6  --  12.1*   HGB  --   --  11.2*  --  11.5*   MCV  --   --  106*  --  106*   PLT  --   --  115*  --  115*   * 125* 123*   < > 124*   POTASSIUM 4.5 4.4 5.1   < > 5.5*   CHLORIDE 87* 90* 89*   < > 93*   CO2 22 22 22   < > 13*   BUN 58* 42* 59*   < > 62*   CR 5.23* 4.08* 5.07*   < > 4.99*   ANIONGAP 13 13 12   < > 18*   SILVIA 9.8 9.5 9.0   < > 8.9   * 119* 86   < > 71   ALBUMIN 2.8* 2.9* 2.8*   < > 2.8*    < > = values in this interval not displayed.       No results found for this or any previous visit (from the past 24 hour(s)).

## 2022-05-20 NOTE — CARE PLAN
Patient was confused, disoriented time, calm and cooperative. Bruises over on her body, loose stool x 3 or more during the shift. Wound dressing was changed. Pt IV was removed and wound found on the left wrist. WOC consulted and new order initiated. Fluid restriction 1500 mL. Will continue to monitor.

## 2022-05-20 NOTE — PROGRESS NOTES
"[facility]  WO Nurse Inpatient Wound Assessment     Reason for consultation: Evaluate and treat \"LEFT WRIST\"     Moisture related to loose stool     Assessment  Left wrist wound due to Skin Tear     Buttock intact     Treatment Plan  Modified to   Wound care  DAILY        Comments: RUE and LUE wounds: Daily   1. Cleanse with Vashe ( #[id number]) and 4x4\" gauze, pat dry   2. Apply Plurogel (#[id number]) to wound bed.   3. Cover with polymem foam (# [id number])   4. Time/Date/Initial dressing change        Orders Updated  Recommended provider order: None, at this time  WO Nurse follow-up plan: weekly  Nursing to notify the Provider(s) and re-consult the WO Nurse if wound(s) deteriorates or new skin concern.    Patient History  According to provider note(s):  \"76 year old female with history of atrial fibrillation on anticoagulation, severe tricuspid regurgitation, pulmonary hypertension, chronic right-sided heart failure, COPD, SKY, anxiety, depression, gout, peptic ulcer disease, peripheral arterial disease admitted on 5/8/2022 after a mechanical fall, found to have left femoral neck fracture.\"    Objective Data  Containment of urine/stool: Incontinence Protocol and Incontinent pad in bed    Active Diet Order:  Orders Placed This Encounter      Advance Diet as Tolerated: Regular Diet Adult      Diet    Output:   I/O last 3 completed shifts:  In: 240 [P.O.:240]  Out: 1500 [Other:1500]    Risk Assessment:   Sensory Perception: 3-->slightly limited  Moisture: 3-->occasionally moist  Activity: 3-->walks occasionally  Mobility: 3-->slightly limited  Nutrition: 3-->adequate  Friction and Shear: 2-->potential problem  Miguel Score: 17                          Labs: Recent Labs   Lab 05/20/22  1122 05/18/22  0754 05/17/22  0833   ALBUMIN 2.8*   < > 2.8*   HGB  --   --  11.2*   WBC  --   --  8.6    < > = values in this interval not displayed.       Physical Exam  Skin inspection: focused left wrist, " "buttock        Wound Location:  Left anterior wrist   Date of last photo:  5/20  Wound History: unclear, patient unable to report   Measurements (length x width x depth, in cm):  1 x 1 x 0.1cm   Wound Base: 100% serous scabbing and dry dermis  Tunneling: N/A  Undermining: N/A  Palpation of the wound bed: normal   Periwound skin: intact  Color: normal and consistent with surrounding tissue  Temperature: normal   Drainage: none  Description of drainage: none  Odor: none  Pain: nonverbal indicators absent       5/20 buttock intact     Interventions  Current support surface: Standard  Atmos Air mattress  Current off-loading measures: Pillows  Visual inspection of wound(s) completed  Wound Care: done per plan of care  Supplies: reviewed  Education provided: plan of care  Discussed plan of care with Patient and Nurse      Lima VALDES   Dept. Pager: [phone]  Dept. Office Number: [phone]            See below for copied forward note about other wound area:     [facility] Nurse Inpatient Assessment     Today's Assessment: RUE    Patient History (according to provider note(s):    \"76 year old female admitted on 5/8/2022 after a mechanical fall. Found to have left femoral neck fracture.\"    AREAS ASSESSED:    Areas visualized during today's visit: Focused: and RUE       Wound Location: RUE near AC             Last photo: 5/13  Wound due to: Surgical Wound  Wound history/plan of care:   Per sister at bedside, was previously using a silver alginate and then iodosorb in hospital.   Wound base:  Very dry lifeless tissue, 80% yellow slough, 20% dusky maroon at right lateral edge     Palpation of the wound bed: firm      Drainage: scant     Description of drainage: serosanguinous     Measurements (length x width x depth, in cm) 1.5  x 1.1  x  0.5 cm      Tunneling N/A     Undermining N/A  Periwound skin: Intact      Color: pink and purple at distal aspect      Temperature: normal   Odor: " "none  Pain: no grimacing or signs of discomfort  Pain interventions prior to dressing change: patient tolerated well  Treatment goal: Heal , Infection control/prevention and Remove necrotic tissue  STATUS: stalled, nonviable tissue in base, no further odor  Supplies ordered: ordered plurogel       TREATMENT PLAN:     RUE wound(s): Daily  1. Cleanse with Vashe ( #[id number]) and 4x4\" gauze, pat dry   2. Apply Plurogel (#[id number]) to wound bed.   3. Cover with polymem foam (# [id number])  4. Time/Date/Initial dressing change     Orders: Updated    RECOMMEND PRIMARY TEAM ORDER: None, at this time  Education provided: plan of care and Infection prevention   Discussed plan of care with: Patient and Nurse  WOC Nurse follow-up plan:weekly  Notify WOC if wound(s) deteriorate.  Nursing to notify the Provider(s) and re-consult the WOC Nurse if new skin concern.    DATA:     Current support surface: Standard  Low air loss mattress  Containment of urine/stool: Anuric and Continent of bowel  BMI: Body mass index is 22.01 kg/m .   Active Diet Order: Orders Placed This Encounter      Advance Diet as Tolerated: Regular Diet Adult      Diet     Output: I/O last 3 completed shifts:  In: 240 [P.O.:240]  Out: 1500 [Other:1500]   Labs:   Recent Labs   Lab 05/20/22  1122 05/18/22  0754 05/17/22  0833   ALBUMIN 2.8*   < > 2.8*   HGB  --   --  11.2*   WBC  --   --  8.6    < > = values in this interval not displayed.     Pressure Injury Risk Assessment:   Miguel Risk Assessment  Sensory Perception: 3-->slightly limited  Moisture: 3-->occasionally moist  Activity: 3-->walks occasionally  Mobility: 3-->slightly limited  Nutrition: 3-->adequate  Friction and Shear: 2-->potential problem  Miguel Score: 17            "

## 2022-05-20 NOTE — PROGRESS NOTES
Date/Time:05/20 ,5416-4322    Trauma/Ortho/Medical (Choose one) :Trauma  Diagnosis:Left  femoral neck fx   POD#: left bipolar hemiarthroplasty on 05/10.  Mental Status:Alert ,confused  Activity/dangle:A-2 walker ,gaitbelt  Diet:Regular,fluid restriction 1500 cc  Pain:Denies  Schuler/Voiding:Dialysis,incontinent  Tele/Restraints/Iso:None  02/LDA:RA  D/C Date:TBD  Other Info:Neuro's q 4,unchanged.Old fistula wound on right arm,intact d/t change in am.

## 2022-05-20 NOTE — PLAN OF CARE
Date/Time: 5/19/22 9627-0907    Trauma/Ortho/Medical (Choose one): Trauma    Diagnosis: Left Femoral Neck Fx and  SAH  POD#: 9  Mental Status: Alert to self only  Activity/dangle: Assist of 2 with GB and walker  Diet: Regular with 1500ml FR  Pain: Denies pain at rest  Schuler/Voiding: Dialysis pt, incont  Tele/Restraints/Iso:  02/LDA:  D/C Date: [facility] possibly tomorrow   Other Info: Neuros Q4hrs unchanged, wound to right arm at old fistula site changed today while in dialysis, mepilex to coccys for blanchable redness

## 2022-05-20 NOTE — CONSULTS
Initial consult completed on 5/18/22.    Care Management Follow Up    Length of Stay (days): 12    Expected Discharge Date: 05/21/2022     Concerns to be Addressed:       Patient plan of care discussed at interdisciplinary rounds: Yes    Anticipated Discharge Disposition: Long Term Care, Home Care     Anticipated Discharge Services: None  Anticipated Discharge DME: None    Patient/family educated on Medicare website which has current facility and service quality ratings: no  Education Provided on the Discharge Plan:    Patient/Family in Agreement with the Plan: yes    Referrals Placed by CM/SW:    Private pay costs discussed: Not applicable    Additional Information:  Consult for discharge planning for patient to begin hospice services when returning to [facility]. Writer confirmed with [facility] that they work with [facility], [facility] and [facility].     Call placed to patient's sister Tereza to confirm this plan. Tereza confirmed and has no preference of what agency to go with. Writer stated that coordination will begin so that when patient returns, they can enroll and begin services. Tereza confirmed this plan. Writer will update Tereza and accepting agency will reach out to her to confirm services. Writer also confirmed that Tereza is in agreement to stop dialysis services after today and she agreed.    Writer called Isaac with [facility] and explained situation. Writer faxed referral to [phone] and will wait to hear if patient has been accepted.     Addendum: Patient has been accepted to [facility]. Writer called to confirm that patient can discharge tomorrow back to [facility]. Message left.     Will continue to follow        GAYLE Becerril

## 2022-05-20 NOTE — CONSULTS
[facility]  Palliative Care Consultation Note    Patient: Francine Chris  Date of Admission:  5/8/2022    Requesting Clinician / Team: Dr Castle  Reason for consult: Goals of care    Recommendations:    Goals of Care (see in depth discussion below): Sister Tereza would like to have Francine transition to hospice focused care upon discharge back to her facility for end of life care. Francine's condition has worsened significantly over the past year with frequent hospitalizations.     She will have her last dialysis session today and possible discharge tomorrow.     Order placed for SW/CC to assist with coordination of hospice at Francine's facility.     Code status: No CPR / No Intubation    Advanced Care Planning:    Patient has a completed Health Care Directive: No.      Patient's sister Tereza has been very involved in Francine's care over many years. There are no other siblings.    Delirium- multifactorial, related to severe frailty, multiple comorbidities,  medications,  environment, renal failure  -Maintain day/night routines and orientation.  -Avoid medications such as steroids, benzodiazepines and anticholinergics.   -Optimize hydration/nutrition, and sleep.  -Utilize sensory aids to maintain orientation such as eye glasses, hearing aids or pocket talkers.  -Calm environment, quiet/reassuring voices, orienting cues, minimized noise/night disruptions, when possible.   -Maximize mobility and prevent/treat pain.    Patient case was reviewed with Dr Castle and SW/CC.    Julee Greene, [facility]  Contact information available via [facility] Paging/Directory        Thank you for the opportunity to participate in the care of this patient and family. Our team: will continue to follow.     During regular M-F work hours ([id number]) -- if you are not sure who specifically to contact -- please contact us on [facility] Smart Web.     After regular work hours and on  weekends/holidays, you can call our answering service at [phone].     Attestation:  Total time on the floor involved in the patient's care: 90 minutes  Total time spent in counseling/care coordination: >50% discussing patient condition, assessment of symptoms, reviewing current status with sister Tereza, goals of care, hospice/comfort care, discussions with MD and RN, SW/CC.     Assessments:  Francine Chris is a 76 year old female with PMH significant for end-stage renal disease on HD, history of malignant melanoma, osteoporosis, multiple falls, COPD, SKY, gout, PAD, severe tricuspid regurgitation, pulmonary hypertension, chronic right heart failure.  She was admitted to hospital on 5/08/2022 after suffering a fall at her skilled nursing facility.  Imaging found  a left femoral neck fracture; she underwent left bipolar hip hemiarthroplasty.  Other complications include a subarachnoid hemorrhage and right internal jugular nearly occlusive thrombus.  She has been delirious throughout the hospitalization.     Today, the patient was seen for:  Goals of care  Decisional support    I introduced palliative care services and our multidisciplinary team. Explained that our service offers an extra layer of support for individuals who are experiencing serious, life threatening illnesses, which can include assistance with symptom management, emotional and spiritual support as well as complex medical decision making. Reviewed past medical history and Tereza's understanding of Francine's current medical situation. Tereza has helped Francine over the past several years with her medical care and has driven her to appointments over the years. Tereza states that Francine has declined significantly over the 3-4 years by but most severely over past year. She has had multiple falls and needed to start dialysis about a year ago. Her mental status has worsened where she is frequently hallucinating/confused since this time. Now  with her hip fracture her situation is even worse. Tereza is interested in a more comfort focused approach for Francine due to her worsening decline. I explained that comfort care is a good option when the burdens of aggressive treatments outweigh the benefits and are no longer offering benefit for the patient. Hospice care focuses on optimizing quality of life and relief from distressing symptoms such as pain, shortness of breath, nausea, and anxiety and allowing a natural dying process. When comfort care is initiated, restorative focused care and all artificial means to sustain life are discontinued, including further diagnostic testing, lab draws, blood glucose testing/insulin, tube feedings, IV fluids, antibiotics, medications not for comfort, dialysis and vital signs. Comfort care is delivered by hospice in the outpatient setting and Francine could have hospice support back at her SNF [facility] [place]. We discussed that Francine may survive two weeks or longer without dialysis and Tereza expresses understanding and would like to have hospice services to help maintain Francine's comfort after discharge. We also reviewed code status and benefits and burden of CPR and Tereza would like to change code status to No CPR and No Intubation as this would only prolong her suffering if her heart were to stop.      Prognosis, Goals, & Planning:        Prognosis, Goals, and/or Advance Care Planning were addressed today: Yes      Functional Status just prior to hospitalization: 4 (Completely disabled and dependent on others for selfcare; bedbound)          Patient has decision-making capacity today for complex decisions: No      Patient's decision making preferences: unable to assess          I have concerns about the patient/family's health literacy today: No           Coping, Meaning, & Spirituality:   Mood, coping, and/or meaning in the context of serious illness were addressed today: Yes  Summary/Comments: Tereza states that Francine  grew up [employer] but was not actively practicing in her adult life.    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  She was admitted to hospital on 5/08/2022 after suffering a fall at her skilled nursing facility.  Imaging found  a left femoral neck fracture; she underwent left bipolar hip hemiarthroplasty.  Other complications include a subarachnoid hemorrhage and right internal jugular nearly occlusive thrombus.  She has been delirious throughout the hospitalization.     Key Palliative Symptom Data:  Patient is unable to answer questions for review of systems    ROS:  Patient is unable to answer questions for review of systems     Past Medical History:  Past Medical History:   Diagnosis Date     Acute renal failure (ARF) (H) 06/22/2021     Anemia in chronic kidney disease, on chronic dialysis (H) 07/14/2021     Atrophy of left kidney 05/18/2017     Chronic atrial fibrillation (H) 04/01/2013     Chronic kidney disease, stage III (moderate) (H) 11/13/2019    Formatting of this note might be different from the original. Cr=1.2     COPD mixed type (H) 04/06/2013    Formatting of this note might be different from the original. On Qvar and combivent, hx of cor pulmonale     Cor pulmonale (chronic) (H) 09/20/2021     Depressive disorder, not elsewhere classified      Dermatitis of lower extremity 05/05/2017     Elevated blood pressure reading without  diagnosis of hypertension 12/10/2004     HTN (hypertension) 09/07/2011     Hx of gout 08/13/2014     Hypercalcemia 07/16/2018    Formatting of this note might be different from the original. 7/16/18  Ca++=10.8   Alb=3.8   7/17/18:  PTH=95.6  Ca++=10.1 (after diuresis), some renal impairment, was on vit D and calcium outpt     Hypertensive heart and kidney disease 07/16/2018    Formatting of this note might be different from the original. Atrophic left kidney, rise in Cr with increase in bumex 2018     Hyponatremia 06/22/2021     Intermediate stage nonexudative age-related macular degeneration of both eyes 01/13/2021     Lung nodule 04/28/2021     Malignant melanoma (H)      Migraine variant 12/10/2004     Problem list name updated by automated process. Provider to review     Neoplasm of skin of neck 06/06/2014     NSVT (nonsustained ventricular tachycardia) (H) 04/09/2020     SKY (obstructive sleep apnea) 04/08/2020     Osteoporosis 11/10/2004     Problem list name updated by automated process. Provider to review     Osteoporosis, unspecified dx 8/02     Pacemaker 07/14/2021    Formatting of this note might be different from the original. 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 On sotolol     Tobacco use disorder      Variants of migraine, not elsewhere classified, without mention of intractable migraine without mention of status migrainosus     flashing lights        Past Surgical History:  Past Surgical History:   Procedure Laterality Date     IRRIGATION AND DEBRIDEMENT UPPER EXTREMITY, COMBINED Right 4/18/2022    Procedure: EVACUATE HEMATOMA UPPER RIGHT ARM. EXCISION FULL THICKNESS SKIN ULCER RIGHT UPPER ARM;  Surgeon: Sree Eric;  Location: [facility] OR     OPEN REDUCTION INTERNAL FIXATION HIP BIPOLAR Left 5/10/2022     Procedure: left bipolar hip hemiarthroplasty;  Surgeon: Eran Funez MD;  Location: [facility] OR     ZZC NONSPECIFIC PROCEDURE  1990's    right ankle fracture     ZZC NONSPECIFIC PROCEDURE      spontaneous AB     ZZC NONSPECIFIC PROCEDURE      tonsillectomy     ZZC NONSPECIFIC PROCEDURE  1980's    tubal ligation         Family History:  Family History   Problem Relation Age of Onset     Hypertension Mother      Arthritis Mother      Circulatory Mother      Heart Disease Mother      Lipids Mother      Osteoporosis Mother      Alcohol/Drug Father         ?MI     Circulatory Father      Heart Disease Father      Hypertension Sister      Allergies Sister         thyroid dz     Arthritis Sister      Osteoporosis Sister      Obesity Brother      Alcohol/Drug Brother         dm, ptsd, cad     Bladder Cancer Brother      Diabetes Brother      Myocardial Infarction Brother      Prostate Cancer Brother      Cerebrovascular Disease Maternal Grandmother      Breast Cancer Maternal Grandmother         94     Cancer - colorectal Maternal Grandmother         97     Arthritis Maternal Grandmother      Gastrointestinal Disease Maternal Grandmother      Osteoporosis Maternal Grandmother      Thyroid Disease Maternal Grandmother      Osteoarthritis Maternal Grandfather      C.A.D. Maternal Grandfather      Heart Failure Paternal Grandmother      Hyperlipidemia Maternal Uncle      Alzheimer Disease Maternal Uncle      Thyroid Disease Other         niece         Allergies:  Allergies   Allergen Reactions     Sotalol Other (See Comments)     Prolonged QT     Bupropion Other (See Comments)     Na 118 acutely. Comorbid diuresis for heart failure        Medications:  I have reviewed this patient's medication profile and medications from this hospitalization.     Noted scheduled meds are:    - MEDICATION INSTRUCTIONS for Dialysis Patients -   Does not apply See Admin Instructions     amLODIPine  5 mg Oral Daily     atorvastatin  40 mg  Oral Daily     famotidine  10 mg Intravenous QPM    Or     famotidine  10 mg Oral QPM     levETIRAcetam  500 mg Intravenous Daily    Or     levETIRAcetam  500 mg Oral Daily    Or     levETIRAcetam  500 mg Per NG tube Daily     polyethylene glycol  17 g Oral Daily     senna-docusate  1 tablet Oral BID     sodium chloride (PF)  3 mL Intracatheter Q8H       Noted PRN meds are:  acetaminophen, sore throat lozenge, bisacodyl, hydrALAZINE, labetalol, lidocaine 4%, lidocaine (buffered or not buffered), naloxone **OR** naloxone **OR** naloxone **OR** naloxone, prochlorperazine **OR** prochlorperazine, sodium chloride, sodium chloride (PF)    Physical Exam:  Vital Signs: Temp: 97.4  F (36.3  C) Temp src: Oral BP: 135/75 Pulse: 73   Resp: 18 SpO2: 97 % O2 Device: None (Room air)    Weight: 132 lbs 4.42 oz    Physical Exam  GENERAL:  Delirious, no distress, chronically ill appearing,   HEAD: bruises left side of face, laceration left side of forehead.   SCLERA: Anicteric  EXTREMITIES: Warm; deep purple bruises of LE  ABDOMEN:  Soft, nontender  RESPIRATORY: Breathing relaxed and nonlabored  CARDIOVASCULAR: RRR  NEUROLOGIC: Lethargic; unable to follow directions or give eye contact.  PSYCH: Calm    Data reviewed:  Recent imaging reviewed.  Results for orders placed or performed during the hospital encounter of 05/08/22   XR Femur Left 2 Views    Impression    IMPRESSION: Fracture through the left femoral neck without evidence for intertrochanteric extension. Left femur otherwise negative.   XR Pelvis 1/2 Views    Impression    IMPRESSION: Fracture through the left femoral neck without evidence for intertrochanteric extension. No dislocation. Right hip negative for fracture. Pelvis negative for fracture. Diffuse demineralization.   XR Chest Port 1 View    Impression    IMPRESSION: Pulmonary vascular congestion with increased interstitial markings, correlate for interstitial pulmonary edema, recommend follow-up to resolution. No  pneumothorax. Moderately enlarged heart. Left subclavian approach pacer. Right-sided central   venous catheter with tip over atrial caval junction.   XR Pelvis w Hip Port Left  1 View    Impression    IMPRESSION:  1.  Interval left hip hemiarthroplasty with cemented proximal femur endoprosthesis. The component is well seated without evidence of complication. Postoperative soft tissue gas about the hip. Lateral skin staples.  2.  No fracture or joint malalignment.  3.  Bone demineralization.   CT Head w/o Contrast   Result Value Ref Range    Radiologist flags New intracranial hemorrhage (AA)     Impression    IMPRESSION:   1. New small volume subarachnoid hemorrhage in the posterior aspect of  the right Sylvian fissure.  2. Diffuse cerebral volume loss and cerebral white matter changes  consistent with chronic small vessel ischemic disease.    [Critical Result: New intracranial hemorrhage]    Finding was identified on 5/12/2022 3:42 PM.     SHAKA JUAN was contacted by Dr. Foley on 5/12/2022 3:55 PM  and verbalized understanding of the critical result.     Radiation dose for this scan was reduced using automated exposure  control, adjustment of the mA and/or kV according to patient size, or  iterative reconstruction technique.     KEVYN FOLEY MD         SYSTEM ID:  [id number]   CT Head w/o Contrast    Impression    IMPRESSION:  1.  No significant interval change. Stable subarachnoid hemorrhage in the right sylvian fissure.   CT Head w/o Contrast    Impression    IMPRESSION:   No change of subarachnoid hemorrhage within the right sylvian fissure.    RENATO GREEN MD         SYSTEM ID:  [id number]   MR Brain w/o Contrast    Impression    IMPRESSION:  HEAD MRI:   1.  Stable subarachnoid hemorrhage along the right sylvian fissure.  2.  No definite acute infarct.  3.  Mild generalized parenchymal volume loss and sequelae of chronic microvascular ischemic disease.    HEAD MRA:   1.  Mild tapering of the internal  carotid arteries, suggestive of atherosclerosis.  2.  No proximal occlusion, saccular aneurysm, or high flow vascular malformation.    NECK MRA:  1.  Incomplete and motion degraded exam. Narrowing of the bilateral carotid bifurcations with at least mild stenosis on the left and mild to moderate stenosis on the right. Consider ultrasound for better evaluation.   MRA Brain ([place]) wo Contrast    Impression    IMPRESSION:  HEAD MRI:   1.  Stable subarachnoid hemorrhage along the right sylvian fissure.  2.  No definite acute infarct.  3.  Mild generalized parenchymal volume loss and sequelae of chronic microvascular ischemic disease.    HEAD MRA:   1.  Mild tapering of the internal carotid arteries, suggestive of atherosclerosis.  2.  No proximal occlusion, saccular aneurysm, or high flow vascular malformation.    NECK MRA:  1.  Incomplete and motion degraded exam. Narrowing of the bilateral carotid bifurcations with at least mild stenosis on the left and mild to moderate stenosis on the right. Consider ultrasound for better evaluation.   MRA Neck (Carotids) wo Contrast    Impression    IMPRESSION:  HEAD MRI:   1.  Stable subarachnoid hemorrhage along the right sylvian fissure.  2.  No definite acute infarct.  3.  Mild generalized parenchymal volume loss and sequelae of chronic microvascular ischemic disease.    HEAD MRA:   1.  Mild tapering of the internal carotid arteries, suggestive of atherosclerosis.  2.  No proximal occlusion, saccular aneurysm, or high flow vascular malformation.    NECK MRA:  1.  Incomplete and motion degraded exam. Narrowing of the bilateral carotid bifurcations with at least mild stenosis on the left and mild to moderate stenosis on the right. Consider ultrasound for better evaluation.   US Carotid Bilateral    Impression    IMPRESSION:  1.  Moderate plaque formation, velocities consistent with 50-69% stenosis in the right internal carotid artery.  2.  Mild plaque formation,  velocities consistent with less than 50% stenosis in the left internal carotid artery.  3.  Flow within the vertebral arteries is antegrade.  4.  Nearly occlusive deep venous thrombus of the internal jugular vein.     MRV Brain wo Contrast    Impression    IMPRESSION:   Head MRI:  1.  Overall unchanged exam with no finding for acute infarct. Small  volume acute subarachnoid hemorrhage along the posterior right sylvian  fissure is relatively unchanged.  2.  Mild burden scattered chronic small vessel ischemic change and a  moderate diffuse parenchymal volume loss are unchanged.      HEAD MRV:  1.  Motion degraded exam with no definite intracranial venous sinus  thrombosis, noting that motion on this exam limits evaluation.    2.  Known thrombosis in the right internal jugular vein is not well  demonstrated on this exam.    RENATO THOMAS MD         SYSTEM ID:  [id number]   MR Brain w/o Contrast    Impression    IMPRESSION:   Head MRI:  1.  Overall unchanged exam with no finding for acute infarct. Small  volume acute subarachnoid hemorrhage along the posterior right sylvian  fissure is relatively unchanged.  2.  Mild burden scattered chronic small vessel ischemic change and a  moderate diffuse parenchymal volume loss are unchanged.      HEAD MRV:  1.  Motion degraded exam with no definite intracranial venous sinus  thrombosis, noting that motion on this exam limits evaluation.    2.  Known thrombosis in the right internal jugular vein is not well  demonstrated on this exam.    RENATO THOMAS MD         SYSTEM ID:  [id number]   CT Chest/Abdomen/Pelvis w Contrast    Impression    IMPRESSION:  1.  No definite evidence of primary or metastatic malignancy in the chest, abdomen or pelvis.  2.  Single indeterminate 5 mm left lower lobe pulmonary nodule. If no primary malignancy is identified, consider follow-up chest CT in 12 months.  3.  Incidental subcentimeter left renal artery aneurysm.  4.  Known right internal jugular  vein thrombus again noted.    REFERENCE:  Guidelines for Management of Incidental Pulmonary Nodules Detected on CT Images: From the Fleischner Society 2017.   Guidelines apply to incidental nodules in patients who are 35 years or older.  Guidelines do not apply to lung cancer screening, patients with immunosuppression, or patients with known primary cancer.    SINGLE NODULE  Nodule size <6 mm  Low-risk patients: No follow-up needed.  High-risk patients: Optional follow-up at 12 months.                Lab Results   Component Value Date    WBC 8.6 05/17/2022    WBC 12.1 (H) 05/14/2022    WBC 9.3 05/12/2022    HGB 11.2 (L) 05/17/2022    HGB 11.5 (L) 05/14/2022    HGB 11.4 (L) 05/12/2022    HCT 33.9 (L) 05/17/2022    HCT 34.2 (L) 05/14/2022    HCT 32.7 (L) 05/12/2022     (L) 05/17/2022     (L) 05/14/2022     (L) 05/12/2022     (L) 05/19/2022     (L) 05/18/2022     (L) 05/17/2022    POTASSIUM 4.5 05/19/2022    POTASSIUM 4.4 05/18/2022    POTASSIUM 5.1 05/17/2022    CHLORIDE 87 (L) 05/19/2022    CHLORIDE 90 (L) 05/18/2022    CHLORIDE 89 (L) 05/17/2022    CO2 22 05/19/2022    CO2 22 05/18/2022    CO2 22 05/17/2022    BUN 58 (H) 05/19/2022    BUN 42 (H) 05/18/2022    BUN 59 (H) 05/17/2022    CR 5.23 (H) 05/19/2022    CR 4.08 (H) 05/18/2022    CR 5.07 (H) 05/17/2022     (H) 05/19/2022     (H) 05/18/2022    GLC 86 05/17/2022    AST 37 02/15/2022    AST 43 01/26/2022    AST 76 (H) 01/12/2022    ALT 32 02/15/2022    ALT 46 01/26/2022    ALT 49 01/12/2022    ALKPHOS 122 02/15/2022    ALKPHOS 148 01/26/2022    ALKPHOS 148 01/12/2022    BILITOTAL 1.0 02/15/2022    BILITOTAL 1.4 (H) 01/26/2022    BILITOTAL 0.9 01/12/2022    INR 1.42 (H) 05/10/2022    INR 1.62 (H) 05/08/2022    INR 1.15 04/17/2022      Lab Results   Component Value Date    ALBUMIN 2.8 05/19/2022

## 2022-05-20 NOTE — PROGRESS NOTES
Care Management Follow Up    Length of Stay (days): 12    Expected Discharge Date: 05/21/2022     Concerns to be Addressed:       Patient plan of care discussed at interdisciplinary rounds: Yes    Anticipated Discharge Disposition: Long Term Care, Home Care     Anticipated Discharge Services: None  Anticipated Discharge DME: None    Patient/family educated on Medicare website which has current facility and service quality ratings: no  Education Provided on the Discharge Plan:    Patient/Family in Agreement with the Plan: yes    Referrals Placed by CM/SW:    Private pay costs discussed: Not applicable    Additional Information:  Message sent to [facility] asking if patient could return to LTC later today. Patient can return anytime today if ready for discharge.    Writer confirmed with [facility] that patient can return over the weekend as well.    GAYLE Becerril

## 2022-05-20 NOTE — PROVIDER NOTIFICATION
Pt's sister ( Tereza) called and will like pt to have dialysis one more time in the hospital before discharge. Dr Castle was notified via [employer] messaging. Awaiting return call. Pt is on dialysis schedule for tomorrow.

## 2022-05-20 NOTE — PROGRESS NOTES
Orthopedic Surgery  Francine Chris  2022  Admit Date:  2022  POD 10 Days Post-Op  S/P Procedure(s):  left bipolar hip hemiarthroplasty    Patient resting comfortably in bed.    Pain controlled.  Tolerating oral intake.    Denies nausea or vomiting  Denies chest pain or shortness of breath  No events overnight.   Asked to see patient concerning bleeding on dressing     Alert and orient to person, place, and time.  Vital Sign Ranges  Temperature Temp  Av.9  F (36.6  C)  Min: 97.3  F (36.3  C)  Max: 98.6  F (37  C)   Blood pressure Systolic (24hrs), Av , Min:131 , Max:144        Diastolic (24hrs), Av, Min:68, Max:81      Pulse Pulse  Av.4  Min: 73  Max: 85   Respirations Resp  Av  Min: 16  Max: 18   Pulse oximetry SpO2  Av.5 %  Min: 97 %  Max: 99 %       Dressing with ~50% saturation on Aquacel, appears to be old blood based on color  Incision well approximated, no areas of active drainage, staples intact  Minimal erythema of the surrounding skin.   Bilateral calves are soft, non-tender.  Bilateral lower extremity is NVI.  Sensation intact bilateral lower extremities  5/5 motor with resisted dorsi and plantar flexion bilaterally  +Dp pulse      Labs:  Recent Labs   Lab Test 22  0707 22  0754 22  0833   POTASSIUM 4.5 4.4 5.1     Recent Labs   Lab Test 22  0833 22  0528 22  0913   HGB 11.2* 11.5* 11.4*     Recent Labs   Lab Test 05/10/22  0729 22  1752 22  0737   INR 1.42* 1.62* 1.15     Recent Labs   Lab Test 22  0833 22  0528 22  0913   * 115* 146*       A/P  1. S/p left hip hemiarthroplasty due to femoral neck fracture   Continue Eliquis for DVT prophylaxis.     Mobilize with PT/OT    WBAT with walker   Leave new dressing intact.     Continue current pain regiment.    2. Disposition   Anticipate d/c to TCU based on placement.    Sol Saucedo PA-C

## 2022-05-20 NOTE — PROGRESS NOTES
Care Management Follow Up    Length of Stay (days): 12    Expected Discharge Date: 05/21/2022     Concerns to be Addressed:     Return to [facility] for M, W,  F diaysis  [phone]  (Center Phone)  [phone]  (Center Fax)      Additional Information:  Writer called [facility] and spoke with Lauren, and informed her patient will  Return to them on Monday 5/23.  Lauren requested the orders and HD notes be faxed to [phone].      Larissa Brady RN

## 2022-05-21 NOTE — PROVIDER NOTIFICATION
"Text page sent:    \"[id number] SUDHAKAR No longer has IV access. Pt. blood pressure 148/81. PRN antihypertensive for SBP>140, but is IV medication.  Would you like PRN switched to PO to treat, or BP ok currently? Maria Luz EMERSON [phone]\"  "

## 2022-05-21 NOTE — PROGRESS NOTES
Care Management Discharge Note    Discharge Date: 05/21/2022       Discharge Disposition: Long Term Care, Home Care    Discharge Services: None    Discharge DME: None    Discharge Transportation: health plan transportation    Private pay costs discussed: Not applicable    PAS Confirmation Code:  N/A  Patient/family educated on Medicare website which has current facility and service quality ratings: no    Education Provided on the Discharge Plan:  Yes  Persons Notified of Discharge Plans: Patient's sister Tereza, Admissions at [facility]  Patient/Family in Agreement with the Plan: yes    Handoff Referral Completed: No    Additional Information:  SW received discharge orders for patient. RICHARD confirmed with Tova at [facility] that there is bed hold for patient and she can return at discharge. Faxed orders via [employer], set up stretcher transport for 1800. Updated [facility] on this and patient's sister Loy and she is in agreement. [facility] hospice has been informed by patient's sister that enrollment will take place at the facility.       GAYLE Sumner

## 2022-05-21 NOTE — PROGRESS NOTES
Assessment and Plan:   ESRD: running with R CVC, 400 BFR. 3L  UF, 3 h. 3K 38 HCO3 138 Na. No heparin.             Interval History:   S/P L MANA: POD #6. Hx of hip fracture.       PVD:   Afib on anticoagulation.   Fall: CT showed SAH. Apixaban on hold. No heparin on dialysis.      Changing to comfort care status.                      Review of Systems:   No sx on dialysis.           Medications:       - MEDICATION INSTRUCTIONS for Dialysis Patients -   Does not apply See Admin Instructions     amLODIPine  5 mg Oral Daily     atorvastatin  40 mg Oral Daily     famotidine  10 mg Intravenous QPM    Or     famotidine  10 mg Oral QPM     - MEDICATION INSTRUCTIONS -   Does not apply Once     polyethylene glycol  17 g Oral Daily     senna-docusate  1 tablet Oral BID     sodium chloride (PF)  9 mL Intracatheter During Dialysis/CRRT (from stock)     sodium chloride (PF)  9 mL Intracatheter During Dialysis/CRRT (from stock)         Current active medications and PTA medications reviewed, see medication list for details.            Physical Exam:   Vitals were reviewed  Patient Vitals for the past 24 hrs:   BP Temp Temp src Pulse Resp SpO2   22 1215 (!) 148/86 -- -- 78 -- --   22 1200 (!) 143/99 -- -- 73 -- --   22 1145 (!) 137/90 -- -- 76 -- --   22 1130 (!) 143/91 -- -- 76 -- --   22 1120 (!) 152/89 97.5  F (36.4  C) Oral 71 16 96 %   22 0700 129/69 -- -- 80 16 95 %   22 0207 139/73 -- -- 87 -- --   22 0118 (!) 149/85 -- -- -- -- --   22 0056 (!) 153/97 -- -- 81 -- --   22 2346 (!) 148/81 -- -- 77 -- --   22 2338 (!) 151/81 97.3  F (36.3  C) Oral 80 16 98 %   22 1527 124/55 97.4  F (36.3  C) Oral 77 16 94 %       Temp:  [97.3  F (36.3  C)-97.5  F (36.4  C)] 97.5  F (36.4  C)  Pulse:  [71-87] 78  Resp:  [16] 16  BP: (124-153)/(55-99) 148/86  SpO2:  [94 %-98 %] 96 %    Temperatures:  Current - Temp: 97.5  F (36.4  C); Max - Temp  Av.4  F (36.3   C)  Min: 97.3  F (36.3  C)  Max: 97.5  F (36.4  C)  Respiration range: Resp  Av  Min: 16  Max: 16  Pulse range: Pulse  Av.8  Min: 71  Max: 87  Blood pressure range: Systolic (24hrs), Av , Min:124 , Max:153   ; Diastolic (24hrs), Av, Min:55, Max:99    Pulse oximetry range: SpO2  Av.8 %  Min: 94 %  Max: 98 %    I/O last 3 completed shifts:  In: 720 [P.O.:720]  Out: -       Intake/Output Summary (Last 24 hours) at 2022 1227  Last data filed at 2022 1900  Gross per 24 hour   Intake 720 ml   Output --   Net 720 ml       Alert, more responsive today.   R CVC with no redness or tenderness       Wt Readings from Last 4 Encounters:   22 60 kg (132 lb 4.4 oz)   22 64 kg (141 lb)   22 64 kg (141 lb)   22 63.8 kg (140 lb 9.6 oz)          Data:          Lab Results   Component Value Date     2022     2022     2022     2005    Lab Results   Component Value Date    CHLORIDE 88 2022    CHLORIDE 91 2022    CHLORIDE 87 2022    CHLORIDE 105 2005    Lab Results   Component Value Date    BUN 52 2022    BUN 36 2022    BUN 58 2022    BUN 16 2005      Lab Results   Component Value Date    POTASSIUM 4.3 2022    POTASSIUM 3.7 2022    POTASSIUM 4.5 2022    POTASSIUM 4.5 2005    Lab Results   Component Value Date    CO2 22 2022    CO2 26 2022    CO2 22 2022    CO2 27 2005    Lab Results   Component Value Date    CR 4.61 2022    CR 3.77 2022    CR 5.23 2022    CR 0.80 2005        Recent Labs   Lab Test 22  0833 22  0528 22  0913   WBC 8.6 12.1* 9.3   HGB 11.2* 11.5* 11.4*   HCT 33.9* 34.2* 32.7*   * 106* 104*   * 115* 146*     Recent Labs   Lab Test 02/15/22  0624 22  0638 22  0113   AST 37 43 76*   ALT 32 46 49   ALKPHOS 122 148 148   BILITOTAL 1.0 1.4* 0.9       No results for  input(s): MAG in the last 21331 hours.  Recent Labs   Lab Test 05/21/22  0736 05/20/22  1122 05/19/22  0707   PHOS 5.6* 4.8* 5.6*     Recent Labs   Lab Test 05/21/22  0736 05/20/22  1122 05/19/22  0707   SILVIA 9.9 9.7 9.8       Lab Results   Component Value Date    SILVIA 9.9 05/21/2022     Lab Results   Component Value Date    WBC 8.6 05/17/2022    HGB 11.2 (L) 05/17/2022    HCT 33.9 (L) 05/17/2022     (H) 05/17/2022     (L) 05/17/2022     Lab Results   Component Value Date     (L) 05/21/2022    POTASSIUM 4.3 05/21/2022    CHLORIDE 88 (L) 05/21/2022    CO2 22 05/21/2022     (H) 05/21/2022     Lab Results   Component Value Date    BUN 52 (H) 05/21/2022    CR 4.61 (H) 05/21/2022     No results found for: MAG  Lab Results   Component Value Date    PHOS 5.6 (H) 05/21/2022       Creatinine   Date Value Ref Range Status   05/21/2022 4.61 (H) 0.52 - 1.04 mg/dL Final   05/20/2022 3.77 (H) 0.52 - 1.04 mg/dL Final   05/19/2022 5.23 (H) 0.52 - 1.04 mg/dL Final   05/18/2022 4.08 (H) 0.52 - 1.04 mg/dL Final   05/17/2022 5.07 (H) 0.52 - 1.04 mg/dL Final   05/16/2022 4.86 (H) 0.52 - 1.04 mg/dL Final   01/12/2005 0.80 0.60 - 1.30 mg/dL Final       Attestation:  I have reviewed today's vital signs, notes, medications, labs and imaging.  Seen on dialysis.      Amrit Mo MD

## 2022-05-21 NOTE — DISCHARGE SUMMARY
[facility]  Hospitalist Discharge Summary      Date of Admission:  5/8/2022  Date of Discharge:  5/21/2022  Discharging Provider: Emily Castle MD  Discharge Service: Hospitalist Service    Discharge Diagnoses   Left femoral neck fracture, S/P bipolar hemiarthroplasty  Mechanical fall  Subarachnoid hemorrhage, posterior right sylvian fissure  Encephalopathy, multifactorial from SAH, opioids, acute illness and hospitalization  Nearly occlusive right internal jugular DVT  ESRD on hemodialysis Monday Wednesday Friday  Hyponatremia, acute on chronic  Permanent atrial fibrillation  S/B AVN ablation with PPM 6/2021  Severe tricuspid regurgitation  Pulmonary hypertension  Chronic right heart failure, compensated  Hyperlipidemia  Anemia  COPD, not in exacerbation  Obstructive sleep apnea  Anxiety  Depression  Gout   PAD  Severe Protein-Calorie Malnutrition         Follow-ups Needed After Discharge   Follow-up Appointments     Follow Up and recommended labs and tests      Follow up with Dr. Dee Dee IBARRA in 2-3 weeks for bandage removal, suture   removal and follow-up x-rays.  No follow up labs or test are needed prior   to visit.  Call for appointment or questions [phone]             Unresulted Labs Ordered in the Past 30 Days of this Admission     No orders found from 4/8/2022 to 5/9/2022.          Discharge Disposition   Discharged to long-term care facility  Condition at discharge: Stable      Hospital Course    Francine Chris is a 76 year old female with history of atrial fibrillation on anticoagulation, severe tricuspid regurgitation, pulmonary hypertension, chronic right-sided heart failure, COPD, SKY, anxiety, depression, gout, peptic ulcer disease, peripheral arterial disease admitted on 5/8/2022 after a mechanical fall, found to have left femoral neck fracture.     Goals of care    I received a message from Eileen Arredondo, primary care nurse practitioner with [facility]  "Geriatrics ([phone]).  Francine's sister, Tereza called her asking about goals of care.     I requested Palliative Care consult and discussed with Julee Greene, LANRE.  I appreciate her evaluation and recommendations    Francine clearly lacks capacity for decision-making.  Her sister, Tereza is making decisions on her behalf and requested changing CODE STATUS from full code to DNR/DNI.  Tereza has been very involved with Francine's care over many years.    Tereza also notes that Francine's condition has worsened significantly over the past year resulting in frequent hospitalizations.  She would like Francine to transition to hospice focused care upon return to her facility.    Patient had final hemodialysis session today      Left femoral neck fracture s/p bipolar hemiarthroplasty  Mechanical fall  *Presents with fall slipping on wet floor  *XR with above fracture    Orthopedic surgery following, routine postoperative cares per orthopedic surgery    Holding apixaban for 2 weeks due to SAH     Subarachnoid hemorrhage, posterior right Sylvian fissure  Encephalopathy, multifactorial from SAH, opioids, acute illness    Nearly occlusive R internal jugular DVT   *Noted to be more confused 5/12, sister reports is not at baseline  *Head CT negative with initial fall 5/2, not checked this admission  *Apixaban resumed 5/11/22     Head CT STAT ordered, demonstrated hemorrhage as above    Discussed with neuro critical care and consulted    Levetiracetam ordered post HD x 7 days post HD for seizure prophylaxis (5/13-5/20)    Received Kcentra    Hold apixaban for 2 weeks \"from bleed stability\" before resuming    Q4H neuro checks with stable head CT    MRI/MRA shows stable small sylvian fissure SAH, no stroke, no significant stenosis although US would better characterize due to motion artifact.    discontinue all narcotics and anticholinergics, tylenol for pain    Goal SBP is < 140 mmHg; patient received 2 doses 10 mg IV " "Hydralazine during early morning hours (0120 and 0209); added Amlodipine 5 mg daily    MRI, MRV ordered by Neurology, did not show new venous-related stroke or other acute findings; internal jugular clot not well visualized     They also recommend CT C/A/P to rule out underlying malignancy.  This shows single indeterminate 5 mm left lower lobe pulmonary nodule, nothing for malignancy     Heme/Onc consultation recommended by Neurology re: internal jugular clot in setting of anticoagulation.  Discussed with Dr. Rodriguez, right IJ thrombosis is secondary to CVC double lumen right internal jugular line. No additional workup needed, no change in ultimate plan for resuming anticoagulants    See comments in Dr. Eric's note, \"Would not remove internal jugular catheter unless she transitions over to the fistula.  Very low likelihood that this occlusion would lead to a pulmonary embolus or other complications.\"  I appreciate his followup.    Neuro checked EEG, given lethargy.  It shows, \"EEG with diffuse theta delta slowing, consistent with moderate diffuse nonspecific encephalopathy.    No electrographic seizures or epileptiform discharges were recorded.\"     finally, \"Plan for repeat NCCT in 2 weeks, if bleed is stable/improved then re-initiation of DOAC can be discussed\"    Would not repeat CT if, ultimately, emphasis is on her comfort     ESRD on HD MWF  Hyponatremia, acute on chronic  On HD since 2021. Dialyzes MWF under care of Dr. Ro. Na 120 in ED (recent baseline runs low). Per Dr. Palmer, hyponatremia is due to large intradialytic weight gains.  5 kg is a typical goal in HD for fluid removal.    Nephrology followed    fluid restriction could be discontinued if change to emphasis on her comfort    resumed usual MWF HD.  She has also been having UF due to hyponatremia, 3 liters removed 5/17    Baseline sodium readings vary from 119 (4/18) - 130 (5/10)     Permanent atrial fibrillation  S/p AVN ablation with ppm " 6/2021    apixaban on hold for 14 days due to SAH     Severe tricuspid regurgitation   Pulmonary hypertension  Chronic R heart failure, compensated  HLD  [needs rec- atorvastatin 40 mg daily, metoprolol 12.5 mg BID, torsemide 40 mg daily]  Echo with EF 50-55%, nl LV fn, severe TR, pulmonary pressures 60-65 mmHg. Seen by CV surgery, deemed to be too high of a surgical risk for valve replacement.     continue lipitor, metoprolol      Anemia  Hgb at 10.5 on presentation, stable from recent    Hgb stable 5/12     COPD  [needs rec- prn albuterol, tiotropium-olodaterol 2 puffs daily]    Continue prior to admission inhaler regimen      SKY  No longer wears CPAP     Anxiety  Depression   Doesn't appear to currently be on meds for this     Gout    Continue allopurinol 100 mg MWF after dialysis      PUD    Continue pantoprazole 40 mg daily      PAD    noted    Clinical Indicators found within the medical record:        Severe Malnutrition  noted by RD on 05/19/22 based on:      % Weight Loss:  > 2% in 1 week (severe malnutrition)  % Intake:  </= 50% for >/= 5 days (severe malnutrition)  Subcutaneous Fat Loss:  Orbital region moderate depletion and Upper arm region moderate depletion  Muscle Loss:  Temporal region severe depletion, Clavicle bone region moderate-severe depletion, Acromion bone region severe depletion and Dorsal hand region moderate depletion  Fluid Retention:  Mild      Malnutrition Diagnosis: Severe malnutrition In Context of:  Acute illness or injury / Chronic illness or disease     Treatment: Eval and treat per RD recommendation including supplements and nutrition education         Clinically Significant Risk Factors Present on Admission     Diet: Advance Diet as Tolerated: Regular Diet Adult  Diet    DVT Prophylaxis: Pneumatic Compression Devices  Schuler Catheter: Not present  Code Status: Full Code          Disposition Plan  Pending improved encephalopathy. needs TCU at discharge    Emily Castle  MD  Hospitalist  [facility]  Text Page (7am - 6pm, M-F)      Consultations This Hospital Stay   WOUND OSTOMY CONTINENCE NURSE  IP CONSULT  NEPHROLOGY IP CONSULT  CARE MANAGEMENT / SOCIAL WORK IP CONSULT  PHYSICAL THERAPY ADULT IP CONSULT  OCCUPATIONAL THERAPY ADULT IP CONSULT  PHYSICAL THERAPY ADULT IP CONSULT  OCCUPATIONAL THERAPY ADULT IP CONSULT  NEUROLOGY CRITICAL CARE ADULT IP CONSULT  PHARMACY IP CONSULT  WOUND OSTOMY CONTINENCE NURSE  IP CONSULT  HEMATOLOGY & ONCOLOGY IP CONSULT  VASCULAR ACCESS ADULT IP CONSULT  CARE MANAGEMENT / SOCIAL WORK IP CONSULT  PALLIATIVE CARE ADULT IP CONSULT  WOUND OSTOMY CONTINENCE NURSE  IP CONSULT  CARE MANAGEMENT / SOCIAL WORK IP CONSULT  CARE MANAGEMENT / SOCIAL WORK IP CONSULT    Code Status   No CPR- Do NOT Intubate    Time Spent on this Encounter   I, Emily Castle MD, personally saw the patient today and spent greater than 30 minutes discharging this patient.       Emily Castle MD  [facility]  [address] [place] [id number]  Phone: [phone]  Fax: [phone]  ______________________________________________________________________    Physical Exam   Vital Signs: Temp: 97.3  F (36.3  C) Temp src: Oral BP: 129/69 Pulse: 80   Resp: 16 SpO2: 95 % O2 Device: None (Room air)    Weight: 132 lbs 4.42 oz  I saw and examined the patient on the date of discharge.           Primary Care Physician   Eileen Arredondo    Discharge Orders      CT Head w/o contrast*     Medication Therapy Management Referral      Neurosurgery Referral      General info for SNF    Length of Stay Estimate: Short Term Care: Estimated # of Days <30  Condition at Discharge: Improving  Level of care:skilled   Rehabilitation Potential: Good  Admission H&P remains valid and up-to-date: Yes  Recent Chemotherapy: N/A  Use Nursing Home Standing Orders: Yes     Mantoux instructions    Give two-step Mantoux (PPD) Per Facility Policy Yes     Follow Up and  recommended labs and tests    Follow up with Dr. Funez PAC in 2-3 weeks for bandage removal, suture removal and follow-up x-rays.  No follow up labs or test are needed prior to visit.  Call for appointment or questions [phone]     Reason for your hospital stay    Left hip hemiarthroplasty s/t femoral neck fracture     Activity - Up with assistive device     Weight bearing status    WBAT with walker     Wound care    Site:   Left lateral hip  Instructions:  Leave dressing intact for 2 weeks, call if becomes overly saturated (>50%); do not soak or submerge, ok to shower, reinforce if needed     Physical Therapy Adult Consult    Evaluate and treat as clinically indicated.    Reason:  Left hip hemiarthroplasty s/t femoral neck fracture     Occupational Therapy Adult Consult    Evaluate and treat as clinically indicated.    Reason:  Left hip hemiarthroplasty s/t femoral neck fracture     Fall precautions     Crutches DME    DME Documentation: Describe the reason for need to support medical necessity: Impaired gait status post hip surgery. I, the undersigned, certify that the above prescribed supplies are medically necessary for this patient and is both reasonable and necessary in reference to accepted standards of medical practice in the treatment of this patient's condition and is not prescribed as a convenience.     Cane DME    DME Documentation: Describe the reason for need to support medical necessity: Impaired gait status post hip surgery. I, the undersigned, certify that the above prescribed supplies are medically necessary for this patient and is both reasonable and necessary in reference to accepted standards of medical practice in the treatment of this patient's condition and is not prescribed as a convenience.     Walker DME    : DME Documentation: Describe the reason for need to support medical necessity: Impaired gait status post hip surgery. I, the undersigned, certify that the above prescribed  supplies are medically necessary for this patient and is both reasonable and necessary in reference to accepted standards of medical practice in the treatment of this patient's condition and is not prescribed as a convenience.     Diet    Follow this diet upon discharge: Orders Placed This Encounter      Advance Diet as Tolerated: Regular Diet Adult       Significant Results and Procedures   Most Recent 3 CBC's:Recent Labs   Lab Test 05/17/22  0833 05/14/22  0528 05/12/22  0913   WBC 8.6 12.1* 9.3   HGB 11.2* 11.5* 11.4*   * 106* 104*   * 115* 146*     Most Recent 3 BMP's:Recent Labs   Lab Test 05/21/22  0736 05/20/22  1122 05/19/22  0707   * 129* 122*   POTASSIUM 4.3 3.7 4.5   CHLORIDE 88* 91* 87*   CO2 22 26 22   BUN 52* 36* 58*   CR 4.61* 3.77* 5.23*   ANIONGAP 15* 12 13   SILVIA 9.9 9.7 9.8   * 115* 104*   ,   Results for orders placed or performed during the hospital encounter of 05/08/22   XR Femur Left 2 Views    Narrative    EXAM: XR FEMUR LEFT 2 VIEW  LOCATION: [facility]  DATE/TIME: 5/8/2022 6:19 PM    INDICATION: Fall, pain.  COMPARISON: None.      Impression    IMPRESSION: Fracture through the left femoral neck without evidence for intertrochanteric extension. Left femur otherwise negative.   XR Pelvis 1/2 Views    Narrative    EXAM: XR PELVIS 1/2 VW  LOCATION: [facility]  DATE/TIME: 5/8/2022 6:19 PM    INDICATION: Fall, left hip pain.  COMPARISON: None.      Impression    IMPRESSION: Fracture through the left femoral neck without evidence for intertrochanteric extension. No dislocation. Right hip negative for fracture. Pelvis negative for fracture. Diffuse demineralization.   XR Chest Port 1 View    Narrative    EXAM: XR CHEST PORT 1 VIEW  LOCATION: [facility]  DATE/TIME: 5/8/2022 8:45 PM    INDICATION: SOB  COMPARISON: 1/12/2022      Impression    IMPRESSION: Pulmonary vascular congestion with  increased interstitial markings, correlate for interstitial pulmonary edema, recommend follow-up to resolution. No pneumothorax. Moderately enlarged heart. Left subclavian approach pacer. Right-sided central   venous catheter with tip over atrial caval junction.   XR Pelvis w Hip Port Left  1 View    Narrative    EXAM: XR PELVIS AND HIP PORTABLE LEFT 1 VIEW  LOCATION: [facility]  DATE/TIME: 5/10/2022 6:14 PM    INDICATION: Left hip postoperative follow-up.  COMPARISON: 5/8/2022.      Impression    IMPRESSION:  1.  Interval left hip hemiarthroplasty with cemented proximal femur endoprosthesis. The component is well seated without evidence of complication. Postoperative soft tissue gas about the hip. Lateral skin staples.  2.  No fracture or joint malalignment.  3.  Bone demineralization.   CT Head w/o Contrast     Value    Radiologist flags New intracranial hemorrhage (AA)    Narrative    CT OF THE HEAD WITHOUT CONTRAST 5/12/2022 3:39 PM     COMPARISON: Head CT 5/2/2022.    HISTORY: Head trauma, moderate-severe.    TECHNIQUE: 5 mm thick axial CT images of the head were acquired  without IV contrast material.    FINDINGS: There is new small subarachnoid hemorrhage in the posterior  aspect of the right Sylvian fissure. There is moderate diffuse  cerebral volume loss. There are subtle patchy areas of decreased  density in the cerebral white matter bilaterally that are consistent  with sequela of chronic small vessel ischemic disease.    The ventricles and basal cisterns are within normal limits in  configuration given the degree of cerebral volume loss.  There is no  midline shift.    No intracranial mass or recent infarct.    The visualized paranasal sinuses are well-aerated. There is no  mastoiditis. There are no fractures of the visualized bones.      Impression    IMPRESSION:   1. New small volume subarachnoid hemorrhage in the posterior aspect of  the right Sylvian fissure.  2. Diffuse  cerebral volume loss and cerebral white matter changes  consistent with chronic small vessel ischemic disease.    [Critical Result: New intracranial hemorrhage]    Finding was identified on 5/12/2022 3:42 PM.     SHAKA JA JUAN was contacted by Dr. Foley on 5/12/2022 3:55 PM  and verbalized understanding of the critical result.     Radiation dose for this scan was reduced using automated exposure  control, adjustment of the mA and/or kV according to patient size, or  iterative reconstruction technique.     KEVYN FOLEY MD         SYSTEM ID:  [id number]   CT Head w/o Contrast    Narrative    EXAM: CT HEAD W/O CONTRAST  LOCATION: [facility]  DATE/TIME: 5/12/2022 9:11 PM    INDICATION: Follow-up intracranial hemorrhage  COMPARISON: 05/12/2022  TECHNIQUE: Routine CT Head without IV contrast. Multiplanar reformats. Dose reduction techniques were used.    FINDINGS:  INTRACRANIAL CONTENTS: Stable subarachnoid hemorrhage in the right sylvian fissure. No new or progressive intracranial hemorrhage. No CT evidence of acute infarct. Mild presumed chronic small vessel ischemic changes. Moderate generalized volume loss. No   hydrocephalus.     VISUALIZED ORBITS/SINUSES/MASTOIDS: Prior bilateral cataract surgery. Visualized portions of the orbits are otherwise unremarkable. Mild mucosal thickening of the right maxillary sinus with chronic mucoperiosteal reaction. No middle ear or mastoid   effusion.    BONES/SOFT TISSUES: No acute abnormality.      Impression    IMPRESSION:  1.  No significant interval change. Stable subarachnoid hemorrhage in the right sylvian fissure.   CT Head w/o Contrast    Narrative    CT SCAN OF THE HEAD WITHOUT CONTRAST   5/13/2022 8:05 AM     HISTORY: Stroke, follow up.    TECHNIQUE:  Axial images of the head and coronal reformations without  IV contrast material. Radiation dose for this scan was reduced using  automated exposure control, adjustment of the mA and/or kV  according  to patient size, or iterative reconstruction technique.    COMPARISON: Head CT 5/12/2022.    FINDINGS:  Acute subarachnoid hemorrhage within the right sylvian fissure,  unchanged. No new areas of hemorrhage. Volume loss and white matter  hypoattenuation likely represent chronic small ischemic change.    The visualized calvarium, tympanic cavities, mastoid cavities, and  extracranial soft tissues are unremarkable. Left maxillary sinus  debris/air fluid level.      Impression    IMPRESSION:   No change of subarachnoid hemorrhage within the right sylvian fissure.    RENATO GREEN MD         SYSTEM ID:  [id number]   MR Brain w/o Contrast    Narrative    EXAM: MR BRAIN W/O CONTRAST, MRA NECK (CAROTIDS) W/O CONTRAST, MRA BRAIN ([place]) W/O CONTRAST  LOCATION: [facility]  DATE/TIME: 5/13/2022 4:48 PM    INDICATION: Neuro deficit. Stroke follow-up. Subarachnoid hemorrhage.  COMPARISON: 05/12/2022  TECHNIQUE:   1) Routine multiplanar multisequence head MRI without intravenous contrast.  2) 3D time-of-flight head MRA without intravenous contrast.  3) Neck MRA without IV contrast. Stenosis measurements made according to NASCET criteria unless otherwise specified.      FINDINGS:  HEAD MRI:  INTRACRANIAL CONTENTS: Stable subarachnoid hemorrhage along the right sylvian fissure. Adjacent diffusion restriction is favored to be due to susceptibility artifact from the hemorrhage. No definite acute or subacute infarct. No adverse intracranial mass   effect. Scattered nonspecific T2/FLAIR hyperintensities within the cerebral white matter most consistent with mild chronic microvascular ischemic change. Mild generalized cerebral atrophy. No hydrocephalus. Normal position of the cerebellar tonsils.     SELLA: No abnormality accounting for technique.    OSSEOUS STRUCTURES/SOFT TISSUES: Normal marrow signal. The major intracranial vascular flow voids are maintained.     ORBITS: Prior bilateral  cataract surgery. Visualized portions of the orbits are otherwise unremarkable.     SINUSES/MASTOIDS: Moderate mucosal thickening in the left maxillary sinus. No middle ear or mastoid effusion.     HEAD MRA:   ANTERIOR CIRCULATION: There is a band of artifact that obscures the proximal internal carotid arteries at the petrous cavernous junction. No proximal occlusion. Mild narrowing of the supraclinoid segments of the internal carotid arteries likely reflects   atherosclerotic disease. No saccular aneurysm. Standard [place] anatomy.    POSTERIOR CIRCULATION: No stenosis/occlusion, aneurysm, or high flow vascular malformation. Dominant right and smaller left vertebral artery contribute to a normal basilar artery.     NECK MRA: Patient aborted the exam prior to completion. Axial 2-D time-of-flight images were the only sequence obtained. Significant motion artifact limits evaluation.    RIGHT CAROTID: At least mild to moderate narrowing of the proximal internal carotid artery at the bifurcation.    LEFT CAROTID: At least mild narrowing of the proximal internal carotid artery at the carotid bifurcation.    VERTEBRAL ARTERIES: The proximal vertebral arteries are obscured by motion artifact. The remainder of the vertebral arteries appear grossly patent. Dominant right and smaller left vertebral arteries.    AORTIC ARCH: Suboptimally visualized on this exam.      Impression    IMPRESSION:  HEAD MRI:   1.  Stable subarachnoid hemorrhage along the right sylvian fissure.  2.  No definite acute infarct.  3.  Mild generalized parenchymal volume loss and sequelae of chronic microvascular ischemic disease.    HEAD MRA:   1.  Mild tapering of the internal carotid arteries, suggestive of atherosclerosis.  2.  No proximal occlusion, saccular aneurysm, or high flow vascular malformation.    NECK MRA:  1.  Incomplete and motion degraded exam. Narrowing of the bilateral carotid bifurcations with at least mild stenosis on the  left and mild to moderate stenosis on the right. Consider ultrasound for better evaluation.   MRA Brain ([place]) wo Contrast    Narrative    EXAM: MR BRAIN W/O CONTRAST, MRA NECK (CAROTIDS) W/O CONTRAST, MRA BRAIN ([place]) W/O CONTRAST  LOCATION: [facility]  DATE/TIME: 5/13/2022 4:48 PM    INDICATION: Neuro deficit. Stroke follow-up. Subarachnoid hemorrhage.  COMPARISON: 05/12/2022  TECHNIQUE:   1) Routine multiplanar multisequence head MRI without intravenous contrast.  2) 3D time-of-flight head MRA without intravenous contrast.  3) Neck MRA without IV contrast. Stenosis measurements made according to NASCET criteria unless otherwise specified.      FINDINGS:  HEAD MRI:  INTRACRANIAL CONTENTS: Stable subarachnoid hemorrhage along the right sylvian fissure. Adjacent diffusion restriction is favored to be due to susceptibility artifact from the hemorrhage. No definite acute or subacute infarct. No adverse intracranial mass   effect. Scattered nonspecific T2/FLAIR hyperintensities within the cerebral white matter most consistent with mild chronic microvascular ischemic change. Mild generalized cerebral atrophy. No hydrocephalus. Normal position of the cerebellar tonsils.     SELLA: No abnormality accounting for technique.    OSSEOUS STRUCTURES/SOFT TISSUES: Normal marrow signal. The major intracranial vascular flow voids are maintained.     ORBITS: Prior bilateral cataract surgery. Visualized portions of the orbits are otherwise unremarkable.     SINUSES/MASTOIDS: Moderate mucosal thickening in the left maxillary sinus. No middle ear or mastoid effusion.     HEAD MRA:   ANTERIOR CIRCULATION: There is a band of artifact that obscures the proximal internal carotid arteries at the petrous cavernous junction. No proximal occlusion. Mild narrowing of the supraclinoid segments of the internal carotid arteries likely reflects   atherosclerotic disease. No saccular aneurysm.  Standard [place] anatomy.    POSTERIOR CIRCULATION: No stenosis/occlusion, aneurysm, or high flow vascular malformation. Dominant right and smaller left vertebral artery contribute to a normal basilar artery.     NECK MRA: Patient aborted the exam prior to completion. Axial 2-D time-of-flight images were the only sequence obtained. Significant motion artifact limits evaluation.    RIGHT CAROTID: At least mild to moderate narrowing of the proximal internal carotid artery at the bifurcation.    LEFT CAROTID: At least mild narrowing of the proximal internal carotid artery at the carotid bifurcation.    VERTEBRAL ARTERIES: The proximal vertebral arteries are obscured by motion artifact. The remainder of the vertebral arteries appear grossly patent. Dominant right and smaller left vertebral arteries.    AORTIC ARCH: Suboptimally visualized on this exam.      Impression    IMPRESSION:  HEAD MRI:   1.  Stable subarachnoid hemorrhage along the right sylvian fissure.  2.  No definite acute infarct.  3.  Mild generalized parenchymal volume loss and sequelae of chronic microvascular ischemic disease.    HEAD MRA:   1.  Mild tapering of the internal carotid arteries, suggestive of atherosclerosis.  2.  No proximal occlusion, saccular aneurysm, or high flow vascular malformation.    NECK MRA:  1.  Incomplete and motion degraded exam. Narrowing of the bilateral carotid bifurcations with at least mild stenosis on the left and mild to moderate stenosis on the right. Consider ultrasound for better evaluation.   MRA Neck (Carotids) wo Contrast    Narrative    EXAM: MR BRAIN W/O CONTRAST, MRA NECK (CAROTIDS) W/O CONTRAST, MRA BRAIN ([place] OF [place]) W/O CONTRAST  LOCATION: [facility]  DATE/TIME: 5/13/2022 4:48 PM    INDICATION: Neuro deficit. Stroke follow-up. Subarachnoid hemorrhage.  COMPARISON: 05/12/2022  TECHNIQUE:   1) Routine multiplanar multisequence head MRI without intravenous  contrast.  2) 3D time-of-flight head MRA without intravenous contrast.  3) Neck MRA without IV contrast. Stenosis measurements made according to NASCET criteria unless otherwise specified.      FINDINGS:  HEAD MRI:  INTRACRANIAL CONTENTS: Stable subarachnoid hemorrhage along the right sylvian fissure. Adjacent diffusion restriction is favored to be due to susceptibility artifact from the hemorrhage. No definite acute or subacute infarct. No adverse intracranial mass   effect. Scattered nonspecific T2/FLAIR hyperintensities within the cerebral white matter most consistent with mild chronic microvascular ischemic change. Mild generalized cerebral atrophy. No hydrocephalus. Normal position of the cerebellar tonsils.     SELLA: No abnormality accounting for technique.    OSSEOUS STRUCTURES/SOFT TISSUES: Normal marrow signal. The major intracranial vascular flow voids are maintained.     ORBITS: Prior bilateral cataract surgery. Visualized portions of the orbits are otherwise unremarkable.     SINUSES/MASTOIDS: Moderate mucosal thickening in the left maxillary sinus. No middle ear or mastoid effusion.     HEAD MRA:   ANTERIOR CIRCULATION: There is a band of artifact that obscures the proximal internal carotid arteries at the petrous cavernous junction. No proximal occlusion. Mild narrowing of the supraclinoid segments of the internal carotid arteries likely reflects   atherosclerotic disease. No saccular aneurysm. Standard [place] anatomy.    POSTERIOR CIRCULATION: No stenosis/occlusion, aneurysm, or high flow vascular malformation. Dominant right and smaller left vertebral artery contribute to a normal basilar artery.     NECK MRA: Patient aborted the exam prior to completion. Axial 2-D time-of-flight images were the only sequence obtained. Significant motion artifact limits evaluation.    RIGHT CAROTID: At least mild to moderate narrowing of the proximal internal carotid artery at the bifurcation.    LEFT  CAROTID: At least mild narrowing of the proximal internal carotid artery at the carotid bifurcation.    VERTEBRAL ARTERIES: The proximal vertebral arteries are obscured by motion artifact. The remainder of the vertebral arteries appear grossly patent. Dominant right and smaller left vertebral arteries.    AORTIC ARCH: Suboptimally visualized on this exam.      Impression    IMPRESSION:  HEAD MRI:   1.  Stable subarachnoid hemorrhage along the right sylvian fissure.  2.  No definite acute infarct.  3.  Mild generalized parenchymal volume loss and sequelae of chronic microvascular ischemic disease.    HEAD MRA:   1.  Mild tapering of the internal carotid arteries, suggestive of atherosclerosis.  2.  No proximal occlusion, saccular aneurysm, or high flow vascular malformation.    NECK MRA:  1.  Incomplete and motion degraded exam. Narrowing of the bilateral carotid bifurcations with at least mild stenosis on the left and mild to moderate stenosis on the right. Consider ultrasound for better evaluation.   US Carotid Bilateral    Addendum: 5/14/2022    Findings were communicated to Nurse Shanti Alcantara RN 17:42 hours on 5/14/2022      Narrative    EXAM: US CAROTID BILATERAL  LOCATION: [facility]  DATE/TIME: 5/14/2022 4:16 PM    INDICATION: History of CVA.    COMPARISON: None.    TECHNIQUE: Duplex exam performed utilizing 2D gray-scale imaging, Doppler interrogation with color-flow and spectral waveform analysis. The percent diameter stenosis is determined using NASCET criteria and Society of Radiologists in Ultrasound Consensus   Criteria.    FINDINGS:  RIGHT: Severe plaque at the bifurcation. The peak systolic velocity in the ICA is greater than 230 cm/sec, consistent with greater than 70% stenosis. Normal velocities in the ECA. Antegrade flow within the vertebral artery.     LEFT: Mild plaque at the bifurcation. The peak systolic velocity in the ICA is less than 125 cm/sec, consistent with  less than 50% stenosis. Normal velocities in the ECA. Antegrade flow within the vertebral artery.    VELOCITY CHART:  Prox CCA   Right: 57/6 cm/s   Left: 78/20 cm/s  Mid/Dist CCA   Right: 42/8 cm/s   Left: 88/19 cm/s  Prox ICA   Right: 210/30 cm/s   Left: 82/20 cm/s  Mid ICA   Right: 201/35 cm/s   Left: 100/24 cm/s  Dist ICA   Right: 78/27 cm/s   Left: 75/24 cm/s  ECA   Right: 176/19 cm/s   Left: 60/0 cm/s  Vertebral   Right: 133/30 cm/s   Left: 82/17 cm/s  ICA/CCA Ratio   Right: 5.0   Left: 1.1      Impression    IMPRESSION:  1.  Moderate plaque formation, velocities consistent with 50-69% stenosis in the right internal carotid artery.  2.  Mild plaque formation, velocities consistent with less than 50% stenosis in the left internal carotid artery.  3.  Flow within the vertebral arteries is antegrade.  4.  Nearly occlusive deep venous thrombus of the internal jugular vein.     MRV Brain wo Contrast    Narrative    MR BRAIN WITHOUT CONTRAST, MRV BRAIN WITHOUT CONTRAST  5/16/2022 12:46  PM    INDICATION: Evaluate for infarct associated with newly identified  jugular venous thrombosis.  TECHNIQUE:   1.  Noncontrast MRI of the brain.  2. Brain MRV without IV contrast.   CONTRAST: None.  COMPARISON: 5/13/2022 brain MRI and 5/13/2022 head CT. 5/14/2022  vascular ultrasound.    FINDINGS:    Head MRI:    No finding for acute infarct. Small volume acute subarachnoid  hemorrhage in the posterior right sylvian fissure is unchanged. No new  intracranial hemorrhage elsewhere. Mild burden scattered chronic small  vessel ischemic change. Moderate diffuse parenchymal volume loss.  Nondilated ventricles. Major intracranial arterial flow voids at the  skull base are preserved. Brainstem and cerebellum are unremarkable.  Cerebellar tonsils are normally positioned.    Mild mucosal thickening in the left maxillary sinus with a small  air-fluid level. Unremarkable orbits. No fluid in the mastoid air  cells. Unremarkable marrow signal.  Remainder negative.    HEAD MRV:  Motion degraded exam. Asymmetric prominence of the left transverse and  sigmoid sinuses relative to the right is presumably physiologic in  nature. While motion limits evaluation for vessel patency, the venous  sinuses appear widely patent. Motion more noticeably limited  evaluation of the deep venous drainage pathways, but there is no  definite occlusion. The known thrombus in the right internal jugular  vein is not well visualized.      Impression    IMPRESSION:   Head MRI:  1.  Overall unchanged exam with no finding for acute infarct. Small  volume acute subarachnoid hemorrhage along the posterior right sylvian  fissure is relatively unchanged.  2.  Mild burden scattered chronic small vessel ischemic change and a  moderate diffuse parenchymal volume loss are unchanged.      HEAD MRV:  1.  Motion degraded exam with no definite intracranial venous sinus  thrombosis, noting that motion on this exam limits evaluation.    2.  Known thrombosis in the right internal jugular vein is not well  demonstrated on this exam.    RENATO THOMAS MD         SYSTEM ID:  [id number]   MR Brain w/o Contrast    Narrative    MR BRAIN WITHOUT CONTRAST, MRV BRAIN WITHOUT CONTRAST  5/16/2022 12:46  PM    INDICATION: Evaluate for infarct associated with newly identified  jugular venous thrombosis.  TECHNIQUE:   1.  Noncontrast MRI of the brain.  2. Brain MRV without IV contrast.   CONTRAST: None.  COMPARISON: 5/13/2022 brain MRI and 5/13/2022 head CT. 5/14/2022  vascular ultrasound.    FINDINGS:    Head MRI:    No finding for acute infarct. Small volume acute subarachnoid  hemorrhage in the posterior right sylvian fissure is unchanged. No new  intracranial hemorrhage elsewhere. Mild burden scattered chronic small  vessel ischemic change. Moderate diffuse parenchymal volume loss.  Nondilated ventricles. Major intracranial arterial flow voids at the  skull base are preserved. Brainstem and cerebellum are  unremarkable.  Cerebellar tonsils are normally positioned.    Mild mucosal thickening in the left maxillary sinus with a small  air-fluid level. Unremarkable orbits. No fluid in the mastoid air  cells. Unremarkable marrow signal. Remainder negative.    HEAD MRV:  Motion degraded exam. Asymmetric prominence of the left transverse and  sigmoid sinuses relative to the right is presumably physiologic in  nature. While motion limits evaluation for vessel patency, the venous  sinuses appear widely patent. Motion more noticeably limited  evaluation of the deep venous drainage pathways, but there is no  definite occlusion. The known thrombus in the right internal jugular  vein is not well visualized.      Impression    IMPRESSION:   Head MRI:  1.  Overall unchanged exam with no finding for acute infarct. Small  volume acute subarachnoid hemorrhage along the posterior right sylvian  fissure is relatively unchanged.  2.  Mild burden scattered chronic small vessel ischemic change and a  moderate diffuse parenchymal volume loss are unchanged.      HEAD MRV:  1.  Motion degraded exam with no definite intracranial venous sinus  thrombosis, noting that motion on this exam limits evaluation.    2.  Known thrombosis in the right internal jugular vein is not well  demonstrated on this exam.    RENATO THOMAS MD         SYSTEM ID:  [id number]   CT Chest/Abdomen/Pelvis w Contrast    Narrative    EXAM: CT CHEST/ABDOMEN/PELVIS W CONTRAST  LOCATION: [facility]  DATE/TIME: 5/17/2022 5:06 PM    INDICATION: Unknown primary, initial workup, vascular thrombosis, prior fall and hip fracture  COMPARISON: CT 05/18/2017  TECHNIQUE: CT scan of the chest, abdomen, and pelvis was performed following injection of IV contrast. Multiplanar reformats were obtained. Dose reduction techniques were used.   CONTRAST: 68 mL Isovue 370    FINDINGS:   LUNGS AND PLEURA: Upper lobe predominant centrilobular emphysema. Single indeterminate  5 mm left lower lobe pulmonary nodule, not definitely visualized on prior abdominal CT (series 4 image 178). No pulmonary mass or focal airspace consolidation.    MEDIASTINUM/AXILLAE: Heart is enlarged, particularly the right atrium and right ventricle. Pacemaker leads again noted. There is a dialysis catheter with tip in the right atrium. No contrast seen in the right internal jugular vein, might correspond to   known thrombus. No suspicious mediastinal or axillary lymphadenopathy.    CORONARY ARTERY CALCIFICATION: Severe.    HEPATOBILIARY: Normal.    PANCREAS: Normal.    SPLEEN: Normal.    ADRENAL GLANDS: Bilateral adrenal hyperplasia without distinct nodule, unchanged since 2017.    KIDNEYS/BLADDER: Left kidney is atrophic in comparison to the right. Nonobstructing right renal stones. No hydronephrosis. Exophytic likely left renal cyst, no specific follow-up recommended. Urinary bladder is partially obscured but otherwise   unremarkable.    BOWEL: Diverticulosis of the colon. No acute inflammatory change. No obstruction. Large volume of stool in the rectum without significant associated inflammatory changes.    LYMPH NODES: No suspicious lymphadenopathy.    VASCULATURE: Extensive calcified atherosclerosis. Incidental subcentimeter left renal artery aneurysm, of doubtful clinical significance (coronal image 38).    PELVIC ORGANS: Unremarkable.    MUSCULOSKELETAL: Left hip arthroplasty partially visualized. Age-indeterminate compression deformities in the lower thoracic spine. Thoracolumbar scoliosis. No suspicious bony lesions.      Impression    IMPRESSION:  1.  No definite evidence of primary or metastatic malignancy in the chest, abdomen or pelvis.  2.  Single indeterminate 5 mm left lower lobe pulmonary nodule. If no primary malignancy is identified, consider follow-up chest CT in 12 months.  3.  Incidental subcentimeter left renal artery aneurysm.  4.  Known right internal jugular vein thrombus again  noted.    REFERENCE:  Guidelines for Management of Incidental Pulmonary Nodules Detected on CT Images: From the Fleischner Society 2017.   Guidelines apply to incidental nodules in patients who are 35 years or older.  Guidelines do not apply to lung cancer screening, patients with immunosuppression, or patients with known primary cancer.    SINGLE NODULE  Nodule size <6 mm  Low-risk patients: No follow-up needed.  High-risk patients: Optional follow-up at 12 months.               Discharge Medications   Current Discharge Medication List      START taking these medications    Details   amLODIPine (NORVASC) 5 MG tablet Take 1 tablet (5 mg) by mouth daily    Associated Diagnoses: Renal hypertension      oxyCODONE (ROXICODONE) 5 MG tablet Take 0.5 tablets (2.5 mg) by mouth every 4 hours as needed for breakthrough pain  Qty: 10 tablet, Refills: 0    Associated Diagnoses: Closed fracture of left hip, initial encounter (H)      polyethylene glycol (MIRALAX) 17 GM/Dose powder Take 17 g by mouth daily  Qty: 510 g    Associated Diagnoses: Closed fracture of left hip, initial encounter (H)         CONTINUE these medications which have CHANGED    Details   !! acetaminophen (TYLENOL) 325 MG tablet Take 2 tablets (650 mg) by mouth 2 times daily.  Qty: 120 tablet, Refills: 98    Associated Diagnoses: Pain      Glycopyrrolate-Formoterol (BEVESPI AEROSPHERE) 9-4.8 MCG/ACT oral inhaler Inhale 1 puff into the lungs daily    Associated Diagnoses: Chronic obstructive pulmonary disease, unspecified COPD type (H)      multivitamin RENAL (MULTIVITAMIN RENAL) 1 MG capsule Take 1 capsule by mouth daily    Associated Diagnoses: ESRD (end stage renal disease) on dialysis (H)       !! - Potential duplicate medications found. Please discuss with provider.      CONTINUE these medications which have NOT CHANGED    Details   ACE/ARB/ARNI NOT PRESCRIBED (INTENTIONAL) Please choose reason not prescribed from choices below.    Associated Diagnoses:  End stage renal disease (H)      !! acetaminophen (TYLENOL) 325 MG tablet Take 650 mg by mouth 2 times daily as needed for mild pain      albuterol (PROAIR HFA/PROVENTIL HFA/VENTOLIN HFA) 108 (90 Base) MCG/ACT inhaler Inhale 2 puffs into the lungs every 4 hours as needed      allopurinol (ZYLOPRIM) 100 MG tablet TAKE ONE TABLET BY MOUTH THREE TIMES A WEEK ON MONDAY, WEDNESDAY AND FRIDAY AFTER HD SESSION  Qty: 12 tablet, Refills: 97    Associated Diagnoses: Chronic gout without tophus, unspecified cause, unspecified site      atorvastatin (LIPITOR) 40 MG tablet Take 1 tablet (40 mg) by mouth daily  Qty: 30 tablet, Refills: 11    Associated Diagnoses: Hyperlipidemia      calcium acetate (CALPHRON) 667 MG TABS tablet Take 1 tablet (667 mg) by mouth 3 times daily (with meals)      loratadine (CLARITIN) 10 MG tablet Take 1 tablet (10 mg) by mouth At Bedtime  Qty: 30 tablet, Refills: 98    Associated Diagnoses: Itching      Melatonin 10 MG TABS tablet Take 10 mg by mouth At Bedtime      metoprolol succinate ER (TOPROL-XL) 25 MG 24 hr tablet Take 0.5 tablets (12.5 mg) by mouth 2 times daily  Qty: 60 tablet, Refills: 4    Associated Diagnoses: Chronic atrial fibrillation (H)      pantoprazole (PROTONIX) 40 MG EC tablet Take 1 tablet (40 mg) by mouth daily  Qty: 30 tablet, Refills: 11    Associated Diagnoses: GERD (gastroesophageal reflux disease)      sodium chloride (OCEAN) 0.65 % nasal spray Spray 1 spray into both nostrils every 2 hours as needed for congestion MAY KEEP AT BEDSIDE  Qty: 50 mL, Refills: 98    Associated Diagnoses: Nose dryness      triamcinolone (KENALOG) 0.1 % external cream Apply to AA BID x 1-2 week then PRN only  Qty: 454 g, Refills: 2    Associated Diagnoses: Itching       !! - Potential duplicate medications found. Please discuss with provider.      STOP taking these medications       apixaban ANTICOAGULANT (ELIQUIS) 2.5 MG tablet Comments:   Reason for Stopping:         torsemide (DEMADEX) 20 MG  tablet Comments:   Reason for Stopping:             Allergies   Allergies   Allergen Reactions     Sotalol Other (See Comments)     Prolonged QT     Bupropion Other (See Comments)     Na 118 acutely. Comorbid diuresis for heart failure

## 2022-05-21 NOTE — PROGRESS NOTES
Potassium   Date Value Ref Range Status   05/21/2022 4.3 3.4 - 5.3 mmol/L Final   01/12/2005 4.5 3.4 - 5.3 mmol/L Final     Hemoglobin   Date Value Ref Range Status   05/17/2022 11.2 (L) 11.7 - 15.7 g/dL Final   01/12/2005 15.6 11.7 - 15.7 g/dL Final     Creatinine   Date Value Ref Range Status   05/21/2022 4.61 (H) 0.52 - 1.04 mg/dL Final   01/12/2005 0.80 0.60 - 1.30 mg/dL Final     Urea Nitrogen   Date Value Ref Range Status   05/21/2022 52 (H) 7 - 30 mg/dL Final   01/12/2005 16 7 - 30 mg/dL Final     Sodium   Date Value Ref Range Status   05/21/2022 125 (L) 133 - 144 mmol/L Final   01/12/2005 141 133 - 144 mmol/L Final     INR   Date Value Ref Range Status   05/10/2022 1.42 (H) 0.85 - 1.15 Final       DIALYSIS PROCEDURE NOTE  Hepatitis status of previous patient on machine log was checked and verified ok to use with this patients hepatitis status.  Patient dialyzed for 3 hrs. on a K3 bath with a net fluid removal of  3L.  A BFR of 400 ml/min was obtained via a RIJ.      The treatment plan was discussed with Dr. Mo during the treatment.    Total heparin received during the treatment: 0 units.   Line flushed, clamped and capped with heparin 1:1000 1.6 mL (1600 units) per lumen    Meds  given: none   Complications: none      Person educated: patient. Knowledge base substantial. Barriers to learning: confusion at times. Educated on procedure via verbal mode. patient verbalized understanding. Pt prefers verbal education style.     ICEBOAT? Timeout performed pre-treatment  I: Patient was identified using 2 identifiers  C:  Consent Signed Yes  E: Equipment preventative maintenance is current and dialysis delivery system OK to use  B: Hepatitis B Surface Antigen: NEGATIVE; Draw Date: 1/12/2022      Hepatitis B Surface Antibody: IMMUNE; Draw Date: 1/12/2022  O: Dialysis orders present and complete prior to treatment  A: Vascular access verified and assessed prior to treatment  T: Treatment was performed at a  clinically appropriate time  ?: Patient was allowed to ask questions and address concerns prior to treatment  See flowsheet in EPIC for further details and post assessment.  Machine water alarm in place and functioning. Transducer pods intact and checked every 15min.   Pt returned via bed.  Chlorine/Chloramine water system checked every 4 hours.  Outpatient Dialysis at N/A, discharging to hospice care

## 2022-05-21 NOTE — PROGRESS NOTES
Xcoverage: paged by RN because the patient lost iv access; her SBP goal <140; had Hydralazine and Labetalol iv prn; will add Hydralazine 10 mg po q4h prn for SBP>140, until she will get a new iv.    Cathleen Rosales MD

## 2022-05-21 NOTE — TELEPHONE ENCOUNTER
[facility] GERIATRICS TELEPHONE NOTE    Late entry, 5/20/22, 4:21 - 4:40 PM (19 minutes)    Resident sister Mugs called today, resident remains in hospital, met with palliative care today.  Elected to enroll in hospice.  Unsure about stopping HD, discussed risk/benefit. Leaning towards stopping, but does not feel entirely comfortable. Recommended talking with hospice further and reaching out to inpatient team if not ready to stop HD at this time. Will plan to see patient on return to [facility] possibly by Monday 5/23.     JUSTO Jerry CNP  05/21/22 7:06 AM

## 2022-05-21 NOTE — PLAN OF CARE
Goal Outcome Evaluation:        Pt. Alert, confused; oriented to self, place, occasionally time & situation. On room air.  Up with assist of 2, gait belt, walker.  L hip dressing CDI.  No IV present.  R internal jugular dressing CDI.  Incontinent of bowel & bladder, can occasionally call appropriately.  One small formed stool this shift; scheduled stool softener held this shift due to prior shift reporting loose stools.  Pt. Tolerating PO intake.  Denies pain.  PRN hydralazine given per protocol for elevated SBP; improvement after medication.

## 2022-05-22 NOTE — CARE PLAN
pt confused time, pt wound and incision dressings were changed. Dialysis was done today. Patient denies pain, had two loose stool during the shift and pt transfered to [facility] with her belonging.

## 2022-05-23 PROBLEM — E46 PROTEIN-CALORIE MALNUTRITION (H): Status: ACTIVE | Noted: 2022-01-01

## 2022-05-23 NOTE — LETTER
"    5/23/2022        RE: Francine Chris  [facility]  [address] [facility] [address]        [facility]  Chief Complaint   Patient presents with     Annual Comprehensive Nursing Home     [place] [facility] Record Number:  [id number]  Place of Service where encounter took place:  [facility] [[id number]]    HPI:    Francine Chris  is a 76 year old  (1945) female with hx of ESRD, afib s/p AV node ablation and pacemaker on DOAC (dx 2013), HTN, combined systolic and diastolic HF, mod-severe tricuspid regurgitation not candidate for surgical repair, mid ascending aortic enlargement, BL LE edema, PAD, QTc prolongation, COPD, pulmonary HTN, hx of smoking, macular degeneration, anxiety/depression, SKY on CPAP, PUD, who is being seen today for an annual comprehensive visit.     HPI information obtained from: facility chart records, facility staff, patient report, [facility] chart review and family/first contact sister Tereza \"Mugs\" report.     Resident well known to provider from previous living arrangement at [facility] where she resided since Sept 2021.    Previous hx reviewed. Hospitalized at [facility] from 6/21/21 to 7/5/21 with nausea/weakness/diarrhea and acute renal failure along with supratheraputic INR, hyperkalemia, and digoxin toxicity. Per nephrology note unclear precipitating event for SILAS but likely severe ATN. Imaging negative for acute process. Previous baseline Cr 1.5-1.7 as of May 2021. Required emergent femoral line for HD on admission, was transitioned to right chest wall tunneled catheter on 6/29/21 as renal function did not return to baseline prior to discharge and HD still required. During this hospitalization coumadin was switched to apixaban.  Hospital stay significant for fluid overload on 7/3/21, restarted torsemide. Continued on supplemental oxygen. Permanent pacemaker placed 6/25/21 s/p ablation due to " inability to use digoxin in setting of renal failure. Recovered at [facility] and discharged to [facility] on supplemental oxygen.     Per hospital discharge was supposed to follow-up with [facility] for Pacemaker check and [facility], but lost to follow-up. After transition to [facility], established care with cardiology (Dr. Holt) and pulmonology (Dr. Linares) in [facility]. Also saw cardiovascular surgery Dr. Janet Sanderson due to severe tricuspid valve regurgitation, unfortunately resident not surgical candidate. Followed by nephrology Dr. Ro and was being dialyzed x3/week. R UE brachiobasilic fistula placed with Dr. Arred[facility] in staged procedures 10/21/21 and 12/01/21 without complication, starting to use fistula, but complicated by recurrent hematoma; R tunneled cath remains in place.    Last [facility] visit with resident 1/11/22 for ER follow-up after fall. Shortly after this visit resident hospitalized at [facility] from 1/12 to 1/13/22 with chest pain and SOB. Lexiscan stress test completed and was negative, monitored on tele and serial torponins negative, unlikely ACS. Nephrology consulted for chronic hyponatremia related to fluid volume overload, placed on fluid restriction. Discharged to [facility] due to inability of [facility] to meet care needs. To ER 1/26/22 s/p mechanical fall with head trauma and scalp laceration, which was closed with staples and discharged back to TCU. Unable to progress with therapy to safely return to [facility] and transitioned to LTC permanently.      Hospitalized at [facility] from 4/16 to 4/20/22 with right arm hematoma around AV fistula site. Vascular surgery consulted underwent excision of right distal upper arm skin ulceration and evacuation of right distal upper arm arteriovenous fistula hematoma with irrigation and drainage on 4/18 with placement of SVETA drain. Anticoagulation held post-op until drain discontinued and resumed on  hospital discharge. Lab work significant for hyponatremia, which has been chronic issue. Also noted with thrombocytopenia, felt to be related to bleeding. Other medications continued for chronic conditions. No follow-up scheduled with vascular on discharge from hospital.     Resident to ER on 5/2/22 due to mechanical fall with head strike. Per notes, was reaching for remote on nightstand and fell from bed and struck her left temporal area on her night stand; resident on apixaban for Afib. Complained of left shoulder pain and dizziness in ER. CT of facial bones, head, neck negative for acute process. L shoulder x-ray was also negative for acute process. Sustained 2.5 cm laceration to left eyebrow, irrigated and closed with 5 interrupted sutures. Given mechanical fall and able to ambulate at baseline in ER discharged back to LTC without further cardiac or metabolic work-up.       Today's concerns are:  Follow-up today for annual visit and assessment of multiple chronic health issues as outlined above, as well as hospital follow-up.    Hospitalized from 5/8 to 5/21/22 s/p fall in LTC room with left femoral fracture in ER several days prior on 5/2 with fall and head strike requiring sutures to close laceration. Ortho consulted. Underwent left bipolar hemiarthroplasty on 5/10/22 with Dr. Funez.    Apixaban resumed on 5/11/22. On 5/12/22 noted with change in mental status, head CT showed subarachnoid hemorrhage in the posterior aspect of  the right Sylvian fissure. Neurology consulted, started on one week of Keppra for seizure prophylaxis, received K-centra and apixaban placed on hold of two weeks. Interval MRI/MRA showed stable subarachnoid hemorrhage along the right sylvian fissure..EEG consistent with moderate diffuse nonspecific encephalopathy, no seizures. Plan was for interval head CT in two weeks prior to resuming apixaban.    Course further complicated by right internal jugular clot noted on imaging.  Recommended CT chest/abd/pelvis for malignancy work-up. Showed indeterminate pulmonary nodule. Hem/onc consulted due to clot in setting of DOAC, but felt clot more likely to CVC double lumen R internal jugular HD access. Vascular consulted, recommended to keep catheter in place, felt jugular vein occlusion not concerning, low likelihood occlusion would lead to PE or other complications, recommend leaving internal jugular cath in place.    Due progressive decline and health status over last year patient and sister met with palliative care on 5/20/22. Elected to discharge back to LTC facility with hospice. Discuss withdrawal of HD with prognosis on order of weeks after dialysis is stopped.    Today's concerns:  Resident seen in bed with sister at bedside.  Unable to offer meaningful history due to somnolence.  Incontinent of stool just prior to visit.   Sister reports resident is sleeping more and eating little.  Appears uncomfortable especially with change in position.    Confusion at facility regarding skilled therapy vs hospice plan.  Met with hospice hospice admissions nurse on unit.  Resident formally admitted to [facility] just prior to visit.  Hospice working to determine plan for comfort medications.    Will not allow for assessment of L AV fistula site, WOCN photo from 5/8/22 reviewed.  OR dressing in place to left hip.     ALLERGIES: Sotalol and Bupropion  PAST MEDICAL HISTORY:  has a past medical history of Acute renal failure (ARF) (H) (06/22/2021), Anemia in chronic kidney disease, on chronic dialysis (H) (07/14/2021), Atrophy of left kidney (05/18/2017), Chronic atrial fibrillation (H) (04/01/2013), Chronic kidney disease, stage III (moderate) (H) (11/13/2019), COPD mixed type (H) (04/06/2013), Cor pulmonale (chronic) (H) (09/20/2021), Depressive disorder, not elsewhere classified, Dermatitis of lower extremity (05/05/2017), Elevated blood pressure reading without diagnosis of hypertension  (12/10/2004), HTN (hypertension) (09/07/2011), gout (08/13/2014), Hypercalcemia (07/16/2018), Hypertensive heart and kidney disease (07/16/2018), Hyponatremia (06/22/2021), Intermediate stage nonexudative age-related macular degeneration of both eyes (01/13/2021), Lung nodule (04/28/2021), Malignant melanoma (H), Migraine variant (12/10/2004), Neoplasm of skin of neck (06/06/2014), NSVT (nonsustained ventricular tachycardia) (H) (04/09/2020), SKY (obstructive sleep apnea) (04/08/2020), Osteoporosis (11/10/2004), Osteoporosis, unspecified (dx 8/02), Pacemaker (07/14/2021), PAD (peripheral artery disease) (H) (02/08/2019), 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  PAST SURGICAL HISTORY:  has a past surgical history that includes NONSPECIFIC PROCEDURE (1990's); NONSPECIFIC PROCEDURE; NONSPECIFIC PROCEDURE; NONSPECIFIC PROCEDURE (1980's); Irrigation and debridement upper extremity, combined (Right, 4/18/2022); and Open reduction internal fixation hip bipolar (Left, 5/10/2022).  IMMUNIZATIONS:  Immunization History   Administered Date(s) Administered     COVID-19,PF,Moderna 02/10/2021, 03/10/2021, 11/05/2021     DTaP, Unspecified 09/07/2011     Influenza (High Dose) 3 valent vaccine 10/05/2016, 10/02/2017, 11/02/2018, 10/09/2019     Influenza (IIV3) PF 09/07/2011     Influenza Quad, Recombinant, pf(RIV4) (Flublok) 09/10/2021     Influenza, Quad, High Dose, Pf, 65yr+ (Fluzone HD) 09/10/2020     Mantoux Tuberculin Skin Test 06/03/2009     Pneumococcal 23 valent 04/06/2013     TD (ADULT, 7+) 11/10/2004     Td (Adult), Adsorbed 11/10/2004     Tdap (Adacel,Boostrix) 09/07/2011, 05/02/2022     Above immunizations pulled from [facility]. [facility] and facility records also reconciled. Outstanding information sent to  to update  [facility].  Future immunizations are deferred as: not clinically appropriate given goals of care.    Current Outpatient Medications   Medication Sig Dispense Refill     ACE/ARB/ARNI NOT PRESCRIBED (INTENTIONAL) Please choose reason not prescribed from choices below.       acetaminophen (TYLENOL) 325 MG tablet Take 2 tablets (650 mg) by mouth 2 times daily. 120 tablet 98     acetaminophen (TYLENOL) 325 MG tablet Take 650 mg by mouth 2 times daily as needed for mild pain       albuterol (PROAIR HFA/PROVENTIL HFA/VENTOLIN HFA) 108 (90 Base) MCG/ACT inhaler Inhale 2 puffs into the lungs every 4 hours as needed       allopurinol (ZYLOPRIM) 100 MG tablet TAKE ONE TABLET BY MOUTH THREE TIMES A WEEK ON MONDAY, WEDNESDAY AND FRIDAY AFTER HD SESSION 12 tablet 97     amLODIPine (NORVASC) 5 MG tablet Take 1 tablet (5 mg) by mouth daily       atorvastatin (LIPITOR) 40 MG tablet Take 1 tablet (40 mg) by mouth daily 30 tablet 11     calcium acetate (CALPHRON) 667 MG TABS tablet Take 1 tablet (667 mg) by mouth 3 times daily (with meals)       Glycopyrrolate-Formoterol (BEVESPI AEROSPHERE) 9-4.8 MCG/ACT oral inhaler Inhale 1 puff into the lungs daily       loratadine (CLARITIN) 10 MG tablet Take 1 tablet (10 mg) by mouth At Bedtime 30 tablet 98     Melatonin 10 MG TABS tablet Take 10 mg by mouth At Bedtime       metoprolol succinate ER (TOPROL-XL) 25 MG 24 hr tablet Take 0.5 tablets (12.5 mg) by mouth 2 times daily 60 tablet 4     multivitamin RENAL (MULTIVITAMIN RENAL) 1 MG capsule Take 1 capsule by mouth daily       oxyCODONE (ROXICODONE) 5 MG tablet Take 2.5 mg by mouth every 4 hours as needed for severe pain       pantoprazole (PROTONIX) 40 MG EC tablet Take 1 tablet (40 mg) by mouth daily 30 tablet 11     polyethylene glycol (MIRALAX) 17 GM/Dose powder Take 17 g by mouth daily 510 g      sodium chloride (OCEAN) 0.65 % nasal spray Spray 1 spray into both nostrils every 2 hours as needed for congestion MAY KEEP AT BEDSIDE 50  "mL 98     triamcinolone (KENALOG) 0.1 % external cream Apply to AA BID x 1-2 week then PRN only 454 g 2     LORazepam (ATIVAN) 2 MG/ML (HIGH CONC) oral solution Take 0.5 mg by mouth every 4 hours as needed for anxiety       Case Management:  I have reviewed the facility/SNF care plan/MDS, including the falls risk, nutrition and pain screening. I also reviewed the current immunizations, and preventive care. .Future cancer screening is not clinically indicated secondary to age/goals of care Patient's desire to return to the community is not assessible due to cognitive impairment. Current Level of Care is appropriate.    Advance Directive Discussion:    I reviewed the current advanced directives as reflected in EPIC, the POLST and the facility chart, and verified the congruency of orders - code status updated in Epic. I contacted the first party Mugs and discussed the plan of Care.  I did not due to cognitive impairment review the advance directives with the resident.     Team Discussion:  I communicated with the appropriate disciplines involved with the Plan of Care:   Nursing   and hospice team  Patient's goal is pain control and comfort.  Information reviewed:  Medications, vital signs, orders, and nursing notes.    ROS:  Unobtainable secondary to cognitive impairment.     Vitals:  BP (!) 143/92   Pulse 88   Temp 98  F (36.7  C)   Resp 18   Ht 1.651 m (5' 5\")   Wt 56.5 kg (124 lb 9.6 oz)   SpO2 97%   BMI 20.73 kg/m   Body mass index is 20.73 kg/m .  Exam:  GENERAL APPEARANCE: Frail, laying in bed on right side  HEAD: Ecchymosis superior to left orbit and to left temporal area, non-tender  EYES:  Keeps eyes closed for most of visit  RESP:  Non-labored breathing, palpation of chest w/ L CW pacer, also R tunneled cath with dressing CDI, no chest wall tenderness, no respiratory distress, Lung sounds clear, no rales/wheeze/rhonchi. SpO2 97% RA.  CV:  Palpation - no murmur/non-displaced, chest w/ L CW pacer PMI " Auscultation - rate and rhythm regular  VASCULAR: R UE fistula without bruit or thrill, dressing in place, CDI.  ABDOMEN:  Normal bowel sounds, soft, nontender, no grimacing or guarding with palpation. Incontinent of large loose, brown stool.   M/S:  +khypohosis. x2 assist to turn patient and change incontinence brief/bedding.   SKIN:  Inspection - Chest wall tunneled cath with CDI dressing no erythema or edema. Wound to left lower leg, wound brown scab. R UE fistula dressing CDI. L hip dressing CDI. Palpation- no increased warmth, skin is dry and non-tender.  PSYCH: Somnolent, attempts to get OOB, but falls back asleep.    Lab/Diagnostic data:   Recent labs in [facility] reviewed by me today.   CBC RESULTS: Recent Labs   Lab Test 05/17/22  0833 05/14/22  0528   WBC 8.6 12.1*   RBC 3.21* 3.24*   HGB 11.2* 11.5*   HCT 33.9* 34.2*   * 106*   MCH 34.9* 35.5*   MCHC 33.0 33.6   RDW 19.0* 19.6*   * 115*     Last Basic Metabolic Panel:  Recent Labs   Lab Test 05/21/22  0736 05/20/22  1122   * 129*   POTASSIUM 4.3 3.7   CHLORIDE 88* 91*   SILVIA 9.9 9.7   CO2 22 26   BUN 52* 36*   CR 4.61* 3.77*   * 115*     Liver Function Studies -   Recent Labs   Lab Test 05/21/22  0736 05/20/22  1122 05/10/22  0729 02/15/22  0624 01/26/22  0638   PROTTOTAL  --   --   --  7.6 7.6   ALBUMIN 2.8* 2.8*   < > 3.0* 3.1*   BILITOTAL  --   --   --  1.0 1.4*   ALKPHOS  --   --   --  122 148   AST  --   --   --  37 43   ALT  --   --   --  32 46    < > = values in this interval not displayed.     ASSESSMENT/PLAN  (Z87.81) Hx of fracture of left hip  (primary encounter diagnosis)  (Z96.642) History of left hip replacement - bipolar hemiarthroplasty  (R29.6) Recurrent falls  Comment: S/p fall and left femoral neck fracture on 5/8 underwent left bipolar hemiarthroplasty on 5/10/22 with Dr. Phipps course complicated by subarachnoid hemorrhage, R internal jugular DVT, and encephalopathy.  Plan:   - Leave OR dressing in place until  pain better controlled  - Pain mgmt per hospice team, witting orders at present, continue oxycodone and Tylenol for now  - No DVT prophylaxis due to brain bleed and now on hospice  - Stated discharge plan: Follow up with Dr. Funez PAC in 2-3 weeks for bandage removal, suture removal and follow-up x-rays. Due to guard prognosis after stopping HD will pursue onsite orthopedic follow-up if need based on clinical status.    (I60.9) Subarachnoid hemorrhage (H) - posterior right sylvian fissure  Comment: Change in mental status POD#2, head CT showed subarachnoid hemorrhage in the posterior aspect of the right Sylvian fissure.  Interval MRI/MRA showed stable subarachnoid hemorrhage along the right sylvian fissure. EEG consistent with moderate diffuse nonspecific encephalopathy, no seizures.  Plan:   - Plan was for interval head CT in two weeks prior to resuming apixaban, but palliative care consult obtained and now plans to enroll in hospice with [facility], nursing on unit completing paperwork and ordering comfort medications  - Monitor clinically and control symptoms for comfort    (I82.C29) Deep vein thrombosis (DVT) of internal jugular vein (H)  Comment: Carotid US 5/14/22 showed occlusive deep venous thrombus of the internal jugular vein. Consulted vascular Dr. Batista felt to be related to right jugular tunneled HD catheter, resume AC as able, low chance of occulusion, recommended leaving catheter in place - see note 5/18/22.   Hem/onc also consulted due to clot in setting of anticoagulation, no malignancy concern.  Plan:   - Contributed to complicated post-op course and ultimate decision to enroll in hospice care  - Hospice team ordering comfort medications now in the case of rapid decline    (Z51.5) Hospice care patient  (E46) Protein-calorie malnutrition (H)  Comment: Met with palliative care in hospice due to significant decline in overall health status over last year, frail with multi-comorbidity. Plans to enroll  in hospice today. Weight 124# down about 15# over last month.  Plan:   - Enrolling in [facility] today, will no longer undergo HD treatments  - Appreciate hospice supports  - Oral intake as tolerated  - Reviewed hospice supports with pt/sister at bedside    (N18.6) ESRD (end stage renal disease) (H)  Comment: Newly diagnosed ESRD with SILAS on CKD stage III June 2021 etiology unclear but likely severe ATN with vomiting and afib with RVR/hypotension prior.   Trouble following diet and fluid restriction.  Chronically low sodium.  Plan:   - No further HD, resident and sister made decision while inpatient, last session 5/21/22 prior to hospital discharge   - No further fluid restriction  - Hospice to manage symptoms for comfort, prognosis of days to week  - PRN input from ephrology Dr. Ro   - R CW tunneled in place, will leave current dressing given guarded prognosis  - Renally dose medications and avoid nephrotoxins    (T82.590A) Malfunction of arteriovenous dialysis fistula, initial encounter (H)    (T14.8XXA) Hematoma  Comment: 4/18/22 underwent excision of right distal upper arm skin ulceration and evacuation of right distal upper arm arteriovenous fistula hematoma with irrigation and drainage with placement of SVETA drain with Omile. Removed drain prior to discharge. Site remains with ulceration. No bruit/thrill today.  Plan:   - Change dressing to R UE PRN to keep clean and dry and prevent infection    (S81.802D) Open wound of left lower leg, subsequent encounter  (I73.9) PAD (peripheral artery disease) (H)  Comment: Stable Hematoma of left lower leg sustained around 2/14/22, eschar intact, hx of PAD on chart, no recent ABIs. Treated for cellulitis x2. Wound scabbed over.   Plan:   - Okay to leave wound KITA and monitor daily     (I50.812) Chronic right-sided heart failure (H)  (I10) HTN  Comment: Chronic, established care with Dr. Holt in [email or web address], she discontinued sildenafil and ambrisentan due to lack  of efficacy.  HFpEF with tricuspid valve regurgitation, RV dysfunction, pulmonary hypertension.  Not candidate for tricuspid valve repair due to frailty - saw Dr. Harding 12/27/21.  Plan:   - Torsemide discontinued   - Remains on metoprolol XL 12.5 mg BID  - Amlodipine 5 mg daily started on hospital for BP control  - Monitor PRN VS, titrate medications based on BP and clinical status    (I48.91) Atrial fibrillation (H)  (Z95.0) Cardiac Pacemaker Insitu  Comment: Chronic afib with digoxin toxicity in June 2021.   S/p ablation and pacemaker placement 6/25/21 anticoagulated on apixaban, but stopped 5/12 due to subarachnoid hemorrhage.   Plan:    - Discontinued apixaban while in patient, reviewed with sister  - Continue metoprolol for rate control   - Remote PPM checks  - Comfort medications in place in the case of rapid decline    (F33.9) Depression  (G47.00) Insomnia  Comment: Chronic, limited medication options due to hyponatremia.   Plan:  - Continue Melatonin 10 mg PO q HS (9 pm), stop if trouble swallowing medications     (D53.9) Macrocytic anemia  (D69.6) Thrombocytopenia   Comment: Chronic in setting of ESRD, low PLT s/p hematoma  Plan:   - No further labs, treat to comfort  - Appreciate hospice supporst          (E78.5) HL  Comment: Chronic, last lipid panel 4/29/21 in [facility] system, LDL 49  Plan:   - Discharged on atorvastatin, hospice to review medications not adding to immediate comfort    (J44.9) COPD (H)  (G47.33) SKY  Comment: Stable. No cough, sputum production, purulence.  Saw pulmonology Dr. Linares 2/9/22, pulmonary function tests showed severe obstruction consistent with COPD.   Plan:   - Continue Bevespi started by pulmonology   - Continue PRN albuterol  - No longer wears CPAP, can use supplemental oxygen for comfort  - Comfort medications in place, consider nebulizer treatments given frailty if symptoms    (Z87.39) Hx of gout   Comment: Chronic  Plan:  - Would stop allopurinol defer to hospice  admissions team    (K21.9) GERD  Comment: Chronic  Plan:   - Continue Protonix 40 mg daily, okay to continue for comfort    Electronically signed by:  JUSTO Jerry CNP         Sincerely,        JUSTO Jerry CNP

## 2022-05-23 NOTE — PROGRESS NOTES
"[facility]  Chief Complaint   Patient presents with     Annual Comprehensive Nursing Home     [place] Medical Record Number:  [id number]  Place of Service where encounter took place:  [facility] - [place] ([place]) [[id number]]    HPI:    Francine Chris  is a 76 year old  (1945) female with hx of ESRD, afib s/p AV node ablation and pacemaker on DOAC (dx 2013), HTN, combined systolic and diastolic HF, mod-severe tricuspid regurgitation not candidate for surgical repair, mid ascending aortic enlargement, BL LE edema, PAD, QTc prolongation, COPD, pulmonary HTN, hx of smoking, macular degeneration, anxiety/depression, SKY on CPAP, PUD, who is being seen today for an annual comprehensive visit.     HPI information obtained from: facility chart records, facility staff, patient report, [facility] chart review and family/first contact sister Tereza \"Mugs\" report.     Resident well known to provider from previous living arrangement at [facility] where she resided since Sept 2021.    Previous hx reviewed. Hospitalized at [facility] from 6/21/21 to 7/5/21 with nausea/weakness/diarrhea and acute renal failure along with supratheraputic INR, hyperkalemia, and digoxin toxicity. Per nephrology note unclear precipitating event for SILAS but likely severe ATN. Imaging negative for acute process. Previous baseline Cr 1.5-1.7 as of May 2021. Required emergent femoral line for HD on admission, was transitioned to right chest wall tunneled catheter on 6/29/21 as renal function did not return to baseline prior to discharge and HD still required. During this hospitalization coumadin was switched to apixaban.  Hospital stay significant for fluid overload on 7/3/21, restarted torsemide. Continued on supplemental oxygen. Permanent pacemaker placed 6/25/21 s/p ablation due to inability to use digoxin in setting of renal failure. Recovered at [facility] TCU and discharged to [facility] on " supplemental oxygen.     Per hospital discharge was supposed to follow-up with [facility] for Pacemaker check and [facility], but lost to follow-up. After transition to [facility], established care with cardiology (Dr. Holt) and pulmonology (Dr. Linares) in [facility] system. Also saw cardiovascular surgery Dr. Janet Sanderson due to severe tricuspid valve regurgitation, unfortunately resident not surgical candidate. Followed by nephrology Dr. Ro and was being dialyzed x3/week. R UE brachiobasilic fistula placed with Dr. Arredondo at [facility] in staged procedures 10/21/21 and 12/01/21 without complication, starting to use fistula, but complicated by recurrent hematoma; R tunneled cath remains in place.    Last [facility] visit with resident 1/11/22 for ER follow-up after fall. Shortly after this visit resident hospitalized at [facility] from 1/12 to 1/13/22 with chest pain and SOB. Lexiscan stress test completed and was negative, monitored on tele and serial torponins negative, unlikely ACS. Nephrology consulted for chronic hyponatremia related to fluid volume overload, placed on fluid restriction. Discharged to [facility]CU due to inability of [facility] to meet care needs. To ER 1/26/22 s/p mechanical fall with head trauma and scalp laceration, which was closed with staples and discharged back to TCU. Unable to progress with therapy to safely return to [facility] and transitioned to LTC permanently.      Hospitalized at [facility] from 4/16 to 4/20/22 with right arm hematoma around AV fistula site. Vascular surgery consulted underwent excision of right distal upper arm skin ulceration and evacuation of right distal upper arm arteriovenous fistula hematoma with irrigation and drainage on 4/18 with placement of SVETA drain. Anticoagulation held post-op until drain discontinued and resumed on hospital discharge. Lab work significant for hyponatremia, which has been chronic issue. Also noted with thrombocytopenia, felt to  be related to bleeding. Other medications continued for chronic conditions. No follow-up scheduled with vascular on discharge from hospital.     Resident to ER on 5/2/22 due to mechanical fall with head strike. Per notes, was reaching for remote on nightstand and fell from bed and struck her left temporal area on her night stand; resident on apixaban for Afib. Complained of left shoulder pain and dizziness in ER. CT of facial bones, head, neck negative for acute process. L shoulder x-ray was also negative for acute process. Sustained 2.5 cm laceration to left eyebrow, irrigated and closed with 5 interrupted sutures. Given mechanical fall and able to ambulate at baseline in ER discharged back to LTC without further cardiac or metabolic work-up.       Today's concerns are:  Follow-up today for annual visit and assessment of multiple chronic health issues as outlined above, as well as hospital follow-up.    Hospitalized from 5/8 to 5/21/22 s/p fall in LTC room with left femoral fracture in ER several days prior on 5/2 with fall and head strike requiring sutures to close laceration. Ortho consulted. Underwent left bipolar hemiarthroplasty on 5/10/22 with Dr. Funez.    Apixaban resumed on 5/11/22. On 5/12/22 noted with change in mental status, head CT showed subarachnoid hemorrhage in the posterior aspect of  the right Sylvian fissure. Neurology consulted, started on one week of Keppra for seizure prophylaxis, received K-centra and apixaban placed on hold of two weeks. Interval MRI/MRA showed stable subarachnoid hemorrhage along the right sylvian fissure..EEG consistent with moderate diffuse nonspecific encephalopathy, no seizures. Plan was for interval head CT in two weeks prior to resuming apixaban.    Course further complicated by right internal jugular clot noted on imaging. Recommended CT chest/abd/pelvis for malignancy work-up. Showed indeterminate pulmonary nodule. Hem/onc consulted due to clot in setting of  DOAC, but felt clot more likely to CVC double lumen R internal jugular HD access. Vascular consulted, recommended to keep catheter in place, felt jugular vein occlusion not concerning, low likelihood occlusion would lead to PE or other complications, recommend leaving internal jugular cath in place.    Due progressive decline and health status over last year patient and sister met with palliative care on 5/20/22. Elected to discharge back to LTC facility with hospice. Discuss withdrawal of HD with prognosis on order of weeks after dialysis is stopped.    Today's concerns:  Resident seen in bed with sister at bedside.  Unable to offer meaningful history due to somnolence.  Incontinent of stool just prior to visit.   Sister reports resident is sleeping more and eating little.  Appears uncomfortable especially with change in position.    Confusion at facility regarding skilled therapy vs hospice plan.  Met with hospice hospice admissions nurse on unit.  Resident formally admitted to [facility] just prior to visit.  Hospice working to determine plan for comfort medications.    Will not allow for assessment of L AV fistula site, WOCN photo from 5/8/22 reviewed.  OR dressing in place to left hip.     ALLERGIES: Sotalol and Bupropion  PAST MEDICAL HISTORY:  has a past medical history of Acute renal failure (ARF) (H) (06/22/2021), Anemia in chronic kidney disease, on chronic dialysis (H) (07/14/2021), Atrophy of left kidney (05/18/2017), Chronic atrial fibrillation (H) (04/01/2013), Chronic kidney disease, stage III (moderate) (H) (11/13/2019), COPD mixed type (H) (04/06/2013), Cor pulmonale (chronic) (H) (09/20/2021), Depressive disorder, not elsewhere classified, Dermatitis of lower extremity (05/05/2017), Elevated blood pressure reading without diagnosis of hypertension (12/10/2004), HTN (hypertension) (09/07/2011), gout (08/13/2014), Hypercalcemia (07/16/2018), Hypertensive heart and kidney disease (07/16/2018),  Hyponatremia (06/22/2021), Intermediate stage nonexudative age-related macular degeneration of both eyes (01/13/2021), Lung nodule (04/28/2021), Malignant melanoma (H), Migraine variant (12/10/2004), Neoplasm of skin of neck (06/06/2014), NSVT (nonsustained ventricular tachycardia) (H) (04/09/2020), SKY (obstructive sleep apnea) (04/08/2020), Osteoporosis (11/10/2004), Osteoporosis, unspecified (dx 8/02), Pacemaker (07/14/2021), PAD (peripheral artery disease) (H) (02/08/2019), 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  PAST SURGICAL HISTORY:  has a past surgical history that includes NONSPECIFIC PROCEDURE (1990's); NONSPECIFIC PROCEDURE; NONSPECIFIC PROCEDURE; NONSPECIFIC PROCEDURE (1980's); Irrigation and debridement upper extremity, combined (Right, 4/18/2022); and Open reduction internal fixation hip bipolar (Left, 5/10/2022).  IMMUNIZATIONS:  Immunization History   Administered Date(s) Administered     COVID-19,PF,Moderna 02/10/2021, 03/10/2021, 11/05/2021     DTaP, Unspecified 09/07/2011     Influenza (High Dose) 3 valent vaccine 10/05/2016, 10/02/2017, 11/02/2018, 10/09/2019     Influenza (IIV3) PF 09/07/2011     Influenza Quad, Recombinant, pf(RIV4) (Flublok) 09/10/2021     Influenza, Quad, High Dose, Pf, 65yr+ (Fluzone HD) 09/10/2020     Mantoux Tuberculin Skin Test 06/03/2009     Pneumococcal 23 valent 04/06/2013     TD (ADULT, 7+) 11/10/2004     Td (Adult), Adsorbed 11/10/2004     Tdap (Adacel,Boostrix) 09/07/2011, 05/02/2022     Above immunizations pulled from [facility]. [facility] and facility records also reconciled. Outstanding information sent to  to update [facility].  Future immunizations are deferred as: not clinically appropriate given goals of care.    Current Outpatient Medications   Medication Sig  Dispense Refill     ACE/ARB/ARNI NOT PRESCRIBED (INTENTIONAL) Please choose reason not prescribed from choices below.       acetaminophen (TYLENOL) 325 MG tablet Take 2 tablets (650 mg) by mouth 2 times daily. 120 tablet 98     acetaminophen (TYLENOL) 325 MG tablet Take 650 mg by mouth 2 times daily as needed for mild pain       albuterol (PROAIR HFA/PROVENTIL HFA/VENTOLIN HFA) 108 (90 Base) MCG/ACT inhaler Inhale 2 puffs into the lungs every 4 hours as needed       allopurinol (ZYLOPRIM) 100 MG tablet TAKE ONE TABLET BY MOUTH THREE TIMES A WEEK ON MONDAY, WEDNESDAY AND FRIDAY AFTER HD SESSION 12 tablet 97     amLODIPine (NORVASC) 5 MG tablet Take 1 tablet (5 mg) by mouth daily       atorvastatin (LIPITOR) 40 MG tablet Take 1 tablet (40 mg) by mouth daily 30 tablet 11     calcium acetate (CALPHRON) 667 MG TABS tablet Take 1 tablet (667 mg) by mouth 3 times daily (with meals)       Glycopyrrolate-Formoterol (BEVESPI AEROSPHERE) 9-4.8 MCG/ACT oral inhaler Inhale 1 puff into the lungs daily       loratadine (CLARITIN) 10 MG tablet Take 1 tablet (10 mg) by mouth At Bedtime 30 tablet 98     Melatonin 10 MG TABS tablet Take 10 mg by mouth At Bedtime       metoprolol succinate ER (TOPROL-XL) 25 MG 24 hr tablet Take 0.5 tablets (12.5 mg) by mouth 2 times daily 60 tablet 4     multivitamin RENAL (MULTIVITAMIN RENAL) 1 MG capsule Take 1 capsule by mouth daily       oxyCODONE (ROXICODONE) 5 MG tablet Take 2.5 mg by mouth every 4 hours as needed for severe pain       pantoprazole (PROTONIX) 40 MG EC tablet Take 1 tablet (40 mg) by mouth daily 30 tablet 11     polyethylene glycol (MIRALAX) 17 GM/Dose powder Take 17 g by mouth daily 510 g      sodium chloride (OCEAN) 0.65 % nasal spray Spray 1 spray into both nostrils every 2 hours as needed for congestion MAY KEEP AT BEDSIDE 50 mL 98     triamcinolone (KENALOG) 0.1 % external cream Apply to AA BID x 1-2 week then PRN only 454 g 2     LORazepam (ATIVAN) 2 MG/ML (HIGH CONC) oral  "solution Take 0.5 mg by mouth every 4 hours as needed for anxiety       Case Management:  I have reviewed the facility/SNF care plan/MDS, including the falls risk, nutrition and pain screening. I also reviewed the current immunizations, and preventive care. .Future cancer screening is not clinically indicated secondary to age/goals of care Patient's desire to return to the community is not assessible due to cognitive impairment. Current Level of Care is appropriate.    Advance Directive Discussion:    I reviewed the current advanced directives as reflected in EPIC, the POLST and the facility chart, and verified the congruency of orders - code status updated in Epic. I contacted the first party Mugs and discussed the plan of Care.  I did not due to cognitive impairment review the advance directives with the resident.     Team Discussion:  I communicated with the appropriate disciplines involved with the Plan of Care:   Nursing   and hospice team  Patient's goal is pain control and comfort.  Information reviewed:  Medications, vital signs, orders, and nursing notes.    ROS:  Unobtainable secondary to cognitive impairment.     Vitals:  BP (!) 143/92   Pulse 88   Temp 98  F (36.7  C)   Resp 18   Ht 1.651 m (5' 5\")   Wt 56.5 kg (124 lb 9.6 oz)   SpO2 97%   BMI 20.73 kg/m   Body mass index is 20.73 kg/m .  Exam:  GENERAL APPEARANCE: Frail, laying in bed on right side  HEAD: Ecchymosis superior to left orbit and to left temporal area, non-tender  EYES:  Keeps eyes closed for most of visit  RESP:  Non-labored breathing, palpation of chest w/ L CW pacer, also R tunneled cath with dressing CDI, no chest wall tenderness, no respiratory distress, Lung sounds clear, no rales/wheeze/rhonchi. SpO2 97% RA.  CV:  Palpation - no murmur/non-displaced, chest w/ L CW pacer PMI Auscultation - rate and rhythm regular  VASCULAR: R UE fistula without bruit or thrill, dressing in place, CDI.  ABDOMEN:  Normal bowel sounds, soft, " nontender, no grimacing or guarding with palpation. Incontinent of large loose, brown stool.   M/S:  +khypohosis. x2 assist to turn patient and change incontinence brief/bedding.   SKIN:  Inspection - Chest wall tunneled cath with CDI dressing no erythema or edema. Wound to left lower leg, wound brown scab. R UE fistula dressing CDI. L hip dressing CDI. Palpation- no increased warmth, skin is dry and non-tender.  PSYCH: Somnolent, attempts to get OOB, but falls back asleep.    Lab/Diagnostic data:   Recent labs in [facility] reviewed by me today.   CBC RESULTS: Recent Labs   Lab Test 05/17/22  0833 05/14/22  0528   WBC 8.6 12.1*   RBC 3.21* 3.24*   HGB 11.2* 11.5*   HCT 33.9* 34.2*   * 106*   MCH 34.9* 35.5*   MCHC 33.0 33.6   RDW 19.0* 19.6*   * 115*     Last Basic Metabolic Panel:  Recent Labs   Lab Test 05/21/22  0736 05/20/22  1122   * 129*   POTASSIUM 4.3 3.7   CHLORIDE 88* 91*   SILVIA 9.9 9.7   CO2 22 26   BUN 52* 36*   CR 4.61* 3.77*   * 115*     Liver Function Studies -   Recent Labs   Lab Test 05/21/22  0736 05/20/22  1122 05/10/22  0729 02/15/22  0624 01/26/22  0638   PROTTOTAL  --   --   --  7.6 7.6   ALBUMIN 2.8* 2.8*   < > 3.0* 3.1*   BILITOTAL  --   --   --  1.0 1.4*   ALKPHOS  --   --   --  122 148   AST  --   --   --  37 43   ALT  --   --   --  32 46    < > = values in this interval not displayed.     ASSESSMENT/PLAN  (Z87.81) Hx of fracture of left hip  (primary encounter diagnosis)  (Z96.642) History of left hip replacement - bipolar hemiarthroplasty  (R29.6) Recurrent falls  Comment: S/p fall and left femoral neck fracture on 5/8 underwent left bipolar hemiarthroplasty on 5/10/22 with Dr. Phipps course complicated by subarachnoid hemorrhage, R internal jugular DVT, and encephalopathy.  Plan:   - Leave OR dressing in place until pain better controlled  - Pain mgmt per hospice team, witting orders at present, continue oxycodone and Tylenol for now  - No DVT prophylaxis due to  brain bleed and now on hospice  - Stated discharge plan: Follow up with Dr. Funez PAC in 2-3 weeks for bandage removal, suture removal and follow-up x-rays. Due to guard prognosis after stopping HD will pursue onsite orthopedic follow-up if need based on clinical status.    (I60.9) Subarachnoid hemorrhage (H) - posterior right sylvian fissure  Comment: Change in mental status POD#2, head CT showed subarachnoid hemorrhage in the posterior aspect of the right Sylvian fissure.  Interval MRI/MRA showed stable subarachnoid hemorrhage along the right sylvian fissure. EEG consistent with moderate diffuse nonspecific encephalopathy, no seizures.  Plan:   - Plan was for interval head CT in two weeks prior to resuming apixaban, but palliative care consult obtained and now plans to enroll in hospice with [facility], nursing on unit completing paperwork and ordering comfort medications  - Monitor clinically and control symptoms for comfort    (I82.C29) Deep vein thrombosis (DVT) of internal jugular vein (H)  Comment: Carotid US 5/14/22 showed occlusive deep venous thrombus of the internal jugular vein. Consulted vascular Dr. Batista felt to be related to right jugular tunneled HD catheter, resume AC as able, low chance of occulusion, recommended leaving catheter in place - see note 5/18/22.   Hem/onc also consulted due to clot in setting of anticoagulation, no malignancy concern.  Plan:   - Contributed to complicated post-op course and ultimate decision to enroll in hospice care  - Hospice team ordering comfort medications now in the case of rapid decline    (Z51.5) Hospice care patient  (E46) Protein-calorie malnutrition (H)  Comment: Met with palliative care in hospice due to significant decline in overall health status over last year, frail with multi-comorbidity. Plans to enroll in hospice today. Weight 124# down about 15# over last month.  Plan:   - Enrolling in [facility] today, will no longer undergo HD treatments  -  Appreciate hospice supports  - Oral intake as tolerated  - Reviewed hospice supports with pt/sister at bedside    (N18.6) ESRD (end stage renal disease) (H)  Comment: Newly diagnosed ESRD with SILAS on CKD stage III June 2021 etiology unclear but likely severe ATN with vomiting and afib with RVR/hypotension prior.   Trouble following diet and fluid restriction.  Chronically low sodium.  Plan:   - No further HD, resident and sister made decision while inpatient, last session 5/21/22 prior to hospital discharge   - No further fluid restriction  - Hospice to manage symptoms for comfort, prognosis of days to week  - PRN input from ephrology Dr. Ro   - R CW tunneled in place, will leave current dressing given guarded prognosis  - Renally dose medications and avoid nephrotoxins    (T82.590A) Malfunction of arteriovenous dialysis fistula, initial encounter (H)    (T14.8XXA) Hematoma  Comment: 4/18/22 underwent excision of right distal upper arm skin ulceration and evacuation of right distal upper arm arteriovenous fistula hematoma with irrigation and drainage with placement of SVETA drain with Omile. Removed drain prior to discharge. Site remains with ulceration. No bruit/thrill today.  Plan:   - Change dressing to R UE PRN to keep clean and dry and prevent infection    (S81.802D) Open wound of left lower leg, subsequent encounter  (I73.9) PAD (peripheral artery disease) (H)  Comment: Stable Hematoma of left lower leg sustained around 2/14/22, eschar intact, hx of PAD on chart, no recent ABIs. Treated for cellulitis x2. Wound scabbed over.   Plan:   - Okay to leave wound KITA and monitor daily     (I50.812) Chronic right-sided heart failure (H)  (I10) HTN  Comment: Chronic, established care with Dr. Holt in [facility], she discontinued sildenafil and ambrisentan due to lack of efficacy.  HFpEF with tricuspid valve regurgitation, RV dysfunction, pulmonary hypertension.  Not candidate for tricuspid valve repair due to  frailty - saw Dr. Harding 12/27/21.  Plan:   - Torsemide discontinued   - Remains on metoprolol XL 12.5 mg BID  - Amlodipine 5 mg daily started on hospital for BP control  - Monitor PRN VS, titrate medications based on BP and clinical status    (I48.91) Atrial fibrillation (H)  (Z95.0) Cardiac Pacemaker Insitu  Comment: Chronic afib with digoxin toxicity in June 2021.   S/p ablation and pacemaker placement 6/25/21 anticoagulated on apixaban, but stopped 5/12 due to subarachnoid hemorrhage.   Plan:    - Discontinued apixaban while in patient, reviewed with sister  - Continue metoprolol for rate control   - Remote PPM checks  - Comfort medications in place in the case of rapid decline    (F33.9) Depression  (G47.00) Insomnia  Comment: Chronic, limited medication options due to hyponatremia.   Plan:  - Continue Melatonin 10 mg PO q HS (9 pm), stop if trouble swallowing medications     (D53.9) Macrocytic anemia  (D69.6) Thrombocytopenia   Comment: Chronic in setting of ESRD, low PLT s/p hematoma  Plan:   - No further labs, treat to comfort  - Appreciate hospice supporst          (E78.5) HL  Comment: Chronic, last lipid panel 4/29/21 in [facility] system, LDL 49  Plan:   - Discharged on atorvastatin, hospice to review medications not adding to immediate comfort    (J44.9) COPD (H)  (G47.33) SKY  Comment: Stable. No cough, sputum production, purulence.  Saw pulmonology Dr. Linares 2/9/22, pulmonary function tests showed severe obstruction consistent with COPD.   Plan:   - Continue Bevespi started by pulmonology   - Continue PRN albuterol  - No longer wears CPAP, can use supplemental oxygen for comfort  - Comfort medications in place, consider nebulizer treatments given frailty if symptoms    (Z87.39) Hx of gout   Comment: Chronic  Plan:  - Would stop allopurinol defer to hospice admissions team    (K21.9) GERD  Comment: Chronic  Plan:   - Continue Protonix 40 mg daily, okay to continue for comfort    Electronically signed  by:  JUSTO Jerry CNP

## 2022-05-25 NOTE — PROGRESS NOTES
"[facility] GERIATRICS    Chief Complaint   Patient presents with     Nursing Home Acute     HPI:  Francine Chris is a 76 year old  (1945), who is being seen today for an episodic care visit at: [facility] () [[id number]].     Today's concern is:   Follow-up today to assess comfort 48 hours after hospice admission.  Visit with sister at bedside who assists in provider majority of hx due to resident with somnolence.    Admitted to [facility] on 5/23/22 under primary dx ESRD.     Medication list reconciled.  Following medications discontinued:  - Tylenol  - Allopurinol  - Apixaban  - Loratadine  - Melatonin   - Amlodipine  - Metoprolol  - Oxycodone  - Pantoprazole  - Renal MVI  - Torsemide  - Atorvastatin  - Calcium acetate    Comfort medications started:  - Morphine 5 mg q 6 hours, q 3 hours PRN  - Ativan 0.5 mg q 4 hours PRN     Resident intermittently alert, but confused.  Swings legs OOB periodically.  No apparent respiratory distress or cough.  No leg swelling.  BM on 5/23.  Staff documenting brief changes, still making some urine.  Taking some bites of food and sips of fluid.    Allergies, and PMH/PSH reviewed in [facility] today.    REVIEW OF SYSTEMS:  Unobtainable secondary to cognitive impairment.   History in HPI provided by resident sister and staff documentation    Objective:   /80   Pulse 89   Temp 97.5  F (36.4  C)   Resp 18   Ht 1.626 m (5' 4\")   Wt 56.5 kg (124 lb 9.6 oz)   SpO2 96%   BMI 21.39 kg/m    GENERAL APPEARANCE: Frail, laying in bed on left side  EYES:  Keeps eyes closed for most of visit  RESP:  Non-labored breathing, palpation of chest w/ L CW pacer, also R tunneled cath with dressing CDI, no chest wall tenderness, no respiratory distress, Lung sounds clear, no rales/wheeze/rhonchi.   CV:  Palpation - no murmur/non-displaced, chest w/ L CW pacer PMI Auscultation - rate and rhythm regular  VASCULAR: R UE fistula without bruit or thrill, " removed old dressing, about 0.5 cm in diameter open area surrounded by ecchymosis, non-tender, unable to express any drainage, cleansed area, covered with silicon boarder foam.  ABDOMEN:  Normal bowel sounds, soft, nontender, no grimacing or guarding with palpation.  M/S:  +khypohosis. Unable to assess left hip as laying on left side.  SKIN:  Inspection - Chest wall tunneled cath with CDI dressing no erythema or edema. Wound to left lower leg, wound brown scab. R UE fistula dressing changes as above. L hip dressing - unable to assess. Palpation- no increased warmth, skin is dry and non-tender.  PSYCH: Somnolent.    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 Appears comfortable with current interventions from [facility] team.  Plan:   - Agree with stopping medications not adding to comfort, would recommend changing MSIR to hydromorphone given ESRD. Will reach out to hospice team.  - Appreciate hospice supports  - [facility] to discuss celebration of life wish with pt/sister  - Anticipatory guidance provided to pt/family regarding prognosis, on order of days. Emotional support provided    Electronically signed by: JUSTO Jerry CNP     05/26/22 1:32 PM  Met with hospice RN: Agree with change opioid therapy to hydromorphone 2 mg q 6 hours and 1 mg q 2 hours PRN due to ESRD after speaking again with hospice  physician.

## 2022-05-27 RX ORDER — HYDROMORPHONE HYDROCHLORIDE 1 MG/ML
SOLUTION ORAL
Refills: 0 | COMMUNITY
Start: 2022-01-01

## 2024-09-26 NOTE — PLAN OF CARE
Goal Outcome Evaluation:  A/Ox2 - mentation waxes and wanes with periods of lethargy - Dr. Dey aware, up with 2 assist/GB/walker, patient was up in chair for about an hour and a half, incontinent of bowel and bladder, and patient needs assistance with meals. Continue to monitor.                       Yes

## 2025-01-02 NOTE — PROGRESS NOTES
[facility]    Medicine Progress Note - Hospitalist Service        Date of Admission:  4/16/2022  6:46 PM    Assessment & Plan:   Francine Chris is a 76 year old female with a past medical history of with a past medical history of COPD, hypertension, SKY, peripheral artery disease, depression, A. fib on Eliquis, end-stage renal disease on dialysis Mondays Wednesdays and Fridays presents to hospital with an AV fistula hematoma.     Right arm hematoma around AV fisula  -3 days of swelling, pain, bruising at her AV fistula site. Symptoms progressed so she presented to emergency room.  -Ultrasound confirmed a 8.4 cm hematoma just distal to the AV fistula.  Fistula was patent.    -hold eliquis, last dose 4/16 am  -Vascular surgery following, plan for drainage of hematoma today.  -NPO   -symptomatic treatment of pain.     Hyponatremia   Chronic, slightly worse than baseline; at presentation 122.  Baseline is between 124-130.  Patient is on 1200 mL fluid restriction.  -Sodium is marginally worse today at 119, recheck after dialysis.  -Continue with fluid restriction     Macrocytic anemia  -Chronic, likely due to renal failure     ESRD on hd MWF  -Nephrology following for dialysis, getting dialyzed this morning before surgery.     COPD  -not in exacerbation  -Albuterol as needed     Atrial fibrillation status post AV node ablation  NSVT  S/p ppm  HTN  Hyperlipidemia  Severe tricuspid regurgitation  Pulmonary hypertension  Chronic right heart failure  -Appears euvolemic on exam   -hold Eliquis  -Continue metoprolol, statin, torsemide     SKY  -Does not tolerate CPAP     Hx of gout  -Allopurinol after dialysis     GERD  -PPI       Diet: Fluid restriction 1200 ML FLUID  NPO per Anesthesia Guidelines for Procedure/Surgery Except for: Meds     DVT Prophylaxis: Pneumatic Compression Devices   Schuler Catheter: Not present  Code Status: Full Code     Disposition Plan    Expected Discharge: 04/19/2022    "  Anticipated discharge location:  Awaiting care coordination huddle  Delays:        Entered: Cristian Trivedi MD 04/18/2022, 9:57 AM        Clinically Significant Risk Factors Present on Admission                  The patient's care was discussed with the Bedside Nurse, Patient and Patient's Family.    Cristian Trivedi MD  Hospitalist Service  [facility]  Text Page 7AM-6PM  Securely message with the Vocera Web Console (learn more here)  Text page via [employer] Paging/Directory    ______________________________________________________________________    Interval History   Pain essentially unchanged in her right arm/forearm area.  Patient getting dialyzed this morning.  Plan for hematoma drainage on the right antecubital area later today by vascular surgery.  Sodium slightly lower today at 119.    Data reviewed today: I reviewed all medications, new labs and imaging results over the last 24 hours. I personally reviewed no images or EKG's today.    Physical Exam   Vital signs:  Temp: 97  F (36.1  C) Temp src: Oral BP: 123/57 Pulse: 69   Resp: 16 SpO2: 97 % O2 Device: None (Room air)   Height: 162.6 cm (5' 4\") Weight: 59.9 kg (132 lb 0.9 oz)  Estimated body mass index is 22.67 kg/m  as calculated from the following:    Height as of this encounter: 1.626 m (5' 4\").    Weight as of this encounter: 59.9 kg (132 lb 0.9 oz).      Wt Readings from Last 2 Encounters:   04/18/22 59.9 kg (132 lb 0.9 oz)   04/13/22 59.9 kg (132 lb)       Gen: AAOX3, NAD, comfortable  HEENT:  no pallor  Resp: CTA B/L, normal WOB, no wheezes  CVS: RRR, no murmur  Abd/GI: Soft, non-tender. BS- normoactive.    Skin: Warm, dry no rashes  MSK: right arm-tense hematoma around the AC fossa; with surrounding bruising, tender  Neuro- CN- intact. No focal deficits.      Data   Recent Labs   Lab 04/18/22  0840 04/17/22  0737 04/16/22  1907   WBC 6.0 6.9 7.3   HGB 9.4* 10.3* 10.8*   MCV 99 102* 99   * 170 188   INR  --  1.15  " --    * 122* 121*   POTASSIUM 4.8 4.8 4.2   CHLORIDE 85* 90* 84*   CO2 22 23 25   BUN 72* 55* 49*   CR 5.31* 5.04* 4.71*   ANIONGAP 12 9 12   SILVIA 9.4 9.8 10.3*   GLC 87 87 127*       No results found for this or any previous visit (from the past 24 hour(s)).  Medications       sodium chloride 0.9%  250 mL Intravenous Once in dialysis/CRRT     sodium chloride 0.9%  300 mL Hemodialysis Machine Once     sodium chloride 0.9%  250 mL Intravenous Once in dialysis/CRRT     sodium chloride 0.9%  300 mL Hemodialysis Machine Once     atorvastatin  40 mg Oral Daily     sodium chloride (PF) 0.9%  1.3-2.6 mL Intracatheter Once in dialysis/CRRT    Followed by     heparin  3 mL Intracatheter Once in dialysis/CRRT     sodium chloride (PF) 0.9%  1.3-2.6 mL Intracatheter Once in dialysis/CRRT    Followed by     heparin  3 mL Intracatheter Once in dialysis/CRRT     metoprolol succinate ER  12.5 mg Oral BID     - MEDICATION INSTRUCTIONS -   Does not apply Once     - MEDICATION INSTRUCTIONS -   Does not apply Once     pantoprazole  40 mg Oral Daily     sodium chloride (PF)  3 mL Intracatheter Q8H     sodium chloride (PF)  9 mL Intracatheter During Dialysis/CRRT (from stock)     sodium chloride (PF)  9 mL Intracatheter During Dialysis/CRRT (from stock)     torsemide  40 mg Oral Daily                DISPLAY PLAN FREE TEXT

## 2025-07-14 NOTE — PROGRESS NOTES
"Nutrition Admission Risk Screen Received -   Have you recently lost weight without trying in the last 6 months ? - \"unsure\"  Have you been eating poorly due to a decreased appetite ?- \"no\"    - Weight trending reviewed --> No losses indicated. Appears to be increasing.   Wt Readings from Last 10 Encounters:   05/10/22 66.1 kg (145 lb 11.6 oz)   05/05/22 64 kg (141 lb)   05/02/22 64 kg (141 lb)   04/25/22 63.8 kg (140 lb 9.6 oz)   04/22/22 60 kg (132 lb 4.8 oz)   04/18/22 59.9 kg (132 lb 0.9 oz)   04/13/22 59.9 kg (132 lb)   04/08/22 59.5 kg (131 lb 3.2 oz)   03/28/22 60.1 kg (132 lb 9.6 oz)   03/23/22 60.1 kg (132 lb 9.6 oz)     NPO today for possible surgery.     Does not meet criteria for full nutrition assessment at this time. Will recheck at LOS or sooner if consulted.     Glenis Richard RD, LD  [facility], Ortho, [facility]o Spine  Pager: [phone]  Weekend Pager: [phone]    " Her/She

## (undated) DEVICE — SU ETHIBOND 5 V-37 4X30" MB66G

## (undated) DEVICE — SU PROLENE 4-0 V-7DA 36" 8975H

## (undated) DEVICE — ESU GROUND PAD UNIVERSAL W/O CORD

## (undated) DEVICE — GLOVE PROTEXIS W/NEU-THERA 7.5  2D73TE75

## (undated) DEVICE — DRSG AQUACEL AG 3.5X9.75" HYDROFIBER 412011

## (undated) DEVICE — SU MONOCRYL 2-0 CT-1 36" UND Y945H

## (undated) DEVICE — GLOVE PROTEXIS W/NEU-THERA 7.0  2D73TE70

## (undated) DEVICE — BNDG ROLLER GAUZE CONFORM 4"X4YD 41-54

## (undated) DEVICE — DRAPE IOBAN INCISE 23X17" 6650EZ

## (undated) DEVICE — LINEN TOWEL PACK X5 5464

## (undated) DEVICE — MANIFOLD NEPTUNE 4 PORT 700-20

## (undated) DEVICE — PREP CHLORAPREP 26ML TINTED ORANGE  260815

## (undated) DEVICE — SOL NACL 0.9% INJ 250ML BAG 2B1322Q

## (undated) DEVICE — SYR 10ML SLIP TIP W/O NDL

## (undated) DEVICE — BONE CEMENT MIXEVAC HI VAC W/CARTRIDGE 0306-563-000

## (undated) DEVICE — SOL WATER IRRIG 1000ML BOTTLE 2F7114

## (undated) DEVICE — GLOVE PROTEXIS BLUE W/NEU-THERA 7.0  2D73EB70

## (undated) DEVICE — SU STRATAFIX PDS PLUS 1 CT-1 18" SXPP1A404

## (undated) DEVICE — GLOVE PROTEXIS BLUE W/NEU-THERA 8.0  2D73EB80

## (undated) DEVICE — SOLUTION WOUND CLEANSING 3/4OZ 10% PVP EA-L3011FB-50

## (undated) DEVICE — BONE CLEANING TIP INTERPULSE  0210-010-000

## (undated) DEVICE — PACK AV FISTULA CUSTOM SCV15AVFS1

## (undated) DEVICE — DECANTER VIAL 2006S

## (undated) DEVICE — NDL 19GA 1.5"

## (undated) DEVICE — SU PLEDGET SOFT TFE 13MMX7MMX1.5MM D7044

## (undated) DEVICE — BLADE SAW SAGITTAL STRK 18X90X1.37MM HD SYS 6 6118-137-090

## (undated) DEVICE — SU SILK 3-0 TIE 24" SA74H

## (undated) DEVICE — DECANTER BAG 2002S

## (undated) DEVICE — GLOVE PROTEXIS W/NEU-THERA 8.0  2D73TE80

## (undated) DEVICE — SOL NACL 0.9% IRRIG 1000ML BOTTLE 2F7124

## (undated) DEVICE — SU SILK 4-0 TIE 24" SA73H

## (undated) DEVICE — SUCTION CANISTER MEDIVAC LINER 3000ML W/LID 65651-530

## (undated) DEVICE — PAD FOAM MCGUIRE KIT 0814-0150

## (undated) DEVICE — DRAIN JACKSON PRATT RESERVOIR 100ML SU130-1305

## (undated) DEVICE — SU ETHILON 4-0 FS-2 18" 662H

## (undated) DEVICE — PACK TOTAL HIP W/POUCH SOP15HPFSM

## (undated) DEVICE — DRAPE STERI TOWEL LG 1010

## (undated) DEVICE — SU VICRYL 2-0 CT 36" J957H

## (undated) DEVICE — SU VICRYL 3-0 SH 27" J316H

## (undated) DEVICE — TUBING SUCTION 12"X1/4" N612

## (undated) DEVICE — GLOVE PROTEXIS POWDER FREE 7.5 ORTHOPEDIC 2D73ET75

## (undated) DEVICE — GLOVE PROTEXIS POWDER FREE 6.5 ORTHOPEDIC 2D73ET65

## (undated) DEVICE — SU ETHILON 3-0 FS-1 18" 663G

## (undated) DEVICE — DRSG KERLIX 4 1/2"X4YDS ROLL 6715

## (undated) DEVICE — SU VICRYL 0 CT 36" J358H

## (undated) DEVICE — DRAIN JACKSON PRATT 15FR ROUND SIL LF JP-2229

## (undated) DEVICE — SUCTION IRR SYSTEM W/O TIP INTERPULSE HANDPIECE 0210-100-000

## (undated) DEVICE — SYR 30ML LL W/O NDL 302832

## (undated) DEVICE — SU VICRYL 0 CT-1 CR 8X18" J740D

## (undated) DEVICE — BONE CLEANING TIP INTERPULSE FEMORAL CANAL 0210-008-000

## (undated) DEVICE — SYR 20ML LL W/O NDL 302830

## (undated) DEVICE — NDL ANGIOCATH 20GA 1.25" 4056

## (undated) DEVICE — DRAIN ROUND W/RESERV KIT JACKSON PRATT 10FR 400ML SU130-402D

## (undated) DEVICE — SYR 03ML LL W/O NDL 309657

## (undated) DEVICE — DRSG KERLIX FLUFFS X5

## (undated) RX ORDER — FENTANYL CITRATE 50 UG/ML
INJECTION, SOLUTION INTRAMUSCULAR; INTRAVENOUS
Status: DISPENSED
Start: 2022-01-01

## (undated) RX ORDER — NEOSTIGMINE METHYLSULFATE 1 MG/ML
VIAL (ML) INJECTION
Status: DISPENSED
Start: 2022-01-01

## (undated) RX ORDER — REGADENOSON 0.08 MG/ML
INJECTION, SOLUTION INTRAVENOUS
Status: DISPENSED
Start: 2022-01-01

## (undated) RX ORDER — GLYCOPYRROLATE 0.2 MG/ML
INJECTION, SOLUTION INTRAMUSCULAR; INTRAVENOUS
Status: DISPENSED
Start: 2022-01-01

## (undated) RX ORDER — CEFAZOLIN SODIUM 1 G/3ML
INJECTION, POWDER, FOR SOLUTION INTRAMUSCULAR; INTRAVENOUS
Status: DISPENSED
Start: 2022-01-01